# Patient Record
Sex: MALE | Race: OTHER | HISPANIC OR LATINO | URBAN - METROPOLITAN AREA
[De-identification: names, ages, dates, MRNs, and addresses within clinical notes are randomized per-mention and may not be internally consistent; named-entity substitution may affect disease eponyms.]

---

## 2023-03-05 ENCOUNTER — INPATIENT (INPATIENT)
Facility: HOSPITAL | Age: 63
LOS: 25 days | Discharge: NURSING, REHAB OR LONG-TERM CARE | DRG: 871 | End: 2023-03-31
Attending: INTERNAL MEDICINE | Admitting: INTERNAL MEDICINE
Payer: MEDICAID

## 2023-03-05 VITALS
HEART RATE: 130 BPM | WEIGHT: 190.04 LBS | DIASTOLIC BLOOD PRESSURE: 68 MMHG | SYSTOLIC BLOOD PRESSURE: 105 MMHG | OXYGEN SATURATION: 94 % | RESPIRATION RATE: 26 BRPM | HEIGHT: 70 IN

## 2023-03-05 DIAGNOSIS — R53.2 FUNCTIONAL QUADRIPLEGIA: ICD-10-CM

## 2023-03-05 DIAGNOSIS — J18.9 PNEUMONIA, UNSPECIFIED ORGANISM: ICD-10-CM

## 2023-03-05 DIAGNOSIS — Z98.890 OTHER SPECIFIED POSTPROCEDURAL STATES: Chronic | ICD-10-CM

## 2023-03-05 DIAGNOSIS — J96.21 ACUTE AND CHRONIC RESPIRATORY FAILURE WITH HYPOXIA: ICD-10-CM

## 2023-03-05 DIAGNOSIS — D63.8 ANEMIA IN OTHER CHRONIC DISEASES CLASSIFIED ELSEWHERE: ICD-10-CM

## 2023-03-05 DIAGNOSIS — E11.9 TYPE 2 DIABETES MELLITUS WITHOUT COMPLICATIONS: ICD-10-CM

## 2023-03-05 DIAGNOSIS — R74.01 ELEVATION OF LEVELS OF LIVER TRANSAMINASE LEVELS: ICD-10-CM

## 2023-03-05 DIAGNOSIS — A41.9 SEPSIS, UNSPECIFIED ORGANISM: ICD-10-CM

## 2023-03-05 DIAGNOSIS — I10 ESSENTIAL (PRIMARY) HYPERTENSION: ICD-10-CM

## 2023-03-05 DIAGNOSIS — Z71.89 OTHER SPECIFIED COUNSELING: ICD-10-CM

## 2023-03-05 DIAGNOSIS — E43 UNSPECIFIED SEVERE PROTEIN-CALORIE MALNUTRITION: ICD-10-CM

## 2023-03-05 DIAGNOSIS — G93.40 ENCEPHALOPATHY, UNSPECIFIED: ICD-10-CM

## 2023-03-05 LAB
ALBUMIN SERPL ELPH-MCNC: 1.8 G/DL — LOW (ref 3.5–5)
ALBUMIN SERPL ELPH-MCNC: 2.1 G/DL — LOW (ref 3.5–5)
ALP SERPL-CCNC: 150 U/L — HIGH (ref 40–120)
ALP SERPL-CCNC: 198 U/L — HIGH (ref 40–120)
ALT FLD-CCNC: 103 U/L DA — HIGH (ref 10–60)
ALT FLD-CCNC: 80 U/L DA — HIGH (ref 10–60)
ANION GAP SERPL CALC-SCNC: 5 MMOL/L — SIGNIFICANT CHANGE UP (ref 5–17)
ANION GAP SERPL CALC-SCNC: 5 MMOL/L — SIGNIFICANT CHANGE UP (ref 5–17)
APPEARANCE UR: CLEAR — SIGNIFICANT CHANGE UP
APTT BLD: 26.1 SEC — LOW (ref 27.5–35.5)
AST SERPL-CCNC: 121 U/L — HIGH (ref 10–40)
AST SERPL-CCNC: 81 U/L — HIGH (ref 10–40)
BACTERIA # UR AUTO: ABNORMAL /HPF
BASOPHILS # BLD AUTO: 0.03 K/UL — SIGNIFICANT CHANGE UP (ref 0–0.2)
BASOPHILS # BLD AUTO: 0.03 K/UL — SIGNIFICANT CHANGE UP (ref 0–0.2)
BASOPHILS NFR BLD AUTO: 0.5 % — SIGNIFICANT CHANGE UP (ref 0–2)
BASOPHILS NFR BLD AUTO: 0.8 % — SIGNIFICANT CHANGE UP (ref 0–2)
BILIRUB SERPL-MCNC: 0.8 MG/DL — SIGNIFICANT CHANGE UP (ref 0.2–1.2)
BILIRUB SERPL-MCNC: 0.8 MG/DL — SIGNIFICANT CHANGE UP (ref 0.2–1.2)
BILIRUB UR-MCNC: NEGATIVE — SIGNIFICANT CHANGE UP
BUN SERPL-MCNC: 11 MG/DL — SIGNIFICANT CHANGE UP (ref 7–18)
BUN SERPL-MCNC: 15 MG/DL — SIGNIFICANT CHANGE UP (ref 7–18)
CALCIUM SERPL-MCNC: 7.8 MG/DL — LOW (ref 8.4–10.5)
CALCIUM SERPL-MCNC: 8.5 MG/DL — SIGNIFICANT CHANGE UP (ref 8.4–10.5)
CHLORIDE SERPL-SCNC: 107 MMOL/L — SIGNIFICANT CHANGE UP (ref 96–108)
CHLORIDE SERPL-SCNC: 110 MMOL/L — HIGH (ref 96–108)
CO2 SERPL-SCNC: 24 MMOL/L — SIGNIFICANT CHANGE UP (ref 22–31)
CO2 SERPL-SCNC: 25 MMOL/L — SIGNIFICANT CHANGE UP (ref 22–31)
COLOR SPEC: YELLOW — SIGNIFICANT CHANGE UP
COMMENT - URINE: SIGNIFICANT CHANGE UP
CREAT SERPL-MCNC: 0.47 MG/DL — LOW (ref 0.5–1.3)
CREAT SERPL-MCNC: 0.59 MG/DL — SIGNIFICANT CHANGE UP (ref 0.5–1.3)
DIFF PNL FLD: ABNORMAL
EGFR: 111 ML/MIN/1.73M2 — SIGNIFICANT CHANGE UP
EGFR: 119 ML/MIN/1.73M2 — SIGNIFICANT CHANGE UP
EOSINOPHIL # BLD AUTO: 0.01 K/UL — SIGNIFICANT CHANGE UP (ref 0–0.5)
EOSINOPHIL # BLD AUTO: 0.02 K/UL — SIGNIFICANT CHANGE UP (ref 0–0.5)
EOSINOPHIL NFR BLD AUTO: 0.3 % — SIGNIFICANT CHANGE UP (ref 0–6)
EOSINOPHIL NFR BLD AUTO: 0.3 % — SIGNIFICANT CHANGE UP (ref 0–6)
EPI CELLS # UR: SIGNIFICANT CHANGE UP /HPF
FLUAV AG NPH QL: SIGNIFICANT CHANGE UP
FLUBV AG NPH QL: SIGNIFICANT CHANGE UP
GLUCOSE SERPL-MCNC: 105 MG/DL — HIGH (ref 70–99)
GLUCOSE SERPL-MCNC: 166 MG/DL — HIGH (ref 70–99)
GLUCOSE UR QL: NEGATIVE — SIGNIFICANT CHANGE UP
HCT VFR BLD CALC: 25.3 % — LOW (ref 39–50)
HCT VFR BLD CALC: 27 % — LOW (ref 39–50)
HGB BLD-MCNC: 8 G/DL — LOW (ref 13–17)
HGB BLD-MCNC: 8.5 G/DL — LOW (ref 13–17)
IMM GRANULOCYTES NFR BLD AUTO: 0.3 % — SIGNIFICANT CHANGE UP (ref 0–0.9)
IMM GRANULOCYTES NFR BLD AUTO: 0.3 % — SIGNIFICANT CHANGE UP (ref 0–0.9)
INR BLD: 1.47 RATIO — HIGH (ref 0.88–1.16)
KETONES UR-MCNC: NEGATIVE — SIGNIFICANT CHANGE UP
LACTATE SERPL-SCNC: 1.3 MMOL/L — SIGNIFICANT CHANGE UP (ref 0.7–2)
LACTATE SERPL-SCNC: 2.2 MMOL/L — HIGH (ref 0.7–2)
LEUKOCYTE ESTERASE UR-ACNC: NEGATIVE — SIGNIFICANT CHANGE UP
LYMPHOCYTES # BLD AUTO: 0.41 K/UL — LOW (ref 1–3.3)
LYMPHOCYTES # BLD AUTO: 0.71 K/UL — LOW (ref 1–3.3)
LYMPHOCYTES # BLD AUTO: 17.8 % — SIGNIFICANT CHANGE UP (ref 13–44)
LYMPHOCYTES # BLD AUTO: 6.5 % — LOW (ref 13–44)
MAGNESIUM SERPL-MCNC: 1.8 MG/DL — SIGNIFICANT CHANGE UP (ref 1.6–2.6)
MCHC RBC-ENTMCNC: 29.6 PG — SIGNIFICANT CHANGE UP (ref 27–34)
MCHC RBC-ENTMCNC: 29.6 PG — SIGNIFICANT CHANGE UP (ref 27–34)
MCHC RBC-ENTMCNC: 31.5 GM/DL — LOW (ref 32–36)
MCHC RBC-ENTMCNC: 31.6 GM/DL — LOW (ref 32–36)
MCV RBC AUTO: 93.7 FL — SIGNIFICANT CHANGE UP (ref 80–100)
MCV RBC AUTO: 94.1 FL — SIGNIFICANT CHANGE UP (ref 80–100)
MONOCYTES # BLD AUTO: 0.42 K/UL — SIGNIFICANT CHANGE UP (ref 0–0.9)
MONOCYTES # BLD AUTO: 0.5 K/UL — SIGNIFICANT CHANGE UP (ref 0–0.9)
MONOCYTES NFR BLD AUTO: 10.5 % — SIGNIFICANT CHANGE UP (ref 2–14)
MONOCYTES NFR BLD AUTO: 7.9 % — SIGNIFICANT CHANGE UP (ref 2–14)
NEUTROPHILS # BLD AUTO: 2.82 K/UL — SIGNIFICANT CHANGE UP (ref 1.8–7.4)
NEUTROPHILS # BLD AUTO: 5.31 K/UL — SIGNIFICANT CHANGE UP (ref 1.8–7.4)
NEUTROPHILS NFR BLD AUTO: 70.3 % — SIGNIFICANT CHANGE UP (ref 43–77)
NEUTROPHILS NFR BLD AUTO: 84.5 % — HIGH (ref 43–77)
NITRITE UR-MCNC: NEGATIVE — SIGNIFICANT CHANGE UP
NRBC # BLD: 0 /100 WBCS — SIGNIFICANT CHANGE UP (ref 0–0)
NRBC # BLD: 0 /100 WBCS — SIGNIFICANT CHANGE UP (ref 0–0)
PH UR: 6.5 — SIGNIFICANT CHANGE UP (ref 5–8)
PHOSPHATE SERPL-MCNC: 2.5 MG/DL — SIGNIFICANT CHANGE UP (ref 2.5–4.5)
PLATELET # BLD AUTO: 209 K/UL — SIGNIFICANT CHANGE UP (ref 150–400)
PLATELET # BLD AUTO: 217 K/UL — SIGNIFICANT CHANGE UP (ref 150–400)
POTASSIUM SERPL-MCNC: 3.3 MMOL/L — LOW (ref 3.5–5.3)
POTASSIUM SERPL-MCNC: 3.6 MMOL/L — SIGNIFICANT CHANGE UP (ref 3.5–5.3)
POTASSIUM SERPL-SCNC: 3.3 MMOL/L — LOW (ref 3.5–5.3)
POTASSIUM SERPL-SCNC: 3.6 MMOL/L — SIGNIFICANT CHANGE UP (ref 3.5–5.3)
PROT SERPL-MCNC: 6.3 G/DL — SIGNIFICANT CHANGE UP (ref 6–8.3)
PROT SERPL-MCNC: 6.8 G/DL — SIGNIFICANT CHANGE UP (ref 6–8.3)
PROT UR-MCNC: 30 MG/DL
PROTHROM AB SERPL-ACNC: 17.6 SEC — HIGH (ref 10.5–13.4)
RAPID RVP RESULT: SIGNIFICANT CHANGE UP
RBC # BLD: 2.7 M/UL — LOW (ref 4.2–5.8)
RBC # BLD: 2.87 M/UL — LOW (ref 4.2–5.8)
RBC # FLD: 13.5 % — SIGNIFICANT CHANGE UP (ref 10.3–14.5)
RBC # FLD: 13.7 % — SIGNIFICANT CHANGE UP (ref 10.3–14.5)
RBC CASTS # UR COMP ASSIST: ABNORMAL /HPF (ref 0–2)
SARS-COV-2 RNA SPEC QL NAA+PROBE: SIGNIFICANT CHANGE UP
SARS-COV-2 RNA SPEC QL NAA+PROBE: SIGNIFICANT CHANGE UP
SODIUM SERPL-SCNC: 137 MMOL/L — SIGNIFICANT CHANGE UP (ref 135–145)
SODIUM SERPL-SCNC: 139 MMOL/L — SIGNIFICANT CHANGE UP (ref 135–145)
SP GR SPEC: 1.01 — SIGNIFICANT CHANGE UP (ref 1.01–1.02)
TROPONIN I, HIGH SENSITIVITY RESULT: 10.5 NG/L — SIGNIFICANT CHANGE UP
UROBILINOGEN FLD QL: 12
WBC # BLD: 4 K/UL — SIGNIFICANT CHANGE UP (ref 3.8–10.5)
WBC # BLD: 6.29 K/UL — SIGNIFICANT CHANGE UP (ref 3.8–10.5)
WBC # FLD AUTO: 4 K/UL — SIGNIFICANT CHANGE UP (ref 3.8–10.5)
WBC # FLD AUTO: 6.29 K/UL — SIGNIFICANT CHANGE UP (ref 3.8–10.5)
WBC UR QL: SIGNIFICANT CHANGE UP /HPF (ref 0–5)

## 2023-03-05 PROCEDURE — 93010 ELECTROCARDIOGRAM REPORT: CPT

## 2023-03-05 PROCEDURE — 71045 X-RAY EXAM CHEST 1 VIEW: CPT | Mod: 26

## 2023-03-05 PROCEDURE — 99285 EMERGENCY DEPT VISIT HI MDM: CPT

## 2023-03-05 RX ORDER — DOXAZOSIN MESYLATE 4 MG
2 TABLET ORAL AT BEDTIME
Refills: 0 | Status: DISCONTINUED | OUTPATIENT
Start: 2023-03-05 | End: 2023-03-14

## 2023-03-05 RX ORDER — PIPERACILLIN AND TAZOBACTAM 4; .5 G/20ML; G/20ML
3.38 INJECTION, POWDER, LYOPHILIZED, FOR SOLUTION INTRAVENOUS ONCE
Refills: 0 | Status: COMPLETED | OUTPATIENT
Start: 2023-03-05 | End: 2023-03-05

## 2023-03-05 RX ORDER — INSULIN GLARGINE 100 [IU]/ML
12 INJECTION, SOLUTION SUBCUTANEOUS EVERY MORNING
Refills: 0 | Status: DISCONTINUED | OUTPATIENT
Start: 2023-03-05 | End: 2023-03-31

## 2023-03-05 RX ORDER — ACETAMINOPHEN 500 MG
650 TABLET ORAL ONCE
Refills: 0 | Status: COMPLETED | OUTPATIENT
Start: 2023-03-05 | End: 2023-03-05

## 2023-03-05 RX ORDER — CIPROFLOXACIN LACTATE 400MG/40ML
400 VIAL (ML) INTRAVENOUS ONCE
Refills: 0 | Status: DISCONTINUED | OUTPATIENT
Start: 2023-03-05 | End: 2023-03-05

## 2023-03-05 RX ORDER — ACETAMINOPHEN 500 MG
650 TABLET ORAL EVERY 6 HOURS
Refills: 0 | Status: DISCONTINUED | OUTPATIENT
Start: 2023-03-05 | End: 2023-03-05

## 2023-03-05 RX ORDER — CEFEPIME 1 G/1
2000 INJECTION, POWDER, FOR SOLUTION INTRAMUSCULAR; INTRAVENOUS ONCE
Refills: 0 | Status: COMPLETED | OUTPATIENT
Start: 2023-03-05 | End: 2023-03-05

## 2023-03-05 RX ORDER — INSULIN LISPRO 100/ML
6 VIAL (ML) SUBCUTANEOUS EVERY 6 HOURS
Refills: 0 | Status: DISCONTINUED | OUTPATIENT
Start: 2023-03-05 | End: 2023-03-31

## 2023-03-05 RX ORDER — ENOXAPARIN SODIUM 100 MG/ML
40 INJECTION SUBCUTANEOUS EVERY 24 HOURS
Refills: 0 | Status: DISCONTINUED | OUTPATIENT
Start: 2023-03-05 | End: 2023-03-31

## 2023-03-05 RX ORDER — CEFEPIME 1 G/1
INJECTION, POWDER, FOR SOLUTION INTRAMUSCULAR; INTRAVENOUS
Refills: 0 | Status: DISCONTINUED | OUTPATIENT
Start: 2023-03-05 | End: 2023-03-13

## 2023-03-05 RX ORDER — SENNA PLUS 8.6 MG/1
10 TABLET ORAL DAILY
Refills: 0 | Status: DISCONTINUED | OUTPATIENT
Start: 2023-03-05 | End: 2023-03-31

## 2023-03-05 RX ORDER — ONDANSETRON 8 MG/1
4 TABLET, FILM COATED ORAL EVERY 8 HOURS
Refills: 0 | Status: DISCONTINUED | OUTPATIENT
Start: 2023-03-05 | End: 2023-03-31

## 2023-03-05 RX ORDER — CEFEPIME 1 G/1
2000 INJECTION, POWDER, FOR SOLUTION INTRAMUSCULAR; INTRAVENOUS EVERY 8 HOURS
Refills: 0 | Status: DISCONTINUED | OUTPATIENT
Start: 2023-03-05 | End: 2023-03-13

## 2023-03-05 RX ORDER — VANCOMYCIN HCL 1 G
1250 VIAL (EA) INTRAVENOUS EVERY 12 HOURS
Refills: 0 | Status: DISCONTINUED | OUTPATIENT
Start: 2023-03-05 | End: 2023-03-07

## 2023-03-05 RX ORDER — AMANTADINE HCL 100 MG
100 CAPSULE ORAL
Refills: 0 | Status: DISCONTINUED | OUTPATIENT
Start: 2023-03-05 | End: 2023-03-31

## 2023-03-05 RX ORDER — POLYETHYLENE GLYCOL 3350 17 G/17G
17 POWDER, FOR SOLUTION ORAL DAILY
Refills: 0 | Status: DISCONTINUED | OUTPATIENT
Start: 2023-03-05 | End: 2023-03-31

## 2023-03-05 RX ORDER — ATORVASTATIN CALCIUM 80 MG/1
80 TABLET, FILM COATED ORAL AT BEDTIME
Refills: 0 | Status: DISCONTINUED | OUTPATIENT
Start: 2023-03-05 | End: 2023-03-12

## 2023-03-05 RX ORDER — ASPIRIN/CALCIUM CARB/MAGNESIUM 324 MG
81 TABLET ORAL DAILY
Refills: 0 | Status: DISCONTINUED | OUTPATIENT
Start: 2023-03-05 | End: 2023-03-31

## 2023-03-05 RX ORDER — INSULIN GLARGINE 100 [IU]/ML
12 INJECTION, SOLUTION SUBCUTANEOUS AT BEDTIME
Refills: 0 | Status: DISCONTINUED | OUTPATIENT
Start: 2023-03-05 | End: 2023-03-31

## 2023-03-05 RX ORDER — POTASSIUM CHLORIDE 20 MEQ
40 PACKET (EA) ORAL ONCE
Refills: 0 | Status: COMPLETED | OUTPATIENT
Start: 2023-03-05 | End: 2023-03-05

## 2023-03-05 RX ORDER — DOXAZOSIN MESYLATE 4 MG
2 TABLET ORAL AT BEDTIME
Refills: 0 | Status: DISCONTINUED | OUTPATIENT
Start: 2023-03-05 | End: 2023-03-05

## 2023-03-05 RX ORDER — ACETAMINOPHEN 500 MG
650 TABLET ORAL EVERY 6 HOURS
Refills: 0 | Status: DISCONTINUED | OUTPATIENT
Start: 2023-03-05 | End: 2023-03-31

## 2023-03-05 RX ORDER — INSULIN LISPRO 100/ML
VIAL (ML) SUBCUTANEOUS EVERY 6 HOURS
Refills: 0 | Status: DISCONTINUED | OUTPATIENT
Start: 2023-03-05 | End: 2023-03-31

## 2023-03-05 RX ORDER — SODIUM CHLORIDE 9 MG/ML
3 INJECTION INTRAMUSCULAR; INTRAVENOUS; SUBCUTANEOUS EVERY 4 HOURS
Refills: 0 | Status: DISCONTINUED | OUTPATIENT
Start: 2023-03-05 | End: 2023-03-05

## 2023-03-05 RX ORDER — NICOTINE POLACRILEX 2 MG
1 GUM BUCCAL DAILY
Refills: 0 | Status: DISCONTINUED | OUTPATIENT
Start: 2023-03-05 | End: 2023-03-28

## 2023-03-05 RX ORDER — AMANTADINE HCL 100 MG
100 CAPSULE ORAL ONCE
Refills: 0 | Status: DISCONTINUED | OUTPATIENT
Start: 2023-03-05 | End: 2023-03-05

## 2023-03-05 RX ORDER — SODIUM CHLORIDE 9 MG/ML
2700 INJECTION INTRAMUSCULAR; INTRAVENOUS; SUBCUTANEOUS ONCE
Refills: 0 | Status: COMPLETED | OUTPATIENT
Start: 2023-03-05 | End: 2023-03-05

## 2023-03-05 RX ORDER — LANOLIN ALCOHOL/MO/W.PET/CERES
3 CREAM (GRAM) TOPICAL AT BEDTIME
Refills: 0 | Status: DISCONTINUED | OUTPATIENT
Start: 2023-03-05 | End: 2023-03-31

## 2023-03-05 RX ORDER — IPRATROPIUM/ALBUTEROL SULFATE 18-103MCG
3 AEROSOL WITH ADAPTER (GRAM) INHALATION EVERY 6 HOURS
Refills: 0 | Status: DISCONTINUED | OUTPATIENT
Start: 2023-03-05 | End: 2023-03-31

## 2023-03-05 RX ORDER — ASPIRIN/CALCIUM CARB/MAGNESIUM 324 MG
81 TABLET ORAL DAILY
Refills: 0 | Status: DISCONTINUED | OUTPATIENT
Start: 2023-03-05 | End: 2023-03-05

## 2023-03-05 RX ADMIN — POLYETHYLENE GLYCOL 3350 17 GRAM(S): 17 POWDER, FOR SOLUTION ORAL at 14:06

## 2023-03-05 RX ADMIN — Medication 40 MILLIEQUIVALENT(S): at 14:58

## 2023-03-05 RX ADMIN — Medication 81 MILLIGRAM(S): at 14:06

## 2023-03-05 RX ADMIN — CEFEPIME 100 MILLIGRAM(S): 1 INJECTION, POWDER, FOR SOLUTION INTRAMUSCULAR; INTRAVENOUS at 09:21

## 2023-03-05 RX ADMIN — Medication 1 PATCH: at 19:31

## 2023-03-05 RX ADMIN — Medication 1 PATCH: at 14:07

## 2023-03-05 RX ADMIN — ENOXAPARIN SODIUM 40 MILLIGRAM(S): 100 INJECTION SUBCUTANEOUS at 10:11

## 2023-03-05 RX ADMIN — Medication 650 MILLIGRAM(S): at 06:24

## 2023-03-05 RX ADMIN — PIPERACILLIN AND TAZOBACTAM 200 GRAM(S): 4; .5 INJECTION, POWDER, LYOPHILIZED, FOR SOLUTION INTRAVENOUS at 06:51

## 2023-03-05 RX ADMIN — SODIUM CHLORIDE 2700 MILLILITER(S): 9 INJECTION INTRAMUSCULAR; INTRAVENOUS; SUBCUTANEOUS at 04:21

## 2023-03-05 RX ADMIN — ATORVASTATIN CALCIUM 80 MILLIGRAM(S): 80 TABLET, FILM COATED ORAL at 22:15

## 2023-03-05 RX ADMIN — CEFEPIME 100 MILLIGRAM(S): 1 INJECTION, POWDER, FOR SOLUTION INTRAMUSCULAR; INTRAVENOUS at 22:14

## 2023-03-05 RX ADMIN — INSULIN GLARGINE 12 UNIT(S): 100 INJECTION, SOLUTION SUBCUTANEOUS at 22:17

## 2023-03-05 RX ADMIN — SENNA PLUS 10 MILLILITER(S): 8.6 TABLET ORAL at 14:06

## 2023-03-05 RX ADMIN — Medication 650 MILLIGRAM(S): at 09:23

## 2023-03-05 RX ADMIN — Medication 650 MILLIGRAM(S): at 04:23

## 2023-03-05 RX ADMIN — Medication 166.67 MILLIGRAM(S): at 17:02

## 2023-03-05 RX ADMIN — CEFEPIME 100 MILLIGRAM(S): 1 INJECTION, POWDER, FOR SOLUTION INTRAMUSCULAR; INTRAVENOUS at 14:06

## 2023-03-05 RX ADMIN — INSULIN GLARGINE 12 UNIT(S): 100 INJECTION, SOLUTION SUBCUTANEOUS at 10:07

## 2023-03-05 RX ADMIN — Medication 100 MILLIGRAM(S): at 18:22

## 2023-03-05 RX ADMIN — Medication 3 MILLILITER(S): at 20:22

## 2023-03-05 RX ADMIN — Medication 650 MILLIGRAM(S): at 07:49

## 2023-03-05 RX ADMIN — Medication 1 APPLICATION(S): at 18:23

## 2023-03-05 RX ADMIN — Medication 2 MILLIGRAM(S): at 22:15

## 2023-03-05 NOTE — PROGRESS NOTE ADULT - PROBLEM SELECTOR PLAN 8
Passive ROM exercises daily  Turn and position every 2 hours.  Strict aspiration precautions.  PT consult.

## 2023-03-05 NOTE — H&P ADULT - NSICDXPASTMEDICALHX_GEN_ALL_CORE_FT
PAST MEDICAL HISTORY:  DM (diabetes mellitus)     History of subdural hemorrhage     PEG (percutaneous endoscopic gastrostomy) status

## 2023-03-05 NOTE — H&P ADULT - NSHPPHYSICALEXAM_GEN_ALL_CORE
General - laying in bed  Eyes - PERRLA, EOM intact  ENT - Nonicteric sclerae, PERRLA, EOMI. Oropharynx clear. Moist mucous membranes. Conjunctivae appear well perfused. (+) Tracheostomy cuffed, 7.5mm  Neck - No noticeable or palpable swelling, redness or rash around throat or on face  Lymph Nodes - No lymphadenopathy  Cardiovascular - RRR no m/r/g, no JVD, no carotid bruits  Lungs - Clear to auscultation, no use of accessory muscles, no crackles or wheezes.  Skin - No rashes, skin warm and dry, no erythematous areas  Abdomen - Normal bowel sounds, abdomen soft and nontender (+) PEG, (+) de la torre catheter  Rectal – Rectal exam not performed since no symptoms indicated blood loss.  Extremities - (+) UE edema non pitting   Musculoskeletal -  normal range of motion, no swollen or erythematous joints.  Neuro– awake, eyes non tracking, (+) R craniotomy, b/l UE and LE paralysis General - laying in bed  Eyes - PERRLA, EOM intact  ENT - Nonicteric sclerae, PERRLA, EOMI. Oropharynx clear. Moist mucous membranes. Conjunctivae appear well perfused. (+) Tracheostomy cuffed, 7.5mm  Neck - No noticeable or palpable swelling, redness or rash around throat or on face  Lymph Nodes - No lymphadenopathy  Cardiovascular - RRR no m/r/g, no JVD, no carotid bruits  Lungs - (+) decreased breath sounds b/l bases, RUL decreased breath sound, no crackles or rales  Skin - No rashes, skin warm and dry, no erythematous areas  Abdomen - Normal bowel sounds, abdomen soft and nontender (+) PEG, (+) de la torre catheter  Rectal – Rectal exam not performed since no symptoms indicated blood loss.  Extremities - (+) UE edema non pitting   Musculoskeletal -  normal range of motion, no swollen or erythematous joints.  Neuro– awake, eyes non tracking, (+) R craniotomy, b/l UE and LE paralysis

## 2023-03-05 NOTE — PROGRESS NOTE ADULT - PROBLEM SELECTOR PLAN 10
Admitted to SCU  DVT and GI prophylaxis.  Continue antibiotics. Follow up culture results.  Continue trach collar Monitor oxygen saturation.  Continue bronchodilators.

## 2023-03-05 NOTE — PROGRESS NOTE ADULT - PROBLEM SELECTOR PLAN 7
Continue lantus 12U every 12 yovana  Lispro 6U every 6 hours  And continue sliding scale coverage.  Monitor glucose.

## 2023-03-05 NOTE — PROGRESS NOTE ADULT - ASSESSMENT
62M from [facility], h/o traumatic subdural hemorrhage following a fall down the stairs while drunk, s/p r craniotomy at [facility] in 01/2023, s/p trach/PEG BIBEMS for tachycardia 170s, RR 27, increased secretions admitted to  for sepsis secondary to RUL pneumonia with tracheostomy to 4L NC.

## 2023-03-05 NOTE — H&P ADULT - ASSESSMENT
62M from [facility], h/o traumatic subdural hemorrhage following a fall down the stairs while drunk, s/p r craniotomy at [facility] in 01/2023, s/p trach/PEG BIBEMS for tachycardia 170s, RR 27, increased secretions admitted to  for sepsis secondary to RUL pneumonia with trachestomy to 4L NC.    Assessment:  #SDH s/p craniotomy  #Sinus Tachycardia  #AHRF secondary to RUL Pneumonia  #s/p Trachesotomy  #s/p PEG  #Anemia  #Chronic de la torre    Plan:  Neuro:  #SDH s/p craniotomy    Cardiovascular:  #Tachycardia  likely due to sepsis  improved    Pulmonary:   #AHRF secondary to Pneumonia  p/w sob  CXR shows RUL wedge-like opacity  Start cefepime 2g q8, vancomycin 250 q12  obtain VT prior to 3rd dose  Send strep ag, legionella, RVP, procalcitonin, mycoplasma igm  tylenol prn    #Tracheostomy  obtain [facility] records  pt currently with cuffed 7.5mm trach  on 4L NC, NH baseline appears to be 2L?  aspiration precautions    Gastrointestinal:  #PEG  on TF at home  resume TF with glucerna  nutrition consult    Renal:  #Chronic de la torre  de la torre changed in ED    Infectious Diseases:  #Sepsis  p/w fever 100.7F, HR >90, RR 27, lactate 2.2 > 1.3  received 2.7L bolus  f/u blood cultures  ua negative  tylenol prn  start vanc and cefepime    Heme/onc:   #Anemia  Hb 8.5  No active bleeding  monitor cbc qd  likely acd    Endo:   #DM  On lantus 18 bid + lispro 10 q6 at NH  c/w lantus 12 bid + lispro 6 q6 + sliding scale  Adjust insulin as indicated  FS q6       Skin/ catheter:   FC changed in ED 3/5  b/l peripherals  Sacral ulcer - wound care consult    Prophylaxis:   Lovenox 40mg sq qd    Goals of Care:   FULL CODE    Dispo:  Admit to  62M from [facility], h/o traumatic subdural hemorrhage following a fall down the stairs while drunk, s/p r craniotomy at [facility] in 01/2023, s/p trach/PEG BIBEMS for tachycardia 170s, RR 27, increased secretions admitted to  for sepsis secondary to RUL pneumonia with trachestomy to 4L NC.    Assessment:  #SDH s/p craniotomy  #Sinus Tachycardia  #AHRF secondary to RUL Pneumonia  #s/p Trachesotomy  #s/p PEG  #Anemia  #Chronic de la torre  #Transaminitis    Plan:  Neuro:  #SDH s/p craniotomy    Cardiovascular:  #Tachycardia  likely due to sepsis  improved    Pulmonary:   #AHRF secondary to Pneumonia  p/w sob  CXR shows RUL wedge-like opacity  Start cefepime 2g q8, vancomycin 250 q12  obtain VT prior to 3rd dose  Send strep ag, legionella, RVP, procalcitonin, mycoplasma igm, MRSA Swab  if MRSA swab negative, may dc vancomycin  tylenol prn  taper off O2 as tolerated    #Tracheostomy  obtain [facility] records  pt currently with cuffed 7.5mm trach  on 4L NC, NH baseline appears to be 2L?  aspiration precautions    Gastrointestinal:  #PEG  on TF at home  resume TF with glucerna  nutrition consult    Renal:  #Chronic de la torre  de la torre changed in ED    Infectious Diseases:  #Sepsis  p/w fever 100.7F, HR >90, RR 27, lactate 2.2 > 1.3  received 2.7L bolus  f/u blood cultures  ua negative  tylenol prn  start vanc and cefepime    Heme/onc:   #Anemia  Hb 8.5  No active bleeding  monitor cbc qd  likely acd    Endo:   #DM  On lantus 18 bid + lispro 10 q6 at NH  c/w lantus 12 bid + lispro 6 q6 + sliding scale  Adjust insulin as indicated  FS q6     Skin/ catheter:   FC changed in ED 3/5  b/l peripherals  Sacral ulcer - wound care consult    Prophylaxis:   Lovenox 40mg sq qd    Goals of Care:   FULL CODE    Dispo:  Admit to  62M from [facility], h/o traumatic subdural hemorrhage following a fall down the stairs while drunk, s/p r craniotomy at [facility] in 01/2023, s/p trach/PEG BIBEMS for tachycardia 170s, RR 27, increased secretions admitted to  for sepsis secondary to RUL pneumonia with trachestomy to 4L NC.    Assessment:  #SDH s/p craniotomy  #Sinus Tachycardia  #AHRF secondary to RUL Pneumonia  #s/p Trachesotomy  #s/p PEG  #Anemia  #Chronic de la torre  #Transaminitis  #HTN    Plan:  Neuro:  #SDH s/p craniotomy    Cardiovascular:  #Tachycardia  likely due to sepsis  improved    #HTN  on amlodipine 10 and enalapril 10 as per NH papers  hold for now due to sepsis and normotensive  restart as bp tolerates    Pulmonary:   #AHRF secondary to Pneumonia  p/w sob  CXR shows RUL wedge-like opacity  Start cefepime 2g q8, vancomycin 250 q12  obtain VT prior to 3rd dose  Send strep ag, legionella, RVP, procalcitonin, mycoplasma igm, MRSA Swab  if MRSA swab negative, may dc vancomycin  tylenol prn  taper off O2 as tolerated    #Tracheostomy  obtain [facility] records  pt currently with cuffed 7.5mm trach  on 4L NC, NH baseline appears to be 2L?  aspiration precautions    Gastrointestinal:  #PEG  on TF at home  resume TF with glucerna  nutrition consult    Renal:  #Chronic de la torre  de la torre changed in ED    Infectious Diseases:  #Sepsis  p/w fever 100.7F, HR >90, RR 27, lactate 2.2 > 1.3  received 2.7L bolus  f/u blood cultures  ua negative  tylenol prn  start vanc and cefepime    Heme/onc:   #Anemia  Hb 8.5  No active bleeding  monitor cbc qd  likely acd    Endo:   #DM  On lantus 18 bid + lispro 10 q6 at NH  c/w lantus 12 bid + lispro 6 q6 + sliding scale  Adjust insulin as indicated  FS q6     Skin/ catheter:   FC changed in ED 3/5  b/l peripherals  Sacral ulcer - wound care consult    Prophylaxis:   Lovenox 40mg sq qd    Goals of Care:   FULL CODE    Dispo:  Admit to  62M from [facility], h/o traumatic subdural hemorrhage following a fall down the stairs while drunk, s/p r craniotomy at [facility] in 01/2023, s/p trach/PEG BIBEMS for tachycardia 170s, RR 27, increased secretions admitted to  for sepsis secondary to RUL pneumonia with trachestomy to 4L NC.    Assessment:  #SDH s/p craniotomy  #Sinus Tachycardia  #AHRF secondary to RUL Pneumonia  #s/p Trachesotomy  #s/p PEG  #Anemia  #Chronic de la torre  #Transaminitis  #HTN    Plan:  Neuro:  #SDH s/p craniotomy    Cardiovascular:  #Tachycardia  likely due to sepsis  improved    #HTN  on amlodipine 10 and enalapril 10 as per NH papers  hold for now due to sepsis and normotensive  restart as bp tolerates    Pulmonary:   #AHRF secondary to Pneumonia  p/w sob  CXR shows RUL wedge-like opacity  Start cefepime 2g q8, vancomycin 250 q12  obtain VT prior to 3rd dose  Send strep ag, legionella, RVP, procalcitonin, mycoplasma igm, MRSA Swab, sputum culture  if MRSA swab negative, may dc vancomycin  tylenol prn  taper off O2 as tolerated    #Tracheostomy  obtain [facility] records  pt currently with cuffed 7.5mm trach  on 4L NC, NH baseline appears to be 2L?  aspiration precautions    Gastrointestinal:  #PEG  on TF at home  resume TF with glucerna  nutrition consult    Renal:  #Chronic de la torre  de la torre changed in ED 3/5    Infectious Diseases:  #Sepsis  p/w fever 100.7F, HR >90, RR 27, lactate 2.2 > 1.3  received 2.7L bolus  f/u blood cultures  ua negative  tylenol prn  start vanc and cefepime    Heme/onc:   #Anemia  Hb 8.5  No active bleeding  monitor cbc qd  likely acd    Endo:   #DM  On lantus 18 bid + lispro 10 q6 at NH  c/w lantus 12 bid + lispro 6 q6 + sliding scale  Adjust insulin as indicated  FS q6     Skin/ catheter:   FC changed in ED 3/5  b/l peripherals  Sacral ulcer - wound care consult    Prophylaxis:   Lovenox 40mg sq qd    Goals of Care:   FULL CODE    Dispo:  Admit to

## 2023-03-05 NOTE — PROGRESS NOTE ADULT - PROBLEM SELECTOR PLAN 1
Continue trach collar.  Monitor oxygen saturation.  Continue sodium chloride 0.9% via nebulizer every 4 hours  F/U blood, urine and sputum culture  Continue antibiotics for RUL pna.

## 2023-03-05 NOTE — ED PROVIDER NOTE - CLINICAL SUMMARY MEDICAL DECISION MAKING FREE TEXT BOX
61 yo male h/o DMII, traumatic SDH, trach PEG p/w fever, tachycardia and increased secretions from trach site. Concern for sepsis. Will send labs, cultures, perform ekg cxr and reassess

## 2023-03-05 NOTE — ED ADULT NURSE NOTE - ED STAT RN HANDOFF DETAILS 2
Pt is endorsed to [facility] INIGUEZ. Temp was repeated - 98.4F after Tylenol 650 mg via Peg tube was given. No distress noted.

## 2023-03-05 NOTE — PROGRESS NOTE ADULT - PROBLEM SELECTOR PLAN 2
Secondary to RUL pna.  Tachycardic, febrile and hypoxic on admission.  Meets sepsis criteria.  F/U cultures.  Continue cefepime and vanco  Monitor trough.

## 2023-03-05 NOTE — H&P ADULT - HISTORY OF PRESENT ILLNESS
62M from [facility], h/o traumatic subdural hemorrhage following a fall down the stairs while drunk, s/p r craniotomy at [facility] in 01/2023, s/p trach/PEG BIBEMS for tachycardia 170s, RR 27, increased secretions. Pt treated with rocephin 1g from 3/2 but continued to worsen and was sent to the hospital for persistent tachycardia and tachypnea.     Spoke with Raulito Evans Jr over phone who provided history about his father's condition. He states that his father had a fall from the top of the stairs while he was drunk and was at [facility] in [place] for a period of time where he underwent a craniotomy, trach and peg, then was discharged to [facility].

## 2023-03-05 NOTE — ED PROVIDER NOTE - OBJECTIVE STATEMENT
63 yo male h/o DMII, traumatic SDH, trach PEG p/w fever, tachycardia and increased secretions from trach site. Pt currently on trach collar. Receiving ceftriaxone IV for past 3 days at NH.

## 2023-03-05 NOTE — PROGRESS NOTE ADULT - PROBLEM SELECTOR PLAN 3
Secondary to SDH and craniotomy  Craniotomy at [facility] 1/23  Maintain safety precautions  Strict aspiration precautions. Normal muscle tone/strength

## 2023-03-05 NOTE — PATIENT PROFILE ADULT - FALL HARM RISK - HARM RISK INTERVENTIONS

## 2023-03-05 NOTE — H&P ADULT - CONVERSATION DETAILS
Spoke to Raulito Evans Jr extensively about pt's SDH with trach/peg. I explained that a tracheostomy and a PEG increases risk of infections. I also explained that CPR in setting of SDH and minimal neurological function is unlikely to improve his overall prognosis. Son Raulito Solomon sates that he just wants his father to get through this and hopefully the antibiotics that the team will provide will help. I explained that his father has severe infection due to pneumonia.     At this time patient is FULL CODE

## 2023-03-05 NOTE — PROGRESS NOTE ADULT - SUBJECTIVE AND OBJECTIVE BOX
WILMA NICOLE    SCU progress note    INTERVAL HPI/OVERNIGHT EVENTS: ***62 year old male admitted earlier this morning from [facility].    DNR [ ]   DNI  [  ]  Full Code    Covid - 19 PCR: negative 3/5    The 4Ms    What Matters Most: see GO  Age appropriate Medications/Screen for High Risk Medication: Yes  Mentation: see CAM below  Mobility: defer to physical exam    The Confusion Assessment Method (CAM) Diagnostic Algorithm     1: Acute Onset or Fluctuating Course  - Is there evidence of an acute change in mental status from the patient’s baseline? Did the (abnormal) behavior  fluctuate during the day, that is, tend to come and go, or increase and decrease in severity?  [ ] YES [ ] NO     2: Inattention  - Did the patient have difficulty focusing attention, being easily distractible, or having difficulty keeping track of what was being said?  [ ] YES [ ] NO  Unable to access     3: Disorganized thinking  -Was the patient’s thinking disorganized or incoherent, such as rambling or irrelevant conversation, unclear or illogical flow of ideas, or unpredictable switching from subject to subject?  [ ] YES [ ] NO  Unable to access    4: Altered Level of consciousness?  [ ] YES [ ] NO    The diagnosis of delirium by CAM requires the presence of features 1 and 2 and either 3 or 4.    PRESSORS: [ ] YES [x ] NO  cefepime   IVPB      cefepime   IVPB 2000 milliGRAM(s) IV Intermittent every 8 hours  vancomycin  IVPB 1250 milliGRAM(s) IV Intermittent every 12 hours    Cardiovascular:  Heart Failure  Acute   Acute on Chronic  Chronic       doxazosin 2 milliGRAM(s) Oral at bedtime    Pulmonary:  sodium chloride 0.9% for Nebulization 3 milliLiter(s) Nebulizer every 4 hours    Hematalogic:  aspirin  chewable 81 milliGRAM(s) Oral daily  enoxaparin Injectable 40 milliGRAM(s) SubCutaneous every 24 hours    Other:  acetaminophen    Suspension .. 650 milliGRAM(s) Oral every 6 hours PRN  aluminum hydroxide/magnesium hydroxide/simethicone Suspension 30 milliLiter(s) Oral every 4 hours PRN  amantadine Syrup 100 milliGRAM(s) Oral two times a day  atorvastatin 80 milliGRAM(s) Oral at bedtime  insulin glargine Injectable (LANTUS) 12 Unit(s) SubCutaneous every morning  insulin glargine Injectable (LANTUS) 12 Unit(s) SubCutaneous at bedtime  insulin lispro (ADMELOG) corrective regimen sliding scale   SubCutaneous every 6 hours  insulin lispro Injectable (ADMELOG) 6 Unit(s) SubCutaneous every 6 hours  melatonin 3 milliGRAM(s) Oral at bedtime PRN  nicotine -   7 mG/24Hr(s) Patch 1 Patch Transdermal daily  ondansetron Injectable 4 milliGRAM(s) IV Push every 8 hours PRN  polyethylene glycol 3350 17 Gram(s) Oral daily  senna Syrup 10 milliLiter(s) Oral daily  silver sulfADIAZINE 1% Cream 1 Application(s) Topical two times a day    acetaminophen    Suspension .. 650 milliGRAM(s) Oral every 6 hours PRN  aluminum hydroxide/magnesium hydroxide/simethicone Suspension 30 milliLiter(s) Oral every 4 hours PRN  amantadine Syrup 100 milliGRAM(s) Oral two times a day  aspirin  chewable 81 milliGRAM(s) Oral daily  atorvastatin 80 milliGRAM(s) Oral at bedtime  cefepime   IVPB      cefepime   IVPB 2000 milliGRAM(s) IV Intermittent every 8 hours  doxazosin 2 milliGRAM(s) Oral at bedtime  enoxaparin Injectable 40 milliGRAM(s) SubCutaneous every 24 hours  insulin glargine Injectable (LANTUS) 12 Unit(s) SubCutaneous every morning  insulin glargine Injectable (LANTUS) 12 Unit(s) SubCutaneous at bedtime  insulin lispro (ADMELOG) corrective regimen sliding scale   SubCutaneous every 6 hours  insulin lispro Injectable (ADMELOG) 6 Unit(s) SubCutaneous every 6 hours  melatonin 3 milliGRAM(s) Oral at bedtime PRN  nicotine -   7 mG/24Hr(s) Patch 1 Patch Transdermal daily  ondansetron Injectable 4 milliGRAM(s) IV Push every 8 hours PRN  polyethylene glycol 3350 17 Gram(s) Oral daily  senna Syrup 10 milliLiter(s) Oral daily  silver sulfADIAZINE 1% Cream 1 Application(s) Topical two times a day  sodium chloride 0.9% for Nebulization 3 milliLiter(s) Nebulizer every 4 hours  vancomycin  IVPB 1250 milliGRAM(s) IV Intermittent every 12 hours    Drug Dosing Weight  Height (cm): 177.8 (05 Mar 2023 03:36)  Weight (kg): 86.2 (05 Mar 2023 03:36)  BMI (kg/m2): 27.3 (05 Mar 2023 03:36)  BSA (m2): 2.04 (05 Mar 2023 03:36)    CENTRAL LINE: [ ] YES [x ] NO  LOCATION:   DATE INSERTED:  REMOVE: [ ] YES [ ] NO  EXPLAIN:    MELCHOR: [x ] YES [ ] NO    DATE INSERTED:  REMOVE:  [ ] YES [x ] NO  EXPLAIN:  Chronic    PAST MEDICAL & SURGICAL HISTORY:  History of subdural hemorrhage      PEG (percutaneous endoscopic gastrostomy) status      DM (diabetes mellitus)      S/P craniotomy                  03- @ 07:01  -  - @ 07:00  --------------------------------------------------------  IN: 0 mL / OUT: 1500 mL / NET: -1500 mL            PHYSICAL EXAM:    GENERAL: NAD, well-groomed, well-developed  HEAD:  Atraumatic, Normocephalic  EYES: EOMI, PERRLA, conjunctiva and sclera clear  ENMT: No tonsillar erythema, exudates  NECK: Supple, No JVD, tracheostomy cuffed 7.5 intact  NERVOUS SYSTEM: Awake. Nonverbal at baseline. Does not follow commands. No spontaneous movement of extremities.  CHEST/LUNG: Diminished breath sounds bilateral bases and RUL diminished Scattered rhonchi noted.        HEART: Regular rate and rhythm; No murmurs, rubs, or gallops  ABDOMEN: +Peg  Soft, Nontender, Nondistended; Bowel sounds present  EXTREMITIES:  Rigid with no spontaneous movement. +1 edema bilateral upper extremities.  2+ Peripheral Pulses, No clubbing, cyanosis  LYMPH: No lymphadenopathy noted  SKIN: Coccyx area reddened.  Craniotomy suture site well healed.      LABS:  CBC Full  -  ( 05 Mar 2023 10:33 )  WBC Count : 4.00 K/uL  RBC Count : 2.70 M/uL  Hemoglobin : 8.0 g/dL  Hematocrit : 25.3 %  Platelet Count - Automated : 209 K/uL  Mean Cell Volume : 93.7 fl  Mean Cell Hemoglobin : 29.6 pg  Mean Cell Hemoglobin Concentration : 31.6 gm/dL  Auto Neutrophil # : 2.82 K/uL  Auto Lymphocyte # : 0.71 K/uL  Auto Monocyte # : 0.42 K/uL  Auto Eosinophil # : 0.01 K/uL  Auto Basophil # : 0.03 K/uL  Auto Neutrophil % : 70.3 %  Auto Lymphocyte % : 17.8 %  Auto Monocyte % : 10.5 %  Auto Eosinophil % : 0.3 %  Auto Basophil % : 0.8 %        139  |  110<H>  |  11  ----------------------------<  166<H>  3.3<L>   |  24  |  0.47<L>    Ca    7.8<L>      05 Mar 2023 10:33  Phos  2.5     03-  Mg     1.8     -    TPro  6.3  /  Alb  1.8<L>  /  TBili  0.8  /  DBili  x   /  AST  121<H>  /  ALT  103<H>  /  AlkPhos  198<H>      PT/INR - ( 05 Mar 2023 04:00 )   PT: 17.6 sec;   INR: 1.47 ratio         PTT - ( 05 Mar 2023 04:00 )  PTT:26.1 sec  Urinalysis Basic - ( 05 Mar 2023 03:15 )    Color: Yellow / Appearance: Clear / S.010 / pH: x  Gluc: x / Ketone: Negative  / Bili: Negative / Urobili: 12   Blood: x / Protein: 30 mg/dL / Nitrite: Negative   Leuk Esterase: Negative / RBC: 2-5 /HPF / WBC 0-2 /HPF   Sq Epi: x / Non Sq Epi: Few /HPF / Bacteria: Few /HPF            [  ]  DVT Prophylaxis  [  ]  Nutrition, Brand, Rate         Goal Rate        Abnormal Nutritional Status -  Malnutrition   Cachexia          RADIOLOGY & ADDITIONAL STUDIES:  ***  < from: Xray Chest 1 View-PORTABLE IMMEDIATE (23 @ 05:57) >  IMPRESSION: Heart is normal in size. There is a tracheostomy tube  present. There is mild bilateral airspace disease without a focal area of   opacification. This may be from pulmonary edema.    --- End of Report ---    < end of copied text >    Goals of Care Discussion with Family/Proxy/Other

## 2023-03-05 NOTE — ED ADULT NURSE NOTE - OBJECTIVE STATEMENT
Pt BIBA from Nursing Home c/o fever and tachycardia x last night. Pt on tracheostomy tube attached to tri collar at 6lpm. with Gtube in placed. Noted redness to bilateral buttocks

## 2023-03-05 NOTE — H&P ADULT - ATTENDING COMMENTS
62M from [facility], h/o traumatic subdural hemorrhage following a fall down the stairs while drunk, s/p r craniotomy at [facility] in 01/2023, s/p trach/PEG BIBEMS for tachycardia 170s, RR 27, increased secretions admitted to  for sepsis secondary to RUL pneumonia with trachestomy to 4L NC.    Assessment:  #SDH s/p craniotomy  #Sinus Tachycardia  #AHRF secondary to RUL Pneumonia  #s/p Trachesotomy  #s/p PEG  #Anemia  #Chronic de la torre  #Transaminitis  #HTN    Plan:  Pain control   Delirium mitigation  Hold home anti HTN  for now due to sepsis and normotensive, restart as bp tolerates  Broad spectrum empiric abx for PNA   FU Cultures   Bronchodilators   PRN Suctioning as needed   Supplemental oxygen as needed to keep O2 sats >94%  Aspiration precautions  Nutrition consult and optimize enteral nutrion  Strict IO   Monitor and correct electrolyte derangements    Optimize glycemic control to keep Glu 140 -180  c/w lantus 12 bid + lispro 6 q6 + sliding scale  Sacral ulcer - wound care consult    Prophylaxis:   Lovenox 40mg sq qd    Goals of Care:   FULL CODE    Dispo:  Admit to

## 2023-03-06 DIAGNOSIS — E87.6 HYPOKALEMIA: ICD-10-CM

## 2023-03-06 LAB
ALBUMIN SERPL ELPH-MCNC: 1.9 G/DL — LOW (ref 3.5–5)
ALP SERPL-CCNC: 186 U/L — HIGH (ref 40–120)
ALT FLD-CCNC: 86 U/L DA — HIGH (ref 10–60)
ANION GAP SERPL CALC-SCNC: 7 MMOL/L — SIGNIFICANT CHANGE UP (ref 5–17)
AST SERPL-CCNC: 86 U/L — HIGH (ref 10–40)
BASOPHILS # BLD AUTO: 0.01 K/UL — SIGNIFICANT CHANGE UP (ref 0–0.2)
BASOPHILS NFR BLD AUTO: 0.2 % — SIGNIFICANT CHANGE UP (ref 0–2)
BILIRUB SERPL-MCNC: 0.9 MG/DL — SIGNIFICANT CHANGE UP (ref 0.2–1.2)
BUN SERPL-MCNC: 8 MG/DL — SIGNIFICANT CHANGE UP (ref 7–18)
CALCIUM SERPL-MCNC: 8.2 MG/DL — LOW (ref 8.4–10.5)
CHLORIDE SERPL-SCNC: 105 MMOL/L — SIGNIFICANT CHANGE UP (ref 96–108)
CO2 SERPL-SCNC: 25 MMOL/L — SIGNIFICANT CHANGE UP (ref 22–31)
CREAT SERPL-MCNC: 0.51 MG/DL — SIGNIFICANT CHANGE UP (ref 0.5–1.3)
CULTURE RESULTS: SIGNIFICANT CHANGE UP
EGFR: 116 ML/MIN/1.73M2 — SIGNIFICANT CHANGE UP
EOSINOPHIL # BLD AUTO: 0.02 K/UL — SIGNIFICANT CHANGE UP (ref 0–0.5)
EOSINOPHIL NFR BLD AUTO: 0.4 % — SIGNIFICANT CHANGE UP (ref 0–6)
GLUCOSE SERPL-MCNC: 129 MG/DL — HIGH (ref 70–99)
GRAM STN FLD: SIGNIFICANT CHANGE UP
HCT VFR BLD CALC: 26.1 % — LOW (ref 39–50)
HCV AB S/CO SERPL IA: 0.09 S/CO — SIGNIFICANT CHANGE UP (ref 0–0.99)
HCV AB SERPL-IMP: SIGNIFICANT CHANGE UP
HGB BLD-MCNC: 8.6 G/DL — LOW (ref 13–17)
IMM GRANULOCYTES NFR BLD AUTO: 0.4 % — SIGNIFICANT CHANGE UP (ref 0–0.9)
LEGIONELLA AG UR QL: NEGATIVE — SIGNIFICANT CHANGE UP
LYMPHOCYTES # BLD AUTO: 0.59 K/UL — LOW (ref 1–3.3)
LYMPHOCYTES # BLD AUTO: 11.3 % — LOW (ref 13–44)
M PNEUMO IGM SER-ACNC: 0.15 INDEX — SIGNIFICANT CHANGE UP (ref 0–0.9)
MAGNESIUM SERPL-MCNC: 1.9 MG/DL — SIGNIFICANT CHANGE UP (ref 1.6–2.6)
MCHC RBC-ENTMCNC: 29.6 PG — SIGNIFICANT CHANGE UP (ref 27–34)
MCHC RBC-ENTMCNC: 33 GM/DL — SIGNIFICANT CHANGE UP (ref 32–36)
MCV RBC AUTO: 89.7 FL — SIGNIFICANT CHANGE UP (ref 80–100)
MONOCYTES # BLD AUTO: 0.5 K/UL — SIGNIFICANT CHANGE UP (ref 0–0.9)
MONOCYTES NFR BLD AUTO: 9.6 % — SIGNIFICANT CHANGE UP (ref 2–14)
MRSA PCR RESULT.: SIGNIFICANT CHANGE UP
MRSA PCR RESULT.: SIGNIFICANT CHANGE UP
MYCOPLASMA AG SPEC QL: NEGATIVE — SIGNIFICANT CHANGE UP
NEUTROPHILS # BLD AUTO: 4.09 K/UL — SIGNIFICANT CHANGE UP (ref 1.8–7.4)
NEUTROPHILS NFR BLD AUTO: 78.1 % — HIGH (ref 43–77)
NRBC # BLD: 0 /100 WBCS — SIGNIFICANT CHANGE UP (ref 0–0)
PHOSPHATE SERPL-MCNC: 2.6 MG/DL — SIGNIFICANT CHANGE UP (ref 2.5–4.5)
PLATELET # BLD AUTO: 221 K/UL — SIGNIFICANT CHANGE UP (ref 150–400)
POTASSIUM SERPL-MCNC: 2.9 MMOL/L — CRITICAL LOW (ref 3.5–5.3)
POTASSIUM SERPL-SCNC: 2.9 MMOL/L — CRITICAL LOW (ref 3.5–5.3)
PROT SERPL-MCNC: 6.7 G/DL — SIGNIFICANT CHANGE UP (ref 6–8.3)
RBC # BLD: 2.91 M/UL — LOW (ref 4.2–5.8)
RBC # FLD: 13.5 % — SIGNIFICANT CHANGE UP (ref 10.3–14.5)
S AUREUS DNA NOSE QL NAA+PROBE: SIGNIFICANT CHANGE UP
S AUREUS DNA NOSE QL NAA+PROBE: SIGNIFICANT CHANGE UP
S PNEUM AG UR QL: NEGATIVE — SIGNIFICANT CHANGE UP
SODIUM SERPL-SCNC: 137 MMOL/L — SIGNIFICANT CHANGE UP (ref 135–145)
SPECIMEN SOURCE: SIGNIFICANT CHANGE UP
SPECIMEN SOURCE: SIGNIFICANT CHANGE UP
VANCOMYCIN TROUGH SERPL-MCNC: 5.9 UG/ML — LOW (ref 10–20)
WBC # BLD: 5.23 K/UL — SIGNIFICANT CHANGE UP (ref 3.8–10.5)
WBC # FLD AUTO: 5.23 K/UL — SIGNIFICANT CHANGE UP (ref 3.8–10.5)

## 2023-03-06 RX ORDER — POTASSIUM CHLORIDE 20 MEQ
40 PACKET (EA) ORAL ONCE
Refills: 0 | Status: COMPLETED | OUTPATIENT
Start: 2023-03-06 | End: 2023-03-06

## 2023-03-06 RX ORDER — POTASSIUM CHLORIDE 20 MEQ
10 PACKET (EA) ORAL
Refills: 0 | Status: COMPLETED | OUTPATIENT
Start: 2023-03-06 | End: 2023-03-06

## 2023-03-06 RX ADMIN — Medication 1 APPLICATION(S): at 17:47

## 2023-03-06 RX ADMIN — Medication 1 APPLICATION(S): at 05:11

## 2023-03-06 RX ADMIN — Medication 3 MILLILITER(S): at 14:03

## 2023-03-06 RX ADMIN — POLYETHYLENE GLYCOL 3350 17 GRAM(S): 17 POWDER, FOR SOLUTION ORAL at 13:13

## 2023-03-06 RX ADMIN — Medication 1 PATCH: at 13:10

## 2023-03-06 RX ADMIN — ATORVASTATIN CALCIUM 80 MILLIGRAM(S): 80 TABLET, FILM COATED ORAL at 21:36

## 2023-03-06 RX ADMIN — INSULIN GLARGINE 12 UNIT(S): 100 INJECTION, SOLUTION SUBCUTANEOUS at 21:36

## 2023-03-06 RX ADMIN — Medication 1 PATCH: at 08:33

## 2023-03-06 RX ADMIN — Medication 100 MILLIEQUIVALENT(S): at 08:14

## 2023-03-06 RX ADMIN — Medication 1: at 13:01

## 2023-03-06 RX ADMIN — Medication 6 UNIT(S): at 05:15

## 2023-03-06 RX ADMIN — Medication 3 MILLILITER(S): at 03:10

## 2023-03-06 RX ADMIN — Medication 6 UNIT(S): at 17:04

## 2023-03-06 RX ADMIN — Medication 1: at 17:04

## 2023-03-06 RX ADMIN — Medication 166.67 MILLIGRAM(S): at 06:04

## 2023-03-06 RX ADMIN — ENOXAPARIN SODIUM 40 MILLIGRAM(S): 100 INJECTION SUBCUTANEOUS at 10:08

## 2023-03-06 RX ADMIN — Medication 1 PATCH: at 13:12

## 2023-03-06 RX ADMIN — Medication 650 MILLIGRAM(S): at 22:07

## 2023-03-06 RX ADMIN — Medication 40 MILLIEQUIVALENT(S): at 08:15

## 2023-03-06 RX ADMIN — Medication 100 MILLIEQUIVALENT(S): at 10:07

## 2023-03-06 RX ADMIN — Medication 6 UNIT(S): at 13:01

## 2023-03-06 RX ADMIN — SENNA PLUS 10 MILLILITER(S): 8.6 TABLET ORAL at 13:12

## 2023-03-06 RX ADMIN — Medication 1: at 23:47

## 2023-03-06 RX ADMIN — CEFEPIME 100 MILLIGRAM(S): 1 INJECTION, POWDER, FOR SOLUTION INTRAMUSCULAR; INTRAVENOUS at 21:37

## 2023-03-06 RX ADMIN — CEFEPIME 100 MILLIGRAM(S): 1 INJECTION, POWDER, FOR SOLUTION INTRAMUSCULAR; INTRAVENOUS at 13:17

## 2023-03-06 RX ADMIN — Medication 2 MILLIGRAM(S): at 21:36

## 2023-03-06 RX ADMIN — Medication 650 MILLIGRAM(S): at 07:00

## 2023-03-06 RX ADMIN — Medication 100 MILLIGRAM(S): at 05:12

## 2023-03-06 RX ADMIN — Medication 650 MILLIGRAM(S): at 05:08

## 2023-03-06 RX ADMIN — Medication 81 MILLIGRAM(S): at 13:14

## 2023-03-06 RX ADMIN — Medication 3 MILLILITER(S): at 20:30

## 2023-03-06 RX ADMIN — CEFEPIME 100 MILLIGRAM(S): 1 INJECTION, POWDER, FOR SOLUTION INTRAMUSCULAR; INTRAVENOUS at 05:10

## 2023-03-06 RX ADMIN — Medication 1 PATCH: at 19:42

## 2023-03-06 RX ADMIN — Medication 100 MILLIEQUIVALENT(S): at 09:31

## 2023-03-06 RX ADMIN — Medication 166.67 MILLIGRAM(S): at 17:05

## 2023-03-06 RX ADMIN — Medication 3 MILLILITER(S): at 08:38

## 2023-03-06 RX ADMIN — Medication 6 UNIT(S): at 00:33

## 2023-03-06 RX ADMIN — Medication 6 UNIT(S): at 23:49

## 2023-03-06 RX ADMIN — INSULIN GLARGINE 12 UNIT(S): 100 INJECTION, SOLUTION SUBCUTANEOUS at 08:12

## 2023-03-06 RX ADMIN — Medication 100 MILLIGRAM(S): at 17:48

## 2023-03-06 NOTE — DISCHARGE NOTE PROVIDER - PROVIDER TOKENS
FREE:[LAST:[Facility Provider],PHONE:[(   )    -],FAX:[(   )    -]] FREE:[LAST:[Facility Provider],PHONE:[(   )    -],FAX:[(   )    -],FOLLOWUP:[1-3 days]]

## 2023-03-06 NOTE — PROGRESS NOTE ADULT - PROBLEM SELECTOR PLAN 2
Likely secondary to aspiration pna.  Continue cefepime and vanco. Monitor vanco trough  F/U urine, sputum and blood cultures.

## 2023-03-06 NOTE — PROGRESS NOTE ADULT - PROBLEM SELECTOR PLAN 3
Secondary to SDH and craniotomy  Craniotomy at [facility] 1/23  Maintain safety precautions  Strict aspiration precautions.

## 2023-03-06 NOTE — PROGRESS NOTE ADULT - PROBLEM SELECTOR PLAN 1
Continue trach collar.  Monitor oxygen saturation.  Continue bronchodilators via nebulizer  F/U sputum and blood cultures  Continue vanco and cefepime.  f/u vanco trough level

## 2023-03-06 NOTE — CHART NOTE - NSCHARTNOTEFT_GEN_A_CORE
EVENT: Temp 38.7    BRIEF HPI: 62M from [facility], h/o traumatic subdural hemorrhage following a fall down the stairs while drunk, s/p r craniotomy at [facility] in 01/2023, s/p trach/PEG BIB EMS for tachycardia 170s, RR 27, increased secretions admitted to  for sepsis secondary to RUL pneumonia with tracheostomy to 4L NC.    OBJECTIVE:  Vital Signs Last 24 Hrs  T(C): 38.7 (06 Mar 2023 05:01), Max: 38.7 (06 Mar 2023 05:01)  T(F): 101.7 (06 Mar 2023 05:01), Max: 101.7 (06 Mar 2023 05:01)  HR: 119 (06 Mar 2023 05:01) (92 - 119)  BP: 129/76 (06 Mar 2023 05:01) (108/69 - 133/80)  BP(mean): 84 (05 Mar 2023 06:49) (80 - 84)  RR: 28 (06 Mar 2023 05:01) (17 - 28)  SpO2: 100% (06 Mar 2023 05:01) (95% - 100%)    Parameters below as of 06 Mar 2023 05:01  Patient On (Oxygen Delivery Method): Hydro-Trach  O2 Flow (L/min): 3    FOCUSED PHYSICAL EXAM:  RESP: Hydro-trach in place  CV: S1 S2, mild tach  NEURO: Non-interactive    LABS:                        8.0    4.00  )-----------( 209      ( 05 Mar 2023 10:33 )             25.3     03-05    139  |  110<H>  |  11  ----------------------------<  166<H>  3.3<L>   |  24  |  0.47<L>    Ca    7.8<L>      05 Mar 2023 10:33  Phos  2.5     03-05  Mg     1.8     03-05    TPro  6.3  /  Alb  1.8<L>  /  TBili  0.8  /  DBili  x   /  AST  121<H>  /  ALT  103<H>  /  AlkPhos  198<H>  03-05    SARS-CoV-2: Not-De mikal (05 Mar 2023 14:46)    Culture - Sputum . (03.06.23 @ 00:14)  Gram Stain:   Numerous polymorphonuclear leukocytes per low power field  Few Squamous epithelial cells per low power field  Numerous Gram Variable Rods seen per oil power field  Moderate Yeast like cells seen per oil power field  Few Gram positive cocci in pairs seen per oil power field  Specimen Source: Trach Asp Tracheal Aspirate    IMAGING:  ACC: [id number] EXAM:  XR CHEST PORTABLE   PROCEDURE DATE:  03/05/2023    INTERPRETATION:  AP chest.  CLINICAL INDICATION: Sepsis.  IMPRESSION: Heart is normal in size. There is a tracheostomy tube  present. There is mild bilateral airspace disease without a focal area of   opacification. This may be from pulmonary edema.    PROBLEM: SIRS probably due to PNA  PLAN:   1. Cont acetaminophen    Suspension 650 lucio GRAM(s) Oral every 6 hours PRN Temp greater or equal to 38C (100.4F),  2. Cont cefepime   IVPB 2000 lucio GRAM(s) IV Intermittent every 8 hours  3. Cont vancomycin  IVPB 1250 lucio GRAM(s) IV Intermittent every 12 hours    FOLLOW UP / RESULT: blood cultures results

## 2023-03-06 NOTE — CONSULT NOTE ADULT - NS ATTEND AMEND GEN_ALL_CORE FT
Impression: This is a 61 Y/O Male from [facility] . Hx of Traumatic Subdural Hemorrhage following a fall down the stairs while drunk, had s/p Craniotomy at [facility] on  with s/p Trach / Peg . Admitted to  with Acute on chronic hypoxic respiratory failure secondary to RUL Pneumonia.    Suggestion:   O2 saturation 96% with HME O2 supplementation at 3L via trach collar.  Oral, trach care, suction.  Not a candidate for speaking valve, trach capping at present time.   Not a candidate for decannulation at this time.   Continue Duoneb via nebulization Q 6 Hours.  On Cefepime 2 mg IVPB Q 8 hours.   Vancomycin 1250 mg IVPB Q 12 Hours with Vacomycin trough monitoring.   DVT / GI prophylactic. On Lovenox 40mg SQ daily.   Aspiration precautions with HOB elevation especially during Peg feeding.

## 2023-03-06 NOTE — DISCHARGE NOTE PROVIDER - NSDCCPCAREPLAN_GEN_ALL_CORE_FT
PRINCIPAL DISCHARGE DIAGNOSIS  Diagnosis: Acute on chronic respiratory failure with hypoxia  Assessment and Plan of Treatment:       SECONDARY DISCHARGE DIAGNOSES  Diagnosis: Sepsis  Assessment and Plan of Treatment:     Diagnosis: Acute encephalopathy  Assessment and Plan of Treatment:     Diagnosis: Transaminitis  Assessment and Plan of Treatment:     Diagnosis: Anemia of chronic disease  Assessment and Plan of Treatment:     Diagnosis: Hypertension  Assessment and Plan of Treatment:     Diagnosis: Diabetes  Assessment and Plan of Treatment:      PRINCIPAL DISCHARGE DIAGNOSIS  Diagnosis: Acute on chronic respiratory failure with hypoxia  Assessment and Plan of Treatment: Continue oxygen as prescribed. Currently using 2LPM via hydro-trach collar. Suction as needed. Keep head of bed elevated at all times. Continue bronchodilators. Suction as needed.      SECONDARY DISCHARGE DIAGNOSES  Diagnosis: Sepsis  Assessment and Plan of Treatment: You were admitted with sepsis. You were treated with multiple IV antibiotics. You no longer require any antibiotics. Keep head of bed elevated at all times.    Diagnosis: Acute encephalopathy  Assessment and Plan of Treatment: Secondary ti ICH from TBI. Maintain safety measure and seizure precautions at all times. Must travel with [facility] on.    Diagnosis: Transaminitis  Assessment and Plan of Treatment: Resolving likely from sepsis. Monitor LFT while in rehab.    Diagnosis: Anemia of chronic disease  Assessment and Plan of Treatment: H&H low but stable. Monitor at rehab monthly.    Diagnosis: Hypertension  Assessment and Plan of Treatment: Continue all medications as prescribed.    Diagnosis: Diabetes  Assessment and Plan of Treatment: Continue morning and bedtime lantus. Continue sliding scale coverage.  Monitor glucose.     PRINCIPAL DISCHARGE DIAGNOSIS  Diagnosis: Acute on chronic respiratory failure with hypoxia  Assessment and Plan of Treatment: Continue oxygen as prescribed. Currently using 2LPM via hydro-trach collar. Suction as needed. Keep head of bed elevated at all times. Continue bronchodilators. Suction as needed.      SECONDARY DISCHARGE DIAGNOSES  Diagnosis: Sepsis  Assessment and Plan of Treatment: You were admitted with sepsis. You were treated with multiple IV antibiotics. You no longer require any antibiotics. Keep head of bed elevated at all times.    Diagnosis: Acute encephalopathy  Assessment and Plan of Treatment: Secondary ti ICH from TBI. Maintain safety measure and seizure precautions at all times. Must travel with [place] on.    Diagnosis: Transaminitis  Assessment and Plan of Treatment: Resolving likely from sepsis. Monitor LFT while in rehab.    Diagnosis: Anemia of chronic disease  Assessment and Plan of Treatment: H&H low but stable. Monitor at rehab monthly.    Diagnosis: Hypertension  Assessment and Plan of Treatment: Continue all medications as prescribed.    Diagnosis: Diabetes  Assessment and Plan of Treatment: Continue morning and bedtime lantus. Continue sliding scale coverage.  Monitor glucose.    Diagnosis: Urinary retention  Assessment and Plan of Treatment: Your de la torre catheter was removed......................     PRINCIPAL DISCHARGE DIAGNOSIS  Diagnosis: Acute on chronic respiratory failure with hypoxia  Assessment and Plan of Treatment: Continue oxygen as prescribed. Currently using 2LPM via hydro-trach collar. Suction as needed. Keep head of bed elevated at all times. Continue bronchodilators. Suction as needed.      SECONDARY DISCHARGE DIAGNOSES  Diagnosis: Sepsis  Assessment and Plan of Treatment: You were admitted with sepsis. You were treated with multiple IV antibiotics. Initially treated for urinary tract infection. Blood cultures negative. You were treated for yeast infection in your sputum.   You were monitored for frequent fever with no apparent source.   Whole body scan resulted  Faint soft tissues accumulation in the left upper thigh lateral to the left hip may represent inflammation/infection. Left lateral thigh exam is not consistent with obvious infectious process at this time, area if w/o tenderness or erythema.  You have no longer fever on antibiotic. Dose completed.  You no longer require any antibiotics. Keep head of bed elevated at all times.   Follow up with facility MD after discharge.       Diagnosis: Acute encephalopathy  Assessment and Plan of Treatment: Secondary ti ICH from TBI. Maintain safety measure and seizure precautions at all times. Must travel with [place] on.    Diagnosis: Transaminitis  Assessment and Plan of Treatment: Resolving likely from sepsis.   Hold Cholesterol table until liver functions are stable.   Monitor LFT while in rehab.    Diagnosis: Anemia of chronic disease  Assessment and Plan of Treatment: Hgb/Hct remains low but stable. No report active bleeding. Monitor CBC at rehab monthly.    Diagnosis: Hypertension  Assessment and Plan of Treatment: Continue  home dose medications as prescribed. Monitor blood pressure.   Follow up with facility MD at facility.    Diagnosis: Diabetes  Assessment and Plan of Treatment: Continue morning and bedtime lantus 12units each. continue 6units lispro q 6hours and Continue sliding scale coverage.  Monitor blood glucose every 6 hours with feeding tube.   Follow up with facility MD.    Diagnosis: Urinary retention  Assessment and Plan of Treatment: You had urinary retention and Urinary catheter was placed. Now attempted trial of voiding but unsuccessful. Reinserted Catheter 3/27/23 Fr 16.  Continue medications as prescribed. Flomax, Proscar.  continue de la torre catheter care.  Can attempt another trial of voiding at nursing facility.    Diagnosis: Functional quadriplegia  Assessment and Plan of Treatment: Continue Passive ROM exercises daily.  Turn and position every 2 hours  Strict aspiration precautions.  Keep head of bed elevated at all times.  Continue supportive care at nursing facility.       Diagnosis: Severe protein-calorie malnutrition  Assessment and Plan of Treatment: Continue tube feeds and supplementation.  Continue following up with facility dietitian and MD.       Diagnosis: Yeast infection  Assessment and Plan of Treatment: You were treated for yeast infection in your sputum on admission.   continue trach care at nursing facility.    Diagnosis: Hyperlipidemia  Assessment and Plan of Treatment: Hold cholesterol pill at this time as your liver enzymes were elevated on admission, now trending down. Continue monitor liver function and lipid profile at nursing facility. Resume as needed.     PRINCIPAL DISCHARGE DIAGNOSIS  Diagnosis: Acute on chronic respiratory failure with hypoxia  Assessment and Plan of Treatment: Continue oxygen as prescribed. Currently using 2LPM via hydro-trach collar. Suction as needed. Keep head of bed elevated at all times. Continue bronchodilators. Suction as needed.      SECONDARY DISCHARGE DIAGNOSES  Diagnosis: Sepsis  Assessment and Plan of Treatment: You were admitted with sepsis. You were treated with multiple IV antibiotics. Initially treated for urinary tract infection. Blood cultures negative. You were treated for yeast infection in your sputum.   You were monitored for frequent fever with no apparent source.   Whole body scan resulted  Faint soft tissues accumulation in the left upper thigh lateral to the left hip may represent inflammation/infection. Left lateral thigh exam is not consistent with obvious infectious process at this time, no signs of infection, tenderness or erythema.  You have no longer fever on antibiotic. Dose completed.  You no longer require any antibiotics. Keep head of bed elevated at all times.   Follow up with facility MD after discharge.       Diagnosis: Acute encephalopathy  Assessment and Plan of Treatment: Secondary ti ICH from TBI. Maintain safety measure and seizure precautions at all times. Must travel [place] on.    Diagnosis: Transaminitis  Assessment and Plan of Treatment: Resolving likely from sepsis.   Hold Cholesterol table until liver functions are stable.   Monitor LFT while in rehab.    Diagnosis: Anemia of chronic disease  Assessment and Plan of Treatment: Hgb/Hct remains low but stable. No report active bleeding. Monitor CBC at rehab monthly.    Diagnosis: Hypertension  Assessment and Plan of Treatment: Continue  home dose medications as prescribed. Monitor blood pressure.   Follow up with facility MD at facility.    Diagnosis: Diabetes  Assessment and Plan of Treatment: Continue morning and bedtime lantus 12units each. continue 6units lispro q 6hours and Continue sliding scale coverage.  Monitor blood glucose every 6 hours with feeding tube.   Follow up with facility MD.    Diagnosis: Urinary retention  Assessment and Plan of Treatment: You had urinary retention and Urinary catheter was placed. Now attempted trial of voiding but unsuccessful. Reinserted Catheter 3/27/23 Fr 16.  Continue medications as prescribed. Flomax, Proscar.  continue de la torre catheter care.  Can attempt another trial of voiding at nursing facility.    Diagnosis: Functional quadriplegia  Assessment and Plan of Treatment: Continue Passive ROM exercises daily.  Turn and position every 2 hours  Strict aspiration precautions.  Keep head of bed elevated at all times.  Continue supportive care at nursing facility.       Diagnosis: Severe protein-calorie malnutrition  Assessment and Plan of Treatment: Continue tube feeds and supplementation.  Continue following up with facility dietitian and MD.       Diagnosis: Yeast infection  Assessment and Plan of Treatment: You were treated for yeast infection in your sputum on admission.   continue trach care at nursing facility.    Diagnosis: Hyperlipidemia  Assessment and Plan of Treatment: Hold cholesterol pill at this time as your liver enzymes were elevated on admission, now trending down. Continue monitor liver function and lipid profile at nursing facility. Resume as needed.

## 2023-03-06 NOTE — PROGRESS NOTE ADULT - PROBLEM SELECTOR PLAN 8
Continue lantus 12U every 12 hours  Lispro 6U every 6 hours  Continue sliding scale coverage.  Monitor glucose.

## 2023-03-06 NOTE — PROGRESS NOTE ADULT - PROBLEM SELECTOR PLAN 1
Continue trach collar.  Monitor oxygen saturation.  Continue bronchodilators via nebulizer  F/U sputum and blood cultures  Continue vanco and cefepime.

## 2023-03-06 NOTE — DISCHARGE NOTE PROVIDER - HOSPITAL COURSE
62M from [facility], h/o traumatic subdural hemorrhage following a fall down the stairs while drunk, s/p r craniotomy at [facility] in 01/2023, s/p trach/PEG BIBEMS for tachycardia 170s, RR 27, increased secretions admitted to  for sepsis secondary to RUL pneumonia with tracheostomy to 3L NC.  3/6 Febrile 101.7    Son will bring helmet to hospital from [facility]. 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  3/6 Febrile 101.7  03/08: Afebrile. Vanco discontinued since MRSA negative. + yeast in sputum; + Candida Galbrata in urine (Bran in place with clear urine and afebrile).   Dr. Peres following. Blood cx NGTD. C/w Cefepime.   03/09: Febrile, tachycardic and tachypneic this AM. Code sepsis called. Sepsis w/u in progress.  Started on Vanco. ID following, . Discussed with Dr. Peres. F/u CXR. Worsening Leukocytosis. IVF bolus with LR (2,300 ml)  per protocol. Lactate 1.9.   3/11: afebrile over past 24hrs, hemodynamically stable. Vanco trough 11.7  03/12: Vanco trough this PM. Blood cx remains NGTD. C/w Vanco and Cefepime. ID following. Persistent Transaminitis.  Hep C non-reactive. F/u remaining Hepatitis panel. Patient on high dose statin which could be contributing to transaminitis. Hold for now and re-eval. Given that patient is unable to verbalize/demonstrate if symptomatic will order Liver US.   03/13: Spiked fevers overnight. Tachycardic and hypotensive. IVF bolus given. Blood cx in progress.  F/u Procal and Lactate. Vanco increased to 1500 mg BID and Meropenem added and Cefepime discontinued Discussed wih Dr. Peres and reccs appreciated  Worsening leukocytosis Pending Abdominal US 2/2 transaminitis.   03/14: Febrile. Urinary retention. On Flomax. Bladder scan and Straight cath Q 4-6 hours. On Vanco and Jose Elias. F/u cx. Abd US unremarkable. CXR 3/13 negative for acute findings.   3/15 Bladder scan 550. Bran cath reinserted.  Son will bring helmet to hospital from [facility]. 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  3/6 Febrile 101.7  03/08: Afebrile. Vanco discontinued since MRSA negative. + yeast in sputum; + Candida Galbrata in urine (Bran in place with clear urine and afebrile).   Dr. Peres following. Blood cx NGTD. C/w Cefepime.   03/09: Febrile, tachycardic and tachypneic this AM. Code sepsis called. Sepsis w/u in progress.  Started on Vanco. ID following, . Discussed with Dr. Peres. F/u CXR. Worsening Leukocytosis. IVF bolus with LR (2,300 ml)  per protocol. Lactate 1.9.   3/11: afebrile over past 24hrs, hemodynamically stable. Vanco trough 11.7  03/12: Vanco trough this PM. Blood cx remains NGTD. C/w Vanco and Cefepime. ID following. Persistent Transaminitis.  Hep C non-reactive. F/u remaining Hepatitis panel. Patient on high dose statin which could be contributing to transaminitis. Hold for now and re-eval. Given that patient is unable to verbalize/demonstrate if symptomatic will order Liver US.   03/13: Spiked fevers overnight. Tachycardic and hypotensive. IVF bolus given. Blood cx in progress.  F/u Procal and Lactate. Vanco increased to 1500 mg BID and Meropenem added and Cefepime discontinued Discussed wih Dr. Peres and reccs appreciated  Worsening leukocytosis Pending Abdominal US 2/2 transaminitis.   03/14: Febrile. Urinary retention. On Flomax. Bladder scan and Straight cath Q 4-6 hours. On Vanco and Jose Elias. F/u cx. Abd US unremarkable. CXR 3/13 negative for acute findings.   3/15 Bladder scan 550. Bran cath reinserted.  Son will bring helmet to hospital from [facility].   62M from [facility], h/o traumatic subdural hemorrhage following a fall down the stairs while drunk, s/p r craniotomy at [facility] in 01/2023, s/p trach/PEG BIBEMS for tachycardia 170s, RR 27, increased secretions admitted to  for sepsis secondary to RUL pneumonia with tracheostomy to 3L NC.  3/6 Pt is noted febrile 101.7 F.   03/08: Afebrile. Vanco discontinued since MRSA negative. + yeast in sputum; + Candida Galbrata in urine (De La Torre in place with clear urine and afebrile).   Dr. Peres following. Blood cx NGTD. C/w Cefepime.   03/09: Febrile, tachycardic and tachypneic this AM. Code sepsis called. Sepsis w/u in progress.  Started on Vanco. ID following, . Discussed with Dr. Peres. F/u CXR. Worsening Leukocytosis. IVF bolus with LR (2,300 ml)  per protocol. Lactate 1.9.   3/11: afebrile over past 24hrs, hemodynamically stable. Vanco trough 11.7  03/12: Vanco trough this PM. Blood cx remains NGTD. C/w Vanco and Cefepime. ID following. Persistent Transaminitis.  Hep C non-reactive. F/u remaining Hepatitis panel. Patient on high dose statin which could be contributing to transaminitis. Hold for now and re-eval. Given that patient is unable to verbalize/demonstrate if symptomatic will order Liver US.   03/13: Spiked fevers overnight. Tachycardic and hypotensive. IVF bolus given. Blood cx in progress.  F/u Procal and Lactate. Vanco increased to 1500 mg BID and Meropenem added and Cefepime discontinued Discussed wih Dr. Peres and reccs appreciated  Worsening leukocytosis Pending Abdominal US 2/2 transaminitis.   03/14: Febrile. Urinary retention. On Flomax. Bladder scan and Straight cath Q 4-6 hours. On Vanco and Jose Elias. F/u cx. Abd US unremarkable. CXR 3/13 negative for acute findings.   3/15 Bladder scan 550. De La Torre cath reinserted.   3/23 Patient febrile overnight 100.5 likely central fever, no leukocytosis  3/24 Patient febrile 102.3 this AM. Patient blood ans sputum culture and UA sent, chest x-ray, RVP ordered. Will start Meropenem, ID aware.  3/25 Patient attempted for trial of voiding, s/p bladder scan with straight catheter. continue bladder scan q6h.   3/27  Patient continues to retain large amount of urine. Last bladder scan 600ml. De La Torre cath reinserted.  3/30  No acute events overnight, WBC whole body scan report noted for Faint soft tissues accumulation in the left upper thigh lateral to the left hip may represent inflammation/infection. Please correlate clinically. Left lateral thigh exam is not consistent with obvious infectious process at this time, if affected area remains stable w/o tenderness or erythema.   3/31 Patient evaluated at bedside, remains afebrile. Last dose ABT Meropenem completed. No further infectious work up is needed as discussed with ID.     Patient is medically optimized for discharge to NH, plan to continue de la torre care at the facility.   Discharge plan discussed with attending physician.   Please refer to medical records for in-depth hospital course as this is a brief summary.    62M from [facility], h/o traumatic subdural hemorrhage following a fall down the stairs while drunk, s/p r craniotomy at [facility] in 01/2023, s/p trach/PEG BIBEMS for tachycardia 170s, RR 27, increased secretions admitted to  for sepsis secondary to RUL pneumonia with tracheostomy to 3L NC.  3/6 Pt is noted febrile 101.7 F.   03/08: Afebrile. Vanco discontinued since MRSA negative. + yeast in sputum; + Candida Galbrata in urine (De La Torre in place with clear urine and afebrile).   Dr. Peres following. Blood cx NGTD. C/w Cefepime.   03/09: Febrile, tachycardic and tachypneic this AM. Code sepsis called. Sepsis w/u in progress.  Started on Vanco. ID following, . Discussed with Dr. Peres. F/u CXR. Worsening Leukocytosis. IVF bolus with LR (2,300 ml)  per protocol. Lactate 1.9.   3/11: afebrile over past 24hrs, hemodynamically stable. Vanco trough 11.7  03/12: Vanco trough this PM. Blood cx remains NGTD. C/w Vanco and Cefepime. ID following. Persistent Transaminitis.  Hep C non-reactive. F/u remaining Hepatitis panel. Patient on high dose statin which could be contributing to transaminitis. Hold for now and re-eval. Given that patient is unable to verbalize/demonstrate if symptomatic will order Liver US.   03/13: Spiked fevers overnight. Tachycardic and hypotensive. IVF bolus given. Blood cx in progress.  F/u Procal and Lactate. Vanco increased to 1500 mg BID and Meropenem added and Cefepime discontinued Discussed wih Dr. Peres and reccs appreciated  Worsening leukocytosis Pending Abdominal US 2/2 transaminitis.   03/14: Febrile. Urinary retention. On Flomax. Bladder scan and Straight cath Q 4-6 hours. On Vanco and Jose Elias. F/u cx. Abd US unremarkable. CXR 3/13 negative for acute findings.   3/15 Bladder scan 550. De La Torre cath reinserted.   3/23 Patient febrile overnight 100.5 likely central fever, no leukocytosis  3/24 Patient febrile 102.3 this AM. Patient blood ans sputum culture and UA sent, chest x-ray, RVP ordered. Will start Meropenem, ID aware.  3/25 Patient attempted for trial of voiding, s/p bladder scan with straight catheter. continue bladder scan q6h.   3/27  Patient continues to retain large amount of urine. Last bladder scan 600ml. De La Torre cath reinserted.  3/30  No acute events overnight, WBC whole body scan report noted for Faint soft tissues accumulation in the left upper thigh lateral to the left hip may represent inflammation/infection. Please correlate clinically. Left lateral thigh exam is not consistent with obvious infectious process at this time, the affected area remains stable w/o tenderness or erythema.   3/31 Patient evaluated at bedside, remains afebrile. Last dose ABT Meropenem completed. No further infectious work up is needed as discussed with ID.     Patient is medically optimized for discharge to NH, plan to continue de la torre care at the facility.   Discharge plan discussed with attending physician.   Please refer to medical records for in-depth hospital course as this is a brief summary.

## 2023-03-06 NOTE — DISCHARGE NOTE PROVIDER - CARE PROVIDER_API CALL
Facility Provider,   Phone: (   )    -  Fax: (   )    -  Follow Up Time:    Facility Provider,   Phone: (   )    -  Fax: (   )    -  Follow Up Time: 1-3 days

## 2023-03-06 NOTE — PROGRESS NOTE ADULT - PROBLEM SELECTOR PLAN 9
Passive ROM exercises daily.  Turn and position every 2 hours  Strict aspiration precautions.  Keep head of bed elevated at all times.

## 2023-03-06 NOTE — ADVANCED PRACTICE NURSE CONSULT - ASSESSMENT
This is a 62yr old male patient admitted for Pneumonia, presenting with a Stage 1 Pressure Injury to the Coccyx and Bilateral Heels, as evident by non-blanchable erythema

## 2023-03-06 NOTE — PROGRESS NOTE ADULT - SUBJECTIVE AND OBJECTIVE BOX
WILMA NICOLE    SCU progress note    INTERVAL HPI/OVERNIGHT EVENTS: ***Febrile overnight.  Tmax 101.7    DNR [ ]   DNI  [  ]  FULL CODE    Covid - 19 PCR: Negative 3/5    The 4Ms    What Matters Most: see GO  Age appropriate Medications/Screen for High Risk Medication: Yes  Mentation: see CAM below  Mobility: defer to physical exam    The Confusion Assessment Method (CAM) Diagnostic Algorithm     1: Acute Onset or Fluctuating Course  - Is there evidence of an acute change in mental status from the patient’s baseline? Did the (abnormal) behavior  fluctuate during the day, that is, tend to come and go, or increase and decrease in severity?  [ ] YES [ ] NO     2: Inattention  - Did the patient have difficulty focusing attention, being easily distractible, or having difficulty keeping track of what was being said?  [ ] YES [ ] NO   Unable to access     3: Disorganized thinking  -Was the patient’s thinking disorganized or incoherent, such as rambling or irrelevant conversation, unclear or illogical flow of ideas, or unpredictable switching from subject to subject?  [ ] YES [ ] NO   Unable to access    4: Altered Level of consciousness?  [x ] YES [ ] NO    The diagnosis of delirium by CAM requires the presence of features 1 and 2 and either 3 or 4.    PRESSORS: [ ] YES [x ] NO  cefepime   IVPB      cefepime   IVPB 2000 milliGRAM(s) IV Intermittent every 8 hours  vancomycin  IVPB 1250 milliGRAM(s) IV Intermittent every 12 hours    Cardiovascular:  Heart Failure  Acute   Acute on Chronic  Chronic       doxazosin 2 milliGRAM(s) Oral at bedtime    Pulmonary:  albuterol/ipratropium for Nebulization 3 milliLiter(s) Nebulizer every 6 hours    Hematalogic:  aspirin  chewable 81 milliGRAM(s) Oral daily  enoxaparin Injectable 40 milliGRAM(s) SubCutaneous every 24 hours    Other:  acetaminophen    Suspension .. 650 milliGRAM(s) Oral every 6 hours PRN  aluminum hydroxide/magnesium hydroxide/simethicone Suspension 30 milliLiter(s) Oral every 4 hours PRN  amantadine Syrup 100 milliGRAM(s) Oral two times a day  atorvastatin 80 milliGRAM(s) Oral at bedtime  insulin glargine Injectable (LANTUS) 12 Unit(s) SubCutaneous every morning  insulin glargine Injectable (LANTUS) 12 Unit(s) SubCutaneous at bedtime  insulin lispro (ADMELOG) corrective regimen sliding scale   SubCutaneous every 6 hours  insulin lispro Injectable (ADMELOG) 6 Unit(s) SubCutaneous every 6 hours  melatonin 3 milliGRAM(s) Oral at bedtime PRN  nicotine -   7 mG/24Hr(s) Patch 1 Patch Transdermal daily  ondansetron Injectable 4 milliGRAM(s) IV Push every 8 hours PRN  polyethylene glycol 3350 17 Gram(s) Oral daily  senna Syrup 10 milliLiter(s) Oral daily  silver sulfADIAZINE 1% Cream 1 Application(s) Topical two times a day    acetaminophen    Suspension .. 650 milliGRAM(s) Oral every 6 hours PRN  albuterol/ipratropium for Nebulization 3 milliLiter(s) Nebulizer every 6 hours  aluminum hydroxide/magnesium hydroxide/simethicone Suspension 30 milliLiter(s) Oral every 4 hours PRN  amantadine Syrup 100 milliGRAM(s) Oral two times a day  aspirin  chewable 81 milliGRAM(s) Oral daily  atorvastatin 80 milliGRAM(s) Oral at bedtime  cefepime   IVPB      cefepime   IVPB 2000 milliGRAM(s) IV Intermittent every 8 hours  doxazosin 2 milliGRAM(s) Oral at bedtime  enoxaparin Injectable 40 milliGRAM(s) SubCutaneous every 24 hours  insulin glargine Injectable (LANTUS) 12 Unit(s) SubCutaneous every morning  insulin glargine Injectable (LANTUS) 12 Unit(s) SubCutaneous at bedtime  insulin lispro (ADMELOG) corrective regimen sliding scale   SubCutaneous every 6 hours  insulin lispro Injectable (ADMELOG) 6 Unit(s) SubCutaneous every 6 hours  melatonin 3 milliGRAM(s) Oral at bedtime PRN  nicotine -   7 mG/24Hr(s) Patch 1 Patch Transdermal daily  ondansetron Injectable 4 milliGRAM(s) IV Push every 8 hours PRN  polyethylene glycol 3350 17 Gram(s) Oral daily  senna Syrup 10 milliLiter(s) Oral daily  silver sulfADIAZINE 1% Cream 1 Application(s) Topical two times a day  vancomycin  IVPB 1250 milliGRAM(s) IV Intermittent every 12 hours    Drug Dosing Weight  Height (cm): 177.8 (05 Mar 2023 03:36)  Weight (kg): 86.2 (05 Mar 2023 03:36)  BMI (kg/m2): 27.3 (05 Mar 2023 03:36)  BSA (m2): 2.04 (05 Mar 2023 03:36)    CENTRAL LINE: [ ] YES [x ] NO  LOCATION:   DATE INSERTED:  REMOVE: [ ] YES [ ] NO  EXPLAIN:    MELCHOR: [x ] YES [ ] NO    DATE INSERTED:  REMOVE:  [x ] YES [ ] NO  EXPLAIN:  TOV ordered    PAST MEDICAL & SURGICAL HISTORY:  History of subdural hemorrhage      PEG (percutaneous endoscopic gastrostomy) status      DM (diabetes mellitus)      S/P craniotomy                  - @ 07:01  -  03- @ 07:00  --------------------------------------------------------  IN: 0 mL / OUT: 3420 mL / NET: -3420 mL            PHYSICAL EXAM:    GENERAL: NAD, well-groomed, well-developed  HEAD:  Atraumatic, Normocephalic  EYES: EOMI, PERRLA, conjunctiva and sclera clear. Intermittent treacking  ENMT: No tonsillar erythema, exudates  NECK: Supple, No JVD, tracheostomy  NERVOUS SYSTEM:  Awake. Nonverbal at baseline. Intermittent tracking. Does not follow commands. No spontaneous movement of extremities.  CHEST/LUNG: Diminished breath sounds bilateral bases and RUL diminished with scattered rhonchi  HEART: Regular rate and rhythm; No murmurs, rubs, or gallops  ABDOMEN: +Peg, Soft, Nontender, Nondistended; Bowel sounds present  EXTREMITIES: +1 edema all extremities.  2+ Peripheral Pulses, No clubbing or cyanosis  LYMPH: No lymphadenopathy noted  SKIN: Stage 1 coccyx and bilateral heels. Craniotomy suture site well healed.      LABS:  CBC Full  -  ( 06 Mar 2023 04:58 )  WBC Count : 5.23 K/uL  RBC Count : 2.91 M/uL  Hemoglobin : 8.6 g/dL  Hematocrit : 26.1 %  Platelet Count - Automated : 221 K/uL  Mean Cell Volume : 89.7 fl  Mean Cell Hemoglobin : 29.6 pg  Mean Cell Hemoglobin Concentration : 33.0 gm/dL  Auto Neutrophil # : 4.09 K/uL  Auto Lymphocyte # : 0.59 K/uL  Auto Monocyte # : 0.50 K/uL  Auto Eosinophil # : 0.02 K/uL  Auto Basophil # : 0.01 K/uL  Auto Neutrophil % : 78.1 %  Auto Lymphocyte % : 11.3 %  Auto Monocyte % : 9.6 %  Auto Eosinophil % : 0.4 %  Auto Basophil % : 0.2 %        137  |  105  |  8   ----------------------------<  129<H>  2.9<LL>   |  25  |  0.51    Ca    8.2<L>      06 Mar 2023 04:58  Phos  2.6     -  Mg     1.9         TPro  6.7  /  Alb  1.9<L>  /  TBili  0.9  /  DBili  x   /  AST  86<H>  /  ALT  86<H>  /  AlkPhos  186<H>      PT/INR - ( 05 Mar 2023 04:00 )   PT: 17.6 sec;   INR: 1.47 ratio         PTT - ( 05 Mar 2023 04:00 )  PTT:26.1 sec  Urinalysis Basic - ( 05 Mar 2023 03:15 )    Color: Yellow / Appearance: Clear / S.010 / pH: x  Gluc: x / Ketone: Negative  / Bili: Negative / Urobili: 12   Blood: x / Protein: 30 mg/dL / Nitrite: Negative   Leuk Esterase: Negative / RBC: 2-5 /HPF / WBC 0-2 /HPF   Sq Epi: x / Non Sq Epi: Few /HPF / Bacteria: Few /HPF            [  ]  DVT Prophylaxis  [  ]  Nutrition, Brand, Rate         Goal Rate        Abnormal Nutritional Status -  Malnutrition   Cachexia          RADIOLOGY & ADDITIONAL STUDIES:  ***  < from: Xray Chest 1 View-PORTABLE IMMEDIATE (23 @ 05:57) >  CLINICAL INDICATION: Sepsis.    IMPRESSION: Heart is normal in size. There is a tracheostomy tube  present. There is mild bilateral airspace disease without a focal area of   opacification. This may be from pulmonary edema.    < end of copied text >    Goals of Care Discussion with Family/Proxy/Other   - see note from 3/6

## 2023-03-06 NOTE — PROGRESS NOTE ADULT - ASSESSMENT
62M from [facility], h/o traumatic subdural hemorrhage following a fall down the stairs while drunk, s/p r craniotomy at [facility] in 01/2023, s/p trach/PEG BIBEMS for tachycardia 170s, RR 27, increased secretions admitted to  for sepsis secondary to RUL pneumonia with tracheostomy to 3L NC.  3/6 Febrile 101.7

## 2023-03-06 NOTE — PROGRESS NOTE ADULT - PROBLEM SELECTOR PLAN 11
DVT and GI prophylaxis.  Continue antibiotics. Followup cultures results.  Continue trach collar. Monitor oxygen saturation.  Continue bronchodilators.  Wife and son updated at bedside.

## 2023-03-06 NOTE — PROGRESS NOTE ADULT - PROBLEM SELECTOR PLAN 3
Secondary to SDH and craniotomy  Craniotomy at [facility] 1/23  Maintain safety precautions  Strict aspiration precautions.

## 2023-03-06 NOTE — DISCHARGE NOTE PROVIDER - NSDCMRMEDTOKEN_GEN_ALL_CORE_FT
acetaminophen 48 mg/mL oral liquid: 20 milliliter(s) orally every 6 hours, As Needed fever  amantadine 50 mg/5 mL oral syrup: 10 milliliter(s) orally 2 times a day  amLODIPine 10 mg oral tablet: 1 tab(s) orally once a day  aspirin 81 mg oral tablet, chewable: 1 tab(s) orally once a day  atorvastatin 80 mg oral tablet: 1 tab(s) orally once a day  doxazosin 2 mg oral tablet: 1 tab(s) orally once a day  enalapril 10 mg oral tablet: 1 tab(s) orally once a day  enoxaparin 40 mg/0.4 mL injectable solution: 0.4 milliliter(s) subcutaneous once a day  Glucagon Emergency Kit for Low Blood Sugar 1 mg injection:   HumaLOG KwikPen 100 units/mL injectable solution: 10 unit(s) injectable every 6 hours  HumaLOG KwikPen 100 units/mL injectable solution: sliding scale  Lantus Solostar Pen 100 units/mL subcutaneous solution: 18 unit(s) subcutaneous every 12 hours  MiraLax oral powder for reconstitution: 17 gram(s) by PEG tube once a day  nicotine 7 mg/24 hr transdermal film, extended release: 1 patch transdermal once a day  Senna leaf extract 176 mg/5 mL oral syrup: 10 milliliter(s) orally once a day (at bedtime)  Silvadene 1% topical cream: Apply topically to affected area 2 times a day  Sodium Chloride, Inhalation 0.9% inhalation solution: 3 milliliter(s) by nebulizer 4 times a day   acetaminophen 48 mg/mL oral liquid: 20 milliliter(s) orally every 6 hours, As Needed fever  amantadine 50 mg/5 mL oral syrup: 10 milliliter(s) orally 2 times a day  amLODIPine 10 mg oral tablet: 1 tab(s) orally once a day  aspirin 81 mg oral tablet, chewable: 1 tab(s) orally once a day  enalapril 10 mg oral tablet: 1 tab(s) orally once a day  enoxaparin 40 mg/0.4 mL injectable solution: 0.4 milliliter(s) subcutaneous once a day  finasteride 5 mg oral tablet: 1 tab(s) orally once a day  Glucagon Emergency Kit for Low Blood Sugar 1 mg injection:   insulin glargine 100 units/mL subcutaneous solution: 12 unit(s) subcutaneous once a day (at bedtime)  insulin lispro 100 units/mL injectable solution: 6 injectable every 6 hours  ipratropium-albuterol 0.5 mg-2.5 mg/3 mL inhalation solution: 3 milliliter(s) inhaled every 6 hours  MiraLax oral powder for reconstitution: 17 gram(s) by PEG tube once a day  Senna leaf extract 176 mg/5 mL oral syrup: 10 milliliter(s) orally once a day (at bedtime)  Silvadene 1% topical cream: Apply topically to affected area 2 times a day  tamsulosin 0.4 mg oral capsule: 1 cap(s) orally once a day (at bedtime)

## 2023-03-06 NOTE — PROGRESS NOTE ADULT - ASSESSMENT
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  3/6 Febrile 101.7

## 2023-03-06 NOTE — PROGRESS NOTE ADULT - SUBJECTIVE AND OBJECTIVE BOX
SUBJECTIVE / OVERNIGHT EVENTS:pt seen and examined  23     MEDICATIONS  (STANDING):  albuterol/ipratropium for Nebulization 3 milliLiter(s) Nebulizer every 6 hours  amantadine Syrup 100 milliGRAM(s) Oral two times a day  aspirin  chewable 81 milliGRAM(s) Oral daily  atorvastatin 80 milliGRAM(s) Oral at bedtime  cefepime   IVPB      cefepime   IVPB 2000 milliGRAM(s) IV Intermittent every 8 hours  doxazosin 2 milliGRAM(s) Oral at bedtime  enoxaparin Injectable 40 milliGRAM(s) SubCutaneous every 24 hours  insulin glargine Injectable (LANTUS) 12 Unit(s) SubCutaneous every morning  insulin glargine Injectable (LANTUS) 12 Unit(s) SubCutaneous at bedtime  insulin lispro (ADMELOG) corrective regimen sliding scale   SubCutaneous every 6 hours  insulin lispro Injectable (ADMELOG) 6 Unit(s) SubCutaneous every 6 hours  nicotine -   7 mG/24Hr(s) Patch 1 Patch Transdermal daily  polyethylene glycol 3350 17 Gram(s) Oral daily  senna Syrup 10 milliLiter(s) Oral daily  silver sulfADIAZINE 1% Cream 1 Application(s) Topical two times a day  vancomycin  IVPB 1250 milliGRAM(s) IV Intermittent every 12 hours    MEDICATIONS  (PRN):  acetaminophen    Suspension .. 650 milliGRAM(s) Oral every 6 hours PRN Temp greater or equal to 38C (100.4F), Mild Pain (1 - 3)  aluminum hydroxide/magnesium hydroxide/simethicone Suspension 30 milliLiter(s) Oral every 4 hours PRN Dyspepsia  melatonin 3 milliGRAM(s) Oral at bedtime PRN Insomnia  ondansetron Injectable 4 milliGRAM(s) IV Push every 8 hours PRN Nausea and/or Vomiting    T(C): 38 (23 @ 21:05), Max: 38.7 (23 @ 05:01)  HR: 99 (23 @ 21:05) (99 - 119)  BP: 120/70 (23 @ 21:05) (110/68 - 129/76)  RR: 24 (23 @ 21:05) (24 - 28)  SpO2: 100% (23 @ 21:05) (100% - 100%)    CAPILLARY BLOOD GLUCOSE      POCT Blood Glucose.: 140 mg/dL (06 Mar 2023 21:26)  POCT Blood Glucose.: 165 mg/dL (06 Mar 2023 16:53)  POCT Blood Glucose.: 194 mg/dL (06 Mar 2023 12:25)  POCT Blood Glucose.: 126 mg/dL (06 Mar 2023 04:50)  POCT Blood Glucose.: 140 mg/dL (05 Mar 2023 23:47)    I&O's Summary    05 Mar 2023 07:01  -  06 Mar 2023 07:00  --------------------------------------------------------  IN: 0 mL / OUT: 3420 mL / NET: -3420 mL    06 Mar 2023 07:01  -  06 Mar 2023 23:26  --------------------------------------------------------  IN: 0 mL / OUT: 1000 mL / NET: -1000 mL        PHYSICAL EXAM:  GENERAL: NAD  EYES: EOMI, PERRLA  NECK: Supple, No JVD  CHEST/LUNG: dec breath sounds at bases  HEART:  S1 , S2 +  ABDOMEN: soft, bs+, peg+  EXTREMITIES:  no edema        LABS:                        8.6    5.23  )-----------( 221      ( 06 Mar 2023 04:58 )             26.1     03-06    137  |  105  |  8   ----------------------------<  129<H>  2.9<LL>   |  25  |  0.51    Ca    8.2<L>      06 Mar 2023 04:58  Phos  2.6     03-06  Mg     1.9     03-06    TPro  6.7  /  Alb  1.9<L>  /  TBili  0.9  /  DBili  x   /  AST  86<H>  /  ALT  86<H>  /  AlkPhos  186<H>  03-06    PT/INR - ( 05 Mar 2023 04:00 )   PT: 17.6 sec;   INR: 1.47 ratio         PTT - ( 05 Mar 2023 04:00 )  PTT:26.1 sec      Urinalysis Basic - ( 05 Mar 2023 03:15 )    Color: Yellow / Appearance: Clear / S.010 / pH: x  Gluc: x / Ketone: Negative  / Bili: Negative / Urobili: 12   Blood: x / Protein: 30 mg/dL / Nitrite: Negative   Leuk Esterase: Negative / RBC: 2-5 /HPF / WBC 0-2 /HPF   Sq Epi: x / Non Sq Epi: Few /HPF / Bacteria: Few /HPF        Culture - Sputum (collected 23 @ 00:14)  Source: Trach Asp Tracheal Aspirate  Gram Stain (23 @ 03:46):    Numerous polymorphonuclear leukocytes per low power field    Few Squamous epithelial cells per low power field    Numerous Gram Variable Rods seen per oil power field    Moderate Yeast like cells seen per oil power field    Few Gram positive cocci in pairsseen per oil power field  Preliminary Report (23 @ 07:52):    No growth to date.      RADIOLOGY & ADDITIONAL TESTS:    Imaging Personally Reviewed:yes    Consultant(s) Notes Reviewed:  yes    Care Discussed with Consultants/Other Providers:yes

## 2023-03-06 NOTE — CHART NOTE - NSCHARTNOTEFT_GEN_A_CORE
Wife and son James updated on test results and treatment plan.  Treatment plan discussed in detail. Discussed when and how patient  would eligible for decannulation. Explained that pt is not a candidate at this time.  All questions answered.

## 2023-03-07 LAB
ALBUMIN SERPL ELPH-MCNC: 1.8 G/DL — LOW (ref 3.5–5)
ALP SERPL-CCNC: 221 U/L — HIGH (ref 40–120)
ALT FLD-CCNC: 80 U/L DA — HIGH (ref 10–60)
ANION GAP SERPL CALC-SCNC: 3 MMOL/L — LOW (ref 5–17)
AST SERPL-CCNC: 76 U/L — HIGH (ref 10–40)
BASOPHILS # BLD AUTO: 0.02 K/UL — SIGNIFICANT CHANGE UP (ref 0–0.2)
BASOPHILS NFR BLD AUTO: 0.4 % — SIGNIFICANT CHANGE UP (ref 0–2)
BILIRUB SERPL-MCNC: 0.6 MG/DL — SIGNIFICANT CHANGE UP (ref 0.2–1.2)
BUN SERPL-MCNC: 11 MG/DL — SIGNIFICANT CHANGE UP (ref 7–18)
CALCIUM SERPL-MCNC: 8.2 MG/DL — LOW (ref 8.4–10.5)
CHLORIDE SERPL-SCNC: 105 MMOL/L — SIGNIFICANT CHANGE UP (ref 96–108)
CO2 SERPL-SCNC: 27 MMOL/L — SIGNIFICANT CHANGE UP (ref 22–31)
CREAT SERPL-MCNC: 0.48 MG/DL — LOW (ref 0.5–1.3)
CULTURE RESULTS: SIGNIFICANT CHANGE UP
EGFR: 118 ML/MIN/1.73M2 — SIGNIFICANT CHANGE UP
EOSINOPHIL # BLD AUTO: 0.23 K/UL — SIGNIFICANT CHANGE UP (ref 0–0.5)
EOSINOPHIL NFR BLD AUTO: 4.4 % — SIGNIFICANT CHANGE UP (ref 0–6)
GLUCOSE SERPL-MCNC: 158 MG/DL — HIGH (ref 70–99)
HCT VFR BLD CALC: 24 % — LOW (ref 39–50)
HGB BLD-MCNC: 8.1 G/DL — LOW (ref 13–17)
IMM GRANULOCYTES NFR BLD AUTO: 0.2 % — SIGNIFICANT CHANGE UP (ref 0–0.9)
LYMPHOCYTES # BLD AUTO: 1.35 K/UL — SIGNIFICANT CHANGE UP (ref 1–3.3)
LYMPHOCYTES # BLD AUTO: 25.7 % — SIGNIFICANT CHANGE UP (ref 13–44)
MAGNESIUM SERPL-MCNC: 2.2 MG/DL — SIGNIFICANT CHANGE UP (ref 1.6–2.6)
MCHC RBC-ENTMCNC: 29.8 PG — SIGNIFICANT CHANGE UP (ref 27–34)
MCHC RBC-ENTMCNC: 33.8 GM/DL — SIGNIFICANT CHANGE UP (ref 32–36)
MCV RBC AUTO: 88.2 FL — SIGNIFICANT CHANGE UP (ref 80–100)
MONOCYTES # BLD AUTO: 0.65 K/UL — SIGNIFICANT CHANGE UP (ref 0–0.9)
MONOCYTES NFR BLD AUTO: 12.4 % — SIGNIFICANT CHANGE UP (ref 2–14)
NEUTROPHILS # BLD AUTO: 3 K/UL — SIGNIFICANT CHANGE UP (ref 1.8–7.4)
NEUTROPHILS NFR BLD AUTO: 56.9 % — SIGNIFICANT CHANGE UP (ref 43–77)
NRBC # BLD: 0 /100 WBCS — SIGNIFICANT CHANGE UP (ref 0–0)
PHOSPHATE SERPL-MCNC: 3.1 MG/DL — SIGNIFICANT CHANGE UP (ref 2.5–4.5)
PLATELET # BLD AUTO: 214 K/UL — SIGNIFICANT CHANGE UP (ref 150–400)
POTASSIUM SERPL-MCNC: 3.3 MMOL/L — LOW (ref 3.5–5.3)
POTASSIUM SERPL-SCNC: 3.3 MMOL/L — LOW (ref 3.5–5.3)
PROT SERPL-MCNC: 6.5 G/DL — SIGNIFICANT CHANGE UP (ref 6–8.3)
RBC # BLD: 2.72 M/UL — LOW (ref 4.2–5.8)
RBC # FLD: 13.7 % — SIGNIFICANT CHANGE UP (ref 10.3–14.5)
SODIUM SERPL-SCNC: 135 MMOL/L — SIGNIFICANT CHANGE UP (ref 135–145)
SPECIMEN SOURCE: SIGNIFICANT CHANGE UP
WBC # BLD: 5.26 K/UL — SIGNIFICANT CHANGE UP (ref 3.8–10.5)
WBC # FLD AUTO: 5.26 K/UL — SIGNIFICANT CHANGE UP (ref 3.8–10.5)

## 2023-03-07 RX ORDER — POTASSIUM CHLORIDE 20 MEQ
40 PACKET (EA) ORAL ONCE
Refills: 0 | Status: COMPLETED | OUTPATIENT
Start: 2023-03-07 | End: 2023-03-07

## 2023-03-07 RX ADMIN — Medication 6 UNIT(S): at 11:57

## 2023-03-07 RX ADMIN — INSULIN GLARGINE 12 UNIT(S): 100 INJECTION, SOLUTION SUBCUTANEOUS at 08:17

## 2023-03-07 RX ADMIN — Medication 100 MILLIGRAM(S): at 18:16

## 2023-03-07 RX ADMIN — CEFEPIME 100 MILLIGRAM(S): 1 INJECTION, POWDER, FOR SOLUTION INTRAMUSCULAR; INTRAVENOUS at 22:04

## 2023-03-07 RX ADMIN — Medication 2 MILLIGRAM(S): at 22:04

## 2023-03-07 RX ADMIN — ATORVASTATIN CALCIUM 80 MILLIGRAM(S): 80 TABLET, FILM COATED ORAL at 22:05

## 2023-03-07 RX ADMIN — CEFEPIME 100 MILLIGRAM(S): 1 INJECTION, POWDER, FOR SOLUTION INTRAMUSCULAR; INTRAVENOUS at 05:31

## 2023-03-07 RX ADMIN — Medication 6 UNIT(S): at 17:09

## 2023-03-07 RX ADMIN — Medication 1 APPLICATION(S): at 05:32

## 2023-03-07 RX ADMIN — Medication 1 PATCH: at 19:01

## 2023-03-07 RX ADMIN — Medication 1: at 17:09

## 2023-03-07 RX ADMIN — POLYETHYLENE GLYCOL 3350 17 GRAM(S): 17 POWDER, FOR SOLUTION ORAL at 11:55

## 2023-03-07 RX ADMIN — CEFEPIME 100 MILLIGRAM(S): 1 INJECTION, POWDER, FOR SOLUTION INTRAMUSCULAR; INTRAVENOUS at 14:01

## 2023-03-07 RX ADMIN — Medication 650 MILLIGRAM(S): at 00:00

## 2023-03-07 RX ADMIN — Medication 3 MILLILITER(S): at 09:18

## 2023-03-07 RX ADMIN — Medication 81 MILLIGRAM(S): at 11:54

## 2023-03-07 RX ADMIN — Medication 1 APPLICATION(S): at 17:10

## 2023-03-07 RX ADMIN — Medication 40 MILLIEQUIVALENT(S): at 11:53

## 2023-03-07 RX ADMIN — INSULIN GLARGINE 12 UNIT(S): 100 INJECTION, SOLUTION SUBCUTANEOUS at 22:04

## 2023-03-07 RX ADMIN — Medication 100 MILLIGRAM(S): at 06:53

## 2023-03-07 RX ADMIN — Medication 1: at 11:56

## 2023-03-07 RX ADMIN — Medication 3 MILLILITER(S): at 20:29

## 2023-03-07 RX ADMIN — Medication 6 UNIT(S): at 05:33

## 2023-03-07 RX ADMIN — Medication 1 PATCH: at 17:07

## 2023-03-07 RX ADMIN — Medication 166.67 MILLIGRAM(S): at 05:32

## 2023-03-07 RX ADMIN — Medication 3 MILLILITER(S): at 14:43

## 2023-03-07 RX ADMIN — Medication 1: at 05:32

## 2023-03-07 RX ADMIN — Medication 3 MILLILITER(S): at 02:29

## 2023-03-07 RX ADMIN — ENOXAPARIN SODIUM 40 MILLIGRAM(S): 100 INJECTION SUBCUTANEOUS at 11:54

## 2023-03-07 RX ADMIN — Medication 1 PATCH: at 11:55

## 2023-03-07 RX ADMIN — SENNA PLUS 10 MILLILITER(S): 8.6 TABLET ORAL at 11:55

## 2023-03-07 NOTE — DIETITIAN INITIAL EVALUATION ADULT - PERTINENT MEDS FT
MEDICATIONS  (STANDING):  albuterol/ipratropium for Nebulization 3 milliLiter(s) Nebulizer every 6 hours  amantadine Syrup 100 milliGRAM(s) Oral two times a day  aspirin  chewable 81 milliGRAM(s) Oral daily  atorvastatin 80 milliGRAM(s) Oral at bedtime  cefepime   IVPB      cefepime   IVPB 2000 milliGRAM(s) IV Intermittent every 8 hours  doxazosin 2 milliGRAM(s) Oral at bedtime  enoxaparin Injectable 40 milliGRAM(s) SubCutaneous every 24 hours  insulin glargine Injectable (LANTUS) 12 Unit(s) SubCutaneous every morning  insulin glargine Injectable (LANTUS) 12 Unit(s) SubCutaneous at bedtime  insulin lispro (ADMELOG) corrective regimen sliding scale   SubCutaneous every 6 hours  insulin lispro Injectable (ADMELOG) 6 Unit(s) SubCutaneous every 6 hours  nicotine -   7 mG/24Hr(s) Patch 1 Patch Transdermal daily  polyethylene glycol 3350 17 Gram(s) Oral daily  potassium chloride   Powder 40 milliEquivalent(s) Oral once  senna Syrup 10 milliLiter(s) Oral daily  silver sulfADIAZINE 1% Cream 1 Application(s) Topical two times a day  vancomycin  IVPB 1250 milliGRAM(s) IV Intermittent every 12 hours    MEDICATIONS  (PRN):  acetaminophen    Suspension .. 650 milliGRAM(s) Oral every 6 hours PRN Temp greater or equal to 38C (100.4F), Mild Pain (1 - 3)  aluminum hydroxide/magnesium hydroxide/simethicone Suspension 30 milliLiter(s) Oral every 4 hours PRN Dyspepsia  melatonin 3 milliGRAM(s) Oral at bedtime PRN Insomnia  ondansetron Injectable 4 milliGRAM(s) IV Push every 8 hours PRN Nausea and/or Vomiting

## 2023-03-07 NOTE — PROGRESS NOTE ADULT - SUBJECTIVE AND OBJECTIVE BOX
WILMA NICOLE    SCU progress note    INTERVAL HPI/OVERNIGHT EVENTS: ***    DNR [ ]   DNI  [  ]    Covid - 19 PCR:     The 4Ms    What Matters Most: see GOC  Age appropriate Medications/Screen for High Risk Medication: Yes  Mentation: see CAM below  Mobility: defer to physical exam    The Confusion Assessment Method (CAM) Diagnostic Algorithm     1: Acute Onset or Fluctuating Course  - Is there evidence of an acute change in mental status from the patient’s baseline? Did the (abnormal) behavior  fluctuate during the day, that is, tend to come and go, or increase and decrease in severity?  [ ] YES [ ] NO     2: Inattention  - Did the patient have difficulty focusing attention, being easily distractible, or having difficulty keeping track of what was being said?  [ ] YES [ ] NO     3: Disorganized thinking  -Was the patient’s thinking disorganized or incoherent, such as rambling or irrelevant conversation, unclear or illogical flow of ideas, or unpredictable switching from subject to subject?  [ ] YES [ ] NO    4: Altered Level of consciousness?  [ ] YES [ ] NO    The diagnosis of delirium by CAM requires the presence of features 1 and 2 and either 3 or 4.    PRESSORS: [ ] YES [ ] NO  cefepime   IVPB      cefepime   IVPB 2000 milliGRAM(s) IV Intermittent every 8 hours  vancomycin  IVPB 1250 milliGRAM(s) IV Intermittent every 12 hours    Cardiovascular:  Heart Failure  Acute   Acute on Chronic  Chronic       doxazosin 2 milliGRAM(s) Oral at bedtime    Pulmonary:  albuterol/ipratropium for Nebulization 3 milliLiter(s) Nebulizer every 6 hours    Hematalogic:  aspirin  chewable 81 milliGRAM(s) Oral daily  enoxaparin Injectable 40 milliGRAM(s) SubCutaneous every 24 hours    Other:  acetaminophen    Suspension .. 650 milliGRAM(s) Oral every 6 hours PRN  aluminum hydroxide/magnesium hydroxide/simethicone Suspension 30 milliLiter(s) Oral every 4 hours PRN  amantadine Syrup 100 milliGRAM(s) Oral two times a day  atorvastatin 80 milliGRAM(s) Oral at bedtime  insulin glargine Injectable (LANTUS) 12 Unit(s) SubCutaneous every morning  insulin glargine Injectable (LANTUS) 12 Unit(s) SubCutaneous at bedtime  insulin lispro (ADMELOG) corrective regimen sliding scale   SubCutaneous every 6 hours  insulin lispro Injectable (ADMELOG) 6 Unit(s) SubCutaneous every 6 hours  melatonin 3 milliGRAM(s) Oral at bedtime PRN  nicotine -   7 mG/24Hr(s) Patch 1 Patch Transdermal daily  ondansetron Injectable 4 milliGRAM(s) IV Push every 8 hours PRN  polyethylene glycol 3350 17 Gram(s) Oral daily  potassium chloride   Powder 40 milliEquivalent(s) Oral once  senna Syrup 10 milliLiter(s) Oral daily  silver sulfADIAZINE 1% Cream 1 Application(s) Topical two times a day    acetaminophen    Suspension .. 650 milliGRAM(s) Oral every 6 hours PRN  albuterol/ipratropium for Nebulization 3 milliLiter(s) Nebulizer every 6 hours  aluminum hydroxide/magnesium hydroxide/simethicone Suspension 30 milliLiter(s) Oral every 4 hours PRN  amantadine Syrup 100 milliGRAM(s) Oral two times a day  aspirin  chewable 81 milliGRAM(s) Oral daily  atorvastatin 80 milliGRAM(s) Oral at bedtime  cefepime   IVPB      cefepime   IVPB 2000 milliGRAM(s) IV Intermittent every 8 hours  doxazosin 2 milliGRAM(s) Oral at bedtime  enoxaparin Injectable 40 milliGRAM(s) SubCutaneous every 24 hours  insulin glargine Injectable (LANTUS) 12 Unit(s) SubCutaneous every morning  insulin glargine Injectable (LANTUS) 12 Unit(s) SubCutaneous at bedtime  insulin lispro (ADMELOG) corrective regimen sliding scale   SubCutaneous every 6 hours  insulin lispro Injectable (ADMELOG) 6 Unit(s) SubCutaneous every 6 hours  melatonin 3 milliGRAM(s) Oral at bedtime PRN  nicotine -   7 mG/24Hr(s) Patch 1 Patch Transdermal daily  ondansetron Injectable 4 milliGRAM(s) IV Push every 8 hours PRN  polyethylene glycol 3350 17 Gram(s) Oral daily  potassium chloride   Powder 40 milliEquivalent(s) Oral once  senna Syrup 10 milliLiter(s) Oral daily  silver sulfADIAZINE 1% Cream 1 Application(s) Topical two times a day  vancomycin  IVPB 1250 milliGRAM(s) IV Intermittent every 12 hours    Drug Dosing Weight  Height (cm): 177.8 (05 Mar 2023 03:36)  Weight (kg): 86.2 (05 Mar 2023 03:36)  BMI (kg/m2): 27.3 (05 Mar 2023 03:36)  BSA (m2): 2.04 (05 Mar 2023 03:36)    CENTRAL LINE: [ ] YES [ ] NO  LOCATION:   DATE INSERTED:  REMOVE: [ ] YES [ ] NO  EXPLAIN:    MELCHOR: [ ] YES [ ] NO    DATE INSERTED:  REMOVE:  [ ] YES [ ] NO  EXPLAIN:    PAST MEDICAL & SURGICAL HISTORY:  History of subdural hemorrhage      PEG (percutaneous endoscopic gastrostomy) status      DM (diabetes mellitus)      S/P craniotomy                  03-06 @ 07:01  -  03-07 @ 07:00  --------------------------------------------------------  IN: 0 mL / OUT: 1000 mL / NET: -1000 mL            PHYSICAL EXAM:    GENERAL: NAD, well-groomed, well-developed  HEAD:  Atraumatic, Normocephalic  EYES: EOMI, PERRLA, conjunctiva and sclera clear  ENMT: No tonsillar erythema, exudates, or enlargement; Moist mucous membranes, Good dentition, No lesions  NECK: Supple, No JVD, Normal thyroid  NERVOUS SYSTEM:  Alert & Oriented X3, Good concentration; Motor Strength 5/5 B/L upper and lower extremities; DTRs 2+ intact and symmetric  CHEST/LUNG: Clear to percussion bilaterally; No rales, rhonchi, wheezing, or rubs  HEART: Regular rate and rhythm; No murmurs, rubs, or gallops  ABDOMEN: Soft, Nontender, Nondistended; Bowel sounds present  EXTREMITIES:  2+ Peripheral Pulses, No clubbing, cyanosis, or edema  LYMPH: No lymphadenopathy noted  SKIN: No rashes or lesions      LABS:  CBC Full  -  ( 07 Mar 2023 06:49 )  WBC Count : 5.26 K/uL  RBC Count : 2.72 M/uL  Hemoglobin : 8.1 g/dL  Hematocrit : 24.0 %  Platelet Count - Automated : 214 K/uL  Mean Cell Volume : 88.2 fl  Mean Cell Hemoglobin : 29.8 pg  Mean Cell Hemoglobin Concentration : 33.8 gm/dL  Auto Neutrophil # : 3.00 K/uL  Auto Lymphocyte # : 1.35 K/uL  Auto Monocyte # : 0.65 K/uL  Auto Eosinophil # : 0.23 K/uL  Auto Basophil # : 0.02 K/uL  Auto Neutrophil % : 56.9 %  Auto Lymphocyte % : 25.7 %  Auto Monocyte % : 12.4 %  Auto Eosinophil % : 4.4 %  Auto Basophil % : 0.4 %    03-07    135  |  105  |  11  ----------------------------<  158<H>  3.3<L>   |  27  |  0.48<L>    Ca    8.2<L>      07 Mar 2023 06:49  Phos  3.1     03-07  Mg     2.2     03-07    TPro  6.5  /  Alb  1.8<L>  /  TBili  0.6  /  DBili  x   /  AST  76<H>  /  ALT  80<H>  /  AlkPhos  221<H>  03-07              [  ]  DVT Prophylaxis  [  ]  Nutrition, Brand, Rate         Goal Rate        Abnormal Nutritional Status -  Malnutrition   Cachexia      Morbid Obesity BMI >/=40    RADIOLOGY & ADDITIONAL STUDIES:  ***    Goals of Care Discussion with Family/Proxy/Other   - see note from/family meeting set up for...     WILMA NICOLE    SCU progress note    INTERVAL HPI/OVERNIGHT EVENTS: No events overnight.     Full Code.     Covid - 19 PCR: Negative 03/05/23     The 4Ms    What Matters Most: see GO  Age appropriate Medications/Screen for High Risk Medication: Yes  Mentation: see CAM below  Mobility: defer to physical exam    The Confusion Assessment Method (CAM) Diagnostic Algorithm     1: Acute Onset or Fluctuating Course  - Is there evidence of an acute change in mental status from the patient’s baseline? Did the (abnormal) behavior  fluctuate during the day, that is, tend to come and go, or increase and decrease in severity?  [ ] YES [x ] NO     2: Inattention  - Did the patient have difficulty focusing attention, being easily distractible, or having difficulty keeping track of what was being said?  [ ] YES [x ] NO     3: Disorganized thinking  -Was the patient’s thinking disorganized or incoherent, such as rambling or irrelevant conversation, unclear or illogical flow of ideas, or unpredictable switching from subject to subject?  [ ] YES [x ] NO    4: Altered Level of consciousness?  [ ] YES [x ] NO    The diagnosis of delirium by CAM requires the presence of features 1 and 2 and either 3 or 4.    PRESSORS: [ ] YES [ x] NO  cefepime   IVPB      cefepime   IVPB 2000 milliGRAM(s) IV Intermittent every 8 hours  vancomycin  IVPB 1250 milliGRAM(s) IV Intermittent every 12 hours    Cardiovascular:  Heart Failure  Acute   Acute on Chronic  Chronic       doxazosin 2 milliGRAM(s) Oral at bedtime    Pulmonary:  albuterol/ipratropium for Nebulization 3 milliLiter(s) Nebulizer every 6 hours    Hematalogic:  aspirin  chewable 81 milliGRAM(s) Oral daily  enoxaparin Injectable 40 milliGRAM(s) SubCutaneous every 24 hours    Other:  acetaminophen    Suspension .. 650 milliGRAM(s) Oral every 6 hours PRN  aluminum hydroxide/magnesium hydroxide/simethicone Suspension 30 milliLiter(s) Oral every 4 hours PRN  amantadine Syrup 100 milliGRAM(s) Oral two times a day  atorvastatin 80 milliGRAM(s) Oral at bedtime  insulin glargine Injectable (LANTUS) 12 Unit(s) SubCutaneous every morning  insulin glargine Injectable (LANTUS) 12 Unit(s) SubCutaneous at bedtime  insulin lispro (ADMELOG) corrective regimen sliding scale   SubCutaneous every 6 hours  insulin lispro Injectable (ADMELOG) 6 Unit(s) SubCutaneous every 6 hours  melatonin 3 milliGRAM(s) Oral at bedtime PRN  nicotine -   7 mG/24Hr(s) Patch 1 Patch Transdermal daily  ondansetron Injectable 4 milliGRAM(s) IV Push every 8 hours PRN  polyethylene glycol 3350 17 Gram(s) Oral daily  potassium chloride   Powder 40 milliEquivalent(s) Oral once  senna Syrup 10 milliLiter(s) Oral daily  silver sulfADIAZINE 1% Cream 1 Application(s) Topical two times a day    acetaminophen    Suspension .. 650 milliGRAM(s) Oral every 6 hours PRN  albuterol/ipratropium for Nebulization 3 milliLiter(s) Nebulizer every 6 hours  aluminum hydroxide/magnesium hydroxide/simethicone Suspension 30 milliLiter(s) Oral every 4 hours PRN  amantadine Syrup 100 milliGRAM(s) Oral two times a day  aspirin  chewable 81 milliGRAM(s) Oral daily  atorvastatin 80 milliGRAM(s) Oral at bedtime  cefepime   IVPB      cefepime   IVPB 2000 milliGRAM(s) IV Intermittent every 8 hours  doxazosin 2 milliGRAM(s) Oral at bedtime  enoxaparin Injectable 40 milliGRAM(s) SubCutaneous every 24 hours  insulin glargine Injectable (LANTUS) 12 Unit(s) SubCutaneous every morning  insulin glargine Injectable (LANTUS) 12 Unit(s) SubCutaneous at bedtime  insulin lispro (ADMELOG) corrective regimen sliding scale   SubCutaneous every 6 hours  insulin lispro Injectable (ADMELOG) 6 Unit(s) SubCutaneous every 6 hours  melatonin 3 milliGRAM(s) Oral at bedtime PRN  nicotine -   7 mG/24Hr(s) Patch 1 Patch Transdermal daily  ondansetron Injectable 4 milliGRAM(s) IV Push every 8 hours PRN  polyethylene glycol 3350 17 Gram(s) Oral daily  potassium chloride   Powder 40 milliEquivalent(s) Oral once  senna Syrup 10 milliLiter(s) Oral daily  silver sulfADIAZINE 1% Cream 1 Application(s) Topical two times a day  vancomycin  IVPB 1250 milliGRAM(s) IV Intermittent every 12 hours    Drug Dosing Weight  Height (cm): 177.8 (05 Mar 2023 03:36)  Weight (kg): 86.2 (05 Mar 2023 03:36)  BMI (kg/m2): 27.3 (05 Mar 2023 03:36)  BSA (m2): 2.04 (05 Mar 2023 03:36)    CENTRAL LINE: [ ] YES [ x] NO  LOCATION:   DATE INSERTED:  REMOVE: [ ] YES [ ] NO  EXPLAIN:    MELCHOR: [ ] YES [ ] NO    DATE INSERTED:  REMOVE:  [ ] YES [ ] NO  EXPLAIN:    PAST MEDICAL & SURGICAL HISTORY:  History of subdural hemorrhage      PEG (percutaneous endoscopic gastrostomy) status      DM (diabetes mellitus)      S/P craniotomy      03-06 @ 07:01  -  03-07 @ 07:00  --------------------------------------------------------  IN: 0 mL / OUT: 1000 mL / NET: -1000 mL      PHYSICAL EXAM:    GENERAL: NAD, well-groomed, well-developed  HEAD:  Atraumatic, Uneven scalp from previous craniotomy.   EYES: Does not track, conjunctiva and sclera clear  ENMT: No tonsillar erythema,Moist mucous membranes,   NECK: Supple, No JVD  NERVOUS SYSTEM:  Alert. Does not follow commands. Motor: 1/5 b/l.   CHEST/LUNG: Decreased at the bases bilaterally. No rales, rhonchi, wheezing, or rubs. Trach Collar in place. Hydro trach @ 3L.   HEART: Regular rate and rhythm; No murmurs, rubs, or gallops  ABDOMEN: Soft, Nontender, Nondistended; Bowel sounds present  EXTREMITIES:  2+ Peripheral Pulses, No clubbing, cyanosis, or edema  LYMPH: No obvious lymphadenopathy noted  SKIN: Stage 1 Pressure Injury to the Coccyx and Bilateral Heels, as evident by non-blanchable erythema.         LABS:  CBC Full  -  ( 07 Mar 2023 06:49 )  WBC Count : 5.26 K/uL  RBC Count : 2.72 M/uL  Hemoglobin : 8.1 g/dL  Hematocrit : 24.0 %  Platelet Count - Automated : 214 K/uL  Mean Cell Volume : 88.2 fl  Mean Cell Hemoglobin : 29.8 pg  Mean Cell Hemoglobin Concentration : 33.8 gm/dL  Auto Neutrophil # : 3.00 K/uL  Auto Lymphocyte # : 1.35 K/uL  Auto Monocyte # : 0.65 K/uL  Auto Eosinophil # : 0.23 K/uL  Auto Basophil # : 0.02 K/uL  Auto Neutrophil % : 56.9 %  Auto Lymphocyte % : 25.7 %  Auto Monocyte % : 12.4 %  Auto Eosinophil % : 4.4 %  Auto Basophil % : 0.4 %    03-07    135  |  105  |  11  ----------------------------<  158<H>  3.3<L>   |  27  |  0.48<L>    Ca    8.2<L>      07 Mar 2023 06:49  Phos  3.1     03-07  Mg     2.2     03-07    TPro  6.5  /  Alb  1.8<L>  /  TBili  0.6  /  DBili  x   /  AST  76<H>  /  ALT  80<H>  /  AlkPhos  221<H>  03-07      [  ]  DVT Prophylaxis  [  ]  Nutrition, Brand, Rate         Goal Rate        Abnormal Nutritional Status -  Malnutrition   Cachexia      Morbid Obesity BMI >/=40    RADIOLOGY & ADDITIONAL STUDIES:    < from: Xray Chest 1 View-PORTABLE IMMEDIATE (03.05.23 @ 05:57) >  ACC: [id number] EXAM:  XR CHEST PORTABLE IMMED 1V   ORDERED BY: JOSELYN GLASGOW     PROCEDURE DATE:  03/05/2023          INTERPRETATION:  AP chest.    CLINICAL INDICATION: Sepsis.    IMPRESSION: Heart is normal in size. There is a tracheostomy tube  present. There is mild bilateral airspace disease without a focal area of   opacification. This may be from pulmonary edema.    --- End of Report ---      NAHOMI SETH MD; Attending Radiologist  This document has been electronically signed. Mar  5 2023 11:37AM    < end of copied text >      Plan of care discussed with Dr. Roberts.

## 2023-03-07 NOTE — PROGRESS NOTE ADULT - PROBLEM SELECTOR PLAN 5
H&H low  Will order iron studies.  Continue to monitor daily. H&H low but stable.   F/u on iron studies.  Continue to monitor daily.

## 2023-03-07 NOTE — DIETITIAN INITIAL EVALUATION ADULT - PERTINENT LABORATORY DATA
03-07    135  |  105  |  11  ----------------------------<  158<H>  3.3<L>   |  27  |  0.48<L>    Ca    8.2<L>      07 Mar 2023 06:49  Phos  3.1     03-07  Mg     2.2     03-07    TPro  6.5  /  Alb  1.8<L>  /  TBili  0.6  /  DBili  x   /  AST  76<H>  /  ALT  80<H>  /  AlkPhos  221<H>  03-07  POCT Blood Glucose.: 154 mg/dL (03-07-23 @ 05:19)

## 2023-03-07 NOTE — DIETITIAN INITIAL EVALUATION ADULT - ENTERAL
Glucerna 1.5 Initial Goal Rate @ 50 ml /hr x 24 hr as tolerated to provide 1200 ml ,1800 calories, Protein 99 gms, free water 911 ml /day.

## 2023-03-07 NOTE — DIETITIAN INITIAL EVALUATION ADULT - OTHER INFO
Pt visited. Pt with Tracheostomy. TF Glucerna 1.5 infusing @ @ 40 ml /hr x 24 hr 960 ml , 1440  calories, Protein 79 gms, free water 729 ml /day.  D/W RN TF tolerated. Pt with Pressure ulcer Stage 1 @ B/L Heel and coccyx. Pt with blue Boots. Bed scale 164 lb. Labs noted.

## 2023-03-07 NOTE — PROGRESS NOTE ADULT - ASSESSMENT
62M from [facility], h/o traumatic subdural hemorrhage following a fall down the stairs while drunk, s/p r craniotomy at [facility] in 01/2023, s/p trach/PEG BIBEMS for tachycardia 170s, RR 27, increased secretions admitted to  for sepsis secondary to RUL pneumonia with tracheostomy to 3L NC.  3/6 Febrile 101.7  03/07: No events overnight. On [place]-trach at 3 L and tolerating well. Hemodynamically stable. Cultures in progress.    62M from [facility], h/o traumatic subdural hemorrhage following a fall down the stairs while drunk, s/p r craniotomy at [facility] in 01/2023, s/p trach/PEG BIBEMS for tachycardia 170s, RR 27, increased secretions admitted to  for sepsis secondary to RUL pneumonia with tracheostomy to 3L NC.  3/6 Febrile 101.7  03/07: No events overnight. On [place]-trach at 3 L and tolerating well. Hemodynamically stable. Cultures in progress. C/w Vanco and Cefepime. F/u Vanco trough. Hypokalemic this AM and replacement ordered.

## 2023-03-07 NOTE — PROGRESS NOTE ADULT - SUBJECTIVE AND OBJECTIVE BOX
Time of Visit:  Patient seen and examined. pat is laying in bed comfortable . o2 via TC     MEDICATIONS  (STANDING):  albuterol/ipratropium for Nebulization 3 milliLiter(s) Nebulizer every 6 hours  amantadine Syrup 100 milliGRAM(s) Oral two times a day  aspirin  chewable 81 milliGRAM(s) Oral daily  atorvastatin 80 milliGRAM(s) Oral at bedtime  cefepime   IVPB      cefepime   IVPB 2000 milliGRAM(s) IV Intermittent every 8 hours  doxazosin 2 milliGRAM(s) Oral at bedtime  enoxaparin Injectable 40 milliGRAM(s) SubCutaneous every 24 hours  insulin glargine Injectable (LANTUS) 12 Unit(s) SubCutaneous every morning  insulin glargine Injectable (LANTUS) 12 Unit(s) SubCutaneous at bedtime  insulin lispro (ADMELOG) corrective regimen sliding scale   SubCutaneous every 6 hours  insulin lispro Injectable (ADMELOG) 6 Unit(s) SubCutaneous every 6 hours  nicotine -   7 mG/24Hr(s) Patch 1 Patch Transdermal daily  polyethylene glycol 3350 17 Gram(s) Oral daily  senna Syrup 10 milliLiter(s) Oral daily  silver sulfADIAZINE 1% Cream 1 Application(s) Topical two times a day      MEDICATIONS  (PRN):  acetaminophen    Suspension .. 650 milliGRAM(s) Oral every 6 hours PRN Temp greater or equal to 38C (100.4F), Mild Pain (1 - 3)  aluminum hydroxide/magnesium hydroxide/simethicone Suspension 30 milliLiter(s) Oral every 4 hours PRN Dyspepsia  melatonin 3 milliGRAM(s) Oral at bedtime PRN Insomnia  ondansetron Injectable 4 milliGRAM(s) IV Push every 8 hours PRN Nausea and/or Vomiting       Medications up to date at time of exam.      PHYSICAL EXAMINATION:  Patient has no new complaints.  GENERAL: The patient is a well-developed, well-nourished, in no apparent distress.     Vital Signs Last 24 Hrs  T(C): 37.5 (07 Mar 2023 14:14), Max: 38 (06 Mar 2023 21:05)  T(F): 99.5 (07 Mar 2023 14:14), Max: 100.4 (06 Mar 2023 21:05)  HR: 99 (07 Mar 2023 14:14) (94 - 99)  BP: 118/73 (07 Mar 2023 14:14) (116/73 - 120/70)  BP(mean): --  RR: 19 (07 Mar 2023 14:14) (19 - 24)  SpO2: 100% (07 Mar 2023 14:14) (100% - 100%)    Parameters below as of 07 Mar 2023 14:14  Patient On (Oxygen Delivery Method): tracheostomy collar  O2 Flow (L/min): 3     (if applicable)    Chest Tube (if applicable)    HEENT: + skull depression from prior surgery     NECK: Supple, + trach     LUNGS: Clear to auscultation, no wheezing, rales, or rhonchi.    HEART: Regular rate and rhythm without murmur.    ABDOMEN: Soft, nontender, and nondistended.  No hepatosplenomegaly is noted + PEG     EXTREMITIES: Without any cyanosis, clubbing, rash, lesions or edema.    NEUROLOGIC: eyes open     SKIN: Warm, dry, good turgor.      LABS:                        8.1    5.26  )-----------( 214      ( 07 Mar 2023 06:49 )             24.0     03-07    135  |  105  |  11  ----------------------------<  158<H>  3.3<L>   |  27  |  0.48<L>    Ca    8.2<L>      07 Mar 2023 06:49  Phos  3.1     03-07  Mg     2.2     03-07    TPro  6.5  /  Alb  1.8<L>  /  TBili  0.6  /  DBili  x   /  AST  76<H>  /  ALT  80<H>  /  AlkPhos  221<H>  03-07                        MICROBIOLOGY: (if applicable)    RADIOLOGY & ADDITIONAL STUDIES:  EKG:   CXR:  ECHO:    IMPRESSION: 62y Male PAST MEDICAL & SURGICAL HISTORY:  History of subdural hemorrhage      PEG (percutaneous endoscopic gastrostomy) status      DM (diabetes mellitus)      S/P craniotomy       p/w         IMP: This is a 62 yr old man  from [facility], h/o traumatic subdural hemorrhage following a fall down the stairs while drunk, s/p r craniotomy at [facility] in 01/2023, s/p trach/PEG BIBEMS for tachycardia 170s, RR 27, increased secretions admitted to  for sepsis secondary to RUL pneumonia with chronic hypoxic resp failue s/p  tracheostomy on  3L NC.  3/6 Febrile 101.7  03/07: No events overnight. On [place]-trach at 3 L and tolerating well. Hemodynamically stable. Cultures in progress. C/w Vanco and Cefepime. F/u Vanco trough. Hypokalemic this AM and replacement ordered.   Pat admitted for acute on chronic hypoxic resp failure due to b/l PNA in setting of tracheostomy requiring supp O2     Plan   - Continue O2 supp via trach   - Trach care   - Antibx as per ICU team   - PEG feed   - Asp precaution   - Duonebs   - DVT GI prophy

## 2023-03-07 NOTE — PROGRESS NOTE ADULT - PROBLEM SELECTOR PLAN 6
Antihypertensives currently on hold secondary to sepsis.  Adjust medications as needed. Antihypertensives currently on hold secondary to sepsis.  Controlled.   Adjust medications as needed.

## 2023-03-07 NOTE — PROGRESS NOTE ADULT - PROBLEM SELECTOR PLAN 11
DVT and GI prophylaxis.  Continue antibiotics. Followup cultures results.  Continue trach collar. Monitor oxygen saturation.  Continue bronchodilators.  Wife and son updated at bedside. DVT and GI prophylaxis.  Continue antibiotics. Followup cultures results.  Continue trach collar. Monitor oxygen saturation.  Continue bronchodilators.  Wife and son updated at bedside.  Likely will return to [facility].

## 2023-03-07 NOTE — DIETITIAN INITIAL EVALUATION ADULT - ORAL INTAKE PTA/DIET HISTORY
NPO w TF , At NH Pt is on Glucerna 1.5  4 cans ( Bolus) /day.( 1424 calories, protein 80 gms) + Flush Water 300 ml BID.  +Pro source once a day.( 15 gms of proetin, 60 calories)

## 2023-03-07 NOTE — PROGRESS NOTE ADULT - SUBJECTIVE AND OBJECTIVE BOX
SUBJECTIVE / OVERNIGHT EVENTS:pt seen and examined  23     MEDICATIONS  (STANDING):  albuterol/ipratropium for Nebulization 3 milliLiter(s) Nebulizer every 6 hours  amantadine Syrup 100 milliGRAM(s) Oral two times a day  aspirin  chewable 81 milliGRAM(s) Oral daily  atorvastatin 80 milliGRAM(s) Oral at bedtime  cefepime   IVPB      cefepime   IVPB 2000 milliGRAM(s) IV Intermittent every 8 hours  doxazosin 2 milliGRAM(s) Oral at bedtime  enoxaparin Injectable 40 milliGRAM(s) SubCutaneous every 24 hours  insulin glargine Injectable (LANTUS) 12 Unit(s) SubCutaneous every morning  insulin glargine Injectable (LANTUS) 12 Unit(s) SubCutaneous at bedtime  insulin lispro (ADMELOG) corrective regimen sliding scale   SubCutaneous every 6 hours  insulin lispro Injectable (ADMELOG) 6 Unit(s) SubCutaneous every 6 hours  nicotine -   7 mG/24Hr(s) Patch 1 Patch Transdermal daily  polyethylene glycol 3350 17 Gram(s) Oral daily  senna Syrup 10 milliLiter(s) Oral daily  silver sulfADIAZINE 1% Cream 1 Application(s) Topical two times a day    MEDICATIONS  (PRN):  acetaminophen    Suspension .. 650 milliGRAM(s) Oral every 6 hours PRN Temp greater or equal to 38C (100.4F), Mild Pain (1 - 3)  aluminum hydroxide/magnesium hydroxide/simethicone Suspension 30 milliLiter(s) Oral every 4 hours PRN Dyspepsia  melatonin 3 milliGRAM(s) Oral at bedtime PRN Insomnia  ondansetron Injectable 4 milliGRAM(s) IV Push every 8 hours PRN Nausea and/or Vomiting    Vital Signs Last 24 Hrs  T(C): 37.6 (23 @ 21:14), Max: 37.6 (23 @ 10:00)  T(F): 99.7 (23 @ 21:14), Max: 99.7 (23 @ 21:14)  HR: 107 (23 @ 21:14) (94 - 107)  BP: 136/77 (23 @ 21:14) (116/73 - 136/77)  BP(mean): --  RR: 18 (23 @ 21:14) (18 - 20)  SpO2: 100% (23 @ 21:14) (100% - 100%)        PHYSICAL EXAM:  GENERAL: NAD  EYES: EOMI, PERRLA  trach site+  CHEST/LUNG: dec breath sounds at bases  HEART:  S1 , S2 +  ABDOMEN: soft, bs+, peg+  EXTREMITIES:  no edema      LABS:      135  |  105  |  11  ----------------------------<  158<H>  3.3<L>   |  27  |  0.48<L>    Ca    8.2<L>      07 Mar 2023 06:49  Phos  3.1       Mg     2.2         TPro  6.5  /  Alb  1.8<L>  /  TBili  0.6  /  DBili      /  AST  76<H>  /  ALT  80<H>  /  AlkPhos  221<H>      Creatinine Trend: 0.48 <--, 0.51 <--, 0.47 <--, 0.59 <--                        8.1    5.26  )-----------( 214      ( 07 Mar 2023 06:49 )             24.0     Urine Studies:  Urinalysis Basic - ( 05 Mar 2023 03:15 )    Color: Yellow / Appearance: Clear / S.010 / pH:   Gluc:  / Ketone: Negative  / Bili: Negative / Urobili: 12   Blood:  / Protein: 30 mg/dL / Nitrite: Negative   Leuk Esterase: Negative / RBC: 2-5 /HPF / WBC 0-2 /HPF   Sq Epi:  / Non Sq Epi: Few /HPF / Bacteria: Few /HPF              LIVER FUNCTIONS - ( 07 Mar 2023 06:49 )  Alb: 1.8 g/dL / Pro: 6.5 g/dL / ALK PHOS: 221 U/L / ALT: 80 U/L DA / AST: 76 U/L / GGT: x             Preliminary Report (23 @ 07:52):    No growth to date.      RADIOLOGY & ADDITIONAL TESTS:    Imaging Personally Reviewed:yes    Consultant(s) Notes Reviewed:  yes    Care Discussed with Consultants/Other Providers:yes

## 2023-03-07 NOTE — PROGRESS NOTE ADULT - PROBLEM SELECTOR PLAN 3
Secondary to SDH and craniotomy  Craniotomy at [facility] 1/23  Maintain safety precautions  Strict aspiration precautions.

## 2023-03-08 DIAGNOSIS — J18.9 PNEUMONIA, UNSPECIFIED ORGANISM: ICD-10-CM

## 2023-03-08 DIAGNOSIS — B37.9 CANDIDIASIS, UNSPECIFIED: ICD-10-CM

## 2023-03-08 LAB
ALBUMIN SERPL ELPH-MCNC: 2 G/DL — LOW (ref 3.5–5)
ALP SERPL-CCNC: 238 U/L — HIGH (ref 40–120)
ALT FLD-CCNC: 94 U/L DA — HIGH (ref 10–60)
ANION GAP SERPL CALC-SCNC: 7 MMOL/L — SIGNIFICANT CHANGE UP (ref 5–17)
AST SERPL-CCNC: 94 U/L — HIGH (ref 10–40)
BILIRUB SERPL-MCNC: 0.6 MG/DL — SIGNIFICANT CHANGE UP (ref 0.2–1.2)
BUN SERPL-MCNC: 13 MG/DL — SIGNIFICANT CHANGE UP (ref 7–18)
CALCIUM SERPL-MCNC: 8.6 MG/DL — SIGNIFICANT CHANGE UP (ref 8.4–10.5)
CHLORIDE SERPL-SCNC: 104 MMOL/L — SIGNIFICANT CHANGE UP (ref 96–108)
CO2 SERPL-SCNC: 27 MMOL/L — SIGNIFICANT CHANGE UP (ref 22–31)
CREAT SERPL-MCNC: 0.46 MG/DL — LOW (ref 0.5–1.3)
EGFR: 120 ML/MIN/1.73M2 — SIGNIFICANT CHANGE UP
FERRITIN SERPL-MCNC: 685 NG/ML — HIGH (ref 30–400)
GLUCOSE SERPL-MCNC: 143 MG/DL — HIGH (ref 70–99)
HCT VFR BLD CALC: 26.5 % — LOW (ref 39–50)
HGB BLD-MCNC: 8.4 G/DL — LOW (ref 13–17)
MAGNESIUM SERPL-MCNC: 2.4 MG/DL — SIGNIFICANT CHANGE UP (ref 1.6–2.6)
MCHC RBC-ENTMCNC: 29 PG — SIGNIFICANT CHANGE UP (ref 27–34)
MCHC RBC-ENTMCNC: 31.7 GM/DL — LOW (ref 32–36)
MCV RBC AUTO: 91.4 FL — SIGNIFICANT CHANGE UP (ref 80–100)
NRBC # BLD: 0 /100 WBCS — SIGNIFICANT CHANGE UP (ref 0–0)
PHOSPHATE SERPL-MCNC: 3.2 MG/DL — SIGNIFICANT CHANGE UP (ref 2.5–4.5)
PLATELET # BLD AUTO: 291 K/UL — SIGNIFICANT CHANGE UP (ref 150–400)
POTASSIUM SERPL-MCNC: 4 MMOL/L — SIGNIFICANT CHANGE UP (ref 3.5–5.3)
POTASSIUM SERPL-SCNC: 4 MMOL/L — SIGNIFICANT CHANGE UP (ref 3.5–5.3)
PROT SERPL-MCNC: 7.4 G/DL — SIGNIFICANT CHANGE UP (ref 6–8.3)
RBC # BLD: 2.9 M/UL — LOW (ref 4.2–5.8)
RBC # FLD: 13.7 % — SIGNIFICANT CHANGE UP (ref 10.3–14.5)
SODIUM SERPL-SCNC: 138 MMOL/L — SIGNIFICANT CHANGE UP (ref 135–145)
WBC # BLD: 6.09 K/UL — SIGNIFICANT CHANGE UP (ref 3.8–10.5)
WBC # FLD AUTO: 6.09 K/UL — SIGNIFICANT CHANGE UP (ref 3.8–10.5)

## 2023-03-08 RX ADMIN — Medication 3 MILLILITER(S): at 16:08

## 2023-03-08 RX ADMIN — Medication 1 APPLICATION(S): at 17:36

## 2023-03-08 RX ADMIN — Medication 1: at 06:13

## 2023-03-08 RX ADMIN — INSULIN GLARGINE 12 UNIT(S): 100 INJECTION, SOLUTION SUBCUTANEOUS at 22:12

## 2023-03-08 RX ADMIN — Medication 2 MILLIGRAM(S): at 22:13

## 2023-03-08 RX ADMIN — Medication 1 PATCH: at 12:03

## 2023-03-08 RX ADMIN — Medication 6 UNIT(S): at 12:02

## 2023-03-08 RX ADMIN — ENOXAPARIN SODIUM 40 MILLIGRAM(S): 100 INJECTION SUBCUTANEOUS at 10:13

## 2023-03-08 RX ADMIN — Medication 100 MILLIGRAM(S): at 06:51

## 2023-03-08 RX ADMIN — Medication 6 UNIT(S): at 00:12

## 2023-03-08 RX ADMIN — Medication 3 MILLILITER(S): at 08:49

## 2023-03-08 RX ADMIN — Medication 100 MILLIGRAM(S): at 18:08

## 2023-03-08 RX ADMIN — Medication 6 UNIT(S): at 17:36

## 2023-03-08 RX ADMIN — Medication 6 UNIT(S): at 23:06

## 2023-03-08 RX ADMIN — INSULIN GLARGINE 12 UNIT(S): 100 INJECTION, SOLUTION SUBCUTANEOUS at 08:24

## 2023-03-08 RX ADMIN — Medication 1 APPLICATION(S): at 05:34

## 2023-03-08 RX ADMIN — CEFEPIME 100 MILLIGRAM(S): 1 INJECTION, POWDER, FOR SOLUTION INTRAMUSCULAR; INTRAVENOUS at 05:34

## 2023-03-08 RX ADMIN — CEFEPIME 100 MILLIGRAM(S): 1 INJECTION, POWDER, FOR SOLUTION INTRAMUSCULAR; INTRAVENOUS at 13:02

## 2023-03-08 RX ADMIN — Medication 3 MILLILITER(S): at 03:25

## 2023-03-08 RX ADMIN — Medication 1: at 00:11

## 2023-03-08 RX ADMIN — Medication 1 PATCH: at 07:45

## 2023-03-08 RX ADMIN — SENNA PLUS 10 MILLILITER(S): 8.6 TABLET ORAL at 12:02

## 2023-03-08 RX ADMIN — Medication 1: at 12:01

## 2023-03-08 RX ADMIN — Medication 81 MILLIGRAM(S): at 12:02

## 2023-03-08 RX ADMIN — CEFEPIME 100 MILLIGRAM(S): 1 INJECTION, POWDER, FOR SOLUTION INTRAMUSCULAR; INTRAVENOUS at 22:13

## 2023-03-08 RX ADMIN — Medication 6 UNIT(S): at 06:14

## 2023-03-08 RX ADMIN — Medication 1 PATCH: at 12:06

## 2023-03-08 RX ADMIN — Medication 3 MILLILITER(S): at 20:17

## 2023-03-08 RX ADMIN — ATORVASTATIN CALCIUM 80 MILLIGRAM(S): 80 TABLET, FILM COATED ORAL at 22:13

## 2023-03-08 RX ADMIN — Medication 1 PATCH: at 18:47

## 2023-03-08 RX ADMIN — POLYETHYLENE GLYCOL 3350 17 GRAM(S): 17 POWDER, FOR SOLUTION ORAL at 12:03

## 2023-03-08 NOTE — PROGRESS NOTE ADULT - PROBLEM SELECTOR PLAN 3
Secondary to SDH and craniotomy  Craniotomy at [facility] 1/23  Maintain safety precautions  Strict aspiration precautions.

## 2023-03-08 NOTE — PROGRESS NOTE ADULT - PROBLEM SELECTOR PLAN 4
Likely secondary to aspiration pna.  Continue cefepime.  Vanco discontinued on 03/07 since MRSA negative.   urine growing Candida Galbrata and sputum + Yeast.   Patient afebrile and urine appears clear.   Dr. Peres consulted on 3/7.  Blood cx NGTD.  Plan to return to [facility] when optimized.

## 2023-03-08 NOTE — PROGRESS NOTE ADULT - ASSESSMENT
62M from [facility], h/o traumatic subdural hemorrhage following a fall down the stairs while drunk, s/p r craniotomy at [facility] in 01/2023, s/p trach/PEG BIBEMS for tachycardia 170s, RR 27, increased secretions admitted to  for sepsis secondary to RUL pneumonia with tracheostomy to 3L NC.  3/6 Febrile 101.7

## 2023-03-08 NOTE — PROGRESS NOTE ADULT - ASSESSMENT
62M from [facility], h/o traumatic subdural hemorrhage following a fall down the stairs while drunk, s/p r craniotomy at [facility] in 01/2023, s/p trach/PEG BIBEMS for tachycardia 170s, RR 27, increased secretions admitted to  for sepsis secondary to RUL pneumonia with tracheostomy to 3L NC.  3/6 Febrile 101.7  03/07: No events overnight. On [place]-trach at 3 L and tolerating well. Hemodynamically stable. Cultures in progress. C/w Vanco and Cefepime. F/u Vanco trough. Hypokalemic this AM and replacement ordered.   03/08: Afebrile. Vanco discontinued since MRSA negative. + yeast in sputum; + Candida Galbrata in urine (Bran in place with clear urine and afebrile).   Dr. Peres following. Blood cx NGTD. C/w Cefepime.

## 2023-03-08 NOTE — CONSULT NOTE ADULT - ASSESSMENT
Bilat Pneumonia  Fevers - improved      Plan - Cont Maxipime 2gms iv q8hrs  No need to treat the Torulopsis Glabrata in urine at this time as his urine is clear.

## 2023-03-08 NOTE — PROGRESS NOTE ADULT - PROBLEM SELECTOR PLAN 3
+ yeast in sputum  + Candida Galbrata in urine (Bran in place with clear urine and afebrile).   Dr. Peres following  Blood cx NGTD

## 2023-03-08 NOTE — PROGRESS NOTE ADULT - PROBLEM SELECTOR PLAN 1
Continue trach collar @ 3L.  Maintain O2 Sat > 94%   Monitor oxygen saturation.  Continue bronchodilators via nebulizer  blood cultures NGTD.   Continue cefepime for PNA  Vanco discontinued on 3/7.

## 2023-03-08 NOTE — PROGRESS NOTE ADULT - PROBLEM SELECTOR PLAN 8
Antihypertensives currently on hold secondary to sepsis.  Controlled.   Adjust medications as needed.

## 2023-03-08 NOTE — PROGRESS NOTE ADULT - PROBLEM SELECTOR PLAN 5
Secondary to SDH and craniotomy  Craniotomy at [facility] 1/23  Maintain safety precautions  Strict aspiration precautions.

## 2023-03-08 NOTE — PROGRESS NOTE ADULT - SUBJECTIVE AND OBJECTIVE BOX
WILMA NICOLE    SCU progress note    INTERVAL HPI/OVERNIGHT EVENTS: No acute events overnight.     DNR [x ]   DNI  [ x ]    Covid - 19 PCR: negative 3/5.     The 4Ms    What Matters Most: see [facility]  Age appropriate Medications/Screen for High Risk Medication: Yes  Mentation: see CAM below  Mobility: defer to physical exam    The Confusion Assessment Method (CAM) Diagnostic Algorithm     1: Acute Onset or Fluctuating Course  - Is there evidence of an acute change in mental status from the patient’s baseline? Did the (abnormal) behavior  fluctuate during the day, that is, tend to come and go, or increase and decrease in severity?  [ ] YES [x ] NO     2: Inattention  - Did the patient have difficulty focusing attention, being easily distractible, or having difficulty keeping track of what was being said?  [ ] YES [x ] NO     3: Disorganized thinking  -Was the patient’s thinking disorganized or incoherent, such as rambling or irrelevant conversation, unclear or illogical flow of ideas, or unpredictable switching from subject to subject?  [ ] YES [x ] NO    4: Altered Level of consciousness?  [ ] YES [x ] NO    The diagnosis of delirium by CAM requires the presence of features 1 and 2 and either 3 or 4.    PRESSORS: [ ] YES [ x] NO  cefepime   IVPB      cefepime   IVPB 2000 milliGRAM(s) IV Intermittent every 8 hours    Cardiovascular:  Heart Failure  Acute   Acute on Chronic  Chronic       doxazosin 2 milliGRAM(s) Oral at bedtime    Pulmonary:  albuterol/ipratropium for Nebulization 3 milliLiter(s) Nebulizer every 6 hours    Hematalogic:  aspirin  chewable 81 milliGRAM(s) Oral daily  enoxaparin Injectable 40 milliGRAM(s) SubCutaneous every 24 hours    Other:  acetaminophen    Suspension .. 650 milliGRAM(s) Oral every 6 hours PRN  aluminum hydroxide/magnesium hydroxide/simethicone Suspension 30 milliLiter(s) Oral every 4 hours PRN  amantadine Syrup 100 milliGRAM(s) Oral two times a day  atorvastatin 80 milliGRAM(s) Oral at bedtime  insulin glargine Injectable (LANTUS) 12 Unit(s) SubCutaneous every morning  insulin glargine Injectable (LANTUS) 12 Unit(s) SubCutaneous at bedtime  insulin lispro (ADMELOG) corrective regimen sliding scale   SubCutaneous every 6 hours  insulin lispro Injectable (ADMELOG) 6 Unit(s) SubCutaneous every 6 hours  melatonin 3 milliGRAM(s) Oral at bedtime PRN  nicotine -   7 mG/24Hr(s) Patch 1 Patch Transdermal daily  ondansetron Injectable 4 milliGRAM(s) IV Push every 8 hours PRN  polyethylene glycol 3350 17 Gram(s) Oral daily  senna Syrup 10 milliLiter(s) Oral daily  silver sulfADIAZINE 1% Cream 1 Application(s) Topical two times a day    acetaminophen    Suspension .. 650 milliGRAM(s) Oral every 6 hours PRN  albuterol/ipratropium for Nebulization 3 milliLiter(s) Nebulizer every 6 hours  aluminum hydroxide/magnesium hydroxide/simethicone Suspension 30 milliLiter(s) Oral every 4 hours PRN  amantadine Syrup 100 milliGRAM(s) Oral two times a day  aspirin  chewable 81 milliGRAM(s) Oral daily  atorvastatin 80 milliGRAM(s) Oral at bedtime  cefepime   IVPB      cefepime   IVPB 2000 milliGRAM(s) IV Intermittent every 8 hours  doxazosin 2 milliGRAM(s) Oral at bedtime  enoxaparin Injectable 40 milliGRAM(s) SubCutaneous every 24 hours  insulin glargine Injectable (LANTUS) 12 Unit(s) SubCutaneous every morning  insulin glargine Injectable (LANTUS) 12 Unit(s) SubCutaneous at bedtime  insulin lispro (ADMELOG) corrective regimen sliding scale   SubCutaneous every 6 hours  insulin lispro Injectable (ADMELOG) 6 Unit(s) SubCutaneous every 6 hours  melatonin 3 milliGRAM(s) Oral at bedtime PRN  nicotine -   7 mG/24Hr(s) Patch 1 Patch Transdermal daily  ondansetron Injectable 4 milliGRAM(s) IV Push every 8 hours PRN  polyethylene glycol 3350 17 Gram(s) Oral daily  senna Syrup 10 milliLiter(s) Oral daily  silver sulfADIAZINE 1% Cream 1 Application(s) Topical two times a day    Drug Dosing Weight  Height (cm): 177.8 (05 Mar 2023 03:36)  Weight (kg): 86.2 (05 Mar 2023 03:36)  BMI (kg/m2): 27.3 (05 Mar 2023 03:36)  BSA (m2): 2.04 (05 Mar 2023 03:36)    CENTRAL LINE: [ ] YES [x ] NO  LOCATION:   DATE INSERTED:  REMOVE: [ ] YES [ ] NO  EXPLAIN:    MELCHOR: [ x] YES [ ] NO    DATE INSERTED:  REMOVE:  [ ] YES [ ] NO  EXPLAIN:    PAST MEDICAL & SURGICAL HISTORY:  History of subdural hemorrhage      PEG (percutaneous endoscopic gastrostomy) status      DM (diabetes mellitus)      S/P craniotomy    03-07 @ 07:01  -  03-08 @ 07:00  --------------------------------------------------------  IN: 0 mL / OUT: 2450 mL / NET: -2450 mL    PHYSICAL EXAM:    GENERAL: NAD, well-groomed, well-developed  HEAD:  Atraumatic, Asymmetrical skull 2/2 hx of craniotomy.   EYES: PERRLA, conjunctiva and sclera clear.   ENMT: Moist mucous membranes.   NECK: Supple, No JVD.   NERVOUS SYSTEM:  Opens eyes spontaneously. Does not track. Does not move extremities.   CHEST/LUNG: Decreased at the bases bilaterally. On [place] trach @ 3l. ; No rales, rhonchi, wheezing, or rubs.   HEART: Regular rate and rhythm; No murmurs, rubs, or gallops  ABDOMEN: Soft, Nontender, Nondistended; Bowel sounds present  EXTREMITIES:  2+ Peripheral Pulses, No clubbing, cyanosis, or edema  LYMPH: No lymphadenopathy noted  SKIN: No rashes or lesions      LABS:  CBC Full  -  ( 08 Mar 2023 06:35 )  WBC Count : 6.09 K/uL  RBC Count : 2.90 M/uL  Hemoglobin : 8.4 g/dL  Hematocrit : 26.5 %  Platelet Count - Automated : 291 K/uL  Mean Cell Volume : 91.4 fl  Mean Cell Hemoglobin : 29.0 pg  Mean Cell Hemoglobin Concentration : 31.7 gm/dL  Auto Neutrophil # : x  Auto Lymphocyte # : x  Auto Monocyte # : x  Auto Eosinophil # : x  Auto Basophil # : x  Auto Neutrophil % : x  Auto Lymphocyte % : x  Auto Monocyte % : x  Auto Eosinophil % : x  Auto Basophil % : x    03-08    138  |  104  |  13  ----------------------------<  143<H>  4.0   |  27  |  0.46<L>    Ca    8.6      08 Mar 2023 06:35  Phos  3.2     03-08  Mg     2.4     03-08    TPro  7.4  /  Alb  2.0<L>  /  TBili  0.6  /  DBili  x   /  AST  94<H>  /  ALT  94<H>  /  AlkPhos  238<H>  03-08    [ x ]  DVT Prophylaxis Lovenox.     RADIOLOGY & ADDITIONAL STUDIES:    < from: Xray Chest 1 View-PORTABLE IMMEDIATE (03.05.23 @ 05:57) >  ACC: [id number] EXAM:  XR CHEST PORTABLE IMMED 1V   ORDERED BY: JOSELYN GLASGOW     PROCEDURE DATE:  03/05/2023          INTERPRETATION:  AP chest.    CLINICAL INDICATION: Sepsis.    IMPRESSION: Heart is normal in size. There is a tracheostomy tube  present. There is mild bilateral airspace disease without a focal area of   opacification. This may be from pulmonary edema.    --- End of Report ---      NAHOMI SETH MD; Attending Radiologist  This document has been electronically signed. Mar  5 2023 11:37AM    < end of copied text >      Plan of care discussed with intensivist.      WILMA NICOLE    SCU progress note    INTERVAL HPI/OVERNIGHT EVENTS: No acute events overnight.     DNR [x ]   DNI  [ x ]    Covid - 19 PCR: negative 3/5.     The 4Ms    What Matters Most: see [facility]  Age appropriate Medications/Screen for High Risk Medication: Yes  Mentation: see CAM below  Mobility: defer to physical exam    The Confusion Assessment Method (CAM) Diagnostic Algorithm     1: Acute Onset or Fluctuating Course  - Is there evidence of an acute change in mental status from the patient’s baseline? Did the (abnormal) behavior  fluctuate during the day, that is, tend to come and go, or increase and decrease in severity?  [ ] YES [x ] NO     2: Inattention  - Did the patient have difficulty focusing attention, being easily distractible, or having difficulty keeping track of what was being said?  [ ] YES [x ] NO     3: Disorganized thinking  -Was the patient’s thinking disorganized or incoherent, such as rambling or irrelevant conversation, unclear or illogical flow of ideas, or unpredictable switching from subject to subject?  [ ] YES [x ] NO    4: Altered Level of consciousness?  [ ] YES [x ] NO    The diagnosis of delirium by CAM requires the presence of features 1 and 2 and either 3 or 4.    PRESSORS: [ ] YES [ x] NO  cefepime   IVPB      cefepime   IVPB 2000 milliGRAM(s) IV Intermittent every 8 hours    Cardiovascular:  Heart Failure  Acute   Acute on Chronic  Chronic       doxazosin 2 milliGRAM(s) Oral at bedtime    Pulmonary:  albuterol/ipratropium for Nebulization 3 milliLiter(s) Nebulizer every 6 hours    Hematalogic:  aspirin  chewable 81 milliGRAM(s) Oral daily  enoxaparin Injectable 40 milliGRAM(s) SubCutaneous every 24 hours    Other:  acetaminophen    Suspension .. 650 milliGRAM(s) Oral every 6 hours PRN  aluminum hydroxide/magnesium hydroxide/simethicone Suspension 30 milliLiter(s) Oral every 4 hours PRN  amantadine Syrup 100 milliGRAM(s) Oral two times a day  atorvastatin 80 milliGRAM(s) Oral at bedtime  insulin glargine Injectable (LANTUS) 12 Unit(s) SubCutaneous every morning  insulin glargine Injectable (LANTUS) 12 Unit(s) SubCutaneous at bedtime  insulin lispro (ADMELOG) corrective regimen sliding scale   SubCutaneous every 6 hours  insulin lispro Injectable (ADMELOG) 6 Unit(s) SubCutaneous every 6 hours  melatonin 3 milliGRAM(s) Oral at bedtime PRN  nicotine -   7 mG/24Hr(s) Patch 1 Patch Transdermal daily  ondansetron Injectable 4 milliGRAM(s) IV Push every 8 hours PRN  polyethylene glycol 3350 17 Gram(s) Oral daily  senna Syrup 10 milliLiter(s) Oral daily  silver sulfADIAZINE 1% Cream 1 Application(s) Topical two times a day    acetaminophen    Suspension .. 650 milliGRAM(s) Oral every 6 hours PRN  albuterol/ipratropium for Nebulization 3 milliLiter(s) Nebulizer every 6 hours  aluminum hydroxide/magnesium hydroxide/simethicone Suspension 30 milliLiter(s) Oral every 4 hours PRN  amantadine Syrup 100 milliGRAM(s) Oral two times a day  aspirin  chewable 81 milliGRAM(s) Oral daily  atorvastatin 80 milliGRAM(s) Oral at bedtime  cefepime   IVPB      cefepime   IVPB 2000 milliGRAM(s) IV Intermittent every 8 hours  doxazosin 2 milliGRAM(s) Oral at bedtime  enoxaparin Injectable 40 milliGRAM(s) SubCutaneous every 24 hours  insulin glargine Injectable (LANTUS) 12 Unit(s) SubCutaneous every morning  insulin glargine Injectable (LANTUS) 12 Unit(s) SubCutaneous at bedtime  insulin lispro (ADMELOG) corrective regimen sliding scale   SubCutaneous every 6 hours  insulin lispro Injectable (ADMELOG) 6 Unit(s) SubCutaneous every 6 hours  melatonin 3 milliGRAM(s) Oral at bedtime PRN  nicotine -   7 mG/24Hr(s) Patch 1 Patch Transdermal daily  ondansetron Injectable 4 milliGRAM(s) IV Push every 8 hours PRN  polyethylene glycol 3350 17 Gram(s) Oral daily  senna Syrup 10 milliLiter(s) Oral daily  silver sulfADIAZINE 1% Cream 1 Application(s) Topical two times a day    Drug Dosing Weight  Height (cm): 177.8 (05 Mar 2023 03:36)  Weight (kg): 86.2 (05 Mar 2023 03:36)  BMI (kg/m2): 27.3 (05 Mar 2023 03:36)  BSA (m2): 2.04 (05 Mar 2023 03:36)    CENTRAL LINE: [ ] YES [x ] NO  LOCATION:   DATE INSERTED:  REMOVE: [ ] YES [ ] NO  EXPLAIN:    MELCHOR: [ x] YES [ ] NO    DATE INSERTED:  REMOVE:  [ ] YES [ ] NO  EXPLAIN:    PAST MEDICAL & SURGICAL HISTORY:  History of subdural hemorrhage      PEG (percutaneous endoscopic gastrostomy) status      DM (diabetes mellitus)      S/P craniotomy    03-07 @ 07:01  -  03-08 @ 07:00  --------------------------------------------------------  IN: 0 mL / OUT: 2450 mL / NET: -2450 mL    PHYSICAL EXAM:    GENERAL: NAD, well-groomed, well-developed  HEAD:   Asymmetrical skull 2/2 hx of craniotomy.   EYES: PERRLA, conjunctiva and sclera clear.   ENMT: Moist mucous membranes.   NECK: Supple, No JVD.   NERVOUS SYSTEM:  Opens eyes spontaneously. Does not track. Does not move extremities.   CHEST/LUNG: Decreased at the bases bilaterally. On [place] trach @ 3l. ; No rales, rhonchi, wheezing, or rubs.   HEART: Regular rate and rhythm; No murmurs, rubs, or gallops  ABDOMEN: Soft, Nontender, Nondistended; Bowel sounds present  EXTREMITIES:  2+ Peripheral Pulses, No clubbing, cyanosis, or edema  LYMPH: No lymphadenopathy noted  SKIN: No rashes or lesions      LABS:  CBC Full  -  ( 08 Mar 2023 06:35 )  WBC Count : 6.09 K/uL  RBC Count : 2.90 M/uL  Hemoglobin : 8.4 g/dL  Hematocrit : 26.5 %  Platelet Count - Automated : 291 K/uL  Mean Cell Volume : 91.4 fl  Mean Cell Hemoglobin : 29.0 pg  Mean Cell Hemoglobin Concentration : 31.7 gm/dL  Auto Neutrophil # : x  Auto Lymphocyte # : x  Auto Monocyte # : x  Auto Eosinophil # : x  Auto Basophil # : x  Auto Neutrophil % : x  Auto Lymphocyte % : x  Auto Monocyte % : x  Auto Eosinophil % : x  Auto Basophil % : x    03-08    138  |  104  |  13  ----------------------------<  143<H>  4.0   |  27  |  0.46<L>    Ca    8.6      08 Mar 2023 06:35  Phos  3.2     03-08  Mg     2.4     03-08    TPro  7.4  /  Alb  2.0<L>  /  TBili  0.6  /  DBili  x   /  AST  94<H>  /  ALT  94<H>  /  AlkPhos  238<H>  03-08    [ x ]  DVT Prophylaxis Lovenox.     RADIOLOGY & ADDITIONAL STUDIES:    < from: Xray Chest 1 View-PORTABLE IMMEDIATE (03.05.23 @ 05:57) >  ACC: [id number] EXAM:  XR CHEST PORTABLE IMMED 1V   ORDERED BY: JOSELYN GLASGOW     PROCEDURE DATE:  03/05/2023          INTERPRETATION:  AP chest.    CLINICAL INDICATION: Sepsis.    IMPRESSION: Heart is normal in size. There is a tracheostomy tube  present. There is mild bilateral airspace disease without a focal area of   opacification. This may be from pulmonary edema.    --- End of Report ---      NAHOMI SETH MD; Attending Radiologist  This document has been electronically signed. Mar  5 2023 11:37AM    < end of copied text >      Plan of care discussed with intensivist.

## 2023-03-08 NOTE — PROGRESS NOTE ADULT - SUBJECTIVE AND OBJECTIVE BOX
SUBJECTIVE / OVERNIGHT EVENTS:pt seen and examined  23     MEDICATIONS  (STANDING):  albuterol/ipratropium for Nebulization 3 milliLiter(s) Nebulizer every 6 hours  amantadine Syrup 100 milliGRAM(s) Oral two times a day  aspirin  chewable 81 milliGRAM(s) Oral daily  atorvastatin 80 milliGRAM(s) Oral at bedtime  cefepime   IVPB      cefepime   IVPB 2000 milliGRAM(s) IV Intermittent every 8 hours  doxazosin 2 milliGRAM(s) Oral at bedtime  enoxaparin Injectable 40 milliGRAM(s) SubCutaneous every 24 hours  insulin glargine Injectable (LANTUS) 12 Unit(s) SubCutaneous every morning  insulin glargine Injectable (LANTUS) 12 Unit(s) SubCutaneous at bedtime  insulin lispro (ADMELOG) corrective regimen sliding scale   SubCutaneous every 6 hours  insulin lispro Injectable (ADMELOG) 6 Unit(s) SubCutaneous every 6 hours  nicotine -   7 mG/24Hr(s) Patch 1 Patch Transdermal daily  polyethylene glycol 3350 17 Gram(s) Oral daily  senna Syrup 10 milliLiter(s) Oral daily  silver sulfADIAZINE 1% Cream 1 Application(s) Topical two times a day    MEDICATIONS  (PRN):  acetaminophen    Suspension .. 650 milliGRAM(s) Oral every 6 hours PRN Temp greater or equal to 38C (100.4F), Mild Pain (1 - 3)  aluminum hydroxide/magnesium hydroxide/simethicone Suspension 30 milliLiter(s) Oral every 4 hours PRN Dyspepsia  melatonin 3 milliGRAM(s) Oral at bedtime PRN Insomnia  ondansetron Injectable 4 milliGRAM(s) IV Push every 8 hours PRN Nausea and/or Vomiting    Vital Signs Last 24 Hrs  T(C): 36.9 (23 @ 20:46), Max: 37.6 (23 @ 21:14)  T(F): 98.5 (23 @ 20:46), Max: 99.7 (23 @ 21:14)  HR: 100 (23 @ 20:46) (93 - 107)  BP: 124/79 (23 @ 20:46) (113/79 - 136/77)  BP(mean): --  RR: 18 (23 @ 20:46) (17 - 19)  SpO2: 100% (23 @ 20:46) (99% - 100%)          PHYSICAL EXAM:  GENERAL: NAD  EYES: EOMI, PERRLA  trach site+  CHEST/LUNG: dec breath sounds at bases  HEART:  S1 , S2 +  ABDOMEN: soft, bs+, peg+  EXTREMITIES:  no edema      LABS:      138  |  104  |  13  ----------------------------<  143<H>  4.0   |  27  |  0.46<L>    Ca    8.6      08 Mar 2023 06:35  Phos  3.2       Mg     2.4         TPro  7.4  /  Alb  2.0<L>  /  TBili  0.6  /  DBili      /  AST  94<H>  /  ALT  94<H>  /  AlkPhos  238<H>      Creatinine Trend: 0.46 <--, 0.48 <--, 0.51 <--, 0.47 <--, 0.59 <--                        8.4    6.09  )-----------( 291      ( 08 Mar 2023 06:35 )             26.5     Urine Studies:  Urinalysis Basic - ( 05 Mar 2023 03:15 )    Color: Yellow / Appearance: Clear / S.010 / pH:   Gluc:  / Ketone: Negative  / Bili: Negative / Urobili: 12   Blood:  / Protein: 30 mg/dL / Nitrite: Negative   Leuk Esterase: Negative / RBC: 2-5 /HPF / WBC 0-2 /HPF   Sq Epi:  / Non Sq Epi: Few /HPF / Bacteria: Few /HPF              LIVER FUNCTIONS - ( 08 Mar 2023 06:35 )  Alb: 2.0 g/dL / Pro: 7.4 g/dL / ALK PHOS: 238 U/L / ALT: 94 U/L DA / AST: 94 U/L / GGT: x                       LIVER FUNCTIONS - ( 07 Mar 2023 06:49 )  Alb: 1.8 g/dL / Pro: 6.5 g/dL / ALK PHOS: 221 U/L / ALT: 80 U/L DA / AST: 76 U/L / GGT: x             Preliminary Report (03-06-23 @ 07:52):    No growth to date.      RADIOLOGY & ADDITIONAL TESTS:    Imaging Personally Reviewed:yes    Consultant(s) Notes Reviewed:  yes    Care Discussed with Consultants/Other Providers:yes

## 2023-03-08 NOTE — CONSULT NOTE ADULT - SUBJECTIVE AND OBJECTIVE BOX
HPI:  62M from [facility], h/o traumatic subdural hemorrhage following a fall down the stairs while drunk, s/p r craniotomy at [facility] in 01/2023, s/p trach/PEG BIBEMS for tachycardia 170s, RR 27, increased secretions. Pt treated with rocephin 1g from 3/2 but continued to worsen and was sent to the hospital for persistent tachycardia and tachypnea.   Spoke with Raulito Evans Jr over phone who provided history about his father's condition. He states that his father had a fall from the top of the stairs while he was drunk and was at [facility] in [place] for a period of time where he underwent a craniotomy, trach and peg, then was discharged to [facility]. (05 Mar 2023 06:27)                PAST MEDICAL & SURGICAL HISTORY:  History of subdural hemorrhage      PEG (percutaneous endoscopic gastrostomy) status      DM (diabetes mellitus)      S/P craniotomy          No Known Allergies      Meds:  acetaminophen    Suspension .. 650 milliGRAM(s) Oral every 6 hours PRN  albuterol/ipratropium for Nebulization 3 milliLiter(s) Nebulizer every 6 hours  aluminum hydroxide/magnesium hydroxide/simethicone Suspension 30 milliLiter(s) Oral every 4 hours PRN  amantadine Syrup 100 milliGRAM(s) Oral two times a day  aspirin  chewable 81 milliGRAM(s) Oral daily  atorvastatin 80 milliGRAM(s) Oral at bedtime  cefepime   IVPB      cefepime   IVPB 2000 milliGRAM(s) IV Intermittent every 8 hours  doxazosin 2 milliGRAM(s) Oral at bedtime  enoxaparin Injectable 40 milliGRAM(s) SubCutaneous every 24 hours  insulin glargine Injectable (LANTUS) 12 Unit(s) SubCutaneous every morning  insulin glargine Injectable (LANTUS) 12 Unit(s) SubCutaneous at bedtime  insulin lispro (ADMELOG) corrective regimen sliding scale   SubCutaneous every 6 hours  insulin lispro Injectable (ADMELOG) 6 Unit(s) SubCutaneous every 6 hours  melatonin 3 milliGRAM(s) Oral at bedtime PRN  nicotine -   7 mG/24Hr(s) Patch 1 Patch Transdermal daily  ondansetron Injectable 4 milliGRAM(s) IV Push every 8 hours PRN  polyethylene glycol 3350 17 Gram(s) Oral daily  senna Syrup 10 milliLiter(s) Oral daily  silver sulfADIAZINE 1% Cream 1 Application(s) Topical two times a day      SOCIAL HISTORY:  Smoker:  YES / NO        PACK YEARS:                         WHEN QUIT?  ETOH use:  YES / NO               FREQUENCY / QUANTITY:  Ilicit Drug use:  YES / NO  Occupation:  Assisted device use (Cane / Walker):  Live with:    FAMILY HISTORY:      VITALS:  Vital Signs Last 24 Hrs  T(C): 37.2 (08 Mar 2023 13:00), Max: 37.6 (07 Mar 2023 21:14)  T(F): 98.9 (08 Mar 2023 13:00), Max: 99.7 (07 Mar 2023 21:14)  HR: 97 (08 Mar 2023 13:00) (93 - 107)  BP: 113/79 (08 Mar 2023 13:00) (113/79 - 136/77)  BP(mean): --  RR: 18 (08 Mar 2023 13:00) (18 - 18)  SpO2: 100% (08 Mar 2023 13:00) (99% - 100%)    Parameters below as of 08 Mar 2023 13:00  Patient On (Oxygen Delivery Method): tracheostomy collar  O2 Flow (L/min): 3      LABS/DIAGNOSTIC TESTS:                          8.4    6.09  )-----------( 291      ( 08 Mar 2023 06:35 )             26.5     WBC Count: 6.09 K/uL (03-08 @ 06:35)  WBC Count: 5.26 K/uL (03-07 @ 06:49)  WBC Count: 5.23 K/uL (03-06 @ 04:58)      03-08    138  |  104  |  13  ----------------------------<  143<H>  4.0   |  27  |  0.46<L>    Ca    8.6      08 Mar 2023 06:35  Phos  3.2     03-08  Mg     2.4     03-08    TPro  7.4  /  Alb  2.0<L>  /  TBili  0.6  /  DBili  x   /  AST  94<H>  /  ALT  94<H>  /  AlkPhos  238<H>  03-08          LIVER FUNCTIONS - ( 08 Mar 2023 06:35 )  Alb: 2.0 g/dL / Pro: 7.4 g/dL / ALK PHOS: 238 U/L / ALT: 94 U/L DA / AST: 94 U/L / GGT: x                 LACTATE:    ABG -     CULTURES:   Trach Asp Tracheal Aspirate  03-06 @ 00:14   Normal Respiratory Jane present  --    Numerous polymorphonuclear leukocytes per low power field  Few Squamous epithelial cells per low power field  Numerous Gram Variable Rods seen per oil power field  Moderate Yeast like cells seen per oil power field  Few Gram positive cocci in pairsseen per oil power field      .Blood Blood-Peripheral  03-05 @ 04:00   No growth to date.  --  --      .Blood Blood-Peripheral  03-05 @ 03:50   No growth to date.  --  --      Clean Catch Clean Catch (Midstream)  03-05 @ 03:15   50,000 - 99,000 CFU/mL Candida glabrata  "Susceptibilities not performed"  --  --          RADIOLOGY:      ROS  [  ] UNABLE TO ELICIT               HPI:  62M from [facility], h/o traumatic subdural hemorrhage following a fall down the stairs while drunk, [facility], s/p trach/PEG BIBEMS for tachycardia 170s, RR 27, increased secretions. Pt treated with rocephin 1g from 3/2 but continued to worsen and was sent to the hospital for persistent tachycardia and tachypnea.   Spoke with Raulito Evans Jr over phone who provided history about his father's condition. He states that his father had a fall from the top of the stairs while he was drunk and was at [facility] in [place] for a period of time where he underwent a craniotomy, trach and peg, then was discharged to [facility]. (05 Mar 2023 06:27)        History as above, asked to see this patient who is a TBI patient and has trach and peg and is unresponsive and was sent in from his NH because of Tachypnea and tachycardia despite being treated with Rocephin at the NH. Here he was         PAST MEDICAL & SURGICAL HISTORY:  History of subdural hemorrhage      PEG (percutaneous endoscopic gastrostomy) status      DM (diabetes mellitus)      S/P craniotomy          No Known Allergies      Meds:  acetaminophen    Suspension .. 650 milliGRAM(s) Oral every 6 hours PRN  albuterol/ipratropium for Nebulization 3 milliLiter(s) Nebulizer every 6 hours  aluminum hydroxide/magnesium hydroxide/simethicone Suspension 30 milliLiter(s) Oral every 4 hours PRN  amantadine Syrup 100 milliGRAM(s) Oral two times a day  aspirin  chewable 81 milliGRAM(s) Oral daily  atorvastatin 80 milliGRAM(s) Oral at bedtime  cefepime   IVPB      cefepime   IVPB 2000 milliGRAM(s) IV Intermittent every 8 hours  doxazosin 2 milliGRAM(s) Oral at bedtime  enoxaparin Injectable 40 milliGRAM(s) SubCutaneous every 24 hours  insulin glargine Injectable (LANTUS) 12 Unit(s) SubCutaneous every morning  insulin glargine Injectable (LANTUS) 12 Unit(s) SubCutaneous at bedtime  insulin lispro (ADMELOG) corrective regimen sliding scale   SubCutaneous every 6 hours  insulin lispro Injectable (ADMELOG) 6 Unit(s) SubCutaneous every 6 hours  melatonin 3 milliGRAM(s) Oral at bedtime PRN  nicotine -   7 mG/24Hr(s) Patch 1 Patch Transdermal daily  ondansetron Injectable 4 milliGRAM(s) IV Push every 8 hours PRN  polyethylene glycol 3350 17 Gram(s) Oral daily  senna Syrup 10 milliLiter(s) Oral daily  silver sulfADIAZINE 1% Cream 1 Application(s) Topical two times a day      SOCIAL HISTORY: h/o alcohol abuse    FAMILY HISTORY: unknown      VITALS:  Vital Signs Last 24 Hrs  T(C): 37.2 (08 Mar 2023 13:00), Max: 37.6 (07 Mar 2023 21:14)  T(F): 98.9 (08 Mar 2023 13:00), Max: 99.7 (07 Mar 2023 21:14)  HR: 97 (08 Mar 2023 13:00) (93 - 107)  BP: 113/79 (08 Mar 2023 13:00) (113/79 - 136/77)  BP(mean): --  RR: 18 (08 Mar 2023 13:00) (18 - 18)  SpO2: 100% (08 Mar 2023 13:00) (99% - 100%)    Parameters below as of 08 Mar 2023 13:00  Patient On (Oxygen Delivery Method): tracheostomy collar  O2 Flow (L/min): 3      LABS/DIAGNOSTIC TESTS:                          8.4    6.09  )-----------( 291      ( 08 Mar 2023 06:35 )             26.5     WBC Count: 6.09 K/uL (03-08 @ 06:35)  WBC Count: 5.26 K/uL (03-07 @ 06:49)  WBC Count: 5.23 K/uL (03-06 @ 04:58)      03-08    138  |  104  |  13  ----------------------------<  143<H>  4.0   |  27  |  0.46<L>    Ca    8.6      08 Mar 2023 06:35  Phos  3.2     03-08  Mg     2.4     03-08    TPro  7.4  /  Alb  2.0<L>  /  TBili  0.6  /  DBili  x   /  AST  94<H>  /  ALT  94<H>  /  AlkPhos  238<H>  03-08          LIVER FUNCTIONS - ( 08 Mar 2023 06:35 )  Alb: 2.0 g/dL / Pro: 7.4 g/dL / ALK PHOS: 238 U/L / ALT: 94 U/L DA / AST: 94 U/L / GGT: x                 LACTATE:    ABG -     CULTURES:   Trach Asp Tracheal Aspirate  03-06 @ 00:14   Normal Respiratory Jane present  --    Numerous polymorphonuclear leukocytes per low power field  Few Squamous epithelial cells per low power field  Numerous Gram Variable Rods seen per oil power field  Moderate Yeast like cells seen per oil power field  Few Gram positive cocci in pairsseen per oil power field      .Blood Blood-Peripheral  03-05 @ 04:00   No growth to date.  --  --      .Blood Blood-Peripheral  03-05 @ 03:50   No growth to date.  --  --      Clean Catch Clean Catch (Midstream)  03-05 @ 03:15   50,000 - 99,000 CFU/mL Candida glabrata  "Susceptibilities not performed"  --  --          RADIOLOGY:< from: Xray Chest 1 View-PORTABLE IMMEDIATE (03.05.23 @ 05:57) >  ACC: [id number] EXAM:  XR CHEST PORTABLE IMMED 1V   ORDERED BY: JOSELYN GLASGOW     PROCEDURE DATE:  03/05/2023          INTERPRETATION:  AP chest.    CLINICAL INDICATION: Sepsis.    IMPRESSION: Heart is normal in size. There is a tracheostomy tube  present. There is mild bilateral airspace disease without a focal area of   opacification. This may be from pulmonary edema.    --- End of Report ---            NAHOMI SETH MD; Attending Radiologist  This document has been electronically signed. Mar  5 2023 11:37AM    < end of copied text >        ROS  [  ] UNABLE TO ELICIT               HPI:  62M from [facility], h/o traumatic subdural hemorrhage following a fall down the stairs while drunk, s/p r craniotomy at [facility] in 01/2023, s/p trach/PEG BIBEMS for tachycardia 170s, RR 27, increased secretions. Pt treated with rocephin 1g from 3/2 but continued to worsen and was sent to the hospital for persistent tachycardia and tachypnea.   Spoke with Raulito Evans Jr over phone who provided history about his father's condition. He states that his father had a fall from the top of the stairs while he was drunk and was at [facility] in [place] for a period of time where he underwent a craniotomy, trach and peg, then was discharged to [facility]. (05 Mar 2023 06:27)        History as above, asked to see this patient who is a TBI patient and has trach and peg and is unresponsive and was sent in from his NH because of Tachypnea and tachycardia despite being treated with Rocephin at the NH. Here he was found to have fevers as high as 103.2 along with bilat pneumonia. He was initially being treated with Vancomycin and Maxipime but as his sputum culture did not show any MRSA his Vancomycin was DCed. He was also found to be growing out Torulopsis Glabrata in his urine cultures, he has a de la torre in currently and his urine is clear. He is not waking or responding to me at this time.        PAST MEDICAL & SURGICAL HISTORY:  History of subdural hemorrhage      PEG (percutaneous endoscopic gastrostomy) status      DM (diabetes mellitus)      S/P craniotomy          No Known Allergies      Meds:  acetaminophen    Suspension .. 650 milliGRAM(s) Oral every 6 hours PRN  albuterol/ipratropium for Nebulization 3 milliLiter(s) Nebulizer every 6 hours  aluminum hydroxide/magnesium hydroxide/simethicone Suspension 30 milliLiter(s) Oral every 4 hours PRN  amantadine Syrup 100 milliGRAM(s) Oral two times a day  aspirin  chewable 81 milliGRAM(s) Oral daily  atorvastatin 80 milliGRAM(s) Oral at bedtime  cefepime   IVPB      cefepime   IVPB 2000 milliGRAM(s) IV Intermittent every 8 hours  doxazosin 2 milliGRAM(s) Oral at bedtime  enoxaparin Injectable 40 milliGRAM(s) SubCutaneous every 24 hours  insulin glargine Injectable (LANTUS) 12 Unit(s) SubCutaneous every morning  insulin glargine Injectable (LANTUS) 12 Unit(s) SubCutaneous at bedtime  insulin lispro (ADMELOG) corrective regimen sliding scale   SubCutaneous every 6 hours  insulin lispro Injectable (ADMELOG) 6 Unit(s) SubCutaneous every 6 hours  melatonin 3 milliGRAM(s) Oral at bedtime PRN  nicotine -   7 mG/24Hr(s) Patch 1 Patch Transdermal daily  ondansetron Injectable 4 milliGRAM(s) IV Push every 8 hours PRN  polyethylene glycol 3350 17 Gram(s) Oral daily  senna Syrup 10 milliLiter(s) Oral daily  silver sulfADIAZINE 1% Cream 1 Application(s) Topical two times a day      SOCIAL HISTORY: h/o alcohol abuse    FAMILY HISTORY: unknown      VITALS:  Vital Signs Last 24 Hrs  T(C): 37.2 (08 Mar 2023 13:00), Max: 37.6 (07 Mar 2023 21:14)  T(F): 98.9 (08 Mar 2023 13:00), Max: 99.7 (07 Mar 2023 21:14)  HR: 97 (08 Mar 2023 13:00) (93 - 107)  BP: 113/79 (08 Mar 2023 13:00) (113/79 - 136/77)  BP(mean): --  RR: 18 (08 Mar 2023 13:00) (18 - 18)  SpO2: 100% (08 Mar 2023 13:00) (99% - 100%)    Parameters below as of 08 Mar 2023 13:00  Patient On (Oxygen Delivery Method): tracheostomy collar  O2 Flow (L/min): 3      LABS/DIAGNOSTIC TESTS:                          8.4    6.09  )-----------( 291      ( 08 Mar 2023 06:35 )             26.5     WBC Count: 6.09 K/uL (03-08 @ 06:35)  WBC Count: 5.26 K/uL (03-07 @ 06:49)  WBC Count: 5.23 K/uL (03-06 @ 04:58)      03-08    138  |  104  |  13  ----------------------------<  143<H>  4.0   |  27  |  0.46<L>    Ca    8.6      08 Mar 2023 06:35  Phos  3.2     03-08  Mg     2.4     03-08    TPro  7.4  /  Alb  2.0<L>  /  TBili  0.6  /  DBili  x   /  AST  94<H>  /  ALT  94<H>  /  AlkPhos  238<H>  03-08          LIVER FUNCTIONS - ( 08 Mar 2023 06:35 )  Alb: 2.0 g/dL / Pro: 7.4 g/dL / ALK PHOS: 238 U/L / ALT: 94 U/L DA / AST: 94 U/L / GGT: x                 LACTATE:    ABG -     CULTURES:   Trach Asp Tracheal Aspirate  03-06 @ 00:14   Normal Respiratory Jane present  --    Numerous polymorphonuclear leukocytes per low power field  Few Squamous epithelial cells per low power field  Numerous Gram Variable Rods seen per oil power field  Moderate Yeast like cells seen per oil power field  Few Gram positive cocci in pairsseen per oil power field      .Blood Blood-Peripheral  03-05 @ 04:00   No growth to date.  --  --      .Blood Blood-Peripheral  03-05 @ 03:50   No growth to date.  --  --      Clean Catch Clean Catch (Midstream)  03-05 @ 03:15   50,000 - 99,000 CFU/mL Candida glabrata  "Susceptibilities not performed"  --  --          RADIOLOGY:< from: Xray Chest 1 View-PORTABLE IMMEDIATE (03.05.23 @ 05:57) >  ACC: [id number] EXAM:  XR CHEST PORTABLE IMMED 1V   ORDERED BY: JOSELYN GLASGOW     PROCEDURE DATE:  03/05/2023          INTERPRETATION:  AP chest.    CLINICAL INDICATION: Sepsis.    IMPRESSION: Heart is normal in size. There is a tracheostomy tube  present. There is mild bilateral airspace disease without a focal area of   opacification. This may be from pulmonary edema.    --- End of Report ---            NAHOMI SETH MD; Attending Radiologist  This document has been electronically signed. Mar  5 2023 11:37AM    < end of copied text >        ROS  [ x ] UNABLE TO ELICIT

## 2023-03-09 LAB
ALBUMIN SERPL ELPH-MCNC: 2 G/DL — LOW (ref 3.5–5)
ALBUMIN SERPL ELPH-MCNC: 2.2 G/DL — LOW (ref 3.5–5)
ALP SERPL-CCNC: 219 U/L — HIGH (ref 40–120)
ALP SERPL-CCNC: 230 U/L — HIGH (ref 40–120)
ALT FLD-CCNC: 100 U/L DA — HIGH (ref 10–60)
ALT FLD-CCNC: 92 U/L DA — HIGH (ref 10–60)
ANION GAP SERPL CALC-SCNC: 5 MMOL/L — SIGNIFICANT CHANGE UP (ref 5–17)
ANION GAP SERPL CALC-SCNC: 6 MMOL/L — SIGNIFICANT CHANGE UP (ref 5–17)
APPEARANCE UR: CLEAR — SIGNIFICANT CHANGE UP
APTT BLD: 28.9 SEC — SIGNIFICANT CHANGE UP (ref 27.5–35.5)
AST SERPL-CCNC: 73 U/L — HIGH (ref 10–40)
AST SERPL-CCNC: 77 U/L — HIGH (ref 10–40)
BASOPHILS # BLD AUTO: 0.03 K/UL — SIGNIFICANT CHANGE UP (ref 0–0.2)
BASOPHILS NFR BLD AUTO: 0.3 % — SIGNIFICANT CHANGE UP (ref 0–2)
BILIRUB SERPL-MCNC: 0.5 MG/DL — SIGNIFICANT CHANGE UP (ref 0.2–1.2)
BILIRUB SERPL-MCNC: 0.6 MG/DL — SIGNIFICANT CHANGE UP (ref 0.2–1.2)
BILIRUB UR-MCNC: NEGATIVE — SIGNIFICANT CHANGE UP
BUN SERPL-MCNC: 17 MG/DL — SIGNIFICANT CHANGE UP (ref 7–18)
BUN SERPL-MCNC: 17 MG/DL — SIGNIFICANT CHANGE UP (ref 7–18)
CALCIUM SERPL-MCNC: 8.5 MG/DL — SIGNIFICANT CHANGE UP (ref 8.4–10.5)
CALCIUM SERPL-MCNC: 8.8 MG/DL — SIGNIFICANT CHANGE UP (ref 8.4–10.5)
CHLORIDE SERPL-SCNC: 104 MMOL/L — SIGNIFICANT CHANGE UP (ref 96–108)
CHLORIDE SERPL-SCNC: 104 MMOL/L — SIGNIFICANT CHANGE UP (ref 96–108)
CO2 SERPL-SCNC: 28 MMOL/L — SIGNIFICANT CHANGE UP (ref 22–31)
CO2 SERPL-SCNC: 28 MMOL/L — SIGNIFICANT CHANGE UP (ref 22–31)
COLOR SPEC: YELLOW — SIGNIFICANT CHANGE UP
CREAT SERPL-MCNC: 0.55 MG/DL — SIGNIFICANT CHANGE UP (ref 0.5–1.3)
CREAT SERPL-MCNC: 0.59 MG/DL — SIGNIFICANT CHANGE UP (ref 0.5–1.3)
DIFF PNL FLD: ABNORMAL
EGFR: 111 ML/MIN/1.73M2 — SIGNIFICANT CHANGE UP
EGFR: 113 ML/MIN/1.73M2 — SIGNIFICANT CHANGE UP
EOSINOPHIL # BLD AUTO: 0.23 K/UL — SIGNIFICANT CHANGE UP (ref 0–0.5)
EOSINOPHIL NFR BLD AUTO: 2 % — SIGNIFICANT CHANGE UP (ref 0–6)
EPI CELLS # UR: ABNORMAL /HPF
GLUCOSE SERPL-MCNC: 163 MG/DL — HIGH (ref 70–99)
GLUCOSE SERPL-MCNC: 173 MG/DL — HIGH (ref 70–99)
GLUCOSE UR QL: NEGATIVE — SIGNIFICANT CHANGE UP
HCT VFR BLD CALC: 23.7 % — LOW (ref 39–50)
HCT VFR BLD CALC: 26.9 % — LOW (ref 39–50)
HGB BLD-MCNC: 7.5 G/DL — LOW (ref 13–17)
HGB BLD-MCNC: 8.5 G/DL — LOW (ref 13–17)
IMM GRANULOCYTES NFR BLD AUTO: 0.7 % — SIGNIFICANT CHANGE UP (ref 0–0.9)
INR BLD: 1.21 RATIO — HIGH (ref 0.88–1.16)
KETONES UR-MCNC: NEGATIVE — SIGNIFICANT CHANGE UP
LACTATE SERPL-SCNC: 1.9 MMOL/L — SIGNIFICANT CHANGE UP (ref 0.7–2)
LEUKOCYTE ESTERASE UR-ACNC: NEGATIVE — SIGNIFICANT CHANGE UP
LYMPHOCYTES # BLD AUTO: 1.48 K/UL — SIGNIFICANT CHANGE UP (ref 1–3.3)
LYMPHOCYTES # BLD AUTO: 13.1 % — SIGNIFICANT CHANGE UP (ref 13–44)
MAGNESIUM SERPL-MCNC: 2.4 MG/DL — SIGNIFICANT CHANGE UP (ref 1.6–2.6)
MCHC RBC-ENTMCNC: 29.1 PG — SIGNIFICANT CHANGE UP (ref 27–34)
MCHC RBC-ENTMCNC: 29.3 PG — SIGNIFICANT CHANGE UP (ref 27–34)
MCHC RBC-ENTMCNC: 31.6 GM/DL — LOW (ref 32–36)
MCHC RBC-ENTMCNC: 31.6 GM/DL — LOW (ref 32–36)
MCV RBC AUTO: 91.9 FL — SIGNIFICANT CHANGE UP (ref 80–100)
MCV RBC AUTO: 92.8 FL — SIGNIFICANT CHANGE UP (ref 80–100)
MONOCYTES # BLD AUTO: 0.68 K/UL — SIGNIFICANT CHANGE UP (ref 0–0.9)
MONOCYTES NFR BLD AUTO: 6 % — SIGNIFICANT CHANGE UP (ref 2–14)
NEUTROPHILS # BLD AUTO: 8.78 K/UL — HIGH (ref 1.8–7.4)
NEUTROPHILS NFR BLD AUTO: 77.9 % — HIGH (ref 43–77)
NITRITE UR-MCNC: NEGATIVE — SIGNIFICANT CHANGE UP
NRBC # BLD: 0 /100 WBCS — SIGNIFICANT CHANGE UP (ref 0–0)
NRBC # BLD: 0 /100 WBCS — SIGNIFICANT CHANGE UP (ref 0–0)
PH UR: 8 — SIGNIFICANT CHANGE UP (ref 5–8)
PHOSPHATE SERPL-MCNC: 3.2 MG/DL — SIGNIFICANT CHANGE UP (ref 2.5–4.5)
PLATELET # BLD AUTO: 352 K/UL — SIGNIFICANT CHANGE UP (ref 150–400)
PLATELET # BLD AUTO: 422 K/UL — HIGH (ref 150–400)
POTASSIUM SERPL-MCNC: 4.1 MMOL/L — SIGNIFICANT CHANGE UP (ref 3.5–5.3)
POTASSIUM SERPL-MCNC: 4.5 MMOL/L — SIGNIFICANT CHANGE UP (ref 3.5–5.3)
POTASSIUM SERPL-SCNC: 4.1 MMOL/L — SIGNIFICANT CHANGE UP (ref 3.5–5.3)
POTASSIUM SERPL-SCNC: 4.5 MMOL/L — SIGNIFICANT CHANGE UP (ref 3.5–5.3)
PROT SERPL-MCNC: 7.2 G/DL — SIGNIFICANT CHANGE UP (ref 6–8.3)
PROT SERPL-MCNC: 7.7 G/DL — SIGNIFICANT CHANGE UP (ref 6–8.3)
PROT UR-MCNC: 100
PROTHROM AB SERPL-ACNC: 14.4 SEC — HIGH (ref 10.5–13.4)
RAPID RVP RESULT: SIGNIFICANT CHANGE UP
RBC # BLD: 2.58 M/UL — LOW (ref 4.2–5.8)
RBC # BLD: 2.9 M/UL — LOW (ref 4.2–5.8)
RBC # FLD: 13.6 % — SIGNIFICANT CHANGE UP (ref 10.3–14.5)
RBC # FLD: 13.7 % — SIGNIFICANT CHANGE UP (ref 10.3–14.5)
RBC CASTS # UR COMP ASSIST: >50 /HPF (ref 0–2)
SARS-COV-2 RNA SPEC QL NAA+PROBE: SIGNIFICANT CHANGE UP
SODIUM SERPL-SCNC: 137 MMOL/L — SIGNIFICANT CHANGE UP (ref 135–145)
SODIUM SERPL-SCNC: 138 MMOL/L — SIGNIFICANT CHANGE UP (ref 135–145)
SP GR SPEC: 1.01 — SIGNIFICANT CHANGE UP (ref 1.01–1.02)
UROBILINOGEN FLD QL: 1
WBC # BLD: 11.28 K/UL — HIGH (ref 3.8–10.5)
WBC # BLD: 9.35 K/UL — SIGNIFICANT CHANGE UP (ref 3.8–10.5)
WBC # FLD AUTO: 11.28 K/UL — HIGH (ref 3.8–10.5)
WBC # FLD AUTO: 9.35 K/UL — SIGNIFICANT CHANGE UP (ref 3.8–10.5)
WBC UR QL: SIGNIFICANT CHANGE UP /HPF (ref 0–5)

## 2023-03-09 RX ORDER — VANCOMYCIN HCL 1 G
1250 VIAL (EA) INTRAVENOUS ONCE
Refills: 0 | Status: COMPLETED | OUTPATIENT
Start: 2023-03-09 | End: 2023-03-09

## 2023-03-09 RX ORDER — SODIUM CHLORIDE 9 MG/ML
2300 INJECTION, SOLUTION INTRAVENOUS ONCE
Refills: 0 | Status: COMPLETED | OUTPATIENT
Start: 2023-03-09 | End: 2023-03-09

## 2023-03-09 RX ORDER — VANCOMYCIN HCL 1 G
1250 VIAL (EA) INTRAVENOUS EVERY 12 HOURS
Refills: 0 | Status: DISCONTINUED | OUTPATIENT
Start: 2023-03-09 | End: 2023-03-12

## 2023-03-09 RX ORDER — VANCOMYCIN HCL 1 G
VIAL (EA) INTRAVENOUS
Refills: 0 | Status: DISCONTINUED | OUTPATIENT
Start: 2023-03-09 | End: 2023-03-12

## 2023-03-09 RX ADMIN — Medication 100 MILLIGRAM(S): at 17:17

## 2023-03-09 RX ADMIN — Medication 6 UNIT(S): at 17:07

## 2023-03-09 RX ADMIN — Medication 1: at 17:06

## 2023-03-09 RX ADMIN — CEFEPIME 100 MILLIGRAM(S): 1 INJECTION, POWDER, FOR SOLUTION INTRAMUSCULAR; INTRAVENOUS at 14:23

## 2023-03-09 RX ADMIN — CEFEPIME 100 MILLIGRAM(S): 1 INJECTION, POWDER, FOR SOLUTION INTRAMUSCULAR; INTRAVENOUS at 05:51

## 2023-03-09 RX ADMIN — Medication 650 MILLIGRAM(S): at 20:54

## 2023-03-09 RX ADMIN — Medication 1 APPLICATION(S): at 05:51

## 2023-03-09 RX ADMIN — Medication 3 MILLILITER(S): at 09:36

## 2023-03-09 RX ADMIN — Medication 6 UNIT(S): at 12:34

## 2023-03-09 RX ADMIN — Medication 3 MILLILITER(S): at 20:32

## 2023-03-09 RX ADMIN — SODIUM CHLORIDE 2300 MILLILITER(S): 9 INJECTION, SOLUTION INTRAVENOUS at 09:32

## 2023-03-09 RX ADMIN — Medication 166.67 MILLIGRAM(S): at 11:18

## 2023-03-09 RX ADMIN — Medication 6 UNIT(S): at 06:50

## 2023-03-09 RX ADMIN — Medication 2 MILLIGRAM(S): at 22:21

## 2023-03-09 RX ADMIN — Medication 3 MILLILITER(S): at 03:39

## 2023-03-09 RX ADMIN — Medication 6 UNIT(S): at 23:22

## 2023-03-09 RX ADMIN — Medication 1: at 23:21

## 2023-03-09 RX ADMIN — Medication 1 PATCH: at 20:01

## 2023-03-09 RX ADMIN — Medication 1 PATCH: at 12:32

## 2023-03-09 RX ADMIN — INSULIN GLARGINE 12 UNIT(S): 100 INJECTION, SOLUTION SUBCUTANEOUS at 08:40

## 2023-03-09 RX ADMIN — POLYETHYLENE GLYCOL 3350 17 GRAM(S): 17 POWDER, FOR SOLUTION ORAL at 12:32

## 2023-03-09 RX ADMIN — Medication 1 PATCH: at 12:04

## 2023-03-09 RX ADMIN — Medication 100 MILLIGRAM(S): at 05:51

## 2023-03-09 RX ADMIN — Medication 3 MILLILITER(S): at 15:43

## 2023-03-09 RX ADMIN — Medication 166.67 MILLIGRAM(S): at 23:22

## 2023-03-09 RX ADMIN — Medication 1 PATCH: at 07:30

## 2023-03-09 RX ADMIN — SENNA PLUS 10 MILLILITER(S): 8.6 TABLET ORAL at 12:35

## 2023-03-09 RX ADMIN — Medication 1 APPLICATION(S): at 17:17

## 2023-03-09 RX ADMIN — ATORVASTATIN CALCIUM 80 MILLIGRAM(S): 80 TABLET, FILM COATED ORAL at 22:21

## 2023-03-09 RX ADMIN — Medication 1: at 12:01

## 2023-03-09 RX ADMIN — CEFEPIME 100 MILLIGRAM(S): 1 INJECTION, POWDER, FOR SOLUTION INTRAMUSCULAR; INTRAVENOUS at 22:20

## 2023-03-09 RX ADMIN — INSULIN GLARGINE 12 UNIT(S): 100 INJECTION, SOLUTION SUBCUTANEOUS at 22:54

## 2023-03-09 RX ADMIN — Medication 1: at 06:49

## 2023-03-09 RX ADMIN — ENOXAPARIN SODIUM 40 MILLIGRAM(S): 100 INJECTION SUBCUTANEOUS at 11:17

## 2023-03-09 RX ADMIN — Medication 650 MILLIGRAM(S): at 22:18

## 2023-03-09 RX ADMIN — Medication 81 MILLIGRAM(S): at 12:32

## 2023-03-09 NOTE — PROGRESS NOTE ADULT - ASSESSMENT
Bilat Pneumonia  Fevers       Plan - Cont Maxipime 2gms iv q8hrs  Restart Vancomycin 1 gm iv q12hrs  repeat blood cultures.

## 2023-03-09 NOTE — PROGRESS NOTE ADULT - PROBLEM SELECTOR PLAN 6
Possibly secondary to sepsis vs amantadine  Controlled.   Continue to monitor daily. Possibly secondary to sepsis vs amantadine  Controlled.   Continue to monitor daily.  May consider US RUQ if worsens

## 2023-03-09 NOTE — PROGRESS NOTE ADULT - ASSESSMENT
62M from [facility], h/o traumatic subdural hemorrhage following a fall down the stairs while drunk, s/p r craniotomy at [facility] in 01/2023, s/p trach/PEG BIBEMS for tachycardia 170s, RR 27, increased secretions admitted to  for sepsis secondary to RUL pneumonia with tracheostomy to 3L NC.  3/6 Febrile 101.7  03/07: No events overnight. On [place]-trach at 3 L and tolerating well. Hemodynamically stable. Cultures in progress. C/w Vanco and Cefepime. F/u Vanco trough. Hypokalemic this AM and replacement ordered.   03/08: Afebrile. Vanco discontinued since MRSA negative. + yeast in sputum; + Candida Galbrata in urine (Bran in place with clear urine and afebrile).   Dr. Peres following. Blood cx NGTD. C/w Cefepime.   03/09: Febrile, tachycardic and tachypneic. Code sepsis called. Sepsis w/u in progress.  Started on Vanco. ID following, . Discussed with Dr. Peres. F/u CXR. Worsening Leukocytosis. IVF bolus with LR.  62M from [facility], h/o traumatic subdural hemorrhage following a fall down the stairs while drunk, s/p r craniotomy at [facility] in 01/2023, s/p trach/PEG BIBEMS for tachycardia 170s, RR 27, increased secretions admitted to  for sepsis secondary to RUL pneumonia with tracheostomy to 3L NC.  3/6 Febrile 101.7  03/07: No events overnight. On [place]-trach at 3 L and tolerating well. Hemodynamically stable. Cultures in progress. C/w Vanco and Cefepime. F/u Vanco trough. Hypokalemic this AM and replacement ordered.   03/08: Afebrile. Vanco discontinued since MRSA negative. + yeast in sputum; + Candida Galbrata in urine (Bran in place with clear urine and afebrile).   Dr. Peres following. Blood cx NGTD. C/w Cefepime.   03/09: 03/09: Febrile, tachycardic and tachypneic this AM. Code sepsis called. Sepsis w/u in progress.  Started on Vanco. ID following, . Discussed with Dr. Peres. F/u CXR. Worsening Leukocytosis. IVF bolus with LR (2,300 ml)  per protocol. Lactate 1.9.

## 2023-03-09 NOTE — PROGRESS NOTE ADULT - PROBLEM SELECTOR PLAN 12
Continue tube feeds and supplementation.  Dietary consult pending. Continue tube feeds and supplementation.  Dietary consult pending.    # Advanced care planning     Sepsis code called   Febrile   Started on Vanco and conitnue cefepime   ID following    May consider RUQ US if transaminitis worsens    S/p IVF bolus    F/u Cultures   F/u CXR

## 2023-03-09 NOTE — PROGRESS NOTE ADULT - PROBLEM SELECTOR PLAN 4
Likely secondary to aspiration pna.  Continue cefepime.  Vanco discontinued on 03/07 since MRSA negative.   urine growing Candida Galbrata and sputum + Yeast.   Patient afebrile and urine appears clear.   Dr. Peres consulted on 3/7.  Blood cx NGTD.  Plan to return to [facility] when optimized. Febrile today 102.4>>100.9>>100.2   Tachycardic and tachypneic this AM    Likely secondary to aspiration pna.   Continue cefepime. Added Vanco.   Code sepsis called and sepsis w/u initiated.  IVF bolus per protocol ( 2,300 ml)  Lactate 1.9 ; Procal 0.7    urine growing Candida Galbrata and sputum + Yeast. Patient afebrile and urine appears clear.    Dr. Peres consulted on 3/7.   F/u cultures from today (3/9).   F/u CXR

## 2023-03-09 NOTE — PROGRESS NOTE ADULT - PROBLEM SELECTOR PLAN 3
+ yeast in sputum  + Candida Galbrata in urine (Bran in place with clear urine and afebrile).   Dr. Peres following  Blood cx NGTD + yeast in sputum  + Candida Galbrata in urine (Bran in place with clear urine and afebrile).   Dr. Peres following and does not recc tx at this time   Blood cx NGTD

## 2023-03-09 NOTE — PROGRESS NOTE ADULT - PROBLEM SELECTOR PLAN 2
Complicated by yeast infection in sputum  Dr. Peres consulted   C/w Cefepime.  O2 via hydro trach Complicated by yeast infection in sputum  Dr. Peres following   Per ID tx for yeast not warranted at this time   C/w Cefepime.  Added Vanco  O2 via hydro trach  F/u CXR

## 2023-03-09 NOTE — PROGRESS NOTE ADULT - SUBJECTIVE AND OBJECTIVE BOX
Time of Visit:  Patient seen and examined.     MEDICATIONS  (STANDING):  albuterol/ipratropium for Nebulization 3 milliLiter(s) Nebulizer every 6 hours  amantadine Syrup 100 milliGRAM(s) Oral two times a day  aspirin  chewable 81 milliGRAM(s) Oral daily  atorvastatin 80 milliGRAM(s) Oral at bedtime  cefepime   IVPB      cefepime   IVPB 2000 milliGRAM(s) IV Intermittent every 8 hours  doxazosin 2 milliGRAM(s) Oral at bedtime  enoxaparin Injectable 40 milliGRAM(s) SubCutaneous every 24 hours  insulin glargine Injectable (LANTUS) 12 Unit(s) SubCutaneous every morning  insulin glargine Injectable (LANTUS) 12 Unit(s) SubCutaneous at bedtime  insulin lispro (ADMELOG) corrective regimen sliding scale   SubCutaneous every 6 hours  insulin lispro Injectable (ADMELOG) 6 Unit(s) SubCutaneous every 6 hours  nicotine -   7 mG/24Hr(s) Patch 1 Patch Transdermal daily  polyethylene glycol 3350 17 Gram(s) Oral daily  senna Syrup 10 milliLiter(s) Oral daily  silver sulfADIAZINE 1% Cream 1 Application(s) Topical two times a day  vancomycin  IVPB      vancomycin  IVPB 1250 milliGRAM(s) IV Intermittent every 12 hours      MEDICATIONS  (PRN):  acetaminophen    Suspension .. 650 milliGRAM(s) Oral every 6 hours PRN Temp greater or equal to 38C (100.4F), Mild Pain (1 - 3)  aluminum hydroxide/magnesium hydroxide/simethicone Suspension 30 milliLiter(s) Oral every 4 hours PRN Dyspepsia  melatonin 3 milliGRAM(s) Oral at bedtime PRN Insomnia  ondansetron Injectable 4 milliGRAM(s) IV Push every 8 hours PRN Nausea and/or Vomiting       Medications up to date at time of exam.      PHYSICAL EXAMINATION:  Vital Signs Last 24 Hrs  T(C): 38.3 (09 Mar 2023 11:21), Max: 39.1 (09 Mar 2023 08:57)  T(F): 100.9 (09 Mar 2023 11:21), Max: 102.4 (09 Mar 2023 08:57)  HR: 115 (09 Mar 2023 11:21) (97 - 116)  BP: 142/84 (09 Mar 2023 11:21) (113/79 - 142/84)  BP(mean): --  RR: 22 (09 Mar 2023 11:21) (17 - 30)  SpO2: 100% (09 Mar 2023 11:21) (100% - 100%)    Parameters below as of 09 Mar 2023 11:21  Patient On (Oxygen Delivery Method): hydrotrach  O2 Flow (L/min): 3     General ; Non verbal. No acute distress.     HEENT: Craniotomy suture site, non infected . No nasal tenderness . Mucous membranes are moist.     NECK: Has Tracheostomy cuffed #7.5, non infected.      LUNGS: Diminished breath sounds  at bases, Non labored . No use of accessory muscle.     HEART: S1 S2 Regular rate and no murmur.    ABDOMEN: Soft, nontender, and nondistended. Active bowel sounds. +ve Peg.     EXTREMITIES: Total care. Non ambulatory. B/L UE with +ve edema.     NEUROLOGIC: Cognitive impaired .       LABS:                        7.5    11.28 )-----------( 422      ( 09 Mar 2023 09:15 )             23.7         137  |  104  |  17  ----------------------------<  163<H>  4.5   |  28  |  0.59    Ca    8.8      09 Mar 2023 09:15  Phos  3.2       Mg     2.4         TPro  7.7  /  Alb  2.2<L>  /  TBili  0.6  /  DBili  x   /  AST  77<H>  /  ALT  100<H>  /  AlkPhos  230<H>      PT/INR - ( 09 Mar 2023 09:15 )   PT: 14.4 sec;   INR: 1.21 ratio         PTT - ( 09 Mar 2023 09:15 )  PTT:28.9 sec  Urinalysis Basic - ( 09 Mar 2023 09:31 )    Color: Yellow / Appearance: Clear / S.010 / pH: x  Gluc: x / Ketone: Negative  / Bili: Negative / Urobili: 1   Blood: x / Protein: 100 / Nitrite: Negative   Leuk Esterase: Negative / RBC: >50 /HPF / WBC 3-5 /HPF   Sq Epi: x / Non Sq Epi: Occasional /HPF / Bacteria: x                Lactate, Blood: 1.9 mmol/L (23 @ 11:43)        MICROBIOLOGY: (if applicable)    RADIOLOGY & ADDITIONAL STUDIES:  EKG:   CXR:  ECHO:    IMPRESSION: 62y Male PAST MEDICAL & SURGICAL HISTORY:  History of subdural hemorrhage      PEG (percutaneous endoscopic gastrostomy) status      DM (diabetes mellitus)      S/P craniotomy       Impression: This is a 61 Y/O Male from [facility] . Hx of Traumatic Subdural Hemorrhage following a fall down the stairs while drunk, had s/p Craniotomy at [facility] on  with s/p Trach / Peg . Admitted to  with Acute on chronic hypoxic respiratory failure secondary to Sepsis due to Pneumonia. Leukocytosis WBC 11.28. Has also for +ve Yeast  in Sputum, Candida Galbrata in Urine.     Suggestion:   O2 saturation 100% with HME O2 supplementation at 3L via trach collar.  Oral, trach care, suction.  Not a candidate for speaking valve, trach capping at present time.   Not a candidate for decannulation at this time.   Continue Duoneb via nebulization Q 6 Hours.  Code Sepsis was called earlier due to Febrile with Tachycardia , Tachypnea. Ongoing Sepsis work up.       On Cefepime 2 mg IVPB Q 8 hours.   Vancomycin 1250 mg IVPB Q 12 Hours with Vancomycin trough monitoring.   DVT / GI prophylactic. On Lovenox 40mg SQ daily.   Aspiration precautions with HOB elevation especially during Peg feeding.

## 2023-03-09 NOTE — PROGRESS NOTE ADULT - SUBJECTIVE AND OBJECTIVE BOX
WILMA NICOLE    SCU progress note    INTERVAL HPI/OVERNIGHT EVENTS: No acute events overnight. Febrile this AM. Code sepsis called.     Full code.     Covid - 19 PCR:     The 4Ms    What Matters Most: see GOC  Age appropriate Medications/Screen for High Risk Medication: Yes  Mentation: see CAM below  Mobility: defer to physical exam    The Confusion Assessment Method (CAM) Diagnostic Algorithm     1: Acute Onset or Fluctuating Course  - Is there evidence of an acute change in mental status from the patient’s baseline? Did the (abnormal) behavior  fluctuate during the day, that is, tend to come and go, or increase and decrease in severity?  [ ] YES [ ] NO     2: Inattention  - Did the patient have difficulty focusing attention, being easily distractible, or having difficulty keeping track of what was being said?  [ ] YES [ ] NO     3: Disorganized thinking  -Was the patient’s thinking disorganized or incoherent, such as rambling or irrelevant conversation, unclear or illogical flow of ideas, or unpredictable switching from subject to subject?  [ ] YES [ ] NO    4: Altered Level of consciousness?  [ ] YES [ ] NO    The diagnosis of delirium by CAM requires the presence of features 1 and 2 and either 3 or 4.    PRESSORS: [ ] YES [ ] NO  cefepime   IVPB      cefepime   IVPB 2000 milliGRAM(s) IV Intermittent every 8 hours  vancomycin  IVPB        Cardiovascular:  Heart Failure  Acute   Acute on Chronic  Chronic       doxazosin 2 milliGRAM(s) Oral at bedtime    Pulmonary:  albuterol/ipratropium for Nebulization 3 milliLiter(s) Nebulizer every 6 hours    Hematalogic:  aspirin  chewable 81 milliGRAM(s) Oral daily  enoxaparin Injectable 40 milliGRAM(s) SubCutaneous every 24 hours    Other:  acetaminophen    Suspension .. 650 milliGRAM(s) Oral every 6 hours PRN  aluminum hydroxide/magnesium hydroxide/simethicone Suspension 30 milliLiter(s) Oral every 4 hours PRN  amantadine Syrup 100 milliGRAM(s) Oral two times a day  atorvastatin 80 milliGRAM(s) Oral at bedtime  insulin glargine Injectable (LANTUS) 12 Unit(s) SubCutaneous every morning  insulin glargine Injectable (LANTUS) 12 Unit(s) SubCutaneous at bedtime  insulin lispro (ADMELOG) corrective regimen sliding scale   SubCutaneous every 6 hours  insulin lispro Injectable (ADMELOG) 6 Unit(s) SubCutaneous every 6 hours  melatonin 3 milliGRAM(s) Oral at bedtime PRN  nicotine -   7 mG/24Hr(s) Patch 1 Patch Transdermal daily  ondansetron Injectable 4 milliGRAM(s) IV Push every 8 hours PRN  polyethylene glycol 3350 17 Gram(s) Oral daily  senna Syrup 10 milliLiter(s) Oral daily  silver sulfADIAZINE 1% Cream 1 Application(s) Topical two times a day    acetaminophen    Suspension .. 650 milliGRAM(s) Oral every 6 hours PRN  albuterol/ipratropium for Nebulization 3 milliLiter(s) Nebulizer every 6 hours  aluminum hydroxide/magnesium hydroxide/simethicone Suspension 30 milliLiter(s) Oral every 4 hours PRN  amantadine Syrup 100 milliGRAM(s) Oral two times a day  aspirin  chewable 81 milliGRAM(s) Oral daily  atorvastatin 80 milliGRAM(s) Oral at bedtime  cefepime   IVPB      cefepime   IVPB 2000 milliGRAM(s) IV Intermittent every 8 hours  doxazosin 2 milliGRAM(s) Oral at bedtime  enoxaparin Injectable 40 milliGRAM(s) SubCutaneous every 24 hours  insulin glargine Injectable (LANTUS) 12 Unit(s) SubCutaneous every morning  insulin glargine Injectable (LANTUS) 12 Unit(s) SubCutaneous at bedtime  insulin lispro (ADMELOG) corrective regimen sliding scale   SubCutaneous every 6 hours  insulin lispro Injectable (ADMELOG) 6 Unit(s) SubCutaneous every 6 hours  melatonin 3 milliGRAM(s) Oral at bedtime PRN  nicotine -   7 mG/24Hr(s) Patch 1 Patch Transdermal daily  ondansetron Injectable 4 milliGRAM(s) IV Push every 8 hours PRN  polyethylene glycol 3350 17 Gram(s) Oral daily  senna Syrup 10 milliLiter(s) Oral daily  silver sulfADIAZINE 1% Cream 1 Application(s) Topical two times a day  vancomycin  IVPB        Drug Dosing Weight  Height (cm): 177.8 (05 Mar 2023 03:36)  Weight (kg): 86.2 (05 Mar 2023 03:36)  BMI (kg/m2): 27.3 (05 Mar 2023 03:36)  BSA (m2): 2.04 (05 Mar 2023 03:36)    CENTRAL LINE: [ ] YES [ ] NO  LOCATION:   DATE INSERTED:  REMOVE: [ ] YES [ ] NO  EXPLAIN:    MELCHOR: [ ] YES [ ] NO    DATE INSERTED:  REMOVE:  [ ] YES [ ] NO  EXPLAIN:    PAST MEDICAL & SURGICAL HISTORY:  History of subdural hemorrhage      PEG (percutaneous endoscopic gastrostomy) status      DM (diabetes mellitus)      S/P craniotomy                  03-08 @ 07:01  -  03-09 @ 07:00  --------------------------------------------------------  IN: 0 mL / OUT: 1650 mL / NET: -1650 mL            PHYSICAL EXAM:    GENERAL: NAD, diaphoretic.   HEAD:  Atraumatic, Asymmetrical scalp 2/2 craniotomy.   EYES: Pupils round and reactive.  Conjunctiva and sclera clear.   ENMT: No tonsillar erythema, exudates, or enlargement; Moist mucous membranes, Good dentition, No lesions  NECK: Supple, No JVD, Normal thyroid  NERVOUS SYSTEM: Opens eyes spontaneously. does not follow commands. Lethargic.   CHEST/LUNG: Diminished b/l. No rales, rhonchi, wheezing, or rubs. On 3L Via hydro trach   HEART: Regular rate and rhythm; No murmurs, rubs, or gallops  ABDOMEN: Soft, Nontender, Nondistended; Bowel sounds present. PEG in place.   EXTREMITIES:  2+ Peripheral Pulses, No clubbing, cyanosis, or edema  LYMPH: No lymphadenopathy noted  SKIN: No rashes or lesions      LABS:  CBC Full  -  ( 09 Mar 2023 06:14 )  WBC Count : 9.35 K/uL  RBC Count : 2.90 M/uL  Hemoglobin : 8.5 g/dL  Hematocrit : 26.9 %  Platelet Count - Automated : 352 K/uL  Mean Cell Volume : 92.8 fl  Mean Cell Hemoglobin : 29.3 pg  Mean Cell Hemoglobin Concentration : 31.6 gm/dL  Auto Neutrophil # : x  Auto Lymphocyte # : x  Auto Monocyte # : x  Auto Eosinophil # : x  Auto Basophil # : x  Auto Neutrophil % : x  Auto Lymphocyte % : x  Auto Monocyte % : x  Auto Eosinophil % : x  Auto Basophil % : x    03-09    138  |  104  |  17  ----------------------------<  173<H>  4.1   |  28  |  0.55    Ca    8.5      09 Mar 2023 06:14  Phos  3.2     03-09  Mg     2.4     03-09    TPro  7.2  /  Alb  2.0<L>  /  TBili  0.5  /  DBili  x   /  AST  73<H>  /  ALT  92<H>  /  AlkPhos  219<H>  03-09              [  ]  DVT Prophylaxis  [  ]  Nutrition, Brand, Rate         Goal Rate        Abnormal Nutritional Status -  Malnutrition   Cachexia      Morbid Obesity BMI >/=40    RADIOLOGY & ADDITIONAL STUDIES:  ***    Goals of Care Discussion with Family/Proxy/Other   - see note from/family meeting set up for...     WILMA NICOLE    SCU progress note    INTERVAL HPI/OVERNIGHT EVENTS: No acute events overnight. Febrile this AM. Code sepsis called.     Full code.     Covid - 19 PCR: 3/9/2023 negative.     The 4Ms    What Matters Most: see [facility]  Age appropriate Medications/Screen for High Risk Medication: Yes  Mentation: see CAM below  Mobility: defer to physical exam    The Confusion Assessment Method (CAM) Diagnostic Algorithm     1: Acute Onset or Fluctuating Course  - Is there evidence of an acute change in mental status from the patient’s baseline? Did the (abnormal) behavior  fluctuate during the day, that is, tend to come and go, or increase and decrease in severity?  [ ] YES [ ] NO Unable to assess     2: Inattention  - Did the patient have difficulty focusing attention, being easily distractible, or having difficulty keeping track of what was being said?  [ ] YES [ ] NO Unable to assess     3: Disorganized thinking  -Was the patient’s thinking disorganized or incoherent, such as rambling or irrelevant conversation, unclear or illogical flow of ideas, or unpredictable switching from subject to subject?  [ ] YES [ ] NO Unable to assess    4: Altered Level of consciousness?  [ ] YES [ ] NO Unable to assess    The diagnosis of delirium by CAM requires the presence of features 1 and 2 and either 3 or 4.    PRESSORS: [ ] YES [ x] NO  cefepime   IVPB      cefepime   IVPB 2000 milliGRAM(s) IV Intermittent every 8 hours  vancomycin  IVPB        Cardiovascular:  Heart Failure  Acute   Acute on Chronic  Chronic       doxazosin 2 milliGRAM(s) Oral at bedtime    Pulmonary:  albuterol/ipratropium for Nebulization 3 milliLiter(s) Nebulizer every 6 hours    Hematalogic:  aspirin  chewable 81 milliGRAM(s) Oral daily  enoxaparin Injectable 40 milliGRAM(s) SubCutaneous every 24 hours    Other:  acetaminophen    Suspension .. 650 milliGRAM(s) Oral every 6 hours PRN  aluminum hydroxide/magnesium hydroxide/simethicone Suspension 30 milliLiter(s) Oral every 4 hours PRN  amantadine Syrup 100 milliGRAM(s) Oral two times a day  atorvastatin 80 milliGRAM(s) Oral at bedtime  insulin glargine Injectable (LANTUS) 12 Unit(s) SubCutaneous every morning  insulin glargine Injectable (LANTUS) 12 Unit(s) SubCutaneous at bedtime  insulin lispro (ADMELOG) corrective regimen sliding scale   SubCutaneous every 6 hours  insulin lispro Injectable (ADMELOG) 6 Unit(s) SubCutaneous every 6 hours  melatonin 3 milliGRAM(s) Oral at bedtime PRN  nicotine -   7 mG/24Hr(s) Patch 1 Patch Transdermal daily  ondansetron Injectable 4 milliGRAM(s) IV Push every 8 hours PRN  polyethylene glycol 3350 17 Gram(s) Oral daily  senna Syrup 10 milliLiter(s) Oral daily  silver sulfADIAZINE 1% Cream 1 Application(s) Topical two times a day    acetaminophen    Suspension .. 650 milliGRAM(s) Oral every 6 hours PRN  albuterol/ipratropium for Nebulization 3 milliLiter(s) Nebulizer every 6 hours  aluminum hydroxide/magnesium hydroxide/simethicone Suspension 30 milliLiter(s) Oral every 4 hours PRN  amantadine Syrup 100 milliGRAM(s) Oral two times a day  aspirin  chewable 81 milliGRAM(s) Oral daily  atorvastatin 80 milliGRAM(s) Oral at bedtime  cefepime   IVPB      cefepime   IVPB 2000 milliGRAM(s) IV Intermittent every 8 hours  doxazosin 2 milliGRAM(s) Oral at bedtime  enoxaparin Injectable 40 milliGRAM(s) SubCutaneous every 24 hours  insulin glargine Injectable (LANTUS) 12 Unit(s) SubCutaneous every morning  insulin glargine Injectable (LANTUS) 12 Unit(s) SubCutaneous at bedtime  insulin lispro (ADMELOG) corrective regimen sliding scale   SubCutaneous every 6 hours  insulin lispro Injectable (ADMELOG) 6 Unit(s) SubCutaneous every 6 hours  melatonin 3 milliGRAM(s) Oral at bedtime PRN  nicotine -   7 mG/24Hr(s) Patch 1 Patch Transdermal daily  ondansetron Injectable 4 milliGRAM(s) IV Push every 8 hours PRN  polyethylene glycol 3350 17 Gram(s) Oral daily  senna Syrup 10 milliLiter(s) Oral daily  silver sulfADIAZINE 1% Cream 1 Application(s) Topical two times a day  vancomycin  IVPB        Drug Dosing Weight  Height (cm): 177.8 (05 Mar 2023 03:36)  Weight (kg): 86.2 (05 Mar 2023 03:36)  BMI (kg/m2): 27.3 (05 Mar 2023 03:36)  BSA (m2): 2.04 (05 Mar 2023 03:36)    CENTRAL LINE: [ ] YES [x ] NO  LOCATION:   DATE INSERTED:  REMOVE: [ ] YES [ ] NO  EXPLAIN:    MELCHOR: [x ] YES [ ] NO    DATE INSERTED:  REMOVE:  [ ] YES [ ] NO  EXPLAIN:    PAST MEDICAL & SURGICAL HISTORY:  History of subdural hemorrhage      PEG (percutaneous endoscopic gastrostomy) status      DM (diabetes mellitus)      S/P craniotomy      03-08 @ 07:01  -  03-09 @ 07:00  --------------------------------------------------------  IN: 0 mL / OUT: 1650 mL / NET: -1650 mL      PHYSICAL EXAM:    GENERAL: NAD, diaphoretic.   HEAD:  Atraumatic, Asymmetrical scalp 2/2 craniotomy.   EYES: Pupils round and reactive.  Conjunctiva and sclera clear.   ENMT: No tonsillar erythema, exudates, or enlargement; Moist mucous membranes, Good dentition, No lesions  NECK: Supple, No JVD, Normal thyroid  NERVOUS SYSTEM: Opens eyes spontaneously. does not follow commands. Lethargic.   CHEST/LUNG: Diminished b/l. No rales, rhonchi, wheezing, or rubs. On 3L Via hydro trach   HEART: Regular rate and rhythm; No murmurs, rubs, or gallops  ABDOMEN: Soft, Nontender, Nondistended; Bowel sounds present. PEG in place.   EXTREMITIES:  2+ Peripheral Pulses, No clubbing, cyanosis, or edema  LYMPH: No lymphadenopathy noted  SKIN: Stage 1 coccyx and bilateral heels.       LABS:  CBC Full  -  ( 09 Mar 2023 06:14 )  WBC Count : 9.35 K/uL  RBC Count : 2.90 M/uL  Hemoglobin : 8.5 g/dL  Hematocrit : 26.9 %  Platelet Count - Automated : 352 K/uL  Mean Cell Volume : 92.8 fl  Mean Cell Hemoglobin : 29.3 pg  Mean Cell Hemoglobin Concentration : 31.6 gm/dL  Auto Neutrophil # : x  Auto Lymphocyte # : x  Auto Monocyte # : x  Auto Eosinophil # : x  Auto Basophil # : x  Auto Neutrophil % : x  Auto Lymphocyte % : x  Auto Monocyte % : x  Auto Eosinophil % : x  Auto Basophil % : x    03-09    138  |  104  |  17  ----------------------------<  173<H>  4.1   |  28  |  0.55    Ca    8.5      09 Mar 2023 06:14  Phos  3.2     03-09  Mg     2.4     03-09    TPro  7.2  /  Alb  2.0<L>  /  TBili  0.5  /  DBili  x   /  AST  73<H>  /  ALT  92<H>  /  AlkPhos  219<H>  03-09              [  ]  DVT Prophylaxis  [  ]  Nutrition, Brand, Rate         Goal Rate        Abnormal Nutritional Status -  Malnutrition   Cachexia      Morbid Obesity BMI >/=40    RADIOLOGY & ADDITIONAL STUDIES:  ***    Goals of Care Discussion with Family/Proxy/Other   - see note from/family meeting set up for...

## 2023-03-09 NOTE — PROGRESS NOTE ADULT - PROBLEM SELECTOR PLAN 1
Continue trach collar @ 3L.  Maintain O2 Sat > 94%   Monitor oxygen saturation.  Continue bronchodilators via nebulizer  blood cultures NGTD.   Continue cefepime for PNA  Vanco discontinued on 3/7. Continue trach collar @ 3L.   Maintain O2 Sat > 94%    Monitor oxygen saturation.   Continue bronchodilators via nebulizer   blood cultures NGTD.    Continue cefepime and Vanco  for PNA.

## 2023-03-09 NOTE — PROGRESS NOTE ADULT - PROBLEM SELECTOR PLAN 7
H&H low but stable.   F/u on iron studies.  Continue to monitor daily. H&H low but stable.   Drop today likely 2/2 hemodilution   Continue to monitor daily.  No signs of bleeding

## 2023-03-09 NOTE — PROGRESS NOTE ADULT - SUBJECTIVE AND OBJECTIVE BOX
62y Male    Meds:  cefepime   IVPB      cefepime   IVPB 2000 milliGRAM(s) IV Intermittent every 8 hours  vancomycin  IVPB 1250 milliGRAM(s) IV Intermittent every 12 hours  vancomycin  IVPB        Allergies    No Known Allergies    Intolerances        VITALS:  Vital Signs Last 24 Hrs  T(C): 37.9 (09 Mar 2023 13:27), Max: 39.1 (09 Mar 2023 08:57)  T(F): 100.2 (09 Mar 2023 13:27), Max: 102.4 (09 Mar 2023 08:57)  HR: 105 (09 Mar 2023 13:27) (99 - 116)  BP: 140/82 (09 Mar 2023 13:27) (123/72 - 142/84)  BP(mean): --  RR: 22 (09 Mar 2023 13:27) (18 - 30)  SpO2: 100% (09 Mar 2023 13:27) (100% - 100%)    Parameters below as of 09 Mar 2023 13:27  Patient On (Oxygen Delivery Method): hydrotrach  O2 Flow (L/min): 3      LABS/DIAGNOSTIC TESTS:                          7.5    11.28 )-----------( 422      ( 09 Mar 2023 09:15 )             23.7     Lactate, Blood: 1.9 mmol/L (03-09 @ 11:43)      03-09    137  |  104  |  17  ----------------------------<  163<H>  4.5   |  28  |  0.59    Ca    8.8      09 Mar 2023 09:15  Phos  3.2     03-09  Mg     2.4     03-09    TPro  7.7  /  Alb  2.2<L>  /  TBili  0.6  /  DBili  x   /  AST  77<H>  /  ALT  100<H>  /  AlkPhos  230<H>  03-09      LIVER FUNCTIONS - ( 09 Mar 2023 09:15 )  Alb: 2.2 g/dL / Pro: 7.7 g/dL / ALK PHOS: 230 U/L / ALT: 100 U/L DA / AST: 77 U/L / GGT: x             CULTURES: Trach Asp Tracheal Aspirate  03-06 @ 00:14   Normal Respiratory Jane present  --    Numerous polymorphonuclear leukocytes per low power field  Few Squamous epithelial cells per low power field  Numerous Gram Variable Rods seen per oil power field  Moderate Yeast like cells seen per oil power field  Few Gram positive cocci in pairsseen per oil power field      .Blood Blood-Peripheral  03-05 @ 04:00   No growth to date.  --  --      .Blood Blood-Peripheral  03-05 @ 03:50   No growth to date.  --  --      Clean Catch Clean Catch (Midstream)  03-05 @ 03:15   50,000 - 99,000 CFU/mL Candida glabrata  "Susceptibilities not performed"  --  --            RADIOLOGY:      ROS:  [  ] UNABLE TO ELICIT 62y Male who is in the same condition and is not responsive, he started having fevers again as high as 102.4 and so restarted him on Vancomycin in addition to his Maxipime.     Meds:  cefepime   IVPB      cefepime   IVPB 2000 milliGRAM(s) IV Intermittent every 8 hours  vancomycin  IVPB 1250 milliGRAM(s) IV Intermittent every 12 hours  vancomycin  IVPB        Allergies    No Known Allergies    Intolerances        VITALS:  Vital Signs Last 24 Hrs  T(C): 37.9 (09 Mar 2023 13:27), Max: 39.1 (09 Mar 2023 08:57)  T(F): 100.2 (09 Mar 2023 13:27), Max: 102.4 (09 Mar 2023 08:57)  HR: 105 (09 Mar 2023 13:27) (99 - 116)  BP: 140/82 (09 Mar 2023 13:27) (123/72 - 142/84)  BP(mean): --  RR: 22 (09 Mar 2023 13:27) (18 - 30)  SpO2: 100% (09 Mar 2023 13:27) (100% - 100%)    Parameters below as of 09 Mar 2023 13:27  Patient On (Oxygen Delivery Method): hydrotrach  O2 Flow (L/min): 3      LABS/DIAGNOSTIC TESTS:                          7.5    11.28 )-----------( 422      ( 09 Mar 2023 09:15 )             23.7     Lactate, Blood: 1.9 mmol/L (03-09 @ 11:43)      03-09    137  |  104  |  17  ----------------------------<  163<H>  4.5   |  28  |  0.59    Ca    8.8      09 Mar 2023 09:15  Phos  3.2     03-09  Mg     2.4     03-09    TPro  7.7  /  Alb  2.2<L>  /  TBili  0.6  /  DBili  x   /  AST  77<H>  /  ALT  100<H>  /  AlkPhos  230<H>  03-09      LIVER FUNCTIONS - ( 09 Mar 2023 09:15 )  Alb: 2.2 g/dL / Pro: 7.7 g/dL / ALK PHOS: 230 U/L / ALT: 100 U/L DA / AST: 77 U/L / GGT: x             CULTURES: Trach Asp Tracheal Aspirate  03-06 @ 00:14   Normal Respiratory Jane present  --    Numerous polymorphonuclear leukocytes per low power field  Few Squamous epithelial cells per low power field  Numerous Gram Variable Rods seen per oil power field  Moderate Yeast like cells seen per oil power field  Few Gram positive cocci in pairsseen per oil power field      .Blood Blood-Peripheral  03-05 @ 04:00   No growth to date.  --  --      .Blood Blood-Peripheral  03-05 @ 03:50   No growth to date.  --  --      Clean Catch Clean Catch (Midstream)  03-05 @ 03:15   50,000 - 99,000 CFU/mL Candida glabrata  "Susceptibilities not performed"  --  --            RADIOLOGY:      ROS:  [ x ] UNABLE TO ELICIT

## 2023-03-09 NOTE — PROGRESS NOTE ADULT - PROBLEM SELECTOR PLAN 5
Secondary to SDH and craniotomy  Craniotomy at [facility] 1/23  Maintain safety precautions  Strict aspiration precautions.

## 2023-03-10 LAB
ALBUMIN SERPL ELPH-MCNC: 1.8 G/DL — LOW (ref 3.5–5)
ALP SERPL-CCNC: 200 U/L — HIGH (ref 40–120)
ALT FLD-CCNC: 75 U/L DA — HIGH (ref 10–60)
ANION GAP SERPL CALC-SCNC: 4 MMOL/L — LOW (ref 5–17)
AST SERPL-CCNC: 54 U/L — HIGH (ref 10–40)
BILIRUB SERPL-MCNC: 0.5 MG/DL — SIGNIFICANT CHANGE UP (ref 0.2–1.2)
BUN SERPL-MCNC: 11 MG/DL — SIGNIFICANT CHANGE UP (ref 7–18)
CALCIUM SERPL-MCNC: 8.4 MG/DL — SIGNIFICANT CHANGE UP (ref 8.4–10.5)
CHLORIDE SERPL-SCNC: 100 MMOL/L — SIGNIFICANT CHANGE UP (ref 96–108)
CO2 SERPL-SCNC: 29 MMOL/L — SIGNIFICANT CHANGE UP (ref 22–31)
CREAT SERPL-MCNC: 0.49 MG/DL — LOW (ref 0.5–1.3)
CULTURE RESULTS: SIGNIFICANT CHANGE UP
CULTURE RESULTS: SIGNIFICANT CHANGE UP
EGFR: 117 ML/MIN/1.73M2 — SIGNIFICANT CHANGE UP
GLUCOSE SERPL-MCNC: 187 MG/DL — HIGH (ref 70–99)
GRAM STN FLD: SIGNIFICANT CHANGE UP
HCT VFR BLD CALC: 24.3 % — LOW (ref 39–50)
HGB BLD-MCNC: 7.9 G/DL — LOW (ref 13–17)
MCHC RBC-ENTMCNC: 29.7 PG — SIGNIFICANT CHANGE UP (ref 27–34)
MCHC RBC-ENTMCNC: 32.5 GM/DL — SIGNIFICANT CHANGE UP (ref 32–36)
MCV RBC AUTO: 91.4 FL — SIGNIFICANT CHANGE UP (ref 80–100)
NRBC # BLD: 0 /100 WBCS — SIGNIFICANT CHANGE UP (ref 0–0)
PLATELET # BLD AUTO: 383 K/UL — SIGNIFICANT CHANGE UP (ref 150–400)
POTASSIUM SERPL-MCNC: 4 MMOL/L — SIGNIFICANT CHANGE UP (ref 3.5–5.3)
POTASSIUM SERPL-SCNC: 4 MMOL/L — SIGNIFICANT CHANGE UP (ref 3.5–5.3)
PROCALCITONIN SERPL-MCNC: 0.46 NG/ML — HIGH (ref 0.02–0.1)
PROT SERPL-MCNC: 7.1 G/DL — SIGNIFICANT CHANGE UP (ref 6–8.3)
RBC # BLD: 2.66 M/UL — LOW (ref 4.2–5.8)
RBC # FLD: 13.4 % — SIGNIFICANT CHANGE UP (ref 10.3–14.5)
SODIUM SERPL-SCNC: 133 MMOL/L — LOW (ref 135–145)
SPECIMEN SOURCE: SIGNIFICANT CHANGE UP
WBC # BLD: 8.68 K/UL — SIGNIFICANT CHANGE UP (ref 3.8–10.5)
WBC # FLD AUTO: 8.68 K/UL — SIGNIFICANT CHANGE UP (ref 3.8–10.5)

## 2023-03-10 PROCEDURE — 71045 X-RAY EXAM CHEST 1 VIEW: CPT | Mod: 26

## 2023-03-10 RX ADMIN — Medication 1: at 06:29

## 2023-03-10 RX ADMIN — CEFEPIME 100 MILLIGRAM(S): 1 INJECTION, POWDER, FOR SOLUTION INTRAMUSCULAR; INTRAVENOUS at 21:27

## 2023-03-10 RX ADMIN — Medication 1 PATCH: at 20:33

## 2023-03-10 RX ADMIN — Medication 6 UNIT(S): at 17:14

## 2023-03-10 RX ADMIN — Medication 100 MILLIGRAM(S): at 18:00

## 2023-03-10 RX ADMIN — ENOXAPARIN SODIUM 40 MILLIGRAM(S): 100 INJECTION SUBCUTANEOUS at 11:55

## 2023-03-10 RX ADMIN — Medication 100 MILLIGRAM(S): at 06:30

## 2023-03-10 RX ADMIN — Medication 2: at 17:14

## 2023-03-10 RX ADMIN — SENNA PLUS 10 MILLILITER(S): 8.6 TABLET ORAL at 12:57

## 2023-03-10 RX ADMIN — Medication 1: at 23:22

## 2023-03-10 RX ADMIN — Medication 3 MILLILITER(S): at 03:36

## 2023-03-10 RX ADMIN — Medication 166.67 MILLIGRAM(S): at 12:57

## 2023-03-10 RX ADMIN — INSULIN GLARGINE 12 UNIT(S): 100 INJECTION, SOLUTION SUBCUTANEOUS at 22:57

## 2023-03-10 RX ADMIN — CEFEPIME 100 MILLIGRAM(S): 1 INJECTION, POWDER, FOR SOLUTION INTRAMUSCULAR; INTRAVENOUS at 06:30

## 2023-03-10 RX ADMIN — POLYETHYLENE GLYCOL 3350 17 GRAM(S): 17 POWDER, FOR SOLUTION ORAL at 12:58

## 2023-03-10 RX ADMIN — Medication 6 UNIT(S): at 23:22

## 2023-03-10 RX ADMIN — Medication 6 UNIT(S): at 06:30

## 2023-03-10 RX ADMIN — Medication 1 APPLICATION(S): at 06:30

## 2023-03-10 RX ADMIN — Medication 81 MILLIGRAM(S): at 12:57

## 2023-03-10 RX ADMIN — ATORVASTATIN CALCIUM 80 MILLIGRAM(S): 80 TABLET, FILM COATED ORAL at 21:27

## 2023-03-10 RX ADMIN — Medication 166.67 MILLIGRAM(S): at 17:21

## 2023-03-10 RX ADMIN — Medication 2 MILLIGRAM(S): at 21:27

## 2023-03-10 RX ADMIN — Medication 1 PATCH: at 07:30

## 2023-03-10 RX ADMIN — Medication 3 MILLILITER(S): at 14:31

## 2023-03-10 RX ADMIN — CEFEPIME 100 MILLIGRAM(S): 1 INJECTION, POWDER, FOR SOLUTION INTRAMUSCULAR; INTRAVENOUS at 13:59

## 2023-03-10 RX ADMIN — Medication 1 PATCH: at 12:57

## 2023-03-10 RX ADMIN — Medication 1 PATCH: at 12:18

## 2023-03-10 RX ADMIN — Medication 3 MILLILITER(S): at 20:10

## 2023-03-10 RX ADMIN — Medication 3 MILLILITER(S): at 08:19

## 2023-03-10 RX ADMIN — Medication 1 APPLICATION(S): at 17:22

## 2023-03-10 NOTE — PROGRESS NOTE ADULT - PROBLEM SELECTOR PLAN 2
Continue trach collar. Titrate oxygen as tolerated.  Maintain sat at or above 92%  Monitor oxygen saturation  Continue bronchodilators  Continue antibiotics for pna  F/U cultures.

## 2023-03-10 NOTE — PROGRESS NOTE ADULT - PROBLEM SELECTOR PLAN 1
Secondary to pneumonia  S/P Code Sepsis yesterday  F/U Blood and sputum cultures.  Continue cefepime and vanco  Repeat CXR pending

## 2023-03-10 NOTE — PROGRESS NOTE ADULT - ASSESSMENT
62M from [facility], h/o traumatic subdural hemorrhage following a fall down the stairs while drunk, s/p r craniotomy at [facility] in 01/2023, s/p trach/PEG BIBEMS for tachycardia 170s, RR 27, increased secretions admitted to  for sepsis secondary to RUL pneumonia with tracheostomy to 3L NC.  3/6 Febrile 101.7  03/07: No events overnight. On [place]-trach at 3 L and tolerating well. Hemodynamically stable. Cultures in progress. C/w Vanco and Cefepime. F/u Vanco trough. Hypokalemic this AM and replacement ordered.   03/08: Afebrile. Vanco discontinued since MRSA negative. + yeast in sputum; + Candida Galbrata in urine (Bran in place with clear urine and afebrile).   Dr. Peres following. Blood cx NGTD. C/w Cefepime.   03/09: 03/09: Febrile, tachycardic and tachypneic this AM. Code sepsis called. Sepsis w/u in progress.  Started on Vanco. ID following, . Discussed with Dr. Peres. F/u CXR. Worsening Leukocytosis. IVF bolus with LR (2,300 ml)  per protocol. Lactate 1.9.

## 2023-03-10 NOTE — PROGRESS NOTE ADULT - PROBLEM SELECTOR PLAN 3
Secondary to SDH and craniotomy  Craniotomy at [facility] 1/23  Maintain safety precautions  Strict aspiration precautions.  Seizure precautions.

## 2023-03-10 NOTE — CHART NOTE - NSCHARTNOTEFT_GEN_A_CORE
EVENT:  62M from [facility], h/o traumatic subdural hemorrhage following a fall down the stairs while drunk, s/p r craniotomy at [facility] in 01/2023, s/p trach/PEG BIBEMS for tachycardia 170s, RR 27, increased secretions. Pt treated with rocephin 1g from 3/2 but continued to worsen and was sent to the hospital for persistent tachycardia and tachypnea.     SUBJECTIVE:  Failed TOV, elevated temperature    OBJECTIVE:  Vital Signs Last 24 Hrs  T(C): 37 (10 Mar 2023 05:04), Max: 39.1 (09 Mar 2023 08:57)  T(F): 98.6 (10 Mar 2023 05:04), Max: 102.4 (09 Mar 2023 08:57)  HR: 95 (10 Mar 2023 05:04) (95 - 116)  BP: 130/78 (10 Mar 2023 05:04) (119/72 - 142/84)  BP(mean): --  RR: 20 (10 Mar 2023 05:04) (20 - 30)  SpO2: 100% (10 Mar 2023 05:04) (100% - 100%)    Parameters below as of 10 Mar 2023 05:04  Patient On (Oxygen Delivery Method): hydro trach  O2 Flow (L/min): 3    LABS:                        7.5    11.28 )-----------( 422      ( 09 Mar 2023 09:15 )             23.7     03-09    137  |  104  |  17  ----------------------------<  163<H>  4.5   |  28  |  0.59    Ca    8.8      09 Mar 2023 09:15  Phos  3.2     03-09  Mg     2.4     03-09    TPro  7.7  /  Alb  2.2<L>  /  TBili  0.6  /  DBili  x   /  AST  77<H>  /  ALT  100<H>  /  AlkPhos  230<H>  03-09    Problem #1: Sepsis  Noted with elevated temp 100.4 last night, rechecked 98.7    Plan:  Likely secondary to Aspiration pna  Continue Cefepime and Vanco   Urine growing Candida Galbrata and sputum + Yeast    Dr. Peres consulted on 3/7   F/u blood cultures from 3/9   F/u CXR    Problem #2: Urinary retention  Plan:  Failed TOV, bladder scan 879ml  -Bran reinserted

## 2023-03-10 NOTE — PROGRESS NOTE ADULT - SUBJECTIVE AND OBJECTIVE BOX
WILMA NICOLE    SCU progress note    S/PINTERVAL HPI/OVERNIGHT EVENTS: ***S/P Code Sepsis yesterday     DNR [ ]   DNI  [  ]  FULL CODE      Covid - 19 PCR: Negative 3/9    The 4Ms    What Matters Most: see [facility]  Age appropriate Medications/Screen for High Risk Medication: Yes  Mentation: see CAM below  Mobility: defer to physical exam    The Confusion Assessment Method (CAM) Diagnostic Algorithm     1: Acute Onset or Fluctuating Course  - Is there evidence of an acute change in mental status from the patient’s baseline? Did the (abnormal) behavior  fluctuate during the day, that is, tend to come and go, or increase and decrease in severity?  [ ] YES [x ] NO     2: Inattention  - Did the patient have difficulty focusing attention, being easily distractible, or having difficulty keeping track of what was being said?  [ ] YES [ ] NO  Unable to access     3: Disorganized thinking  -Was the patient’s thinking disorganized or incoherent, such as rambling or irrelevant conversation, unclear or illogical flow of ideas, or unpredictable switching from subject to subject?  [ ] YES [ ] NO Unable to access    4: Altered Level of consciousness?  [x ] YES [ ] NO    The diagnosis of delirium by CAM requires the presence of features 1 and 2 and either 3 or 4.    PRESSORS: [ ] YES [x ] NO  cefepime   IVPB      cefepime   IVPB 2000 milliGRAM(s) IV Intermittent every 8 hours  vancomycin  IVPB      vancomycin  IVPB 1250 milliGRAM(s) IV Intermittent every 12 hours    Cardiovascular:  Heart Failure  Acute   Acute on Chronic  Chronic       doxazosin 2 milliGRAM(s) Oral at bedtime    Pulmonary:  albuterol/ipratropium for Nebulization 3 milliLiter(s) Nebulizer every 6 hours    Hematalogic:  aspirin  chewable 81 milliGRAM(s) Oral daily  enoxaparin Injectable 40 milliGRAM(s) SubCutaneous every 24 hours    Other:  acetaminophen    Suspension .. 650 milliGRAM(s) Oral every 6 hours PRN  aluminum hydroxide/magnesium hydroxide/simethicone Suspension 30 milliLiter(s) Oral every 4 hours PRN  amantadine Syrup 100 milliGRAM(s) Oral two times a day  atorvastatin 80 milliGRAM(s) Oral at bedtime  insulin glargine Injectable (LANTUS) 12 Unit(s) SubCutaneous every morning  insulin glargine Injectable (LANTUS) 12 Unit(s) SubCutaneous at bedtime  insulin lispro (ADMELOG) corrective regimen sliding scale   SubCutaneous every 6 hours  insulin lispro Injectable (ADMELOG) 6 Unit(s) SubCutaneous every 6 hours  melatonin 3 milliGRAM(s) Oral at bedtime PRN  nicotine -   7 mG/24Hr(s) Patch 1 Patch Transdermal daily  ondansetron Injectable 4 milliGRAM(s) IV Push every 8 hours PRN  polyethylene glycol 3350 17 Gram(s) Oral daily  senna Syrup 10 milliLiter(s) Oral daily  silver sulfADIAZINE 1% Cream 1 Application(s) Topical two times a day    acetaminophen    Suspension .. 650 milliGRAM(s) Oral every 6 hours PRN  albuterol/ipratropium for Nebulization 3 milliLiter(s) Nebulizer every 6 hours  aluminum hydroxide/magnesium hydroxide/simethicone Suspension 30 milliLiter(s) Oral every 4 hours PRN  amantadine Syrup 100 milliGRAM(s) Oral two times a day  aspirin  chewable 81 milliGRAM(s) Oral daily  atorvastatin 80 milliGRAM(s) Oral at bedtime  cefepime   IVPB      cefepime   IVPB 2000 milliGRAM(s) IV Intermittent every 8 hours  doxazosin 2 milliGRAM(s) Oral at bedtime  enoxaparin Injectable 40 milliGRAM(s) SubCutaneous every 24 hours  insulin glargine Injectable (LANTUS) 12 Unit(s) SubCutaneous every morning  insulin glargine Injectable (LANTUS) 12 Unit(s) SubCutaneous at bedtime  insulin lispro (ADMELOG) corrective regimen sliding scale   SubCutaneous every 6 hours  insulin lispro Injectable (ADMELOG) 6 Unit(s) SubCutaneous every 6 hours  melatonin 3 milliGRAM(s) Oral at bedtime PRN  nicotine -   7 mG/24Hr(s) Patch 1 Patch Transdermal daily  ondansetron Injectable 4 milliGRAM(s) IV Push every 8 hours PRN  polyethylene glycol 3350 17 Gram(s) Oral daily  senna Syrup 10 milliLiter(s) Oral daily  silver sulfADIAZINE 1% Cream 1 Application(s) Topical two times a day  vancomycin  IVPB      vancomycin  IVPB 1250 milliGRAM(s) IV Intermittent every 12 hours    Drug Dosing Weight  Height (cm): 177.8 (05 Mar 2023 03:36)  Weight (kg): 86.2 (05 Mar 2023 03:36)  BMI (kg/m2): 27.3 (05 Mar 2023 03:36)  BSA (m2): 2.04 (05 Mar 2023 03:36)    CENTRAL LINE: [ ] YES [x ] NO  LOCATION:   DATE INSERTED:  REMOVE: [ ] YES [ ] NO  EXPLAIN:    MELCHOR: [x ] YES [ ] NO    DATE INSERTED:  REMOVE:  [ ] YES [x ] NO  EXPLAIN:  Failed TOV    PAST MEDICAL & SURGICAL HISTORY:  History of subdural hemorrhage      PEG (percutaneous endoscopic gastrostomy) status      DM (diabetes mellitus)      S/P craniotomy                  - @ 07:01  -  -10 @ 07:00  --------------------------------------------------------  IN: 0 mL / OUT: 3100 mL / NET: -3100 mL            PHYSICAL EXAM:    GENERAL: NAD  HEAD:  Right craniofacial deformity  EYES: PERRLA, conjunctiva and sclera clear  ENMT: No tonsillar erythema, exudates  NECK: Supple, No JVD, tracheostomy intact  NERVOUS SYSTEM:  Awake. Nonverbal at baseline. Intermittent tracking with eyes. Does not follow commands. No spontaneous movement of extremities.  CHEST/LUNG: diminished breath sounds bilateral bases  HEART: Regular rate and rhythm; No murmurs, rubs, or gallops  ABDOMEN: +Peg, Soft, Nontender, Nondistended; Bowel sounds present  EXTREMITIES:  No spontaneous movement. +1 edema all extremities. 2+ Peripheral Pulses, No clubbing, cyanosis  LYMPH: No lymphadenopathy noted  SKIN: Stage 1 coccyx and bilateral heels      LABS:  CBC Full  -  ( 09 Mar 2023 09:15 )  WBC Count : 11.28 K/uL  RBC Count : 2.58 M/uL  Hemoglobin : 7.5 g/dL  Hematocrit : 23.7 %  Platelet Count - Automated : 422 K/uL  Mean Cell Volume : 91.9 fl  Mean Cell Hemoglobin : 29.1 pg  Mean Cell Hemoglobin Concentration : 31.6 gm/dL  Auto Neutrophil # : 8.78 K/uL  Auto Lymphocyte # : 1.48 K/uL  Auto Monocyte # : 0.68 K/uL  Auto Eosinophil # : 0.23 K/uL  Auto Basophil # : 0.03 K/uL  Auto Neutrophil % : 77.9 %  Auto Lymphocyte % : 13.1 %  Auto Monocyte % : 6.0 %  Auto Eosinophil % : 2.0 %  Auto Basophil % : 0.3 %        137  |  104  |  17  ----------------------------<  163<H>  4.5   |  28  |  0.59    Ca    8.8      09 Mar 2023 09:15  Phos  3.2       Mg     2.4         TPro  7.7  /  Alb  2.2<L>  /  TBili  0.6  /  DBili  x   /  AST  77<H>  /  ALT  100<H>  /  AlkPhos  230<H>      PT/INR - ( 09 Mar 2023 09:15 )   PT: 14.4 sec;   INR: 1.21 ratio         PTT - ( 09 Mar 2023 09:15 )  PTT:28.9 sec  Urinalysis Basic - ( 09 Mar 2023 09:31 )    Color: Yellow / Appearance: Clear / S.010 / pH: x  Gluc: x / Ketone: Negative  / Bili: Negative / Urobili: 1   Blood: x / Protein: 100 / Nitrite: Negative   Leuk Esterase: Negative / RBC: >50 /HPF / WBC 3-5 /HPF   Sq Epi: x / Non Sq Epi: Occasional /HPF / Bacteria: x            [  ]  DVT Prophylaxis  [  ]  Nutrition, Brand, Rate         Goal Rate        Abnormal Nutritional Status -  Malnutrition   Cachexia          RADIOLOGY & ADDITIONAL STUDIES:  ***  < from: Xray Chest 1 View-PORTABLE IMMEDIATE (23 @ 05:57) >  IMPRESSION: Heart is normal in size. There is a tracheostomy tube  present. There is mild bilateral airspace disease without a focal area of   opacification. This may be from pulmonary edema.      < end of copied text >    Goals of Care Discussion with Family/Proxy/Other   - see note from 3/05/23

## 2023-03-10 NOTE — PROGRESS NOTE ADULT - PROBLEM SELECTOR PLAN 4
Possibly secondary to sepsis vs amantadine  Controlled.   Continue to monitor daily.  May consider US RUQ if worsens.

## 2023-03-10 NOTE — PROGRESS NOTE ADULT - PROBLEM SELECTOR PLAN 10
DVT and GI prophylaxis  Continue antibiotics as per ID cefepime and vanco  F/U cultures  Repeat CXR pending

## 2023-03-10 NOTE — PROGRESS NOTE ADULT - SUBJECTIVE AND OBJECTIVE BOX
Time of Visit:  Patient seen and examined.     MEDICATIONS  (STANDING):  albuterol/ipratropium for Nebulization 3 milliLiter(s) Nebulizer every 6 hours  amantadine Syrup 100 milliGRAM(s) Oral two times a day  aspirin  chewable 81 milliGRAM(s) Oral daily  atorvastatin 80 milliGRAM(s) Oral at bedtime  cefepime   IVPB      cefepime   IVPB 2000 milliGRAM(s) IV Intermittent every 8 hours  doxazosin 2 milliGRAM(s) Oral at bedtime  enoxaparin Injectable 40 milliGRAM(s) SubCutaneous every 24 hours  insulin glargine Injectable (LANTUS) 12 Unit(s) SubCutaneous every morning  insulin glargine Injectable (LANTUS) 12 Unit(s) SubCutaneous at bedtime  insulin lispro (ADMELOG) corrective regimen sliding scale   SubCutaneous every 6 hours  insulin lispro Injectable (ADMELOG) 6 Unit(s) SubCutaneous every 6 hours  nicotine -   7 mG/24Hr(s) Patch 1 Patch Transdermal daily  polyethylene glycol 3350 17 Gram(s) Oral daily  senna Syrup 10 milliLiter(s) Oral daily  silver sulfADIAZINE 1% Cream 1 Application(s) Topical two times a day  vancomycin  IVPB      vancomycin  IVPB 1250 milliGRAM(s) IV Intermittent every 12 hours      MEDICATIONS  (PRN):  acetaminophen    Suspension .. 650 milliGRAM(s) Oral every 6 hours PRN Temp greater or equal to 38C (100.4F), Mild Pain (1 - 3)  aluminum hydroxide/magnesium hydroxide/simethicone Suspension 30 milliLiter(s) Oral every 4 hours PRN Dyspepsia  melatonin 3 milliGRAM(s) Oral at bedtime PRN Insomnia  ondansetron Injectable 4 milliGRAM(s) IV Push every 8 hours PRN Nausea and/or Vomiting       Medications up to date at time of exam.      PHYSICAL EXAMINATION:  Patient has no new complaints.  GENERAL: The patient is a well-developed, well-nourished, in no apparent distress.     Vital Signs Last 24 Hrs  T(C): 37.7 (10 Mar 2023 14:33), Max: 38 (09 Mar 2023 20:09)  T(F): 99.8 (10 Mar 2023 14:33), Max: 100.4 (09 Mar 2023 20:09)  HR: 101 (10 Mar 2023 14:33) (95 - 101)  BP: 129/83 (10 Mar 2023 14:33) (119/72 - 133/79)  BP(mean): --  RR: 20 (10 Mar 2023 14:33) (20 - 21)  SpO2: 95% (10 Mar 2023 14:33) (95% - 100%)    Parameters below as of 10 Mar 2023 14:33  Patient On (Oxygen Delivery Method): hydro trach  O2 Flow (L/min): 3     (if applicable)    Chest Tube (if applicable)    HEENT: Head is normocephalic and atraumatic. Extraocular muscles are intact. Mucous membranes are moist.     NECK: Supple, + tach , clean stoma     LUNGS: Clear to auscultation, no wheezing, rales, or rhonchi.    HEART: Regular rate and rhythm without murmur.    ABDOMEN: Soft, nontender, and nondistended.  No hepatosplenomegaly is noted. + PEG     : No painful voiding, no pelvic pain    EXTREMITIES: Without any cyanosis, clubbing, rash, lesions or edema.    NEUROLOGIC: eyes open     SKIN: Warm, dry, good turgor.      LABS:                        7.5    11.28 )-----------( 422      ( 09 Mar 2023 09:15 )             23.7     03-09    137  |  104  |  17  ----------------------------<  163<H>  4.5   |  28  |  0.59    Ca    8.8      09 Mar 2023 09:15  Phos  3.2     03-09  Mg     2.4     03-09    TPro  7.7  /  Alb  2.2<L>  /  TBili  0.6  /  DBili  x   /  AST  77<H>  /  ALT  100<H>  /  AlkPhos  230<H>  03-09    PT/INR - ( 09 Mar 2023 09:15 )   PT: 14.4 sec;   INR: 1.21 ratio         PTT - ( 09 Mar 2023 09:15 )  PTT:28.9 sec  Urinalysis Basic - ( 09 Mar 2023 09:31 )    Color: Yellow / Appearance: Clear / S.010 / pH: x  Gluc: x / Ketone: Negative  / Bili: Negative / Urobili: 1   Blood: x / Protein: 100 / Nitrite: Negative   Leuk Esterase: Negative / RBC: >50 /HPF / WBC 3-5 /HPF   Sq Epi: x / Non Sq Epi: Occasional /HPF / Bacteria: x                  Procalcitonin, Serum: 0.46 ng/mL (03-10-23 @ 06:50)      MICROBIOLOGY: (if applicable)    RADIOLOGY & ADDITIONAL STUDIES:  EKG:   CXR:< from: Xray Chest 1 View- PORTABLE-Urgent (Xray Chest 1 View- PORTABLE-Urgent .) (03.10.23 @ 11:07) >    ACC: [id number] EXAM:  XR CHEST PORTABLE URGENT 1V   ORDERED BY: ANTHONY RESENDIZ     PROCEDURE DATE:  03/10/2023          INTERPRETATION:  Chest one view    HISTORY: Fever    COMPARISON STUDY: 3/5/2023    Frontal expiratory view of the chest shows the heart to be normal in   size. Tracheostomy tube is again noted.    The lungs show mild right atelectasis and there is no evidence of   pneumothorax nor pleural effusion.    IMPRESSION:  No focal infiltrate.        Thank you for the courtesy of this referral.    --- End of Report ---            DOLORES CAM MD; Attending Interventional Radiologist  This document has been electronically signed. Mar 10 2023  2:43PM    < end of copied text >    ECHO:    IMPRESSION: 62y Male PAST MEDICAL & SURGICAL HISTORY:  History of subdural hemorrhage      PEG (percutaneous endoscopic gastrostomy) status      DM (diabetes mellitus)      S/P craniotomy       p/w           IMP : This is a 62 yr old man  from [facility], h/o traumatic subdural hemorrhage following a fall down the stairs while drunk, s/p r craniotomy at [facility] in 2023, s/p trach/PEG BIBEMS for tachycardia 170s, RR 27, increased secretions admitted to  for sepsis secondary to RUL pneumonia with tracheostomy to 3L NC.  3/6 Febrile 101.7  : No events overnight. On [place]-trach at 3 L and tolerating well. Hemodynamically stable. Cultures in progress. C/w Vanco and Cefepime. F/u Vanco trough. Hypokalemic this AM and replacement ordered.   : Afebrile. Vanco discontinued since MRSA negative. + yeast in sputum; + Candida Galbrata in urine (Bran in place with clear urine and afebrile).   Dr. Peres following. Blood cx NGTD. C/w Cefepime.   : : Febrile, tachycardic and tachypneic this AM. Code sepsis called. Sepsis w/u in progress.  Started on Vanco. ID following, . Discussed with Dr. Peres. F/u CXR. Worsening Leukocytosis. IVF bolus with LR (2,300 ml)  per protocol. Lactate 1.9.        COVID-19 Negative Lab Result:  · COVID-19 Negative Lab Result	COVID-19 ruled out    Plan   - Continue antibx   - CXR improved   - Pulmoanry hygiene   - Trach stoma care   - Continue o2 supp as needed via TC to maintain sat >90%  - Continue duonebs   - DVT GI prophy     D/C with NP covering

## 2023-03-10 NOTE — CHART NOTE - NSCHARTNOTEFT_GEN_A_CORE
Son updated on current test results and treatment plan.  Discussed antibiotic changes.  All questions answered.

## 2023-03-11 LAB
ALBUMIN SERPL ELPH-MCNC: 1.8 G/DL — LOW (ref 3.5–5)
ALP SERPL-CCNC: 205 U/L — HIGH (ref 40–120)
ALT FLD-CCNC: 75 U/L DA — HIGH (ref 10–60)
ANION GAP SERPL CALC-SCNC: 5 MMOL/L — SIGNIFICANT CHANGE UP (ref 5–17)
AST SERPL-CCNC: 57 U/L — HIGH (ref 10–40)
BILIRUB SERPL-MCNC: 0.5 MG/DL — SIGNIFICANT CHANGE UP (ref 0.2–1.2)
BUN SERPL-MCNC: 12 MG/DL — SIGNIFICANT CHANGE UP (ref 7–18)
CALCIUM SERPL-MCNC: 8.5 MG/DL — SIGNIFICANT CHANGE UP (ref 8.4–10.5)
CHLORIDE SERPL-SCNC: 100 MMOL/L — SIGNIFICANT CHANGE UP (ref 96–108)
CO2 SERPL-SCNC: 29 MMOL/L — SIGNIFICANT CHANGE UP (ref 22–31)
CREAT SERPL-MCNC: 0.52 MG/DL — SIGNIFICANT CHANGE UP (ref 0.5–1.3)
EGFR: 115 ML/MIN/1.73M2 — SIGNIFICANT CHANGE UP
GLUCOSE SERPL-MCNC: 173 MG/DL — HIGH (ref 70–99)
HCT VFR BLD CALC: 24.3 % — LOW (ref 39–50)
HGB BLD-MCNC: 7.9 G/DL — LOW (ref 13–17)
MAGNESIUM SERPL-MCNC: 2.3 MG/DL — SIGNIFICANT CHANGE UP (ref 1.6–2.6)
MCHC RBC-ENTMCNC: 29.3 PG — SIGNIFICANT CHANGE UP (ref 27–34)
MCHC RBC-ENTMCNC: 32.5 GM/DL — SIGNIFICANT CHANGE UP (ref 32–36)
MCV RBC AUTO: 90 FL — SIGNIFICANT CHANGE UP (ref 80–100)
NRBC # BLD: 0 /100 WBCS — SIGNIFICANT CHANGE UP (ref 0–0)
PHOSPHATE SERPL-MCNC: 3 MG/DL — SIGNIFICANT CHANGE UP (ref 2.5–4.5)
PLATELET # BLD AUTO: 427 K/UL — HIGH (ref 150–400)
POTASSIUM SERPL-MCNC: 3.8 MMOL/L — SIGNIFICANT CHANGE UP (ref 3.5–5.3)
POTASSIUM SERPL-SCNC: 3.8 MMOL/L — SIGNIFICANT CHANGE UP (ref 3.5–5.3)
PROT SERPL-MCNC: 7.1 G/DL — SIGNIFICANT CHANGE UP (ref 6–8.3)
RBC # BLD: 2.7 M/UL — LOW (ref 4.2–5.8)
RBC # FLD: 13.4 % — SIGNIFICANT CHANGE UP (ref 10.3–14.5)
SODIUM SERPL-SCNC: 134 MMOL/L — LOW (ref 135–145)
VANCOMYCIN TROUGH SERPL-MCNC: 11.7 UG/ML — SIGNIFICANT CHANGE UP (ref 10–20)
WBC # BLD: 8.82 K/UL — SIGNIFICANT CHANGE UP (ref 3.8–10.5)
WBC # FLD AUTO: 8.82 K/UL — SIGNIFICANT CHANGE UP (ref 3.8–10.5)

## 2023-03-11 RX ADMIN — Medication 1: at 23:59

## 2023-03-11 RX ADMIN — Medication 1 APPLICATION(S): at 05:27

## 2023-03-11 RX ADMIN — Medication 6 UNIT(S): at 17:04

## 2023-03-11 RX ADMIN — CEFEPIME 100 MILLIGRAM(S): 1 INJECTION, POWDER, FOR SOLUTION INTRAMUSCULAR; INTRAVENOUS at 13:12

## 2023-03-11 RX ADMIN — Medication 81 MILLIGRAM(S): at 11:20

## 2023-03-11 RX ADMIN — Medication 166.67 MILLIGRAM(S): at 06:21

## 2023-03-11 RX ADMIN — CEFEPIME 100 MILLIGRAM(S): 1 INJECTION, POWDER, FOR SOLUTION INTRAMUSCULAR; INTRAVENOUS at 21:53

## 2023-03-11 RX ADMIN — ATORVASTATIN CALCIUM 80 MILLIGRAM(S): 80 TABLET, FILM COATED ORAL at 21:54

## 2023-03-11 RX ADMIN — INSULIN GLARGINE 12 UNIT(S): 100 INJECTION, SOLUTION SUBCUTANEOUS at 08:18

## 2023-03-11 RX ADMIN — CEFEPIME 100 MILLIGRAM(S): 1 INJECTION, POWDER, FOR SOLUTION INTRAMUSCULAR; INTRAVENOUS at 05:27

## 2023-03-11 RX ADMIN — Medication 1 PATCH: at 11:19

## 2023-03-11 RX ADMIN — Medication 1 PATCH: at 19:55

## 2023-03-11 RX ADMIN — SENNA PLUS 10 MILLILITER(S): 8.6 TABLET ORAL at 11:21

## 2023-03-11 RX ADMIN — Medication 1: at 17:03

## 2023-03-11 RX ADMIN — Medication 1 PATCH: at 07:26

## 2023-03-11 RX ADMIN — Medication 3 MILLILITER(S): at 20:30

## 2023-03-11 RX ADMIN — Medication 100 MILLIGRAM(S): at 17:06

## 2023-03-11 RX ADMIN — Medication 6 UNIT(S): at 06:22

## 2023-03-11 RX ADMIN — Medication 1 PATCH: at 11:20

## 2023-03-11 RX ADMIN — INSULIN GLARGINE 12 UNIT(S): 100 INJECTION, SOLUTION SUBCUTANEOUS at 21:53

## 2023-03-11 RX ADMIN — ENOXAPARIN SODIUM 40 MILLIGRAM(S): 100 INJECTION SUBCUTANEOUS at 09:13

## 2023-03-11 RX ADMIN — Medication 3 MILLILITER(S): at 03:10

## 2023-03-11 RX ADMIN — Medication 3 MILLILITER(S): at 08:49

## 2023-03-11 RX ADMIN — Medication 6 UNIT(S): at 13:12

## 2023-03-11 RX ADMIN — Medication 1: at 13:12

## 2023-03-11 RX ADMIN — Medication 1: at 06:22

## 2023-03-11 RX ADMIN — Medication 166.67 MILLIGRAM(S): at 17:03

## 2023-03-11 RX ADMIN — Medication 2 MILLIGRAM(S): at 21:54

## 2023-03-11 RX ADMIN — Medication 1 APPLICATION(S): at 17:06

## 2023-03-11 RX ADMIN — Medication 100 MILLIGRAM(S): at 05:27

## 2023-03-11 RX ADMIN — Medication 3 MILLILITER(S): at 15:29

## 2023-03-11 RX ADMIN — POLYETHYLENE GLYCOL 3350 17 GRAM(S): 17 POWDER, FOR SOLUTION ORAL at 11:21

## 2023-03-11 NOTE — CHART NOTE - NSCHARTNOTEFT_GEN_A_CORE
Patient with mild hematuria noted this evening via de [place] torre catheter. Patient already receiving antibiotics which would cover possible UTI. He has mild thrombocytosis. Will continue to monitor for now. Most likely mild de [place] torre related trauma in setting of being on baby asa and enoxaparin ppx.

## 2023-03-11 NOTE — PROGRESS NOTE ADULT - SUBJECTIVE AND OBJECTIVE BOX
Time of Visit:  Patient seen and examined. pat is laying in bed , wife Navya at bedside     MEDICATIONS  (STANDING):  albuterol/ipratropium for Nebulization 3 milliLiter(s) Nebulizer every 6 hours  amantadine Syrup 100 milliGRAM(s) Oral two times a day  aspirin  chewable 81 milliGRAM(s) Oral daily  atorvastatin 80 milliGRAM(s) Oral at bedtime  cefepime   IVPB      cefepime   IVPB 2000 milliGRAM(s) IV Intermittent every 8 hours  doxazosin 2 milliGRAM(s) Oral at bedtime  enoxaparin Injectable 40 milliGRAM(s) SubCutaneous every 24 hours  insulin glargine Injectable (LANTUS) 12 Unit(s) SubCutaneous every morning  insulin glargine Injectable (LANTUS) 12 Unit(s) SubCutaneous at bedtime  insulin lispro (ADMELOG) corrective regimen sliding scale   SubCutaneous every 6 hours  insulin lispro Injectable (ADMELOG) 6 Unit(s) SubCutaneous every 6 hours  nicotine -   7 mG/24Hr(s) Patch 1 Patch Transdermal daily  polyethylene glycol 3350 17 Gram(s) Oral daily  senna Syrup 10 milliLiter(s) Oral daily  silver sulfADIAZINE 1% Cream 1 Application(s) Topical two times a day  vancomycin  IVPB      vancomycin  IVPB 1250 milliGRAM(s) IV Intermittent every 12 hours      MEDICATIONS  (PRN):  acetaminophen    Suspension .. 650 milliGRAM(s) Oral every 6 hours PRN Temp greater or equal to 38C (100.4F), Mild Pain (1 - 3)  aluminum hydroxide/magnesium hydroxide/simethicone Suspension 30 milliLiter(s) Oral every 4 hours PRN Dyspepsia  melatonin 3 milliGRAM(s) Oral at bedtime PRN Insomnia  ondansetron Injectable 4 milliGRAM(s) IV Push every 8 hours PRN Nausea and/or Vomiting       Medications up to date at time of exam.      PHYSICAL EXAMINATION:  Patient has no new complaints.  GENERAL: The patient is a well-developed, well-nourished, in no apparent distress.     Vital Signs Last 24 Hrs  T(C): 37.6 (11 Mar 2023 12:48), Max: 37.7 (10 Mar 2023 14:33)  T(F): 99.6 (11 Mar 2023 12:48), Max: 99.8 (10 Mar 2023 14:33)  HR: 103 (11 Mar 2023 12:48) (101 - 106)  BP: 128/71 (11 Mar 2023 12:48) (124/75 - 132/75)  BP(mean): --  RR: 19 (11 Mar 2023 12:48) (19 - 20)  SpO2: 100% (11 Mar 2023 12:48) (95% - 100%)    Parameters below as of 11 Mar 2023 12:48  Patient On (Oxygen Delivery Method): Hydrotrach  O2 Flow (L/min): 3     (if applicable)    Chest Tube (if applicable)    HEENT: Head is normocephalic and atraumatic. Extraocular muscles are intact. Mucous membranes are moist.     NECK: Supple, no palpable adenopathy. + trach     LUNGS: Clear to auscultation, no wheezing, rales, or rhonchi.    HEART: Regular rate and rhythm without murmur.    ABDOMEN: Soft, nontender, and nondistended.  No hepatosplenomegaly is noted. + PEG     EXTREMITIES: Without any cyanosis, clubbing, rash, lesions or edema.    NEUROLOGIC: eyes open     SKIN: Warm, dry, good turgor.      LABS:                        7.9    8.82  )-----------( 427      ( 11 Mar 2023 05:15 )             24.3     03-11    134<L>  |  100  |  12  ----------------------------<  173<H>  3.8   |  29  |  0.52    Ca    8.5      11 Mar 2023 05:15  Phos  3.0     03-11  Mg     2.3     03-11    TPro  7.1  /  Alb  1.8<L>  /  TBili  0.5  /  DBili  x   /  AST  57<H>  /  ALT  75<H>  /  AlkPhos  205<H>  03-11                    Procalcitonin, Serum: 0.46 ng/mL (03-10-23 @ 06:50)      MICROBIOLOGY: (if applicable)    RADIOLOGY & ADDITIONAL STUDIES:  EKG:   CXR:  ECHO:    IMPRESSION: 62y Male PAST MEDICAL & SURGICAL HISTORY:  History of subdural hemorrhage      PEG (percutaneous endoscopic gastrostomy) status      DM (diabetes mellitus)      S/P craniotomy       p/w       IMP : This is a 62 yr old man  from [facility], h/o traumatic subdural hemorrhage following a fall down the stairs while drunk, s/p r craniotomy at [facility] in 01/2023, s/p trach/PEG BIBEMS for tachycardia 170s, RR 27, increased secretions admitted to  for sepsis secondary to RUL pneumonia with tracheostomy to 3L NC.  3/6 Febrile 101.7  03/07: No events overnight. On [place]-trach at 3 L and tolerating well. Hemodynamically stable. Cultures in progress. C/w Vanco and Cefepime. F/u Vanco trough. Hypokalemic this AM and replacement ordered.   03/08: Afebrile. Vanco discontinued since MRSA negative. + yeast in sputum; + Candida Galbrata in urine (Bran in place with clear urine and afebrile).   Dr. Peres following. Blood cx NGTD. C/w Cefepime.   03/09: 03/09: Febrile, tachycardic and tachypneic this AM. Code sepsis called. Sepsis w/u in progress.  Started on Vanco. ID following, . Discussed with Dr. Peres. F/u CXR. Worsening Leukocytosis. IVF bolus with LR (2,300 ml)  per protocol. Lactate 1.9.   3/11; pat having low grade fever           Plan   - Continue antibx   - Monitor vanco level and dose appropriately   - Pulmonary hygiene   - Trach secretions is watery pink   - Trach stoma care   - Continue o2 supp as needed via TC to maintain sat >90%  - Continue duonebs   - DVT GI prophy     spoke with wife Navya at bedside  Time of Visit:  Patient seen and examined. pat is laying in bed , wife Navya at bedside     MEDICATIONS  (STANDING):  albuterol/ipratropium for Nebulization 3 milliLiter(s) Nebulizer every 6 hours  amantadine Syrup 100 milliGRAM(s) Oral two times a day  aspirin  chewable 81 milliGRAM(s) Oral daily  atorvastatin 80 milliGRAM(s) Oral at bedtime  cefepime   IVPB      cefepime   IVPB 2000 milliGRAM(s) IV Intermittent every 8 hours  doxazosin 2 milliGRAM(s) Oral at bedtime  enoxaparin Injectable 40 milliGRAM(s) SubCutaneous every 24 hours  insulin glargine Injectable (LANTUS) 12 Unit(s) SubCutaneous every morning  insulin glargine Injectable (LANTUS) 12 Unit(s) SubCutaneous at bedtime  insulin lispro (ADMELOG) corrective regimen sliding scale   SubCutaneous every 6 hours  insulin lispro Injectable (ADMELOG) 6 Unit(s) SubCutaneous every 6 hours  nicotine -   7 mG/24Hr(s) Patch 1 Patch Transdermal daily  polyethylene glycol 3350 17 Gram(s) Oral daily  senna Syrup 10 milliLiter(s) Oral daily  silver sulfADIAZINE 1% Cream 1 Application(s) Topical two times a day  vancomycin  IVPB      vancomycin  IVPB 1250 milliGRAM(s) IV Intermittent every 12 hours      MEDICATIONS  (PRN):  acetaminophen    Suspension .. 650 milliGRAM(s) Oral every 6 hours PRN Temp greater or equal to 38C (100.4F), Mild Pain (1 - 3)  aluminum hydroxide/magnesium hydroxide/simethicone Suspension 30 milliLiter(s) Oral every 4 hours PRN Dyspepsia  melatonin 3 milliGRAM(s) Oral at bedtime PRN Insomnia  ondansetron Injectable 4 milliGRAM(s) IV Push every 8 hours PRN Nausea and/or Vomiting       Medications up to date at time of exam.      PHYSICAL EXAMINATION:  Patient has no new complaints.  GENERAL: The patient is a well-developed, well-nourished, in no apparent distress.     Vital Signs Last 24 Hrs  T(C): 37.6 (11 Mar 2023 12:48), Max: 37.7 (10 Mar 2023 14:33)  T(F): 99.6 (11 Mar 2023 12:48), Max: 99.8 (10 Mar 2023 14:33)  HR: 103 (11 Mar 2023 12:48) (101 - 106)  BP: 128/71 (11 Mar 2023 12:48) (124/75 - 132/75)  BP(mean): --  RR: 19 (11 Mar 2023 12:48) (19 - 20)  SpO2: 100% (11 Mar 2023 12:48) (95% - 100%)    Parameters below as of 11 Mar 2023 12:48  Patient On (Oxygen Delivery Method): Hydrotrach  O2 Flow (L/min): 3     (if applicable)    Chest Tube (if applicable)    HEENT:  + skull depression due to surgery  Mucous membranes are moist.     NECK: Supple, no palpable adenopathy. + trach     LUNGS: Clear to auscultation, no wheezing, rales, or rhonchi.    HEART: Regular rate and rhythm without murmur.    ABDOMEN: Soft, nontender, and nondistended.  No hepatosplenomegaly is noted. + PEG     EXTREMITIES: Without any cyanosis, clubbing, rash, lesions or edema.    NEUROLOGIC: eyes open     SKIN: Warm, dry, good turgor.      LABS:                        7.9    8.82  )-----------( 427      ( 11 Mar 2023 05:15 )             24.3     03-11    134<L>  |  100  |  12  ----------------------------<  173<H>  3.8   |  29  |  0.52    Ca    8.5      11 Mar 2023 05:15  Phos  3.0     03-11  Mg     2.3     03-11    TPro  7.1  /  Alb  1.8<L>  /  TBili  0.5  /  DBili  x   /  AST  57<H>  /  ALT  75<H>  /  AlkPhos  205<H>  03-11                    Procalcitonin, Serum: 0.46 ng/mL (03-10-23 @ 06:50)      MICROBIOLOGY: (if applicable)    RADIOLOGY & ADDITIONAL STUDIES:  EKG:   CXR:  ECHO:    IMPRESSION: 62y Male PAST MEDICAL & SURGICAL HISTORY:  History of subdural hemorrhage      PEG (percutaneous endoscopic gastrostomy) status      DM (diabetes mellitus)      S/P craniotomy       p/w       IMP : This is a 62 yr old man  from [facility], h/o traumatic subdural hemorrhage following a fall down the stairs while drunk, s/p r craniotomy at [facility] in 01/2023, s/p trach/PEG BIBEMS for tachycardia 170s, RR 27, increased secretions admitted to  for sepsis secondary to RUL pneumonia with tracheostomy to 3L NC.  3/6 Febrile 101.7  03/07: No events overnight. On [place]-trach at 3 L and tolerating well. Hemodynamically stable. Cultures in progress. C/w Vanco and Cefepime. F/u Vanco trough. Hypokalemic this AM and replacement ordered.   03/08: Afebrile. Vanco discontinued since MRSA negative. + yeast in sputum; + Candida Galbrata in urine (Bran in place with clear urine and afebrile).   Dr. Peres following. Blood cx NGTD. C/w Cefepime.   03/09: 03/09: Febrile, tachycardic and tachypneic this AM. Code sepsis called. Sepsis w/u in progress.  Started on Vanco. ID following, . Discussed with Dr. Peres. F/u CXR. Worsening Leukocytosis. IVF bolus with LR (2,300 ml)  per protocol. Lactate 1.9.   3/11; pat having low grade fever           Plan   - Continue antibx   - Monitor vanco level and dose appropriately   - Pulmonary hygiene   - Trach secretions is watery pink   - Trach stoma care   - Continue o2 supp as needed via TC to maintain sat >90%  - Continue duonebs   - DVT GI santiago     spoke with wife Navya at bedside

## 2023-03-11 NOTE — PROGRESS NOTE ADULT - PROBLEM SELECTOR PLAN 2
Continue trach collar. Titrate oxygen as tolerated.  Maintain sat at or above 92%  Monitor oxygen saturation  Continue bronchodilators  Continue antibiotics for pna  F/U cultures. Continue trach collar. Titrate oxygen as tolerated.  Maintain sat at or above 92%  Monitor oxygen saturation  Continue bronchodilators  Continue antibiotics for pna  F/U cultures from 3/9 - thus far negative to date.

## 2023-03-11 NOTE — PROGRESS NOTE ADULT - PROBLEM SELECTOR PLAN 10
DVT and GI prophylaxis  Continue antibiotics as per ID cefepime and vanco  F/U cultures  Repeat CXR pending DVT and GI prophylaxis  Continue antibiotics as per ID cefepime and vanco  F/U cultures - thus far NGTD  Repeat CXR with no focal infiltrate DVT and GI prophylaxis  Continue antibiotics as per ID cefepime and vanco  F/U cultures - thus far NGTD  Repeat CXR with no focal infiltrate.

## 2023-03-11 NOTE — PROGRESS NOTE ADULT - PROBLEM SELECTOR PLAN 3
Secondary to SDH and craniotomy  Craniotomy at [facility] 1/23  Maintain safety precautions  Strict aspiration precautions.  Seizure precautions.

## 2023-03-11 NOTE — PROGRESS NOTE ADULT - PROBLEM SELECTOR PLAN 9
Continue tube feeds and supplementation.  Dietary consult pending. Continue tube feeds and supplementation.  Dietary consult completed 3/7; recs: Glucerna 1.5 @ 50 ml /hr x 24 Continue tube feeds and supplementation.  Dietary consult completed 3/7; recs: Glucerna 1.5 @ 50 ml /hr x 24  Continue bowel regimen with senna, miralax

## 2023-03-11 NOTE — CHART NOTE - NSCHARTNOTEFT_GEN_A_CORE
Patient's wife updated at the bedside. We discussed the pending microbiology results, CXR results from 3/10 as well as current medical plan. She had no further questions at the end of our discussion.

## 2023-03-11 NOTE — PROGRESS NOTE ADULT - SUBJECTIVE AND OBJECTIVE BOX
WILMA NICOLE    SCU progress note    INTERVAL HPI/OVERNIGHT EVENTS: *No acute events overnight    DNR [ ]   DNI  [  ] Full Code    Covid - 19 PCR: SARS-CoV-2: NotDetec (09 Mar 2023 10:48)  SARS-CoV-2: NotDetec (05 Mar 2023 14:46)    [facility] 4Ms    What Matters Most: see GOC  Age appropriate Medications/Screen for High Risk Medication: Yes  Mentation: see CAM below  Mobility: defer to physical exam    The Confusion Assessment Method (CAM) Diagnostic Algorithm     1: Acute Onset or Fluctuating Course  - Is there evidence of an acute change in mental status from the patient’s baseline? Did the (abnormal) behavior  fluctuate during the day, that is, tend to come and go, or increase and decrease in severity?  [ ] YES [ X] NO     2: Inattention  - Did the patient have difficulty focusing attention, being easily distractible, or having difficulty keeping track of what was being said?  [ ] YES [ ] NO unable to assess     3: Disorganized thinking  -Was the patient’s thinking disorganized or incoherent, such as rambling or irrelevant conversation, unclear or illogical flow of ideas, or unpredictable switching from subject to subject?  [ ] YES [ ] NO unable to assess    4: Altered Level of consciousness?  [X ] YES [ ] NO    The diagnosis of delirium by CAM requires the presence of features 1 and 2 and either 3 or 4.      cefepime   IVPB      cefepime   IVPB 2000 milliGRAM(s) IV Intermittent every 8 hours  vancomycin  IVPB      vancomycin  IVPB 1250 milliGRAM(s) IV Intermittent every 12 hours    Cardiovascular:  Heart Failure  Acute   Acute on Chronic  Chronic       doxazosin 2 milliGRAM(s) Oral at bedtime    Pulmonary:  albuterol/ipratropium for Nebulization 3 milliLiter(s) Nebulizer every 6 hours    Hematalogic:  aspirin  chewable 81 milliGRAM(s) Oral daily  enoxaparin Injectable 40 milliGRAM(s) SubCutaneous every 24 hours    Other:  acetaminophen    Suspension .. 650 milliGRAM(s) Oral every 6 hours PRN  aluminum hydroxide/magnesium hydroxide/simethicone Suspension 30 milliLiter(s) Oral every 4 hours PRN  amantadine Syrup 100 milliGRAM(s) Oral two times a day  atorvastatin 80 milliGRAM(s) Oral at bedtime  insulin glargine Injectable (LANTUS) 12 Unit(s) SubCutaneous every morning  insulin glargine Injectable (LANTUS) 12 Unit(s) SubCutaneous at bedtime  insulin lispro (ADMELOG) corrective regimen sliding scale   SubCutaneous every 6 hours  insulin lispro Injectable (ADMELOG) 6 Unit(s) SubCutaneous every 6 hours  melatonin 3 milliGRAM(s) Oral at bedtime PRN  nicotine -   7 mG/24Hr(s) Patch 1 Patch Transdermal daily  ondansetron Injectable 4 milliGRAM(s) IV Push every 8 hours PRN  polyethylene glycol 3350 17 Gram(s) Oral daily  senna Syrup 10 milliLiter(s) Oral daily  silver sulfADIAZINE 1% Cream 1 Application(s) Topical two times a day    acetaminophen    Suspension .. 650 milliGRAM(s) Oral every 6 hours PRN  albuterol/ipratropium for Nebulization 3 milliLiter(s) Nebulizer every 6 hours  aluminum hydroxide/magnesium hydroxide/simethicone Suspension 30 milliLiter(s) Oral every 4 hours PRN  amantadine Syrup 100 milliGRAM(s) Oral two times a day  aspirin  chewable 81 milliGRAM(s) Oral daily  atorvastatin 80 milliGRAM(s) Oral at bedtime  cefepime   IVPB 2000 milliGRAM(s) IV Intermittent every 8 hours  cefepime   IVPB      doxazosin 2 milliGRAM(s) Oral at bedtime  enoxaparin Injectable 40 milliGRAM(s) SubCutaneous every 24 hours  insulin glargine Injectable (LANTUS) 12 Unit(s) SubCutaneous every morning  insulin glargine Injectable (LANTUS) 12 Unit(s) SubCutaneous at bedtime  insulin lispro (ADMELOG) corrective regimen sliding scale   SubCutaneous every 6 hours  insulin lispro Injectable (ADMELOG) 6 Unit(s) SubCutaneous every 6 hours  melatonin 3 milliGRAM(s) Oral at bedtime PRN  nicotine -   7 mG/24Hr(s) Patch 1 Patch Transdermal daily  ondansetron Injectable 4 milliGRAM(s) IV Push every 8 hours PRN  polyethylene glycol 3350 17 Gram(s) Oral daily  senna Syrup 10 milliLiter(s) Oral daily  silver sulfADIAZINE 1% Cream 1 Application(s) Topical two times a day  vancomycin  IVPB      vancomycin  IVPB 1250 milliGRAM(s) IV Intermittent every 12 hours    Drug Dosing Weight  Height (cm): 177.8 (05 Mar 2023 03:36)  Weight (kg): 86.2 (05 Mar 2023 03:36)  BMI (kg/m2): 27.3 (05 Mar 2023 03:36)  BSA (m2): 2.04 (05 Mar 2023 03:36)    CENTRAL LINE: [ ] YES [X ] NO  LOCATION:   DATE INSERTED:  REMOVE: [ ] YES [ ] NO  EXPLAIN:    MELCHOR: [X ] YES [ ] NO    DATE INSERTED: 3/5/23  REMOVE:  [ ] YES [X ] NO  EXPLAIN:   Failed TOV    PAST MEDICAL & SURGICAL HISTORY:  History of subdural hemorrhage      PEG (percutaneous endoscopic gastrostomy) status      DM (diabetes mellitus)      S/P craniotomy          03-10 @ 07:01  -  03-11 @ 07:00  --------------------------------------------------------  IN: 0 mL / OUT: 2000 mL / NET: -2000 mL            PHYSICAL EXAM:  GENERAL: NAD  HEAD:  Right craniofacial deformity  EYES: PERRL, conjunctiva and sclera clear  ENMT: No tonsillar erythema, exudates  NECK: Supple, No JVD, tracheostomy intact  NERVOUS SYSTEM:  Awake. Nonverbal at baseline. Intermittent tracking with eyes. Does not follow commands. No spontaneous movement of extremities.  CHEST/LUNG: Breathing non-labored, regular, coarse breath sounds bilaterally with wet cough productive of large amt, thick yellow secretions  HEART: Regular rate and rhythm; No murmurs, rubs, or gallops  ABDOMEN: +Peg, Soft, Nontender, Nondistended; Bowel sounds present  EXTREMITIES:  No spontaneous movement. +1 edema all extremities. 2+ Peripheral Pulses, No clubbing, cyanosis  LYMPH: No lymphadenopathy noted  SKIN: Stage 1 coccyx and bilateral heels        LABS:  CBC Full  -  ( 11 Mar 2023 05:15 )  WBC Count : 8.82 K/uL  RBC Count : 2.70 M/uL  Hemoglobin : 7.9 g/dL  Hematocrit : 24.3 %  Platelet Count - Automated : 427 K/uL  Mean Cell Volume : 90.0 fl  Mean Cell Hemoglobin : 29.3 pg  Mean Cell Hemoglobin Concentration : 32.5 gm/dL  Auto Neutrophil # : x  Auto Lymphocyte # : x  Auto Monocyte # : x  Auto Eosinophil # : x  Auto Basophil # : x  Auto Neutrophil % : x  Auto Lymphocyte % : x  Auto Monocyte % : x  Auto Eosinophil % : x  Auto Basophil % : x    03-11    134<L>  |  100  |  12  ----------------------------<  173<H>  3.8   |  29  |  0.52    Ca    8.5      11 Mar 2023 05:15  Phos  3.0     03-11  Mg     2.3     03-11    TPro  7.1  /  Alb  1.8<L>  /  TBili  0.5  /  DBili  x   /  AST  57<H>  /  ALT  75<H>  /  AlkPhos  205<H>  03-11              [  ]  DVT Prophylaxis  [  ]  Nutrition, Brand, Rate         Goal Rate        Abnormal Nutritional Status -  Malnutrition   Cachexia      Morbid Obesity BMI >/=40    RADIOLOGY & ADDITIONAL STUDIES:  ***  < from: Xray Chest 1 View-PORTABLE IMMEDIATE (03.05.23 @ 05:57) >  IMPRESSION: Heart is normal in size. There is a tracheostomy tube  present. There is mild bilateral airspace disease without a focal area of   opacification. This may be from pulmonary edema.      < end of copied text >    Goals of Care Discussion with Family/Proxy/Other   - see note from 3/05/23 WILMA NICOLE    SCU progress note    INTERVAL HPI/OVERNIGHT EVENTS: *No acute events overnight    DNR [ ]   DNI  [  ] Full Code    Covid - 19 PCR: SARS-CoV-2: NotDetec (09 Mar 2023 10:48)  SARS-CoV-2: NotDetec (05 Mar 2023 14:46)    [facility]    What Matters Most: see GOC  Age appropriate Medications/Screen for High Risk Medication: Yes  Mentation: see CAM below  Mobility: defer to physical exam    The Confusion Assessment Method (CAM) Diagnostic Algorithm     1: Acute Onset or Fluctuating Course  - Is there evidence of an acute change in mental status from the patient’s baseline? Did the (abnormal) behavior  fluctuate during the day, that is, tend to come and go, or increase and decrease in severity?  [ ] YES [ X] NO     2: Inattention  - Did the patient have difficulty focusing attention, being easily distractible, or having difficulty keeping track of what was being said?  [ ] YES [ ] NO unable to assess     3: Disorganized thinking  -Was the patient’s thinking disorganized or incoherent, such as rambling or irrelevant conversation, unclear or illogical flow of ideas, or unpredictable switching from subject to subject?  [ ] YES [ ] NO unable to assess    4: Altered Level of consciousness?  [X ] YES [ ] NO    The diagnosis of delirium by CAM requires the presence of features 1 and 2 and either 3 or 4.      cefepime   IVPB      cefepime   IVPB 2000 milliGRAM(s) IV Intermittent every 8 hours  vancomycin  IVPB      vancomycin  IVPB 1250 milliGRAM(s) IV Intermittent every 12 hours    Cardiovascular:  Heart Failure  Acute   Acute on Chronic  Chronic       doxazosin 2 milliGRAM(s) Oral at bedtime    Pulmonary:  albuterol/ipratropium for Nebulization 3 milliLiter(s) Nebulizer every 6 hours    Hematalogic:  aspirin  chewable 81 milliGRAM(s) Oral daily  enoxaparin Injectable 40 milliGRAM(s) SubCutaneous every 24 hours    Other:  acetaminophen    Suspension .. 650 milliGRAM(s) Oral every 6 hours PRN  aluminum hydroxide/magnesium hydroxide/simethicone Suspension 30 milliLiter(s) Oral every 4 hours PRN  amantadine Syrup 100 milliGRAM(s) Oral two times a day  atorvastatin 80 milliGRAM(s) Oral at bedtime  insulin glargine Injectable (LANTUS) 12 Unit(s) SubCutaneous every morning  insulin glargine Injectable (LANTUS) 12 Unit(s) SubCutaneous at bedtime  insulin lispro (ADMELOG) corrective regimen sliding scale   SubCutaneous every 6 hours  insulin lispro Injectable (ADMELOG) 6 Unit(s) SubCutaneous every 6 hours  melatonin 3 milliGRAM(s) Oral at bedtime PRN  nicotine -   7 mG/24Hr(s) Patch 1 Patch Transdermal daily  ondansetron Injectable 4 milliGRAM(s) IV Push every 8 hours PRN  polyethylene glycol 3350 17 Gram(s) Oral daily  senna Syrup 10 milliLiter(s) Oral daily  silver sulfADIAZINE 1% Cream 1 Application(s) Topical two times a day    acetaminophen    Suspension .. 650 milliGRAM(s) Oral every 6 hours PRN  albuterol/ipratropium for Nebulization 3 milliLiter(s) Nebulizer every 6 hours  aluminum hydroxide/magnesium hydroxide/simethicone Suspension 30 milliLiter(s) Oral every 4 hours PRN  amantadine Syrup 100 milliGRAM(s) Oral two times a day  aspirin  chewable 81 milliGRAM(s) Oral daily  atorvastatin 80 milliGRAM(s) Oral at bedtime  cefepime   IVPB 2000 milliGRAM(s) IV Intermittent every 8 hours  cefepime   IVPB      doxazosin 2 milliGRAM(s) Oral at bedtime  enoxaparin Injectable 40 milliGRAM(s) SubCutaneous every 24 hours  insulin glargine Injectable (LANTUS) 12 Unit(s) SubCutaneous every morning  insulin glargine Injectable (LANTUS) 12 Unit(s) SubCutaneous at bedtime  insulin lispro (ADMELOG) corrective regimen sliding scale   SubCutaneous every 6 hours  insulin lispro Injectable (ADMELOG) 6 Unit(s) SubCutaneous every 6 hours  melatonin 3 milliGRAM(s) Oral at bedtime PRN  nicotine -   7 mG/24Hr(s) Patch 1 Patch Transdermal daily  ondansetron Injectable 4 milliGRAM(s) IV Push every 8 hours PRN  polyethylene glycol 3350 17 Gram(s) Oral daily  senna Syrup 10 milliLiter(s) Oral daily  silver sulfADIAZINE 1% Cream 1 Application(s) Topical two times a day  vancomycin  IVPB      vancomycin  IVPB 1250 milliGRAM(s) IV Intermittent every 12 hours    Drug Dosing Weight  Height (cm): 177.8 (05 Mar 2023 03:36)  Weight (kg): 86.2 (05 Mar 2023 03:36)  BMI (kg/m2): 27.3 (05 Mar 2023 03:36)  BSA (m2): 2.04 (05 Mar 2023 03:36)    CENTRAL LINE: [ ] YES [X ] NO  LOCATION:   DATE INSERTED:  REMOVE: [ ] YES [ ] NO  EXPLAIN:    MELCHOR: [X ] YES [ ] NO    DATE INSERTED: 3/5/23  REMOVE:  [ ] YES [X ] NO  EXPLAIN:   Failed TOV    PAST MEDICAL & SURGICAL HISTORY:  History of subdural hemorrhage      PEG (percutaneous endoscopic gastrostomy) status      DM (diabetes mellitus)      S/P craniotomy          03-10 @ 07:01  -  03-11 @ 07:00  --------------------------------------------------------  IN: 0 mL / OUT: 2000 mL / NET: -2000 mL            PHYSICAL EXAM:  GENERAL: NAD  HEAD:  Right craniofacial deformity  EYES: PERRL, conjunctiva and sclera clear  ENMT: No tonsillar erythema, exudates  NECK: Supple, No JVD, tracheostomy intact  NERVOUS SYSTEM:  Awake. Nonverbal at baseline. Intermittent tracking with eyes. Does not follow commands. No spontaneous movement of extremities.  CHEST/LUNG: Breathing non-labored, regular, coarse breath sounds bilaterally with wet cough productive of large amt, thick yellow secretions  HEART: Regular rate and rhythm; No murmurs, rubs, or gallops  ABDOMEN: +Peg, Soft, Nontender, Nondistended; Bowel sounds present  EXTREMITIES:  No spontaneous movement. +1 edema all extremities. 2+ Peripheral Pulses, No clubbing, cyanosis  LYMPH: No lymphadenopathy noted  SKIN: Stage 1 coccyx and bilateral heels        LABS:  CBC Full  -  ( 11 Mar 2023 05:15 )  WBC Count : 8.82 K/uL  RBC Count : 2.70 M/uL  Hemoglobin : 7.9 g/dL  Hematocrit : 24.3 %  Platelet Count - Automated : 427 K/uL  Mean Cell Volume : 90.0 fl  Mean Cell Hemoglobin : 29.3 pg  Mean Cell Hemoglobin Concentration : 32.5 gm/dL  Auto Neutrophil # : x  Auto Lymphocyte # : x  Auto Monocyte # : x  Auto Eosinophil # : x  Auto Basophil # : x  Auto Neutrophil % : x  Auto Lymphocyte % : x  Auto Monocyte % : x  Auto Eosinophil % : x  Auto Basophil % : x    03-11    134<L>  |  100  |  12  ----------------------------<  173<H>  3.8   |  29  |  0.52    Ca    8.5      11 Mar 2023 05:15  Phos  3.0     03-11  Mg     2.3     03-11    TPro  7.1  /  Alb  1.8<L>  /  TBili  0.5  /  DBili  x   /  AST  57<H>  /  ALT  75<H>  /  AlkPhos  205<H>  03-11              [  ]  DVT Prophylaxis  [  ]  Nutrition, Brand, Rate         Goal Rate        Abnormal Nutritional Status -  Malnutrition   Cachexia      Morbid Obesity BMI >/=40    RADIOLOGY & ADDITIONAL STUDIES:  ***    < from: Xray Chest 1 View- PORTABLE-Urgent (Xray Chest 1 View- PORTABLE-Urgent .) (03.10.23 @ 11:07) >  COMPARISON STUDY: 3/5/2023    Frontal expiratory view of the chest shows the heart to be normal in size. Tracheostomy tube is again noted.    The lungs show mild right atelectasis and there is no evidence of pneumothorax nor pleural effusion.    IMPRESSION:  No focal infiltrate.    < end of copied text >    < from: Xray Chest 1 View-PORTABLE IMMEDIATE (03.05.23 @ 05:57) >  IMPRESSION: Heart is normal in size. There is a tracheostomy tube  present. There is mild bilateral airspace disease without a focal area of   opacification. This may be from pulmonary edema.      < end of copied text >    Goals of Care Discussion with Family/Proxy/Other   - see note from 3/05/23 WILMA NICOLE    SCU progress note    INTERVAL HPI/OVERNIGHT EVENTS: *No acute events overnight    DNR [ ]   DNI  [  ] Full Code    Covid - 19 PCR: SARS-CoV-2: NotDetec (09 Mar 2023 10:48)  SARS-CoV-2: NotDetec (05 Mar 2023 14:46)    [facility] 4Ms    What Matters Most: see GOC  Age appropriate Medications/Screen for High Risk Medication: Yes  Mentation: see CAM below  Mobility: defer to physical exam    The Confusion Assessment Method (CAM) Diagnostic Algorithm     1: Acute Onset or Fluctuating Course  - Is there evidence of an acute change in mental status from the patient’s baseline? Did the (abnormal) behavior  fluctuate during the day, that is, tend to come and go, or increase and decrease in severity?  [ ] YES [ X] NO     2: Inattention  - Did the patient have difficulty focusing attention, being easily distractible, or having difficulty keeping track of what was being said?  [ ] YES [ ] NO unable to assess     3: Disorganized thinking  -Was the patient’s thinking disorganized or incoherent, such as rambling or irrelevant conversation, unclear or illogical flow of ideas, or unpredictable switching from subject to subject?  [ ] YES [ ] NO unable to assess    4: Altered Level of consciousness?  [X ] YES [ ] NO    The diagnosis of delirium by CAM requires the presence of features 1 and 2 and either 3 or 4.      cefepime   IVPB      cefepime   IVPB 2000 milliGRAM(s) IV Intermittent every 8 hours  vancomycin  IVPB      vancomycin  IVPB 1250 milliGRAM(s) IV Intermittent every 12 hours    Cardiovascular:  Heart Failure  Acute   Acute on Chronic  Chronic       doxazosin 2 milliGRAM(s) Oral at bedtime    Pulmonary:  albuterol/ipratropium for Nebulization 3 milliLiter(s) Nebulizer every 6 hours    Hematalogic:  aspirin  chewable 81 milliGRAM(s) Oral daily  enoxaparin Injectable 40 milliGRAM(s) SubCutaneous every 24 hours    Other:  acetaminophen    Suspension .. 650 milliGRAM(s) Oral every 6 hours PRN  aluminum hydroxide/magnesium hydroxide/simethicone Suspension 30 milliLiter(s) Oral every 4 hours PRN  amantadine Syrup 100 milliGRAM(s) Oral two times a day  atorvastatin 80 milliGRAM(s) Oral at bedtime  insulin glargine Injectable (LANTUS) 12 Unit(s) SubCutaneous every morning  insulin glargine Injectable (LANTUS) 12 Unit(s) SubCutaneous at bedtime  insulin lispro (ADMELOG) corrective regimen sliding scale   SubCutaneous every 6 hours  insulin lispro Injectable (ADMELOG) 6 Unit(s) SubCutaneous every 6 hours  melatonin 3 milliGRAM(s) Oral at bedtime PRN  nicotine -   7 mG/24Hr(s) Patch 1 Patch Transdermal daily  ondansetron Injectable 4 milliGRAM(s) IV Push every 8 hours PRN  polyethylene glycol 3350 17 Gram(s) Oral daily  senna Syrup 10 milliLiter(s) Oral daily  silver sulfADIAZINE 1% Cream 1 Application(s) Topical two times a day    acetaminophen    Suspension .. 650 milliGRAM(s) Oral every 6 hours PRN  albuterol/ipratropium for Nebulization 3 milliLiter(s) Nebulizer every 6 hours  aluminum hydroxide/magnesium hydroxide/simethicone Suspension 30 milliLiter(s) Oral every 4 hours PRN  amantadine Syrup 100 milliGRAM(s) Oral two times a day  aspirin  chewable 81 milliGRAM(s) Oral daily  atorvastatin 80 milliGRAM(s) Oral at bedtime  cefepime   IVPB 2000 milliGRAM(s) IV Intermittent every 8 hours  cefepime   IVPB      doxazosin 2 milliGRAM(s) Oral at bedtime  enoxaparin Injectable 40 milliGRAM(s) SubCutaneous every 24 hours  insulin glargine Injectable (LANTUS) 12 Unit(s) SubCutaneous every morning  insulin glargine Injectable (LANTUS) 12 Unit(s) SubCutaneous at bedtime  insulin lispro (ADMELOG) corrective regimen sliding scale   SubCutaneous every 6 hours  insulin lispro Injectable (ADMELOG) 6 Unit(s) SubCutaneous every 6 hours  melatonin 3 milliGRAM(s) Oral at bedtime PRN  nicotine -   7 mG/24Hr(s) Patch 1 Patch Transdermal daily  ondansetron Injectable 4 milliGRAM(s) IV Push every 8 hours PRN  polyethylene glycol 3350 17 Gram(s) Oral daily  senna Syrup 10 milliLiter(s) Oral daily  silver sulfADIAZINE 1% Cream 1 Application(s) Topical two times a day  vancomycin  IVPB      vancomycin  IVPB 1250 milliGRAM(s) IV Intermittent every 12 hours    Drug Dosing Weight  Height (cm): 177.8 (05 Mar 2023 03:36)  Weight (kg): 86.2 (05 Mar 2023 03:36)  BMI (kg/m2): 27.3 (05 Mar 2023 03:36)  BSA (m2): 2.04 (05 Mar 2023 03:36)    CENTRAL LINE: [ ] YES [X ] NO  LOCATION:   DATE INSERTED:  REMOVE: [ ] YES [ ] NO  EXPLAIN:    MELCHOR: [X ] YES [ ] NO    DATE INSERTED: 3/5/23  REMOVE:  [ ] YES [X ] NO  EXPLAIN:   Failed TOV    PAST MEDICAL & SURGICAL HISTORY:  History of subdural hemorrhage      PEG (percutaneous endoscopic gastrostomy) status      DM (diabetes mellitus)      S/P craniotomy          03-10 @ 07:01  -  03-11 @ 07:00  --------------------------------------------------------  IN: 0 mL / OUT: 2000 mL / NET: -2000 mL    Last bowel movement 3/10/23        PHYSICAL EXAM:  GENERAL: NAD  HEAD:  Right craniofacial deformity  EYES: PERRL, conjunctiva and sclera clear  ENMT: No tonsillar erythema, exudates  NECK: Supple, No JVD, tracheostomy intact  NERVOUS SYSTEM:  Awake. Nonverbal at baseline. Intermittent tracking with eyes. Does not follow commands. No spontaneous movement of extremities.  CHEST/LUNG: Breathing non-labored, regular, coarse breath sounds bilaterally with wet cough productive of large amt, thick yellow secretions  HEART: Regular rate and rhythm; No murmurs, rubs, or gallops  ABDOMEN: +Peg, Soft, Nontender, Nondistended; Bowel sounds present, last bowel movement 3/10  EXTREMITIES:  No spontaneous movement. +1 edema all extremities. 2+ Peripheral Pulses, No clubbing, cyanosis  LYMPH: No lymphadenopathy noted  SKIN: Stage 1 coccyx and bilateral heels        LABS:  CBC Full  -  ( 11 Mar 2023 05:15 )  WBC Count : 8.82 K/uL  RBC Count : 2.70 M/uL  Hemoglobin : 7.9 g/dL  Hematocrit : 24.3 %  Platelet Count - Automated : 427 K/uL  Mean Cell Volume : 90.0 fl  Mean Cell Hemoglobin : 29.3 pg  Mean Cell Hemoglobin Concentration : 32.5 gm/dL  Auto Neutrophil # : x  Auto Lymphocyte # : x  Auto Monocyte # : x  Auto Eosinophil # : x  Auto Basophil # : x  Auto Neutrophil % : x  Auto Lymphocyte % : x  Auto Monocyte % : x  Auto Eosinophil % : x  Auto Basophil % : x    03-11    134<L>  |  100  |  12  ----------------------------<  173<H>  3.8   |  29  |  0.52    Ca    8.5      11 Mar 2023 05:15  Phos  3.0     03-11  Mg     2.3     03-11    TPro  7.1  /  Alb  1.8<L>  /  TBili  0.5  /  DBili  x   /  AST  57<H>  /  ALT  75<H>  /  AlkPhos  205<H>  03-11              [  ]  DVT Prophylaxis  [  ]  Nutrition, Brand, Rate         Goal Rate        Abnormal Nutritional Status -  Malnutrition   Cachexia      Morbid Obesity BMI >/=40    RADIOLOGY & ADDITIONAL STUDIES:  ***    < from: Xray Chest 1 View- PORTABLE-Urgent (Xray Chest 1 View- PORTABLE-Urgent .) (03.10.23 @ 11:07) >  COMPARISON STUDY: 3/5/2023    Frontal expiratory view of the chest shows the heart to be normal in size. Tracheostomy tube is again noted.    The lungs show mild right atelectasis and there is no evidence of pneumothorax nor pleural effusion.    IMPRESSION:  No focal infiltrate.    < end of copied text >    < from: Xray Chest 1 View-PORTABLE IMMEDIATE (03.05.23 @ 05:57) >  IMPRESSION: Heart is normal in size. There is a tracheostomy tube  present. There is mild bilateral airspace disease without a focal area of   opacification. This may be from pulmonary edema.      < end of copied text >    Goals of Care Discussion with Family/Proxy/Other   - see note from 3/05/23

## 2023-03-11 NOTE — PROGRESS NOTE ADULT - PROBLEM SELECTOR PLAN 1
Secondary to pneumonia  S/P Code Sepsis yesterday  F/U Blood and sputum cultures.  Continue cefepime and vanco  Repeat CXR pending Secondary to pneumonia  S/P Code Sepsis (3/9)  F/U Blood and sputum cultures (3/9) - thus far negative to date  Continue cefepime and vanco  Repeat CXR 3/10 without focal infiltrate

## 2023-03-11 NOTE — PROGRESS NOTE ADULT - ASSESSMENT
62M from [facility], h/o traumatic subdural hemorrhage following a fall down the stairs while drunk, s/p r craniotomy at [facility] in 01/2023, s/p trach/PEG BIBEMS for tachycardia 170s, RR 27, increased secretions admitted to  for sepsis secondary to RUL pneumonia with tracheostomy to 3L NC.  3/6 Febrile 101.7  03/07: No events overnight. On [place]-trach at 3 L and tolerating well. Hemodynamically stable. Cultures in progress. C/w Vanco and Cefepime. F/u Vanco trough. Hypokalemic this AM and replacement ordered.   03/08: Afebrile. Vanco discontinued since MRSA negative. + yeast in sputum; + Candida Galbrata in urine (Bran in place with clear urine and afebrile).   Dr. Peres following. Blood cx NGTD. C/w Cefepime.   03/09: 03/09: Febrile, tachycardic and tachypneic this AM. Code sepsis called. Sepsis w/u in progress.  Started on Vanco. ID following, . Discussed with Dr. Peres. F/u CXR. Worsening Leukocytosis. IVF bolus with LR (2,300 ml)  per protocol. Lactate 1.9.    62M from [facility], h/o traumatic subdural hemorrhage following a fall down the stairs while drunk, s/p r craniotomy at [facility] in 01/2023, s/p trach/PEG BIBEMS for tachycardia 170s, RR 27, increased secretions admitted to  for sepsis secondary to RUL pneumonia with tracheostomy to 3L NC.  3/6 Febrile 101.7  03/07: No events overnight. On [place]-trach at 3 L and tolerating well. Hemodynamically stable. Cultures in progress. C/w Vanco and Cefepime. F/u Vanco trough. Hypokalemic this AM and replacement ordered.   03/08: Afebrile. Vanco discontinued since MRSA negative. + yeast in sputum; + Candida Galbrata in urine (Bran in place with clear urine and afebrile).   Dr. Peres following. Blood cx NGTD. C/w Cefepime.   03/09: 03/09: Febrile, tachycardic and tachypneic this AM. Code sepsis called. Sepsis w/u in progress.  Started on Vanco. ID following, . Discussed with Dr. Peres. F/u CXR. Worsening Leukocytosis. IVF bolus with LR (2,300 ml)  per protocol. Lactate 1.9.   3/11: afebrile over past 24hrs, hemodynamically stable.   62M from [facility], h/o traumatic subdural hemorrhage following a fall down the stairs while drunk, s/p r craniotomy at [facility] in 01/2023, s/p trach/PEG BIBEMS for tachycardia 170s, RR 27, increased secretions admitted to  for sepsis secondary to RUL pneumonia with tracheostomy to 3L NC.  3/6 Febrile 101.7  03/07: No events overnight. On [place]-trach at 3 L and tolerating well. Hemodynamically stable. Cultures in progress. C/w Vanco and Cefepime. F/u Vanco trough. Hypokalemic this AM and replacement ordered.   03/08: Afebrile. Vanco discontinued since MRSA negative. + yeast in sputum; + Candida Galbrata in urine (Bran in place with clear urine and afebrile).   Dr. Peres following. Blood cx NGTD. C/w Cefepime.   03/09: Febrile, tachycardic and tachypneic this AM. Code sepsis called. Sepsis w/u in progress.  Started on Vanco. ID following, . Discussed with Dr. Peres. F/u CXR. Worsening Leukocytosis. IVF bolus with LR (2,300 ml)  per protocol. Lactate 1.9.   3/11: afebrile over past 24hrs, hemodynamically stable. Vanco trough 11.7

## 2023-03-12 LAB
ALBUMIN SERPL ELPH-MCNC: 2 G/DL — LOW (ref 3.5–5)
ALP SERPL-CCNC: 201 U/L — HIGH (ref 40–120)
ALT FLD-CCNC: 81 U/L DA — HIGH (ref 10–60)
ANION GAP SERPL CALC-SCNC: 5 MMOL/L — SIGNIFICANT CHANGE UP (ref 5–17)
AST SERPL-CCNC: 59 U/L — HIGH (ref 10–40)
BILIRUB SERPL-MCNC: 0.5 MG/DL — SIGNIFICANT CHANGE UP (ref 0.2–1.2)
BUN SERPL-MCNC: 15 MG/DL — SIGNIFICANT CHANGE UP (ref 7–18)
CALCIUM SERPL-MCNC: 8.9 MG/DL — SIGNIFICANT CHANGE UP (ref 8.4–10.5)
CHLORIDE SERPL-SCNC: 101 MMOL/L — SIGNIFICANT CHANGE UP (ref 96–108)
CO2 SERPL-SCNC: 29 MMOL/L — SIGNIFICANT CHANGE UP (ref 22–31)
CREAT SERPL-MCNC: 0.53 MG/DL — SIGNIFICANT CHANGE UP (ref 0.5–1.3)
CULTURE RESULTS: SIGNIFICANT CHANGE UP
EGFR: 115 ML/MIN/1.73M2 — SIGNIFICANT CHANGE UP
GLUCOSE SERPL-MCNC: 169 MG/DL — HIGH (ref 70–99)
HCT VFR BLD CALC: 25.2 % — LOW (ref 39–50)
HGB BLD-MCNC: 8.1 G/DL — LOW (ref 13–17)
MAGNESIUM SERPL-MCNC: 2.5 MG/DL — SIGNIFICANT CHANGE UP (ref 1.6–2.6)
MCHC RBC-ENTMCNC: 29.2 PG — SIGNIFICANT CHANGE UP (ref 27–34)
MCHC RBC-ENTMCNC: 32.1 GM/DL — SIGNIFICANT CHANGE UP (ref 32–36)
MCV RBC AUTO: 91 FL — SIGNIFICANT CHANGE UP (ref 80–100)
NRBC # BLD: 0 /100 WBCS — SIGNIFICANT CHANGE UP (ref 0–0)
PHOSPHATE SERPL-MCNC: 3.2 MG/DL — SIGNIFICANT CHANGE UP (ref 2.5–4.5)
PLATELET # BLD AUTO: 474 K/UL — HIGH (ref 150–400)
POTASSIUM SERPL-MCNC: 4 MMOL/L — SIGNIFICANT CHANGE UP (ref 3.5–5.3)
POTASSIUM SERPL-SCNC: 4 MMOL/L — SIGNIFICANT CHANGE UP (ref 3.5–5.3)
PROT SERPL-MCNC: 7.4 G/DL — SIGNIFICANT CHANGE UP (ref 6–8.3)
RBC # BLD: 2.77 M/UL — LOW (ref 4.2–5.8)
RBC # FLD: 13.3 % — SIGNIFICANT CHANGE UP (ref 10.3–14.5)
SODIUM SERPL-SCNC: 135 MMOL/L — SIGNIFICANT CHANGE UP (ref 135–145)
SPECIMEN SOURCE: SIGNIFICANT CHANGE UP
VANCOMYCIN TROUGH SERPL-MCNC: 13.3 UG/ML — SIGNIFICANT CHANGE UP (ref 10–20)
WBC # BLD: 9.21 K/UL — SIGNIFICANT CHANGE UP (ref 3.8–10.5)
WBC # FLD AUTO: 9.21 K/UL — SIGNIFICANT CHANGE UP (ref 3.8–10.5)

## 2023-03-12 RX ORDER — VANCOMYCIN HCL 1 G
250 VIAL (EA) INTRAVENOUS ONCE
Refills: 0 | Status: COMPLETED | OUTPATIENT
Start: 2023-03-12 | End: 2023-03-12

## 2023-03-12 RX ORDER — SODIUM CHLORIDE 9 MG/ML
500 INJECTION INTRAMUSCULAR; INTRAVENOUS; SUBCUTANEOUS ONCE
Refills: 0 | Status: COMPLETED | OUTPATIENT
Start: 2023-03-12 | End: 2023-03-12

## 2023-03-12 RX ORDER — VANCOMYCIN HCL 1 G
1500 VIAL (EA) INTRAVENOUS EVERY 12 HOURS
Refills: 0 | Status: COMPLETED | OUTPATIENT
Start: 2023-03-13 | End: 2023-03-19

## 2023-03-12 RX ADMIN — Medication 81 MILLIGRAM(S): at 11:55

## 2023-03-12 RX ADMIN — Medication 6 UNIT(S): at 17:06

## 2023-03-12 RX ADMIN — Medication 3 MILLILITER(S): at 03:05

## 2023-03-12 RX ADMIN — Medication 650 MILLIGRAM(S): at 18:20

## 2023-03-12 RX ADMIN — Medication 3 MILLILITER(S): at 09:23

## 2023-03-12 RX ADMIN — Medication 3 MILLILITER(S): at 15:57

## 2023-03-12 RX ADMIN — Medication 1 PATCH: at 11:30

## 2023-03-12 RX ADMIN — Medication 1: at 05:21

## 2023-03-12 RX ADMIN — ENOXAPARIN SODIUM 40 MILLIGRAM(S): 100 INJECTION SUBCUTANEOUS at 11:53

## 2023-03-12 RX ADMIN — POLYETHYLENE GLYCOL 3350 17 GRAM(S): 17 POWDER, FOR SOLUTION ORAL at 11:54

## 2023-03-12 RX ADMIN — Medication 166.67 MILLIGRAM(S): at 06:37

## 2023-03-12 RX ADMIN — Medication 166.67 MILLIGRAM(S): at 17:53

## 2023-03-12 RX ADMIN — INSULIN GLARGINE 12 UNIT(S): 100 INJECTION, SOLUTION SUBCUTANEOUS at 08:13

## 2023-03-12 RX ADMIN — CEFEPIME 100 MILLIGRAM(S): 1 INJECTION, POWDER, FOR SOLUTION INTRAMUSCULAR; INTRAVENOUS at 05:26

## 2023-03-12 RX ADMIN — Medication 1 PATCH: at 11:55

## 2023-03-12 RX ADMIN — Medication 1: at 11:53

## 2023-03-12 RX ADMIN — Medication 100 MILLIGRAM(S): at 20:53

## 2023-03-12 RX ADMIN — Medication 650 MILLIGRAM(S): at 17:54

## 2023-03-12 RX ADMIN — Medication 1 PATCH: at 07:26

## 2023-03-12 RX ADMIN — Medication 6 UNIT(S): at 00:00

## 2023-03-12 RX ADMIN — Medication 6 UNIT(S): at 05:22

## 2023-03-12 RX ADMIN — Medication 100 MILLIGRAM(S): at 17:06

## 2023-03-12 RX ADMIN — Medication 3 MILLILITER(S): at 20:08

## 2023-03-12 RX ADMIN — CEFEPIME 100 MILLIGRAM(S): 1 INJECTION, POWDER, FOR SOLUTION INTRAMUSCULAR; INTRAVENOUS at 13:39

## 2023-03-12 RX ADMIN — Medication 100 MILLIGRAM(S): at 05:27

## 2023-03-12 RX ADMIN — Medication 6 UNIT(S): at 11:54

## 2023-03-12 RX ADMIN — SENNA PLUS 10 MILLILITER(S): 8.6 TABLET ORAL at 11:55

## 2023-03-12 RX ADMIN — SODIUM CHLORIDE 1000 MILLILITER(S): 9 INJECTION INTRAMUSCULAR; INTRAVENOUS; SUBCUTANEOUS at 21:38

## 2023-03-12 RX ADMIN — Medication 1: at 17:05

## 2023-03-12 RX ADMIN — Medication 1 APPLICATION(S): at 17:07

## 2023-03-12 RX ADMIN — Medication 1 PATCH: at 19:25

## 2023-03-12 RX ADMIN — INSULIN GLARGINE 12 UNIT(S): 100 INJECTION, SOLUTION SUBCUTANEOUS at 22:16

## 2023-03-12 RX ADMIN — Medication 1 APPLICATION(S): at 05:26

## 2023-03-12 RX ADMIN — CEFEPIME 100 MILLIGRAM(S): 1 INJECTION, POWDER, FOR SOLUTION INTRAMUSCULAR; INTRAVENOUS at 22:15

## 2023-03-12 NOTE — PROGRESS NOTE ADULT - ASSESSMENT
62M from [facility], h/o traumatic subdural hemorrhage following a fall down the stairs while drunk, s/p r craniotomy at [facility] in 01/2023, s/p trach/PEG BIBEMS for tachycardia 170s, RR 27, increased secretions admitted to  for sepsis secondary to RUL pneumonia with tracheostomy to 3L NC.  3/6 Febrile 101.7  03/07: No events overnight. On [place]-trach at 3 L and tolerating well. Hemodynamically stable. Cultures in progress. C/w Vanco and Cefepime. F/u Vanco trough. Hypokalemic this AM and replacement ordered.   03/08: Afebrile. Vanco discontinued since MRSA negative. + yeast in sputum; + Candida Galbrata in urine (Bran in place with clear urine and afebrile).   Dr. Peres following. Blood cx NGTD. C/w Cefepime.   03/09: Febrile, tachycardic and tachypneic this AM. Code sepsis called. Sepsis w/u in progress.  Started on Vanco. ID following, . Discussed with Dr. Peres. F/u CXR. Worsening Leukocytosis. IVF bolus with LR (2,300 ml)  per protocol. Lactate 1.9.   3/11: afebrile over past 24hrs, hemodynamically stable. Vanco trough 11.7  03/12: Vanco trough this PM. Blood cx remains NGTD.    62M from [facility], h/o traumatic subdural hemorrhage following a fall down the stairs while drunk, s/p r craniotomy at [facility] in 01/2023, s/p trach/PEG BIBEMS for tachycardia 170s, RR 27, increased secretions admitted to  for sepsis secondary to RUL pneumonia with tracheostomy to 3L NC.  3/6 Febrile 101.7  03/07: No events overnight. On [place]-trach at 3 L and tolerating well. Hemodynamically stable. Cultures in progress. C/w Vanco and Cefepime. F/u Vanco trough. Hypokalemic this AM and replacement ordered.   03/08: Afebrile. Vanco discontinued since MRSA negative. + yeast in sputum; + Candida Galbrata in urine (Bran in place with clear urine and afebrile).   Dr. Peres following. Blood cx NGTD. C/w Cefepime.   03/09: Febrile, tachycardic and tachypneic this AM. Code sepsis called. Sepsis w/u in progress.  Started on Vanco. ID following, . Discussed with Dr. Peres. F/u CXR. Worsening Leukocytosis. IVF bolus with LR (2,300 ml)  per protocol. Lactate 1.9.   3/11: afebrile over past 24hrs, hemodynamically stable. Vanco trough 11.7  03/12: Vanco trough this PM. Blood cx remains NGTD. C/w Vanco and Cefepime. ID following. Persistent Transaminitis.  Hep C non-reactive. F/u remaining Hepatitis panel. Patient on high dose statin which could be contributing to transaminitis. Hold for now and re-eval. Given that patient is unable to verbalize/demonstrate if symptomatic will order Liver US.

## 2023-03-12 NOTE — PROGRESS NOTE ADULT - PROBLEM SELECTOR PLAN 10
DVT and GI prophylaxis  Continue antibiotics as per ID cefepime and vanco  F/U cultures - thus far NGTD  Repeat CXR with no focal infiltrate. DVT and GI prophylaxis  Continue antibiotics as per ID cefepime and vanco  Monitor Vanco trough   F/U cultures - thus far NGTD  Repeat CXR with no focal infiltrate.  Persistent transaminitis. Held high dose statin and F/u Liver US.   Likely will return to [facility] when optimized.

## 2023-03-12 NOTE — PROGRESS NOTE ADULT - PROBLEM SELECTOR PLAN 2
Continue trach collar. Titrate oxygen as tolerated.  Maintain sat at or above 92%  Monitor oxygen saturation  Continue bronchodilators  Continue antibiotics for pna  F/U cultures from 3/9 - thus far negative to date.

## 2023-03-12 NOTE — PROGRESS NOTE ADULT - PROBLEM SELECTOR PLAN 1
Secondary to pneumonia  S/P Code Sepsis (3/9)  F/U Blood and sputum cultures (3/9) - thus far negative to date  Continue cefepime and vanco  Repeat CXR 3/10 without focal infiltrate Secondary to pneumonia  S/P Code Sepsis (3/9)  F/U Blood and sputum cultures (3/9) - thus far negative to date  Continue cefepime and vanco  Repeat CXR 3/10 without focal infiltrate  ID following  Afebrile

## 2023-03-12 NOTE — CHART NOTE - NSCHARTNOTEFT_GEN_A_CORE
EVENT: Notified by nurse regarding patient with elevated temp    HPI: 62M from [facility], h/o traumatic subdural hemorrhage following a fall down the stairs while drunk, s/p r craniotomy at [facility] in 01/2023, s/p trach/PEG BIBEMS for tachycardia 170s, RR 27, increased secretions admitted to  for sepsis secondary to RUL pneumonia     Patient with elevated temp t- max 102.3 and hypotension 92/53. Patient currently on ABT for Septicemia and followed by ID    OBJECTIVE:  Vital Signs Last 24 Hrs  T(C): 39.1 (12 Mar 2023 21:30), Max: 39.1 (12 Mar 2023 21:30)  T(F): 102.3 (12 Mar 2023 21:30), Max: 102.3 (12 Mar 2023 21:30)  HR: 122 (12 Mar 2023 21:30) (104 - 122)  BP: 92/53 (12 Mar 2023 21:30) (92/53 - 163/87)  BP(mean): 87 (12 Mar 2023 06:08) (87 - 87)  RR: 20 (12 Mar 2023 21:30) (16 - 20)  SpO2: 100% (12 Mar 2023 21:30) (100% - 100%)    Parameters below as of 12 Mar 2023 21:30  Patient On (Oxygen Delivery Method): hydro trach  O2 Flow (L/min): 3    MEDS  acetaminophen    Suspension .. 650 milliGRAM(s) Oral every 6 hours PRN  albuterol/ipratropium for Nebulization 3 milliLiter(s) Nebulizer every 6 hours  aluminum hydroxide/magnesium hydroxide/simethicone Suspension 30 milliLiter(s) Oral every 4 hours PRN  amantadine Syrup 100 milliGRAM(s) Oral two times a day  aspirin  chewable 81 milliGRAM(s) Oral daily  cefepime   IVPB      cefepime   IVPB 2000 milliGRAM(s) IV Intermittent every 8 hours  doxazosin 2 milliGRAM(s) Oral at bedtime  enoxaparin Injectable 40 milliGRAM(s) SubCutaneous every 24 hours  insulin glargine Injectable (LANTUS) 12 Unit(s) SubCutaneous every morning  insulin glargine Injectable (LANTUS) 12 Unit(s) SubCutaneous at bedtime  insulin lispro (ADMELOG) corrective regimen sliding scale   SubCutaneous every 6 hours  insulin lispro Injectable (ADMELOG) 6 Unit(s) SubCutaneous every 6 hours  melatonin 3 milliGRAM(s) Oral at bedtime PRN  nicotine -   7 mG/24Hr(s) Patch 1 Patch Transdermal daily  ondansetron Injectable 4 milliGRAM(s) IV Push every 8 hours PRN  polyethylene glycol 3350 17 Gram(s) Oral daily  senna Syrup 10 milliLiter(s) Oral daily  silver sulfADIAZINE 1% Cream 1 Application(s) Topical two times a day      FOCUSED PHYSICAL EXAM:    LABS:                        8.1    9.21  )-----------( 474      ( 12 Mar 2023 07:31 )             25.2     03-12    135  |  101  |  15  ----------------------------<  169<H>  4.0   |  29  |  0.53    Ca    8.9      12 Mar 2023 07:31  Phos  3.2     03-12  Mg     2.5     03-12    TPro  7.4  /  Alb  2.0<L>  /  TBili  0.5  /  DBili  x   /  AST  59<H>  /  ALT  81<H>  /  AlkPhos  201<H>  03-12      PROBLEM: Elevated temp  and hypotension  2/2 Sepsis.   PLAN:   NS bolus 500 ml x 1 dose   Tylenol 650 mg as needed for fever  continue cefepime  IVPB 2000 milliGRAM(s) IV Intermittent every 8 hours  and Vanco  F/U Blood and sputum cultures (3/9) - thus far negative to date  HOLD antihypertension meds   Supportive care   FOLLOW UP / RESULT: Reassess blood pressure and temp

## 2023-03-12 NOTE — PROGRESS NOTE ADULT - SUBJECTIVE AND OBJECTIVE BOX
WILMA NICOLE    SCU progress note    INTERVAL HPI/OVERNIGHT EVENTS: Mild hematuria overnight. Currently no hematuria noticed. Melchor in place.     FULL CODE.     Covid - 19 PCR: not detected 3/9/2023    The 4Ms    What Matters Most: see [facility]  Age appropriate Medications/Screen for High Risk Medication: Yes  Mentation: see CAM below  Mobility: defer to physical exam    The Confusion Assessment Method (CAM) Diagnostic Algorithm     1: Acute Onset or Fluctuating Course  - Is there evidence of an acute change in mental status from the patient’s baseline? Did the (abnormal) behavior  fluctuate during the day, that is, tend to come and go, or increase and decrease in severity?  [ ] YES [ ] NO Unable to assess     2: Inattention  - Did the patient have difficulty focusing attention, being easily distractible, or having difficulty keeping track of what was being said?  [ ] YES [ ] NO  Unable to assess     3: Disorganized thinking  -Was the patient’s thinking disorganized or incoherent, such as rambling or irrelevant conversation, unclear or illogical flow of ideas, or unpredictable switching from subject to subject?  [ ] YES [ ] NO  Unable to assess    4: Altered Level of consciousness?  [ ] YES [ ] NO  Unable to assess    The diagnosis of delirium by CAM requires the presence of features 1 and 2 and either 3 or 4.    PRESSORS: [ ] YES [x ] NO  cefepime   IVPB      cefepime   IVPB 2000 milliGRAM(s) IV Intermittent every 8 hours  vancomycin  IVPB      vancomycin  IVPB 1250 milliGRAM(s) IV Intermittent every 12 hours    Cardiovascular:  Heart Failure  Acute   Acute on Chronic  Chronic       doxazosin 2 milliGRAM(s) Oral at bedtime    Pulmonary:  albuterol/ipratropium for Nebulization 3 milliLiter(s) Nebulizer every 6 hours    Hematalogic:  aspirin  chewable 81 milliGRAM(s) Oral daily  enoxaparin Injectable 40 milliGRAM(s) SubCutaneous every 24 hours    Other:  acetaminophen    Suspension .. 650 milliGRAM(s) Oral every 6 hours PRN  aluminum hydroxide/magnesium hydroxide/simethicone Suspension 30 milliLiter(s) Oral every 4 hours PRN  amantadine Syrup 100 milliGRAM(s) Oral two times a day  atorvastatin 80 milliGRAM(s) Oral at bedtime  insulin glargine Injectable (LANTUS) 12 Unit(s) SubCutaneous every morning  insulin glargine Injectable (LANTUS) 12 Unit(s) SubCutaneous at bedtime  insulin lispro (ADMELOG) corrective regimen sliding scale   SubCutaneous every 6 hours  insulin lispro Injectable (ADMELOG) 6 Unit(s) SubCutaneous every 6 hours  melatonin 3 milliGRAM(s) Oral at bedtime PRN  nicotine -   7 mG/24Hr(s) Patch 1 Patch Transdermal daily  ondansetron Injectable 4 milliGRAM(s) IV Push every 8 hours PRN  polyethylene glycol 3350 17 Gram(s) Oral daily  senna Syrup 10 milliLiter(s) Oral daily  silver sulfADIAZINE 1% Cream 1 Application(s) Topical two times a day    acetaminophen    Suspension .. 650 milliGRAM(s) Oral every 6 hours PRN  albuterol/ipratropium for Nebulization 3 milliLiter(s) Nebulizer every 6 hours  aluminum hydroxide/magnesium hydroxide/simethicone Suspension 30 milliLiter(s) Oral every 4 hours PRN  amantadine Syrup 100 milliGRAM(s) Oral two times a day  aspirin  chewable 81 milliGRAM(s) Oral daily  atorvastatin 80 milliGRAM(s) Oral at bedtime  cefepime   IVPB 2000 milliGRAM(s) IV Intermittent every 8 hours  cefepime   IVPB      doxazosin 2 milliGRAM(s) Oral at bedtime  enoxaparin Injectable 40 milliGRAM(s) SubCutaneous every 24 hours  insulin glargine Injectable (LANTUS) 12 Unit(s) SubCutaneous every morning  insulin glargine Injectable (LANTUS) 12 Unit(s) SubCutaneous at bedtime  insulin lispro (ADMELOG) corrective regimen sliding scale   SubCutaneous every 6 hours  insulin lispro Injectable (ADMELOG) 6 Unit(s) SubCutaneous every 6 hours  melatonin 3 milliGRAM(s) Oral at bedtime PRN  nicotine -   7 mG/24Hr(s) Patch 1 Patch Transdermal daily  ondansetron Injectable 4 milliGRAM(s) IV Push every 8 hours PRN  polyethylene glycol 3350 17 Gram(s) Oral daily  senna Syrup 10 milliLiter(s) Oral daily  silver sulfADIAZINE 1% Cream 1 Application(s) Topical two times a day  vancomycin  IVPB      vancomycin  IVPB 1250 milliGRAM(s) IV Intermittent every 12 hours    Drug Dosing Weight  Height (cm): 177.8 (05 Mar 2023 03:36)  Weight (kg): 86.2 (05 Mar 2023 03:36)  BMI (kg/m2): 27.3 (05 Mar 2023 03:36)  BSA (m2): 2.04 (05 Mar 2023 03:36)    CENTRAL LINE: [ ] YES [ x] NO  LOCATION:   DATE INSERTED:  REMOVE: [ ] YES [ ] NO  EXPLAIN:    MELCHOR: [ x] YES [ ] NO    DATE INSERTED:  REMOVE:  [ ] YES [ ] NO  EXPLAIN:    PAST MEDICAL & SURGICAL HISTORY:  History of subdural hemorrhage      PEG (percutaneous endoscopic gastrostomy) status      DM (diabetes mellitus)      S/P craniotomy    03-11 @ 06:01  -  03-12 @ 07:00  --------------------------------------------------------  IN: 0 mL / OUT: 1000 mL / NET: -1000 mL      ROS: Unattainable 2/2 mentation (Base line/ hx of SDH/craniotomy).   PHYSICAL EXAM:    GENERAL: NAD, diaphoretic   HEAD:  Atraumatic, Normocephalic  EYES: PERRLA, conjunctiva and sclera clear  ENMT:  Moist mucous membranes.   NECK: Supple, No JVD.   NERVOUS SYSTEM:  opens eyes spontaneously. Does not move exts upon command or spontaneously.   CHEST/LUNG:  Lung sounds diminished bilaterally R>L. ; No rales, rhonchi, wheezing, or rubs. Hydra trach in place. On 3L .   HEART: Regular rate and rhythm; No murmurs, rubs, or gallops  ABDOMEN: Soft, Nontender, Nondistended; Bowel sounds present  EXTREMITIES:  2+ Peripheral Pulses, No clubbing, cyanosis, or edema  LYMPH: No lymphadenopathy noted  SKIN: Stage 1 Pressure Coccyx and Bilateral Heels.       LABS:  CBC Full  -  ( 12 Mar 2023 07:31 )  WBC Count : 9.21 K/uL  RBC Count : 2.77 M/uL  Hemoglobin : 8.1 g/dL  Hematocrit : 25.2 %  Platelet Count - Automated : 474 K/uL  Mean Cell Volume : 91.0 fl  Mean Cell Hemoglobin : 29.2 pg  Mean Cell Hemoglobin Concentration : 32.1 gm/dL  Auto Neutrophil # : x  Auto Lymphocyte # : x  Auto Monocyte # : x  Auto Eosinophil # : x  Auto Basophil # : x  Auto Neutrophil % : x  Auto Lymphocyte % : x  Auto Monocyte % : x  Auto Eosinophil % : x  Auto Basophil % : x    03-12    135  |  101  |  15  ----------------------------<  169<H>  4.0   |  29  |  0.53    Ca    8.9      12 Mar 2023 07:31  Phos  3.2     03-12  Mg     2.5     03-12    TPro  7.4  /  Alb  2.0<L>  /  TBili  0.5  /  DBili  x   /  AST  59<H>  /  ALT  81<H>  /  AlkPhos  201<H>  03-12    [ x ]  DVT Prophylaxis: Lovenox 40 mg daily.   [  ]  Nutrition: Glucerna 1.5 @ 40 ml/hr       RADIOLOGY & ADDITIONAL STUDIES:   < from: Xray Chest 1 View- PORTABLE-Urgent (Xray Chest 1 View- PORTABLE-Urgent .) (03.10.23 @ 11:07) >  ACC: [id number] EXAM:  XR CHEST PORTABLE URGENT 1V   ORDERED BY: ANTHONY RESENDIZ     PROCEDURE DATE:  03/10/2023        INTERPRETATION:  Chest one view    HISTORY: Fever    COMPARISON STUDY: 3/5/2023    Frontal expiratory view of the chest shows the heart to be normal in   size. Tracheostomy tube is again noted.    The lungs show mild right atelectasis and there is no evidence of   pneumothorax nor pleural effusion.    IMPRESSION:  No focal infiltrate.      Thank you for the courtesy of this referral.    --- End of Report ---      DOLORES CAM MD; Attending Interventional Radiologist  This document has been electronically signed. Mar 10 2023  2:43PM    < end of copied text >    Plan of care discussed with intensivist.

## 2023-03-12 NOTE — PROGRESS NOTE ADULT - PROBLEM SELECTOR PLAN 9
Continue tube feeds and supplementation.  Dietary consult completed 3/7; recs: Glucerna 1.5 @ 50 ml /hr x 24  Continue bowel regimen with senna, miralax

## 2023-03-12 NOTE — PROGRESS NOTE ADULT - PROBLEM SELECTOR PLAN 3
Secondary to SDH and craniotomy  Craniotomy at [facility] 1/23  Maintain safety precautions  Strict aspiration precautions.  Seizure precautions. Secondary to SDH and craniotomy  Craniotomy at [facility] 1/23  Maintain safety precautions  Strict aspiration precautions.  Seizure precautions.  Off note: Request that family bring helmet to protect Crani site).

## 2023-03-12 NOTE — PROGRESS NOTE ADULT - PROBLEM SELECTOR PLAN 4
Possibly secondary to sepsis vs amantadine  Controlled.   Continue to monitor daily.  May consider US RUQ if worsens. Possibly secondary to sepsis vs drug induced (On high dose statin).   Hold statin for now  Liver US  Continue to monitor daily.  F/u remaining Hepatitis panel   Hepatic panel in AM

## 2023-03-12 NOTE — PROGRESS NOTE ADULT - PROBLEM SELECTOR PLAN 6
Continue lantus 12U every 12 hours  Lispro 6U every 6 hours  Continue sliding scale coverage.  Monitor glucose. Continue Lantus 12 units BID  Lispro 6U every 6 hours  Continue sliding scale coverage.  Monitor glucose.

## 2023-03-13 LAB
ALBUMIN SERPL ELPH-MCNC: 1.9 G/DL — LOW (ref 3.5–5)
ALP SERPL-CCNC: 238 U/L — HIGH (ref 40–120)
ALT FLD-CCNC: 89 U/L DA — HIGH (ref 10–60)
ANION GAP SERPL CALC-SCNC: 7 MMOL/L — SIGNIFICANT CHANGE UP (ref 5–17)
APPEARANCE UR: CLEAR — SIGNIFICANT CHANGE UP
AST SERPL-CCNC: 61 U/L — HIGH (ref 10–40)
BACTERIA # UR AUTO: ABNORMAL /HPF
BILIRUB SERPL-MCNC: 0.6 MG/DL — SIGNIFICANT CHANGE UP (ref 0.2–1.2)
BILIRUB UR-MCNC: NEGATIVE — SIGNIFICANT CHANGE UP
BUN SERPL-MCNC: 17 MG/DL — SIGNIFICANT CHANGE UP (ref 7–18)
CALCIUM SERPL-MCNC: 8.6 MG/DL — SIGNIFICANT CHANGE UP (ref 8.4–10.5)
CHLORIDE SERPL-SCNC: 103 MMOL/L — SIGNIFICANT CHANGE UP (ref 96–108)
CO2 SERPL-SCNC: 27 MMOL/L — SIGNIFICANT CHANGE UP (ref 22–31)
COLOR SPEC: YELLOW — SIGNIFICANT CHANGE UP
CREAT SERPL-MCNC: 0.58 MG/DL — SIGNIFICANT CHANGE UP (ref 0.5–1.3)
DIFF PNL FLD: ABNORMAL
EGFR: 112 ML/MIN/1.73M2 — SIGNIFICANT CHANGE UP
GLUCOSE SERPL-MCNC: 191 MG/DL — HIGH (ref 70–99)
GLUCOSE UR QL: NEGATIVE — SIGNIFICANT CHANGE UP
HAV IGG SER QL IA: REACTIVE
HAV IGM SER-ACNC: SIGNIFICANT CHANGE UP
HBV CORE IGM SER-ACNC: SIGNIFICANT CHANGE UP
HBV SURFACE AG SER-ACNC: SIGNIFICANT CHANGE UP
HCT VFR BLD CALC: 25.2 % — LOW (ref 39–50)
HGB BLD-MCNC: 8 G/DL — LOW (ref 13–17)
KETONES UR-MCNC: NEGATIVE — SIGNIFICANT CHANGE UP
LACTATE SERPL-SCNC: 1.1 MMOL/L — SIGNIFICANT CHANGE UP (ref 0.7–2)
LEUKOCYTE ESTERASE UR-ACNC: ABNORMAL
MAGNESIUM SERPL-MCNC: 2.5 MG/DL — SIGNIFICANT CHANGE UP (ref 1.6–2.6)
MCHC RBC-ENTMCNC: 28.7 PG — SIGNIFICANT CHANGE UP (ref 27–34)
MCHC RBC-ENTMCNC: 31.7 GM/DL — LOW (ref 32–36)
MCV RBC AUTO: 90.3 FL — SIGNIFICANT CHANGE UP (ref 80–100)
NITRITE UR-MCNC: NEGATIVE — SIGNIFICANT CHANGE UP
NRBC # BLD: 0 /100 WBCS — SIGNIFICANT CHANGE UP (ref 0–0)
PH UR: 6.5 — SIGNIFICANT CHANGE UP (ref 5–8)
PHOSPHATE SERPL-MCNC: 2.7 MG/DL — SIGNIFICANT CHANGE UP (ref 2.5–4.5)
PLATELET # BLD AUTO: 456 K/UL — HIGH (ref 150–400)
POTASSIUM SERPL-MCNC: 3.4 MMOL/L — LOW (ref 3.5–5.3)
POTASSIUM SERPL-SCNC: 3.4 MMOL/L — LOW (ref 3.5–5.3)
PROCALCITONIN SERPL-MCNC: 1.24 NG/ML — HIGH (ref 0.02–0.1)
PROT SERPL-MCNC: 7.2 G/DL — SIGNIFICANT CHANGE UP (ref 6–8.3)
PROT UR-MCNC: 30 MG/DL
RBC # BLD: 2.79 M/UL — LOW (ref 4.2–5.8)
RBC # FLD: 13.4 % — SIGNIFICANT CHANGE UP (ref 10.3–14.5)
RBC CASTS # UR COMP ASSIST: ABNORMAL /HPF (ref 0–2)
SODIUM SERPL-SCNC: 137 MMOL/L — SIGNIFICANT CHANGE UP (ref 135–145)
SP GR SPEC: 1.01 — SIGNIFICANT CHANGE UP (ref 1.01–1.02)
UROBILINOGEN FLD QL: NEGATIVE — SIGNIFICANT CHANGE UP
WBC # BLD: 12.32 K/UL — HIGH (ref 3.8–10.5)
WBC # FLD AUTO: 12.32 K/UL — HIGH (ref 3.8–10.5)
WBC UR QL: ABNORMAL /HPF (ref 0–5)

## 2023-03-13 PROCEDURE — 71045 X-RAY EXAM CHEST 1 VIEW: CPT | Mod: 26

## 2023-03-13 PROCEDURE — 76705 ECHO EXAM OF ABDOMEN: CPT | Mod: 26

## 2023-03-13 RX ORDER — POTASSIUM CHLORIDE 20 MEQ
40 PACKET (EA) ORAL ONCE
Refills: 0 | Status: DISCONTINUED | OUTPATIENT
Start: 2023-03-13 | End: 2023-03-13

## 2023-03-13 RX ORDER — MEROPENEM 1 G/30ML
1000 INJECTION INTRAVENOUS ONCE
Refills: 0 | Status: COMPLETED | OUTPATIENT
Start: 2023-03-13 | End: 2023-03-13

## 2023-03-13 RX ORDER — MEROPENEM 1 G/30ML
INJECTION INTRAVENOUS
Refills: 0 | Status: COMPLETED | OUTPATIENT
Start: 2023-03-13 | End: 2023-03-20

## 2023-03-13 RX ORDER — POTASSIUM CHLORIDE 20 MEQ
40 PACKET (EA) ORAL ONCE
Refills: 0 | Status: COMPLETED | OUTPATIENT
Start: 2023-03-13 | End: 2023-03-13

## 2023-03-13 RX ORDER — MEROPENEM 1 G/30ML
1000 INJECTION INTRAVENOUS EVERY 8 HOURS
Refills: 0 | Status: COMPLETED | OUTPATIENT
Start: 2023-03-13 | End: 2023-03-20

## 2023-03-13 RX ADMIN — Medication 81 MILLIGRAM(S): at 12:24

## 2023-03-13 RX ADMIN — Medication 1 APPLICATION(S): at 06:32

## 2023-03-13 RX ADMIN — Medication 6 UNIT(S): at 00:09

## 2023-03-13 RX ADMIN — Medication 1: at 06:33

## 2023-03-13 RX ADMIN — MEROPENEM 100 MILLIGRAM(S): 1 INJECTION INTRAVENOUS at 14:12

## 2023-03-13 RX ADMIN — Medication 300 MILLIGRAM(S): at 17:11

## 2023-03-13 RX ADMIN — Medication 100 MILLIGRAM(S): at 05:29

## 2023-03-13 RX ADMIN — Medication 2 MILLIGRAM(S): at 22:05

## 2023-03-13 RX ADMIN — Medication 300 MILLIGRAM(S): at 06:32

## 2023-03-13 RX ADMIN — Medication 650 MILLIGRAM(S): at 20:29

## 2023-03-13 RX ADMIN — Medication 3: at 00:08

## 2023-03-13 RX ADMIN — MEROPENEM 100 MILLIGRAM(S): 1 INJECTION INTRAVENOUS at 22:06

## 2023-03-13 RX ADMIN — Medication 6 UNIT(S): at 17:10

## 2023-03-13 RX ADMIN — Medication 3 MILLILITER(S): at 03:16

## 2023-03-13 RX ADMIN — Medication 6 UNIT(S): at 12:23

## 2023-03-13 RX ADMIN — Medication 3 MILLILITER(S): at 20:39

## 2023-03-13 RX ADMIN — Medication 40 MILLIEQUIVALENT(S): at 13:01

## 2023-03-13 RX ADMIN — Medication 1: at 17:10

## 2023-03-13 RX ADMIN — ENOXAPARIN SODIUM 40 MILLIGRAM(S): 100 INJECTION SUBCUTANEOUS at 12:22

## 2023-03-13 RX ADMIN — Medication 100 MILLIGRAM(S): at 18:01

## 2023-03-13 RX ADMIN — Medication 1: at 12:22

## 2023-03-13 RX ADMIN — Medication 3 MILLILITER(S): at 08:50

## 2023-03-13 RX ADMIN — POLYETHYLENE GLYCOL 3350 17 GRAM(S): 17 POWDER, FOR SOLUTION ORAL at 12:25

## 2023-03-13 RX ADMIN — Medication 650 MILLIGRAM(S): at 21:06

## 2023-03-13 RX ADMIN — SENNA PLUS 10 MILLILITER(S): 8.6 TABLET ORAL at 12:24

## 2023-03-13 RX ADMIN — Medication 1 PATCH: at 12:20

## 2023-03-13 RX ADMIN — Medication 1 APPLICATION(S): at 17:12

## 2023-03-13 RX ADMIN — CEFEPIME 100 MILLIGRAM(S): 1 INJECTION, POWDER, FOR SOLUTION INTRAMUSCULAR; INTRAVENOUS at 05:25

## 2023-03-13 RX ADMIN — MEROPENEM 100 MILLIGRAM(S): 1 INJECTION INTRAVENOUS at 11:51

## 2023-03-13 RX ADMIN — Medication 1 PATCH: at 19:43

## 2023-03-13 RX ADMIN — Medication 1 PATCH: at 07:20

## 2023-03-13 RX ADMIN — Medication 1 PATCH: at 12:24

## 2023-03-13 RX ADMIN — Medication 3 MILLILITER(S): at 14:16

## 2023-03-13 RX ADMIN — Medication 6 UNIT(S): at 06:33

## 2023-03-13 RX ADMIN — INSULIN GLARGINE 12 UNIT(S): 100 INJECTION, SOLUTION SUBCUTANEOUS at 08:14

## 2023-03-13 NOTE — PROGRESS NOTE ADULT - PROBLEM SELECTOR PLAN 10
DVT and GI prophylaxis  Continue antibiotics   Cefepime discontinued.   Vanco increased from 1250 to 1500 mg BID  Meropenem added in the setting of fevers.   Monitor Vanco trough   F/U cultures - thus far NGTD  Repeat blood cultures  Persistent transaminitis. Held high dose statin and F/u Liver US.   Likely will return to [facility] when optimized.

## 2023-03-13 NOTE — PROGRESS NOTE ADULT - PROBLEM SELECTOR PLAN 4
Possibly secondary to sepsis vs drug induced (On high dose statin).   Hold statin for now  Liver US pending  Continue to monitor daily.  F/u remaining Hepatitis panel   Avoid hepatotoxic agents.

## 2023-03-13 NOTE — PROGRESS NOTE ADULT - SUBJECTIVE AND OBJECTIVE BOX
WILMA NICOLE    SCU progress note    INTERVAL HPI/OVERNIGHT EVENTS: Fever and hypotension overnight.     Full code.     Covid - 19 PCR: 03/09/2023 Negative.     The 4Ms    What Matters Most: see [facility]  Age appropriate Medications/Screen for High Risk Medication: Yes  Mentation: see CAM below  Mobility: defer to physical exam    The Confusion Assessment Method (CAM) Diagnostic Algorithm     1: Acute Onset or Fluctuating Course  - Is there evidence of an acute change in mental status from the patient’s baseline? Did the (abnormal) behavior  fluctuate during the day, that is, tend to come and go, or increase and decrease in severity?  [ ] YES [ ] NO Unable to assess     2: Inattention  - Did the patient have difficulty focusing attention, being easily distractible, or having difficulty keeping track of what was being said?  [ ] YES [ ] NO  Unable to assess     3: Disorganized thinking  -Was the patient’s thinking disorganized or incoherent, such as rambling or irrelevant conversation, unclear or illogical flow of ideas, or unpredictable switching from subject to subject?  [ ] YES [ ] NO  Unable to assess    4: Altered Level of consciousness?  [ ] YES [ ] NO  Unable to assess    The diagnosis of delirium by CAM requires the presence of features 1 and 2 and either 3 or 4.    PRESSORS: [ ] YES [x ] NO  meropenem  IVPB      meropenem  IVPB 1000 milliGRAM(s) IV Intermittent once  meropenem  IVPB 1000 milliGRAM(s) IV Intermittent every 8 hours  vancomycin  IVPB 1500 milliGRAM(s) IV Intermittent every 12 hours    Cardiovascular:  Heart Failure  Acute   Acute on Chronic  Chronic       doxazosin 2 milliGRAM(s) Oral at bedtime    Pulmonary:  albuterol/ipratropium for Nebulization 3 milliLiter(s) Nebulizer every 6 hours    Hematalogic:  aspirin  chewable 81 milliGRAM(s) Oral daily  enoxaparin Injectable 40 milliGRAM(s) SubCutaneous every 24 hours    Other:  acetaminophen    Suspension .. 650 milliGRAM(s) Oral every 6 hours PRN  aluminum hydroxide/magnesium hydroxide/simethicone Suspension 30 milliLiter(s) Oral every 4 hours PRN  amantadine Syrup 100 milliGRAM(s) Oral two times a day  insulin glargine Injectable (LANTUS) 12 Unit(s) SubCutaneous every morning  insulin glargine Injectable (LANTUS) 12 Unit(s) SubCutaneous at bedtime  insulin lispro (ADMELOG) corrective regimen sliding scale   SubCutaneous every 6 hours  insulin lispro Injectable (ADMELOG) 6 Unit(s) SubCutaneous every 6 hours  melatonin 3 milliGRAM(s) Oral at bedtime PRN  nicotine -   7 mG/24Hr(s) Patch 1 Patch Transdermal daily  ondansetron Injectable 4 milliGRAM(s) IV Push every 8 hours PRN  polyethylene glycol 3350 17 Gram(s) Oral daily  potassium chloride    Tablet ER 40 milliEquivalent(s) Oral once  senna Syrup 10 milliLiter(s) Oral daily  silver sulfADIAZINE 1% Cream 1 Application(s) Topical two times a day    acetaminophen    Suspension .. 650 milliGRAM(s) Oral every 6 hours PRN  albuterol/ipratropium for Nebulization 3 milliLiter(s) Nebulizer every 6 hours  aluminum hydroxide/magnesium hydroxide/simethicone Suspension 30 milliLiter(s) Oral every 4 hours PRN  amantadine Syrup 100 milliGRAM(s) Oral two times a day  aspirin  chewable 81 milliGRAM(s) Oral daily  doxazosin 2 milliGRAM(s) Oral at bedtime  enoxaparin Injectable 40 milliGRAM(s) SubCutaneous every 24 hours  insulin glargine Injectable (LANTUS) 12 Unit(s) SubCutaneous every morning  insulin glargine Injectable (LANTUS) 12 Unit(s) SubCutaneous at bedtime  insulin lispro (ADMELOG) corrective regimen sliding scale   SubCutaneous every 6 hours  insulin lispro Injectable (ADMELOG) 6 Unit(s) SubCutaneous every 6 hours  melatonin 3 milliGRAM(s) Oral at bedtime PRN  meropenem  IVPB      meropenem  IVPB 1000 milliGRAM(s) IV Intermittent once  meropenem  IVPB 1000 milliGRAM(s) IV Intermittent every 8 hours  nicotine -   7 mG/24Hr(s) Patch 1 Patch Transdermal daily  ondansetron Injectable 4 milliGRAM(s) IV Push every 8 hours PRN  polyethylene glycol 3350 17 Gram(s) Oral daily  potassium chloride    Tablet ER 40 milliEquivalent(s) Oral once  senna Syrup 10 milliLiter(s) Oral daily  silver sulfADIAZINE 1% Cream 1 Application(s) Topical two times a day  vancomycin  IVPB 1500 milliGRAM(s) IV Intermittent every 12 hours    Drug Dosing Weight  Height (cm): 177.8 (05 Mar 2023 03:36)  Weight (kg): 86.2 (05 Mar 2023 03:36)  BMI (kg/m2): 27.3 (05 Mar 2023 03:36)  BSA (m2): 2.04 (05 Mar 2023 03:36)    CENTRAL LINE: [ ] YES [x ] NO  LOCATION:   DATE INSERTED:  REMOVE: [ ] YES [ ] NO  EXPLAIN:    MELCHOR: [x ] YES [ ] NO    DATE INSERTED:  REMOVE:  [ ] YES [ ] NO  EXPLAIN:    PAST MEDICAL & SURGICAL HISTORY:  History of subdural hemorrhage      PEG (percutaneous endoscopic gastrostomy) status      DM (diabetes mellitus)      S/P craniotomy      03-12 @ 07:01  -  03-13 @ 07:00  --------------------------------------------------------  IN: 0 mL / OUT: 800 mL / NET: -800 mL    PHYSICAL EXAM:    GENERAL: NAD  HEAD:  Atraumatic, Asymmetrical skull 2/2 craniotomy hx.   EYES: Pupils round and reactive, conjunctiva and sclera clear  ENMT: No tonsillar erythema, exudates, or enlargement; Moist mucous membranes, Good dentition, No lesions  NECK: Supple, No JVD.   NERVOUS SYSTEM: Opens eyes spontaneously. Does not move exts. Does not follow commands. Does not track.   CHEST/LUNG: Diminished RLL>LLL  No rales, rhonchi, wheezing, or rubs. Trach in place.   HEART: Regular rate and rhythm; No murmurs, rubs, or gallops  ABDOMEN: Soft, Nontender, Nondistended; Bowel sounds present. PEG tube in place.   EXTREMITIES:  2+ Peripheral Pulses, No clubbing, cyanosis, or edema  LYMPH: No lymphadenopathy noted  SKIN: Stage 1 Pressure Injury to the Coccyx and Bilateral Heels, as evident by non-blanchable erythema        LABS:  CBC Full  -  ( 13 Mar 2023 06:48 )  WBC Count : 12.32 K/uL  RBC Count : 2.79 M/uL  Hemoglobin : 8.0 g/dL  Hematocrit : 25.2 %  Platelet Count - Automated : 456 K/uL  Mean Cell Volume : 90.3 fl  Mean Cell Hemoglobin : 28.7 pg  Mean Cell Hemoglobin Concentration : 31.7 gm/dL  Auto Neutrophil # : x  Auto Lymphocyte # : x  Auto Monocyte # : x  Auto Eosinophil # : x  Auto Basophil # : x  Auto Neutrophil % : x  Auto Lymphocyte % : x  Auto Monocyte % : x  Auto Eosinophil % : x  Auto Basophil % : x    03-13    137  |  103  |  17  ----------------------------<  191<H>  3.4<L>   |  27  |  0.58    Ca    8.6      13 Mar 2023 06:48  Phos  2.7     03-13  Mg     2.5     03-13    TPro  7.2  /  Alb  1.9<L>  /  TBili  0.6  /  DBili  x   /  AST  61<H>  /  ALT  89<H>  /  AlkPhos  238<H>  03-13    [ x ]  DVT Prophylaxis Lovenox.   [  ] Glucerna 1.5     RADIOLOGY & ADDITIONAL STUDIES:    < from: Xray Chest 1 View- PORTABLE-Urgent (Xray Chest 1 View- PORTABLE-Urgent .) (03.10.23 @ 11:07) >  ACC: [id number] EXAM:  XR CHEST PORTABLE URGENT 1V   ORDERED BY: ANTHONY RESENDIZ     PROCEDURE DATE:  03/10/2023          INTERPRETATION:  Chest one view    HISTORY: Fever    COMPARISON STUDY: 3/5/2023    Frontal expiratory view of the chest shows the heart to be normal in   size. Tracheostomy tube is again noted.    The lungs show mild right atelectasis and there is no evidence of   pneumothorax nor pleural effusion.    IMPRESSION:  No focal infiltrate.        Thank you for the courtesy of this referral.    --- End of Report ---      DOLORES CAM MD; Attending Interventional Radiologist  This document has been electronically signed. Mar 10 2023  2:43PM    < end of copied text >      Plan of care discussed with intensivist.      WILMA NICOLE    SCU progress note    INTERVAL HPI/OVERNIGHT EVENTS: Fever and hypotension overnight.     Full code.     Covid - 19 PCR: 03/09/2023 Negative.     The 4Ms    What Matters Most: see [facility]  Age appropriate Medications/Screen for High Risk Medication: Yes  Mentation: see CAM below  Mobility: defer to physical exam    The Confusion Assessment Method (CAM) Diagnostic Algorithm     1: Acute Onset or Fluctuating Course  - Is there evidence of an acute change in mental status from the patient’s baseline? Did the (abnormal) behavior  fluctuate during the day, that is, tend to come and go, or increase and decrease in severity?  [ ] YES [ ] NO Unable to assess     2: Inattention  - Did the patient have difficulty focusing attention, being easily distractible, or having difficulty keeping track of what was being said?  [ ] YES [ ] NO  Unable to assess     3: Disorganized thinking  -Was the patient’s thinking disorganized or incoherent, such as rambling or irrelevant conversation, unclear or illogical flow of ideas, or unpredictable switching from subject to subject?  [ ] YES [ ] NO  Unable to assess    4: Altered Level of consciousness?  [ ] YES [ ] NO  Unable to assess    The diagnosis of delirium by CAM requires the presence of features 1 and 2 and either 3 or 4.    PRESSORS: [ ] YES [x ] NO  meropenem  IVPB      meropenem  IVPB 1000 milliGRAM(s) IV Intermittent once  meropenem  IVPB 1000 milliGRAM(s) IV Intermittent every 8 hours  vancomycin  IVPB 1500 milliGRAM(s) IV Intermittent every 12 hours    Cardiovascular:  Heart Failure  Acute   Acute on Chronic  Chronic       doxazosin 2 milliGRAM(s) Oral at bedtime    Pulmonary:  albuterol/ipratropium for Nebulization 3 milliLiter(s) Nebulizer every 6 hours    Hematalogic:  aspirin  chewable 81 milliGRAM(s) Oral daily  enoxaparin Injectable 40 milliGRAM(s) SubCutaneous every 24 hours    Other:  acetaminophen    Suspension .. 650 milliGRAM(s) Oral every 6 hours PRN  aluminum hydroxide/magnesium hydroxide/simethicone Suspension 30 milliLiter(s) Oral every 4 hours PRN  amantadine Syrup 100 milliGRAM(s) Oral two times a day  insulin glargine Injectable (LANTUS) 12 Unit(s) SubCutaneous every morning  insulin glargine Injectable (LANTUS) 12 Unit(s) SubCutaneous at bedtime  insulin lispro (ADMELOG) corrective regimen sliding scale   SubCutaneous every 6 hours  insulin lispro Injectable (ADMELOG) 6 Unit(s) SubCutaneous every 6 hours  melatonin 3 milliGRAM(s) Oral at bedtime PRN  nicotine -   7 mG/24Hr(s) Patch 1 Patch Transdermal daily  ondansetron Injectable 4 milliGRAM(s) IV Push every 8 hours PRN  polyethylene glycol 3350 17 Gram(s) Oral daily  potassium chloride    Tablet ER 40 milliEquivalent(s) Oral once  senna Syrup 10 milliLiter(s) Oral daily  silver sulfADIAZINE 1% Cream 1 Application(s) Topical two times a day    acetaminophen    Suspension .. 650 milliGRAM(s) Oral every 6 hours PRN  albuterol/ipratropium for Nebulization 3 milliLiter(s) Nebulizer every 6 hours  aluminum hydroxide/magnesium hydroxide/simethicone Suspension 30 milliLiter(s) Oral every 4 hours PRN  amantadine Syrup 100 milliGRAM(s) Oral two times a day  aspirin  chewable 81 milliGRAM(s) Oral daily  doxazosin 2 milliGRAM(s) Oral at bedtime  enoxaparin Injectable 40 milliGRAM(s) SubCutaneous every 24 hours  insulin glargine Injectable (LANTUS) 12 Unit(s) SubCutaneous every morning  insulin glargine Injectable (LANTUS) 12 Unit(s) SubCutaneous at bedtime  insulin lispro (ADMELOG) corrective regimen sliding scale   SubCutaneous every 6 hours  insulin lispro Injectable (ADMELOG) 6 Unit(s) SubCutaneous every 6 hours  melatonin 3 milliGRAM(s) Oral at bedtime PRN  meropenem  IVPB      meropenem  IVPB 1000 milliGRAM(s) IV Intermittent once  meropenem  IVPB 1000 milliGRAM(s) IV Intermittent every 8 hours  nicotine -   7 mG/24Hr(s) Patch 1 Patch Transdermal daily  ondansetron Injectable 4 milliGRAM(s) IV Push every 8 hours PRN  polyethylene glycol 3350 17 Gram(s) Oral daily  potassium chloride    Tablet ER 40 milliEquivalent(s) Oral once  senna Syrup 10 milliLiter(s) Oral daily  silver sulfADIAZINE 1% Cream 1 Application(s) Topical two times a day  vancomycin  IVPB 1500 milliGRAM(s) IV Intermittent every 12 hours    Drug Dosing Weight  Height (cm): 177.8 (05 Mar 2023 03:36)  Weight (kg): 86.2 (05 Mar 2023 03:36)  BMI (kg/m2): 27.3 (05 Mar 2023 03:36)  BSA (m2): 2.04 (05 Mar 2023 03:36)    CENTRAL LINE: [ ] YES [x ] NO  LOCATION:   DATE INSERTED:  REMOVE: [ ] YES [ ] NO  EXPLAIN:    MELCHOR: [x ] YES [ ] NO    DATE INSERTED:  REMOVE:  [ ] YES [ ] NO  EXPLAIN:    PAST MEDICAL & SURGICAL HISTORY:  History of subdural hemorrhage      PEG (percutaneous endoscopic gastrostomy) status      DM (diabetes mellitus)      S/P craniotomy      03-12 @ 07:01  -  03-13 @ 07:00  --------------------------------------------------------  IN: 0 mL / OUT: 800 mL / NET: -800 mL    PHYSICAL EXAM:    GENERAL: NAD  HEAD:  Atraumatic, Asymmetrical skull 2/2 craniotomy hx.   EYES: Pupils round and reactive, conjunctiva and sclera clear  ENMT: No tonsillar erythema, exudates, or enlargement; Moist mucous membranes, Good dentition, No lesions  NECK: Supple, No JVD.   NERVOUS SYSTEM: Opens eyes spontaneously. Does not move exts. Does not follow commands. Does not track.   CHEST/LUNG: Diminished RLL>LLL  No rales, rhonchi, wheezing, or rubs. Trach in place.   HEART: Regular rate and rhythm; No murmurs, rubs, or gallops  ABDOMEN: Soft, Nontender, Nondistended; Bowel sounds present. PEG tube in place.   EXTREMITIES:  2+ Peripheral Pulses, No clubbing, cyanosis, or edema  LYMPH: No lymphadenopathy noted  SKIN: Stage 1 Pressure Injury to the Coccyx and Bilateral Heels, as evident by non-blanchable erythema        LABS:  CBC Full  -  ( 13 Mar 2023 06:48 )  WBC Count : 12.32 K/uL  RBC Count : 2.79 M/uL  Hemoglobin : 8.0 g/dL  Hematocrit : 25.2 %  Platelet Count - Automated : 456 K/uL  Mean Cell Volume : 90.3 fl  Mean Cell Hemoglobin : 28.7 pg  Mean Cell Hemoglobin Concentration : 31.7 gm/dL  Auto Neutrophil # : x  Auto Lymphocyte # : x  Auto Monocyte # : x  Auto Eosinophil # : x  Auto Basophil # : x  Auto Neutrophil % : x  Auto Lymphocyte % : x  Auto Monocyte % : x  Auto Eosinophil % : x  Auto Basophil % : x    03-13    137  |  103  |  17  ----------------------------<  191<H>  3.4<L>   |  27  |  0.58    Ca    8.6      13 Mar 2023 06:48  Phos  2.7     03-13  Mg     2.5     03-13    TPro  7.2  /  Alb  1.9<L>  /  TBili  0.6  /  DBili  x   /  AST  61<H>  /  ALT  89<H>  /  AlkPhos  238<H>  03-13    [ x ]  DVT Prophylaxis Lovenox.   [  ] Glucerna 1.5     RADIOLOGY & ADDITIONAL STUDIES:    < from: Xray Chest 1 View- PORTABLE-Urgent (Xray Chest 1 View- PORTABLE-Urgent .) (03.10.23 @ 11:07) >  ACC: [id number] EXAM:  XR CHEST PORTABLE URGENT 1V   ORDERED BY: ANTHONY RESENDIZ     PROCEDURE DATE:  03/10/2023          INTERPRETATION:  Chest one view    HISTORY: Fever    COMPARISON STUDY: 3/5/2023    Frontal expiratory view of the chest shows the heart to be normal in   size. Tracheostomy tube is again noted.    The lungs show mild right atelectasis and there is no evidence of   pneumothorax nor pleural effusion.    IMPRESSION:  No focal infiltrate.      Thank you for the courtesy of this referral.    --- End of Report ---      DOLORES CAM MD; Attending Interventional Radiologist  This document has been electronically signed. Mar 10 2023  2:43PM    < end of copied text >      Plan of care discussed with intensivist.

## 2023-03-13 NOTE — PROGRESS NOTE ADULT - PROBLEM SELECTOR PLAN 8
Yes Passive ROM exercises daily.  Turn and position every 2 hours  Strict aspiration precautions.  Keep head of bed elevated at all times.

## 2023-03-13 NOTE — PROGRESS NOTE ADULT - PROBLEM SELECTOR PLAN 6
Continue Lantus 12 units BID  Lispro 6U every 6 hours  Continue sliding scale coverage.  Monitor glucose.

## 2023-03-13 NOTE — PROGRESS NOTE ADULT - PROBLEM SELECTOR PLAN 3
Secondary to SDH and craniotomy  Craniotomy at [facility] 1/23  Maintain safety precautions  Strict aspiration precautions.  Seizure precautions.  Off note: Request that family bring helmet to protect Crani site).

## 2023-03-13 NOTE — PROGRESS NOTE ADULT - ASSESSMENT
62M from [facility], h/o traumatic subdural hemorrhage following a fall down the stairs while drunk, s/p r craniotomy at [facility] in 01/2023, s/p trach/PEG BIBEMS for tachycardia 170s, RR 27, increased secretions admitted to  for sepsis secondary to RUL pneumonia with tracheostomy to 3L NC.  3/6 Febrile 101.7  03/07: No events overnight. On [place]-trach at 3 L and tolerating well. Hemodynamically stable. Cultures in progress. C/w Vanco and Cefepime. F/u Vanco trough. Hypokalemic this AM and replacement ordered.   03/08: Afebrile. Vanco discontinued since MRSA negative. + yeast in sputum; + Candida [place]br[place] in urine (Bran in place with clear urine and afebrile).   Dr. Peres following. Blood cx NGTD. C/w Cefepime.   03/09: Febrile, tachycardic and tachypneic this AM. Code sepsis called. Sepsis w/u in progress.  Started on Vanco. ID following, . Discussed with Dr. Peres. F/u CXR. Worsening Leukocytosis. IVF bolus with LR (2,300 ml)  per protocol. Lactate 1.9.   3/11: afebrile over past 24hrs, hemodynamically stable. Vanco trough 11.7  03/12: Vanco trough this PM. Blood cx remains NGTD. C/w Vanco and Cefepime. ID following. Persistent Transaminitis.  Hep C non-reactive. F/u remaining Hepatitis panel. Patient on high dose statin which could be contributing to transaminitis. Hold for now and re-eval. Given that patient is unable to verbalize/demonstrate if symptomatic will order Liver US.   03/13: Spiked fevers overnight. Tachycardic and hypotensive. IVF bolus given. Blood cx in progress.  F/u Procal and Lactate. Vanco increased to 1500 mg BID and Meropenem added and Cefepime discontinued Discussed wih Dr. Peres and reccs appreciated  Worsening leukocytosis Pending Abdominal US 2/2 transaminitis.

## 2023-03-13 NOTE — PROGRESS NOTE ADULT - PROBLEM SELECTOR PLAN 1
Secondary to pneumonia  S/P Code Sepsis (3/9)  F/U Blood and sputum cultures (3/9) - thus far negative to date  Continue cefepime and vanco  Repeat CXR 3/10 without focal infiltrate  ID following  Febrile today 102.3 rectal  Worsening leukocytosis. Tachy and hypotensive.   IVF bolus  Lactate and Procal   Blood cultures repeated.   Sputum cx   Meropenem started  Vanco dosage increased from 1250 to 1500 mg BID

## 2023-03-14 LAB
ALBUMIN SERPL ELPH-MCNC: 1.9 G/DL — LOW (ref 3.5–5)
ALP SERPL-CCNC: 213 U/L — HIGH (ref 40–120)
ALT FLD-CCNC: 94 U/L DA — HIGH (ref 10–60)
ANION GAP SERPL CALC-SCNC: 3 MMOL/L — LOW (ref 5–17)
AST SERPL-CCNC: 68 U/L — HIGH (ref 10–40)
BASOPHILS # BLD AUTO: 0.07 K/UL — SIGNIFICANT CHANGE UP (ref 0–0.2)
BASOPHILS NFR BLD AUTO: 0.7 % — SIGNIFICANT CHANGE UP (ref 0–2)
BILIRUB SERPL-MCNC: 0.6 MG/DL — SIGNIFICANT CHANGE UP (ref 0.2–1.2)
BUN SERPL-MCNC: 18 MG/DL — SIGNIFICANT CHANGE UP (ref 7–18)
CALCIUM SERPL-MCNC: 8.8 MG/DL — SIGNIFICANT CHANGE UP (ref 8.4–10.5)
CHLORIDE SERPL-SCNC: 103 MMOL/L — SIGNIFICANT CHANGE UP (ref 96–108)
CO2 SERPL-SCNC: 29 MMOL/L — SIGNIFICANT CHANGE UP (ref 22–31)
CREAT SERPL-MCNC: 0.61 MG/DL — SIGNIFICANT CHANGE UP (ref 0.5–1.3)
CULTURE RESULTS: SIGNIFICANT CHANGE UP
CULTURE RESULTS: SIGNIFICANT CHANGE UP
EGFR: 110 ML/MIN/1.73M2 — SIGNIFICANT CHANGE UP
EOSINOPHIL # BLD AUTO: 0.21 K/UL — SIGNIFICANT CHANGE UP (ref 0–0.5)
EOSINOPHIL NFR BLD AUTO: 2 % — SIGNIFICANT CHANGE UP (ref 0–6)
GLUCOSE SERPL-MCNC: 211 MG/DL — HIGH (ref 70–99)
HCT VFR BLD CALC: 25.6 % — LOW (ref 39–50)
HGB BLD-MCNC: 8 G/DL — LOW (ref 13–17)
IMM GRANULOCYTES NFR BLD AUTO: 1.2 % — HIGH (ref 0–0.9)
LACTATE SERPL-SCNC: 1.9 MMOL/L — SIGNIFICANT CHANGE UP (ref 0.7–2)
LYMPHOCYTES # BLD AUTO: 1.39 K/UL — SIGNIFICANT CHANGE UP (ref 1–3.3)
LYMPHOCYTES # BLD AUTO: 13.4 % — SIGNIFICANT CHANGE UP (ref 13–44)
MAGNESIUM SERPL-MCNC: 2.4 MG/DL — SIGNIFICANT CHANGE UP (ref 1.6–2.6)
MCHC RBC-ENTMCNC: 29 PG — SIGNIFICANT CHANGE UP (ref 27–34)
MCHC RBC-ENTMCNC: 31.3 GM/DL — LOW (ref 32–36)
MCV RBC AUTO: 92.8 FL — SIGNIFICANT CHANGE UP (ref 80–100)
MONOCYTES # BLD AUTO: 0.82 K/UL — SIGNIFICANT CHANGE UP (ref 0–0.9)
MONOCYTES NFR BLD AUTO: 7.9 % — SIGNIFICANT CHANGE UP (ref 2–14)
NEUTROPHILS # BLD AUTO: 7.78 K/UL — HIGH (ref 1.8–7.4)
NEUTROPHILS NFR BLD AUTO: 74.8 % — SIGNIFICANT CHANGE UP (ref 43–77)
NRBC # BLD: 0 /100 WBCS — SIGNIFICANT CHANGE UP (ref 0–0)
PHOSPHATE SERPL-MCNC: 2.8 MG/DL — SIGNIFICANT CHANGE UP (ref 2.5–4.5)
PLATELET # BLD AUTO: 446 K/UL — HIGH (ref 150–400)
POTASSIUM SERPL-MCNC: 3.8 MMOL/L — SIGNIFICANT CHANGE UP (ref 3.5–5.3)
POTASSIUM SERPL-SCNC: 3.8 MMOL/L — SIGNIFICANT CHANGE UP (ref 3.5–5.3)
PROT SERPL-MCNC: 7.2 G/DL — SIGNIFICANT CHANGE UP (ref 6–8.3)
RBC # BLD: 2.76 M/UL — LOW (ref 4.2–5.8)
RBC # FLD: 13.7 % — SIGNIFICANT CHANGE UP (ref 10.3–14.5)
SODIUM SERPL-SCNC: 135 MMOL/L — SIGNIFICANT CHANGE UP (ref 135–145)
SPECIMEN SOURCE: SIGNIFICANT CHANGE UP
SPECIMEN SOURCE: SIGNIFICANT CHANGE UP
VANCOMYCIN TROUGH SERPL-MCNC: 14 UG/ML — SIGNIFICANT CHANGE UP (ref 10–20)
WBC # BLD: 10.39 K/UL — SIGNIFICANT CHANGE UP (ref 3.8–10.5)
WBC # FLD AUTO: 10.39 K/UL — SIGNIFICANT CHANGE UP (ref 3.8–10.5)

## 2023-03-14 RX ORDER — TAMSULOSIN HYDROCHLORIDE 0.4 MG/1
0.4 CAPSULE ORAL AT BEDTIME
Refills: 0 | Status: DISCONTINUED | OUTPATIENT
Start: 2023-03-14 | End: 2023-03-31

## 2023-03-14 RX ADMIN — INSULIN GLARGINE 12 UNIT(S): 100 INJECTION, SOLUTION SUBCUTANEOUS at 00:05

## 2023-03-14 RX ADMIN — MEROPENEM 100 MILLIGRAM(S): 1 INJECTION INTRAVENOUS at 21:45

## 2023-03-14 RX ADMIN — Medication 81 MILLIGRAM(S): at 12:26

## 2023-03-14 RX ADMIN — TAMSULOSIN HYDROCHLORIDE 0.4 MILLIGRAM(S): 0.4 CAPSULE ORAL at 21:44

## 2023-03-14 RX ADMIN — Medication 6 UNIT(S): at 17:23

## 2023-03-14 RX ADMIN — Medication 3 MILLILITER(S): at 20:25

## 2023-03-14 RX ADMIN — MEROPENEM 100 MILLIGRAM(S): 1 INJECTION INTRAVENOUS at 05:31

## 2023-03-14 RX ADMIN — Medication 3 MILLILITER(S): at 03:08

## 2023-03-14 RX ADMIN — Medication 300 MILLIGRAM(S): at 18:54

## 2023-03-14 RX ADMIN — Medication 1 PATCH: at 19:45

## 2023-03-14 RX ADMIN — Medication 650 MILLIGRAM(S): at 19:12

## 2023-03-14 RX ADMIN — TAMSULOSIN HYDROCHLORIDE 0.4 MILLIGRAM(S): 0.4 CAPSULE ORAL at 12:25

## 2023-03-14 RX ADMIN — Medication 1 PATCH: at 12:26

## 2023-03-14 RX ADMIN — ENOXAPARIN SODIUM 40 MILLIGRAM(S): 100 INJECTION SUBCUTANEOUS at 12:25

## 2023-03-14 RX ADMIN — Medication 1: at 06:59

## 2023-03-14 RX ADMIN — Medication 6 UNIT(S): at 00:03

## 2023-03-14 RX ADMIN — SENNA PLUS 10 MILLILITER(S): 8.6 TABLET ORAL at 12:27

## 2023-03-14 RX ADMIN — Medication 650 MILLIGRAM(S): at 18:12

## 2023-03-14 RX ADMIN — MEROPENEM 100 MILLIGRAM(S): 1 INJECTION INTRAVENOUS at 14:09

## 2023-03-14 RX ADMIN — Medication 1 APPLICATION(S): at 17:24

## 2023-03-14 RX ADMIN — Medication 1 APPLICATION(S): at 07:01

## 2023-03-14 RX ADMIN — POLYETHYLENE GLYCOL 3350 17 GRAM(S): 17 POWDER, FOR SOLUTION ORAL at 12:27

## 2023-03-14 RX ADMIN — Medication 6 UNIT(S): at 12:13

## 2023-03-14 RX ADMIN — Medication 6 UNIT(S): at 07:00

## 2023-03-14 RX ADMIN — Medication 1 PATCH: at 07:44

## 2023-03-14 RX ADMIN — Medication 3 MILLILITER(S): at 14:50

## 2023-03-14 RX ADMIN — Medication 100 MILLIGRAM(S): at 05:32

## 2023-03-14 RX ADMIN — Medication 1: at 17:23

## 2023-03-14 RX ADMIN — Medication 3 MILLILITER(S): at 09:02

## 2023-03-14 RX ADMIN — Medication 2: at 12:12

## 2023-03-14 RX ADMIN — Medication 1: at 00:02

## 2023-03-14 RX ADMIN — Medication 300 MILLIGRAM(S): at 05:32

## 2023-03-14 RX ADMIN — INSULIN GLARGINE 12 UNIT(S): 100 INJECTION, SOLUTION SUBCUTANEOUS at 07:43

## 2023-03-14 RX ADMIN — Medication 100 MILLIGRAM(S): at 17:50

## 2023-03-14 NOTE — PROGRESS NOTE ADULT - ASSESSMENT
Fevers - no obvious source at this time  ? infected sacral decubitus      Plan - decrease Vancomycin  Fevers - no obvious source at this time  ? infected sacral decubitus      Plan - Cont  Vancomycin 1500mgs iv q12hrs for now but monitor trough closely as he may overshoot   Cont Meropenem 1000mgs iv q8hrs  if he continues to spike will get a whole body Indium scan. Fevers - no obvious source at this time  possibly having central fevers      Plan - Cont  Vancomycin 1500mgs iv q12hrs for now but monitor trough closely as he may overshoot   Cont Meropenem 1000mgs iv q8hrs  if he continues to spike might consider DCing antibiotics in a few days and placing on Naprosyn. Fevers - ? UTI  possibly having central fevers      Plan - Cont  Vancomycin 1500mgs iv q12hrs for now but monitor trough closely as he may overshoot   Cont Meropenem 1000mgs iv q8hrs

## 2023-03-14 NOTE — PROGRESS NOTE ADULT - NUTRITIONAL ASSESSMENT
3/7/23: Glucerna 1.5 Initial Goal Rate @ 50 ml /hr x 24 hr as tolerated to provide 1200 ml ,1800 calories, Protein 99 gms, free water 911 ml /day.

## 2023-03-14 NOTE — PROGRESS NOTE ADULT - PROBLEM SELECTOR PLAN 2
Continue trach collar. Titrate oxygen as tolerated.  Maintain sat at or above 92%  Monitor oxygen saturation  Continue bronchodilators  Empirically being covered with Vanco and Meropenem antibiotics for pna/Septicemia.   F/U cultures from 3/9 - thus far negative to date. Continue trach collar. Titrate oxygen as tolerated.  Maintain sat at or above 92%  Monitor oxygen saturation  Continue bronchodilators  Empirically being covered with Vanco and Meropenem antibiotics for pna/ Septicemia.   F/U cultures from 3/9 and 3/13 - thus far negative to date.

## 2023-03-14 NOTE — PROGRESS NOTE ADULT - PROBLEM SELECTOR PLAN 9
Continue tube feeds and supplementation.  Dietary consult completed 3/7; recs: Glucerna 1.5 @ 50 ml /hr x 24  Continue bowel regimen with senna, miralax Continue tube feeds and supplementation.  Dietary consult completed 3/7; recs: Glucerna 1.5 @ 50 ml /hr x 24  Continue bowel regimen with senna, miralax,

## 2023-03-14 NOTE — PROGRESS NOTE ADULT - SUBJECTIVE AND OBJECTIVE BOX
62y Male    Meds:  meropenem  IVPB      meropenem  IVPB 1000 milliGRAM(s) IV Intermittent every 8 hours  vancomycin  IVPB 1500 milliGRAM(s) IV Intermittent every 12 hours    Allergies    No Known Allergies    Intolerances        VITALS:  Vital Signs Last 24 Hrs  T(C): 37.1 (14 Mar 2023 13:02), Max: 38.7 (13 Mar 2023 20:15)  T(F): 98.8 (14 Mar 2023 13:02), Max: 101.6 (13 Mar 2023 20:15)  HR: 102 (14 Mar 2023 13:02) (98 - 104)  BP: 132/83 (14 Mar 2023 13:02) (128/77 - 132/83)  BP(mean): 93 (14 Mar 2023 05:36) (93 - 93)  RR: 19 (14 Mar 2023 13:02) (18 - 24)  SpO2: 97% (14 Mar 2023 13:02) (97% - 100%)    Parameters below as of 14 Mar 2023 17:14  Patient On (Oxygen Delivery Method): Hydrotrach  O2 Flow (L/min): 3      LABS/DIAGNOSTIC TESTS:                          8.0    10.39 )-----------( 446      ( 14 Mar 2023 07:39 )             25.6     Lactate, Blood: 1.9 mmol/L (03-14 @ 07:39)      03-14    135  |  103  |  18  ----------------------------<  211<H>  3.8   |  29  |  0.61    Ca    8.8      14 Mar 2023 07:39  Phos  2.8     03-14  Mg     2.4     03-14    TPro  7.2  /  Alb  1.9<L>  /  TBili  0.6  /  DBili  x   /  AST  68<H>  /  ALT  94<H>  /  AlkPhos  213<H>  03-14      LIVER FUNCTIONS - ( 14 Mar 2023 07:39 )  Alb: 1.9 g/dL / Pro: 7.2 g/dL / ALK PHOS: 213 U/L / ALT: 94 U/L DA / AST: 68 U/L / GGT: x             CULTURES: .Sputum Sputum  03-09 @ 23:37   Normal Respiratory Jane present  --    Rare Squamous epithelial cells per low power field  Numerous polymorphonuclear leukocytes per low power field  Few Gram positive cocci in pairs per oil power field  Few Gram Negative Rods per oil power field  Few Yeast like cells per oil power field      .Blood Blood  03-09 @ 09:24   No growth to date.  --  --      Trach Asp Tracheal Aspirate  03-06 @ 00:14   Normal Respiratory Jane present  --    Numerous polymorphonuclear leukocytes per low power field  Few Squamous epithelial cells per low power field  Numerous Gram Variable Rods seen per oil power field  Moderate Yeast like cells seen per oil power field  Few Gram positive cocci in pairsseen per oil power field      .Blood Blood-Peripheral  03-05 @ 04:00   No Growth Final  --  --      .Blood Blood-Peripheral  03-05 @ 03:50   No Growth Final  --  --      Clean Catch Clean Catch (Midstream)  03-05 @ 03:15   50,000 - 99,000 CFU/mL Candida glabrata  "Susceptibilities not performed"  --  --            RADIOLOGY:      ROS:  [  ] UNABLE TO ELICIT 62y Male who is still in the hospital and I was asked to re eval him as he started having fevers. He is currently looking and feeling well and his major complaint is that they are drawing blood on him to frequently. He still has a cough but feels it is improving and has some sputum production, he denies any nausea, vomiting or diarrhea, he has no urinary symptoms either as he has a de la torre in place. He was started on Vanco and Meropenem  empirically. He has an unstageable decubitus ulcer but is not willing to let me examine it at this time.    Meds:  meropenem  IVPB      meropenem  IVPB 1000 milliGRAM(s) IV Intermittent every 8 hours  vancomycin  IVPB 1500 milliGRAM(s) IV Intermittent every 12 hours    Allergies    No Known Allergies    Intolerances        VITALS:  Vital Signs Last 24 Hrs  T(C): 37.1 (14 Mar 2023 13:02), Max: 38.7 (13 Mar 2023 20:15)  T(F): 98.8 (14 Mar 2023 13:02), Max: 101.6 (13 Mar 2023 20:15)  HR: 102 (14 Mar 2023 13:02) (98 - 104)  BP: 132/83 (14 Mar 2023 13:02) (128/77 - 132/83)  BP(mean): 93 (14 Mar 2023 05:36) (93 - 93)  RR: 19 (14 Mar 2023 13:02) (18 - 24)  SpO2: 97% (14 Mar 2023 13:02) (97% - 100%)    Parameters below as of 14 Mar 2023 17:14  Patient On (Oxygen Delivery Method): Hydrotrach  O2 Flow (L/min): 3      LABS/DIAGNOSTIC TESTS:                          8.0    10.39 )-----------( 446      ( 14 Mar 2023 07:39 )             25.6     Lactate, Blood: 1.9 mmol/L (03-14 @ 07:39)      03-14    135  |  103  |  18  ----------------------------<  211<H>  3.8   |  29  |  0.61    Ca    8.8      14 Mar 2023 07:39  Phos  2.8     03-14  Mg     2.4     03-14    TPro  7.2  /  Alb  1.9<L>  /  TBili  0.6  /  DBili  x   /  AST  68<H>  /  ALT  94<H>  /  AlkPhos  213<H>  03-14      LIVER FUNCTIONS - ( 14 Mar 2023 07:39 )  Alb: 1.9 g/dL / Pro: 7.2 g/dL / ALK PHOS: 213 U/L / ALT: 94 U/L DA / AST: 68 U/L / GGT: x             CULTURES: .Sputum Sputum  03-09 @ 23:37   Normal Respiratory Jane present  --    Rare Squamous epithelial cells per low power field  Numerous polymorphonuclear leukocytes per low power field  Few Gram positive cocci in pairs per oil power field  Few Gram Negative Rods per oil power field  Few Yeast like cells per oil power field      .Blood Blood  03-09 @ 09:24   No growth to date.  --  --      Trach Asp Tracheal Aspirate  03-06 @ 00:14   Normal Respiratory Jane present  --    Numerous polymorphonuclear leukocytes per low power field  Few Squamous epithelial cells per low power field  Numerous Gram Variable Rods seen per oil power field  Moderate Yeast like cells seen per oil power field  Few Gram positive cocci in pairsseen per oil power field      .Blood Blood-Peripheral  03-05 @ 04:00   No Growth Final  --  --      .Blood Blood-Peripheral  03-05 @ 03:50   No Growth Final  --  --      Clean Catch Clean Catch (Midstream)  03-05 @ 03:15   50,000 - 99,000 CFU/mL Candida glabrata  "Susceptibilities not performed"  --  --            RADIOLOGY:< from: Xray Chest 1 View- PORTABLE-Urgent (Xray Chest 1 View- PORTABLE-Urgent .) (03.13.23 @ 17:19) >  ACC: [id number] EXAM:  XR CHEST PORTABLE URGENT 1V   ORDERED BY: KASHMIR COLON     PROCEDURE DATE:  03/13/2023          INTERPRETATION:  TIME OF EXAM: March 13, 2023 at 4:53 PM.    CLINICAL INFORMATION: Sepsis. Fever.    COMPARISON:  March 10, 2023.    TECHNIQUE:   AP Portable chest x-ray.    INTERPRETATION:    Heart size and the mediastinum cannot be accurately evaluated on this   projection. Tracheostomy tube in place.  There is new thickened bandlike opacity extending from the right hilum to   the periphery of the right upper lobe.  There is linear atelectasis in the left lower lung.  No pleural effusion or pneumothorax is seen.  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      IMPRESSION:  New thickened bandlike opacity extending from the right   hilum to the periphery of the right upper lobe, of indeterminate nature.   Question artifact, platelike atelectasis, or possibly pleural fluid   within a fissure. Follow-up chest x-ray can be obtained.    Left lower lung linear atelectasis.    --- End of Report ---            LAVERNE SALINAS MD; Attending Radiologist  This document has been electronically signed. Mar 14 2023  9:38AM    < end of copied text >        ROS:  [  ] UNABLE TO ELICIT 62y Male who has been having fevers on and off after completing a course of antibiotics and so  was started on Vanco and Meropenem  empirically. He is responsive to noxious stimuli only. He remains     Meds:  meropenem  IVPB      meropenem  IVPB 1000 milliGRAM(s) IV Intermittent every 8 hours  vancomycin  IVPB 1500 milliGRAM(s) IV Intermittent every 12 hours    Allergies    No Known Allergies    Intolerances        VITALS:  Vital Signs Last 24 Hrs  T(C): 37.1 (14 Mar 2023 13:02), Max: 38.7 (13 Mar 2023 20:15)  T(F): 98.8 (14 Mar 2023 13:02), Max: 101.6 (13 Mar 2023 20:15)  HR: 102 (14 Mar 2023 13:02) (98 - 104)  BP: 132/83 (14 Mar 2023 13:02) (128/77 - 132/83)  BP(mean): 93 (14 Mar 2023 05:36) (93 - 93)  RR: 19 (14 Mar 2023 13:02) (18 - 24)  SpO2: 97% (14 Mar 2023 13:02) (97% - 100%)    Parameters below as of 14 Mar 2023 17:14  Patient On (Oxygen Delivery Method): Hydrotrach  O2 Flow (L/min): 3      LABS/DIAGNOSTIC TESTS:                          8.0    10.39 )-----------( 446      ( 14 Mar 2023 07:39 )             25.6     Lactate, Blood: 1.9 mmol/L (03-14 @ 07:39)      03-14    135  |  103  |  18  ----------------------------<  211<H>  3.8   |  29  |  0.61    Ca    8.8      14 Mar 2023 07:39  Phos  2.8     03-14  Mg     2.4     03-14    TPro  7.2  /  Alb  1.9<L>  /  TBili  0.6  /  DBili  x   /  AST  68<H>  /  ALT  94<H>  /  AlkPhos  213<H>  03-14      LIVER FUNCTIONS - ( 14 Mar 2023 07:39 )  Alb: 1.9 g/dL / Pro: 7.2 g/dL / ALK PHOS: 213 U/L / ALT: 94 U/L DA / AST: 68 U/L / GGT: x             CULTURES: .Sputum Sputum  03-09 @ 23:37   Normal Respiratory Jane present  --    Rare Squamous epithelial cells per low power field  Numerous polymorphonuclear leukocytes per low power field  Few Gram positive cocci in pairs per oil power field  Few Gram Negative Rods per oil power field  Few Yeast like cells per oil power field      .Blood Blood  03-09 @ 09:24   No growth to date.  --  --      Trach Asp Tracheal Aspirate  03-06 @ 00:14   Normal Respiratory Jane present  --    Numerous polymorphonuclear leukocytes per low power field  Few Squamous epithelial cells per low power field  Numerous Gram Variable Rods seen per oil power field  Moderate Yeast like cells seen per oil power field  Few Gram positive cocci in pairsseen per oil power field      .Blood Blood-Peripheral  03-05 @ 04:00   No Growth Final  --  --      .Blood Blood-Peripheral  03-05 @ 03:50   No Growth Final  --  --      Clean Catch Clean Catch (Midstream)  03-05 @ 03:15   50,000 - 99,000 CFU/mL Candida glabrata  "Susceptibilities not performed"  --  --            RADIOLOGY:< from: Xray Chest 1 View- PORTABLE-Urgent (Xray Chest 1 View- PORTABLE-Urgent .) (03.13.23 @ 17:19) >  ACC: [id number] EXAM:  XR CHEST PORTABLE URGENT 1V   ORDERED BY: KASHMIR COLON     PROCEDURE DATE:  03/13/2023          INTERPRETATION:  TIME OF EXAM: March 13, 2023 at 4:53 PM.    CLINICAL INFORMATION: Sepsis. Fever.    COMPARISON:  March 10, 2023.    TECHNIQUE:   AP Portable chest x-ray.    INTERPRETATION:    Heart size and the mediastinum cannot be accurately evaluated on this   projection. Tracheostomy tube in place.  There is new thickened bandlike opacity extending from the right hilum to   the periphery of the right upper lobe.  There is linear atelectasis in the left lower lung.  No pleural effusion or pneumothorax is seen.  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      IMPRESSION:  New thickened bandlike opacity extending from the right   hilum to the periphery of the right upper lobe, of indeterminate nature.   Question artifact, platelike atelectasis, or possibly pleural fluid   within a fissure. Follow-up chest x-ray can be obtained.    Left lower lung linear atelectasis.    --- End of Report ---            LAVERNE SALINAS MD; Attending Radiologist  This document has been electronically signed. Mar 14 2023  9:38AM    < end of copied text >        ROS:  [ x ] UNABLE TO ELICIT 62y Male who has been having fevers on and off after completing a course of antibiotics and so  was started on Vanco and Meropenem  empirically. He is responsive to noxious stimuli only. He has no diarrhea.    Meds:  meropenem  IVPB      meropenem  IVPB 1000 milliGRAM(s) IV Intermittent every 8 hours  vancomycin  IVPB 1500 milliGRAM(s) IV Intermittent every 12 hours    Allergies    No Known Allergies    Intolerances        VITALS:  Vital Signs Last 24 Hrs  T(C): 37.1 (14 Mar 2023 13:02), Max: 38.7 (13 Mar 2023 20:15)  T(F): 98.8 (14 Mar 2023 13:02), Max: 101.6 (13 Mar 2023 20:15)  HR: 102 (14 Mar 2023 13:02) (98 - 104)  BP: 132/83 (14 Mar 2023 13:02) (128/77 - 132/83)  BP(mean): 93 (14 Mar 2023 05:36) (93 - 93)  RR: 19 (14 Mar 2023 13:02) (18 - 24)  SpO2: 97% (14 Mar 2023 13:02) (97% - 100%)    Parameters below as of 14 Mar 2023 17:14  Patient On (Oxygen Delivery Method): Hydrotrach  O2 Flow (L/min): 3      LABS/DIAGNOSTIC TESTS:                          8.0    10.39 )-----------( 446      ( 14 Mar 2023 07:39 )             25.6     Lactate, Blood: 1.9 mmol/L (03-14 @ 07:39)      03-14    135  |  103  |  18  ----------------------------<  211<H>  3.8   |  29  |  0.61    Ca    8.8      14 Mar 2023 07:39  Phos  2.8     03-14  Mg     2.4     03-14    TPro  7.2  /  Alb  1.9<L>  /  TBili  0.6  /  DBili  x   /  AST  68<H>  /  ALT  94<H>  /  AlkPhos  213<H>  03-14      LIVER FUNCTIONS - ( 14 Mar 2023 07:39 )  Alb: 1.9 g/dL / Pro: 7.2 g/dL / ALK PHOS: 213 U/L / ALT: 94 U/L DA / AST: 68 U/L / GGT: x             CULTURES: .Sputum Sputum  03-09 @ 23:37   Normal Respiratory Jane present  --    Rare Squamous epithelial cells per low power field  Numerous polymorphonuclear leukocytes per low power field  Few Gram positive cocci in pairs per oil power field  Few Gram Negative Rods per oil power field  Few Yeast like cells per oil power field      .Blood Blood  03-09 @ 09:24   No growth to date.  --  --      Trach Asp Tracheal Aspirate  03-06 @ 00:14   Normal Respiratory Jane present  --    Numerous polymorphonuclear leukocytes per low power field  Few Squamous epithelial cells per low power field  Numerous Gram Variable Rods seen per oil power field  Moderate Yeast like cells seen per oil power field  Few Gram positive cocci in pairsseen per oil power field      .Blood Blood-Peripheral  03-05 @ 04:00   No Growth Final  --  --      .Blood Blood-Peripheral  03-05 @ 03:50   No Growth Final  --  --      Clean Catch Clean Catch (Midstream)  03-05 @ 03:15   50,000 - 99,000 CFU/mL Candida glabrata  "Susceptibilities not performed"  --  --            RADIOLOGY:< from: Xray Chest 1 View- PORTABLE-Urgent (Xray Chest 1 View- PORTABLE-Urgent .) (03.13.23 @ 17:19) >  ACC: [id number] EXAM:  XR CHEST PORTABLE URGENT 1V   ORDERED BY: KASHMIR COLON     PROCEDURE DATE:  03/13/2023          INTERPRETATION:  TIME OF EXAM: March 13, 2023 at 4:53 PM.    CLINICAL INFORMATION: Sepsis. Fever.    COMPARISON:  March 10, 2023.    TECHNIQUE:   AP Portable chest x-ray.    INTERPRETATION:    Heart size and the mediastinum cannot be accurately evaluated on this   projection. Tracheostomy tube in place.  There is new thickened bandlike opacity extending from the right hilum to   the periphery of the right upper lobe.  There is linear atelectasis in the left lower lung.  No pleural effusion or pneumothorax is seen.  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      IMPRESSION:  New thickened bandlike opacity extending from the right   hilum to the periphery of the right upper lobe, of indeterminate nature.   Question artifact, platelike atelectasis, or possibly pleural fluid   within a fissure. Follow-up chest x-ray can be obtained.    Left lower lung linear atelectasis.    --- End of Report ---            LAVERNE SALINAS MD; Attending Radiologist  This document has been electronically signed. Mar 14 2023  9:38AM    < end of copied text >        ROS:  [ x ] UNABLE TO ELICIT

## 2023-03-14 NOTE — PROGRESS NOTE ADULT - RESPIRATORY
no wheezes/no rales/breath sounds equal/good air movement/rhonchi airway patent/breath sounds equal/good air movement/rales/rhonchi

## 2023-03-14 NOTE — CHART NOTE - NSCHARTNOTEFT_GEN_A_CORE
EVENT:   3/13/23m 8:15pm, patient with dx. sepsis 2/2 PNA, had T - 101.6F, HR - 104, BP - 128/77, RR - 22, O2sat - 99% on 3L hydro trach O2. Blood culture x 2 sets done on 3/13/23, chest x ray - done on 3/13/23 (-)neg. for acute finding. (WBC - 12.34). Tylenol 650 mg - given via PEG tube, ice pack - applied. Bladder scan - done (363 ml residual), urinalysis - sent. Serum lactate - ordered. At 5am, patient's bladder scan showed 547 ml residual urine. He was straight catheterized. Monitor patient urine retention level after voiding.      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 Pt treated with Rocephin 1g from 3/2 but continued to worsen and was sent to the hospital for persistent tachycardia and tachypnea.   OBJECTIVE:  Vital Signs Last 24 Hrs  T(C): 37.2 (14 Mar 2023 05:36), Max: 38.7 (13 Mar 2023 20:15)  T(F): 98.9 (14 Mar 2023 05:36), Max: 101.6 (13 Mar 2023 20:15)  HR: 98 (14 Mar 2023 05:36) (98 - 104)  BP: 131/81 (14 Mar 2023 05:36) (125/79 - 131/81)  BP(mean): 93 (14 Mar 2023 05:36) (93 - 93)  RR: 18 (14 Mar 2023 05:36) (18 - 24)  SpO2: 100% (14 Mar 2023 05:36) (99% - 100%)    Parameters below as of 14 Mar 2023 05:36  Patient On (Oxygen Delivery Method): hydro-trach  O2 Flow (L/min): 3      FOCUSED PHYSICAL EXAM:    LABS:                        8.0    12.32 )-----------( 456      ( 13 Mar 2023 06:48 )             25.2     03-13    137  |  103  |  17  ----------------------------<  191<H>  3.4<L>   |  27  |  0.58    Ca    8.6      13 Mar 2023 06:48  Phos  2.7     03-13  Mg     2.5     03-13    TPro  7.2  /  Alb  1.9<L>  /  TBili  0.6  /  DBili  x   /  AST  61<H>  /  ALT  89<H>  /  AlkPhos  238<H>  03-13    PLAN:   - Follow-up morning labs,   - Monitor patient VS, I &O,   - Continue supportive care and patient comfort.

## 2023-03-14 NOTE — PROGRESS NOTE ADULT - SUBJECTIVE AND OBJECTIVE BOX
WILMA NICOLE    SCU progress note    INTERVAL HPI/OVERNIGHT EVENTS: ***    DNR [ ]   DNI  [  ]    Covid - 19 PCR:     The 4Ms    What Matters Most: see GOC  Age appropriate Medications/Screen for High Risk Medication: Yes  Mentation: see CAM below  Mobility: defer to physical exam    The Confusion Assessment Method (CAM) Diagnostic Algorithm     1: Acute Onset or Fluctuating Course  - Is there evidence of an acute change in mental status from the patient’s baseline? Did the (abnormal) behavior  fluctuate during the day, that is, tend to come and go, or increase and decrease in severity?  [ ] YES [ ] NO     2: Inattention  - Did the patient have difficulty focusing attention, being easily distractible, or having difficulty keeping track of what was being said?  [ ] YES [ ] NO     3: Disorganized thinking  -Was the patient’s thinking disorganized or incoherent, such as rambling or irrelevant conversation, unclear or illogical flow of ideas, or unpredictable switching from subject to subject?  [ ] YES [ ] NO    4: Altered Level of consciousness?  [ ] YES [ ] NO    The diagnosis of delirium by CAM requires the presence of features 1 and 2 and either 3 or 4.    PRESSORS: [ ] YES [ ] NO  meropenem  IVPB      meropenem  IVPB 1000 milliGRAM(s) IV Intermittent every 8 hours  vancomycin  IVPB 1500 milliGRAM(s) IV Intermittent every 12 hours    Cardiovascular:  Heart Failure  Acute   Acute on Chronic  Chronic       doxazosin 2 milliGRAM(s) Oral at bedtime    Pulmonary:  albuterol/ipratropium for Nebulization 3 milliLiter(s) Nebulizer every 6 hours    Hematalogic:  aspirin  chewable 81 milliGRAM(s) Oral daily  enoxaparin Injectable 40 milliGRAM(s) SubCutaneous every 24 hours    Other:  acetaminophen    Suspension .. 650 milliGRAM(s) Oral every 6 hours PRN  aluminum hydroxide/magnesium hydroxide/simethicone Suspension 30 milliLiter(s) Oral every 4 hours PRN  amantadine Syrup 100 milliGRAM(s) Oral two times a day  insulin glargine Injectable (LANTUS) 12 Unit(s) SubCutaneous every morning  insulin glargine Injectable (LANTUS) 12 Unit(s) SubCutaneous at bedtime  insulin lispro (ADMELOG) corrective regimen sliding scale   SubCutaneous every 6 hours  insulin lispro Injectable (ADMELOG) 6 Unit(s) SubCutaneous every 6 hours  melatonin 3 milliGRAM(s) Oral at bedtime PRN  nicotine -   7 mG/24Hr(s) Patch 1 Patch Transdermal daily  ondansetron Injectable 4 milliGRAM(s) IV Push every 8 hours PRN  polyethylene glycol 3350 17 Gram(s) Oral daily  senna Syrup 10 milliLiter(s) Oral daily  silver sulfADIAZINE 1% Cream 1 Application(s) Topical two times a day    acetaminophen    Suspension .. 650 milliGRAM(s) Oral every 6 hours PRN  albuterol/ipratropium for Nebulization 3 milliLiter(s) Nebulizer every 6 hours  aluminum hydroxide/magnesium hydroxide/simethicone Suspension 30 milliLiter(s) Oral every 4 hours PRN  amantadine Syrup 100 milliGRAM(s) Oral two times a day  aspirin  chewable 81 milliGRAM(s) Oral daily  doxazosin 2 milliGRAM(s) Oral at bedtime  enoxaparin Injectable 40 milliGRAM(s) SubCutaneous every 24 hours  insulin glargine Injectable (LANTUS) 12 Unit(s) SubCutaneous every morning  insulin glargine Injectable (LANTUS) 12 Unit(s) SubCutaneous at bedtime  insulin lispro (ADMELOG) corrective regimen sliding scale   SubCutaneous every 6 hours  insulin lispro Injectable (ADMELOG) 6 Unit(s) SubCutaneous every 6 hours  melatonin 3 milliGRAM(s) Oral at bedtime PRN  meropenem  IVPB      meropenem  IVPB 1000 milliGRAM(s) IV Intermittent every 8 hours  nicotine -   7 mG/24Hr(s) Patch 1 Patch Transdermal daily  ondansetron Injectable 4 milliGRAM(s) IV Push every 8 hours PRN  polyethylene glycol 3350 17 Gram(s) Oral daily  senna Syrup 10 milliLiter(s) Oral daily  silver sulfADIAZINE 1% Cream 1 Application(s) Topical two times a day  vancomycin  IVPB 1500 milliGRAM(s) IV Intermittent every 12 hours    Drug Dosing Weight  Height (cm): 177.8 (05 Mar 2023 03:36)  Weight (kg): 86.2 (05 Mar 2023 03:36)  BMI (kg/m2): 27.3 (05 Mar 2023 03:36)  BSA (m2): 2.04 (05 Mar 2023 03:36)    CENTRAL LINE: [ ] YES [ ] NO  LOCATION:   DATE INSERTED:  REMOVE: [ ] YES [ ] NO  EXPLAIN:    MELCHOR: [ ] YES [ ] NO    DATE INSERTED:  REMOVE:  [ ] YES [ ] NO  EXPLAIN:    PAST MEDICAL & SURGICAL HISTORY:  History of subdural hemorrhage      PEG (percutaneous endoscopic gastrostomy) status      DM (diabetes mellitus)      S/P craniotomy                   @ 07:01  -  -14 @ 07:00  --------------------------------------------------------  IN: 0 mL / OUT: 800 mL / NET: -800 mL            PHYSICAL EXAM:    GENERAL: NAD, well-groomed, well-developed  HEAD:  Atraumatic, Normocephalic  EYES: EOMI, PERRLA, conjunctiva and sclera clear  ENMT: No tonsillar erythema, exudates, or enlargement; Moist mucous membranes, Good dentition, No lesions  NECK: Supple, No JVD, Normal thyroid  NERVOUS SYSTEM:  Alert & Oriented X3, Good concentration; Motor Strength 5/5 B/L upper and lower extremities; DTRs 2+ intact and symmetric  CHEST/LUNG: Clear to percussion bilaterally; No rales, rhonchi, wheezing, or rubs  HEART: Regular rate and rhythm; No murmurs, rubs, or gallops  ABDOMEN: Soft, Nontender, Nondistended; Bowel sounds present  EXTREMITIES:  2+ Peripheral Pulses, No clubbing, cyanosis, or edema  LYMPH: No lymphadenopathy noted  SKIN: No rashes or lesions      LABS:  CBC Full  -  ( 13 Mar 2023 06:48 )  WBC Count : 12.32 K/uL  RBC Count : 2.79 M/uL  Hemoglobin : 8.0 g/dL  Hematocrit : 25.2 %  Platelet Count - Automated : 456 K/uL  Mean Cell Volume : 90.3 fl  Mean Cell Hemoglobin : 28.7 pg  Mean Cell Hemoglobin Concentration : 31.7 gm/dL  Auto Neutrophil # : x  Auto Lymphocyte # : x  Auto Monocyte # : x  Auto Eosinophil # : x  Auto Basophil # : x  Auto Neutrophil % : x  Auto Lymphocyte % : x  Auto Monocyte % : x  Auto Eosinophil % : x  Auto Basophil % : x        137  |  103  |  17  ----------------------------<  191<H>  3.4<L>   |  27  |  0.58    Ca    8.6      13 Mar 2023 06:48  Phos  2.7       Mg     2.5         TPro  7.2  /  Alb  1.9<L>  /  TBili  0.6  /  DBili  x   /  AST  61<H>  /  ALT  89<H>  /  AlkPhos  238<H>        Urinalysis Basic - ( 13 Mar 2023 23:35 )    Color: Yellow / Appearance: Clear / S.015 / pH: x  Gluc: x / Ketone: Negative  / Bili: Negative / Urobili: Negative   Blood: x / Protein: 30 mg/dL / Nitrite: Negative   Leuk Esterase: Small / RBC: 5-10 /HPF / WBC 6-10 /HPF   Sq Epi: x / Non Sq Epi: x / Bacteria: Moderate /HPF            [  ]  DVT Prophylaxis  [  ]  Nutrition, Brand, Rate         Goal Rate        Abnormal Nutritional Status -  Malnutrition   Cachexia      Morbid Obesity BMI >/=40    RADIOLOGY & ADDITIONAL STUDIES:  ***    Goals of Care Discussion with Family/Proxy/Other   - see note from/family meeting set up for...     WILMA NICOLE    SCU progress note    INTERVAL HPI/OVERNIGHT EVENTS: Overnight T 101.6 F and tachycardia.     Full Code.     Covid - 19 PCR: 3/9/2023 negative.     The 4Ms    What Matters Most: see [facility]  Age appropriate Medications/Screen for High Risk Medication: Yes  Mentation: see CAM below  Mobility: defer to physical exam    The Confusion Assessment Method (CAM) Diagnostic Algorithm     1: Acute Onset or Fluctuating Course  - Is there evidence of an acute change in mental status from the patient’s baseline? Did the (abnormal) behavior  fluctuate during the day, that is, tend to come and go, or increase and decrease in severity?  [ ] YES [] NO Unable to assess     2: Inattention  - Did the patient have difficulty focusing attention, being easily distractible, or having difficulty keeping track of what was being said?  [ ] YES [ ] NO Unable to assess     3: Disorganized thinking  -Was the patient’s thinking disorganized or incoherent, such as rambling or irrelevant conversation, unclear or illogical flow of ideas, or unpredictable switching from subject to subject?  [ ] YES [ ] NO  Unable to assess    4: Altered Level of consciousness?  [ ] YES [ ] NO  Unable to assess    The diagnosis of delirium by CAM requires the presence of features 1 and 2 and either 3 or 4.    PRESSORS: [ ] YES [x ] NO  meropenem  IVPB      meropenem  IVPB 1000 milliGRAM(s) IV Intermittent every 8 hours  vancomycin  IVPB 1500 milliGRAM(s) IV Intermittent every 12 hours    Cardiovascular:  Heart Failure  Acute   Acute on Chronic  Chronic       doxazosin 2 milliGRAM(s) Oral at bedtime    Pulmonary:  albuterol/ipratropium for Nebulization 3 milliLiter(s) Nebulizer every 6 hours    Hematalogic:  aspirin  chewable 81 milliGRAM(s) Oral daily  enoxaparin Injectable 40 milliGRAM(s) SubCutaneous every 24 hours    Other:  acetaminophen    Suspension .. 650 milliGRAM(s) Oral every 6 hours PRN  aluminum hydroxide/magnesium hydroxide/simethicone Suspension 30 milliLiter(s) Oral every 4 hours PRN  amantadine Syrup 100 milliGRAM(s) Oral two times a day  insulin glargine Injectable (LANTUS) 12 Unit(s) SubCutaneous every morning  insulin glargine Injectable (LANTUS) 12 Unit(s) SubCutaneous at bedtime  insulin lispro (ADMELOG) corrective regimen sliding scale   SubCutaneous every 6 hours  insulin lispro Injectable (ADMELOG) 6 Unit(s) SubCutaneous every 6 hours  melatonin 3 milliGRAM(s) Oral at bedtime PRN  nicotine -   7 mG/24Hr(s) Patch 1 Patch Transdermal daily  ondansetron Injectable 4 milliGRAM(s) IV Push every 8 hours PRN  polyethylene glycol 3350 17 Gram(s) Oral daily  senna Syrup 10 milliLiter(s) Oral daily  silver sulfADIAZINE 1% Cream 1 Application(s) Topical two times a day    acetaminophen    Suspension .. 650 milliGRAM(s) Oral every 6 hours PRN  albuterol/ipratropium for Nebulization 3 milliLiter(s) Nebulizer every 6 hours  aluminum hydroxide/magnesium hydroxide/simethicone Suspension 30 milliLiter(s) Oral every 4 hours PRN  amantadine Syrup 100 milliGRAM(s) Oral two times a day  aspirin  chewable 81 milliGRAM(s) Oral daily  doxazosin 2 milliGRAM(s) Oral at bedtime  enoxaparin Injectable 40 milliGRAM(s) SubCutaneous every 24 hours  insulin glargine Injectable (LANTUS) 12 Unit(s) SubCutaneous every morning  insulin glargine Injectable (LANTUS) 12 Unit(s) SubCutaneous at bedtime  insulin lispro (ADMELOG) corrective regimen sliding scale   SubCutaneous every 6 hours  insulin lispro Injectable (ADMELOG) 6 Unit(s) SubCutaneous every 6 hours  melatonin 3 milliGRAM(s) Oral at bedtime PRN  meropenem  IVPB      meropenem  IVPB 1000 milliGRAM(s) IV Intermittent every 8 hours  nicotine -   7 mG/24Hr(s) Patch 1 Patch Transdermal daily  ondansetron Injectable 4 milliGRAM(s) IV Push every 8 hours PRN  polyethylene glycol 3350 17 Gram(s) Oral daily  senna Syrup 10 milliLiter(s) Oral daily  silver sulfADIAZINE 1% Cream 1 Application(s) Topical two times a day  vancomycin  IVPB 1500 milliGRAM(s) IV Intermittent every 12 hours    Drug Dosing Weight  Height (cm): 177.8 (05 Mar 2023 03:36)  Weight (kg): 86.2 (05 Mar 2023 03:36)  BMI (kg/m2): 27.3 (05 Mar 2023 03:36)  BSA (m2): 2.04 (05 Mar 2023 03:36)    CENTRAL LINE: [ ] YES [x ] NO  LOCATION:   DATE INSERTED:  REMOVE: [ ] YES [ ] NO  EXPLAIN:    MELCHOR: [ ] YES [x ] NO    DATE INSERTED:  REMOVE:  [ ] YES [ ] NO  EXPLAIN:    PAST MEDICAL & SURGICAL HISTORY:  History of subdural hemorrhage    PEG (percutaneous endoscopic gastrostomy) status    DM (diabetes mellitus)    S/P craniotomy    03-13 @ 07:01  -  03-14 @ 07:00  --------------------------------------------------------  IN: 0 mL / OUT: 800 mL / NET: -800 mL    PHYSICAL EXAM:  GENERAL: NAD.   HEAD:  Atraumatic, Skull asymmetrical 2/2 hx of craniotomy.   EYES: conjunctiva and sclera clear. Pupils round and reactive.   ENMT: Moist mucous membranes. Trach.   NECK: Supple, No JVD.   NERVOUS SYSTEM:  Opens spontaneously. No tracking. Does not moves extremities.   CHEST/LUNG: Diminished lung sounds R>L.  No rales, rhonchi, wheezing, or rubs. On 3L Hydro trach   HEART: Regular rate and rhythm; No murmurs, rubs, or gallops  ABDOMEN: Soft, Nontender, Nondistended; Bowel sounds present  EXTREMITIES:  2+ Peripheral Pulses, No clubbing, cyanosis, or edema  LYMPH: No lymphadenopathy noted  SKIN: No rashes or lesions      LABS:  CBC Full  -  ( 13 Mar 2023 06:48 )  WBC Count : 12.32 K/uL  RBC Count : 2.79 M/uL  Hemoglobin : 8.0 g/dL  Hematocrit : 25.2 %  Platelet Count - Automated : 456 K/uL  Mean Cell Volume : 90.3 fl  Mean Cell Hemoglobin : 28.7 pg  Mean Cell Hemoglobin Concentration : 31.7 gm/dL  Auto Neutrophil # : x  Auto Lymphocyte # : x  Auto Monocyte # : x  Auto Eosinophil # : x  Auto Basophil # : x  Auto Neutrophil % : x  Auto Lymphocyte % : x  Auto Monocyte % : x  Auto Eosinophil % : x  Auto Basophil % : x        137  |  103  |  17  ----------------------------<  191<H>  3.4<L>   |  27  |  0.58    Ca    8.6      13 Mar 2023 06:48  Phos  2.7       Mg     2.5         TPro  7.2  /  Alb  1.9<L>  /  TBili  0.6  /  DBili  x   /  AST  61<H>  /  ALT  89<H>  /  AlkPhos  238<H>        Urinalysis Basic - ( 13 Mar 2023 23:35 )    Color: Yellow / Appearance: Clear / S.015 / pH: x  Gluc: x / Ketone: Negative  / Bili: Negative / Urobili: Negative   Blood: x / Protein: 30 mg/dL / Nitrite: Negative   Leuk Esterase: Small / RBC: 5-10 /HPF / WBC 6-10 /HPF   Sq Epi: x / Non Sq Epi: x / Bacteria: Moderate /HPF    [  ]  DVT Prophylaxis  [  ]  Nutrition, Brand, Rate         Goal Rate        Abnormal Nutritional Status -  Malnutrition   Cachexia      Morbid Obesity BMI >/=40    RADIOLOGY & ADDITIONAL STUDIES:    < from: Xray Chest 1 View- PORTABLE-Urgent (Xray Chest 1 View- PORTABLE-Urgent .) (23 @ 17:19) >  ACC: [id number] EXAM:  XR CHEST PORTABLE URGENT 1V   ORDERED BY: KASHMIR COLON     PROCEDURE DATE:  2023          INTERPRETATION:  TIME OF EXAM: 2023 at 4:53 PM.    CLINICAL INFORMATION: Sepsis. Fever.    COMPARISON:  March 10, 2023.    TECHNIQUE:   AP Portable chest x-ray.    INTERPRETATION:    Heart size and the mediastinum cannot be accurately evaluated on this   projection. Tracheostomy tube in place.  There is new thickened bandlike opacity extending from the right hilum to   the periphery of the right upper lobe.  There is linear atelectasis in the left lower lung.  No pleural effusion or pneumothorax is seen.  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      IMPRESSION:  New thickened bandlike opacity extending from the right   hilum to the periphery of the right upper lobe, of indeterminate nature.   Question artifact, platelike atelectasis, or possibly pleural fluid   within a fissure. Follow-up chest x-ray can be obtained.    Left lower lung linear atelectasis.    --- End of Report ---            LAVERNE SALINAS MD; Attending Radiologist  This document has been electronically signed. Mar 14 2023  9:38AM    < end of copied text >  < from: US Abdomen Upper Quadrant Right (23 @ 11:18) >  ACC: [id number] EXAM:  US ABDOMEN RT UPR QUADRANT   ORDERED BY: KASHMIR COLON     PROCEDURE DATE:  2023          INTERPRETATION:  CLINICAL INFORMATION: Persistent transaminitis. Sepsis.    COMPARISON: None available.    TECHNIQUE: Sonography of the right upper quadrant. The examination was   technically limited secondary to a combination of overlying bowel gas and   difficulty with patient positioning.    FINDINGS:  Liver: Within normal limits.  Bile ducts: Normal caliber. Common bile duct measures 5 mm.  Gallbladder: Within normal limits.  Pancreas: Visualized portions are within normal limits.  Right kidney: 11.8 cm. No hydronephrosis.  Ascites: None.  IVC and aorta: Visualized portions normal in caliber.    IMPRESSION:  Technically limited study, otherwise unremarkable right upper quadrant   ultrasound.    --- End of Report ---      EM TORREZ MD; Attending Radiologist  This document has been electronically signed. Mar 13 2023 12:47PM    < end of copied text >      Plan of care discussed with intensivist and family.

## 2023-03-14 NOTE — PROGRESS NOTE ADULT - PROBLEM SELECTOR PLAN 5
Antihypertensives currently on hold secondary to sepsis/hypotension Antihypertensives currently on hold secondary to sepsis/hypotension.

## 2023-03-14 NOTE — PROGRESS NOTE ADULT - PROBLEM SELECTOR PLAN 1
Secondary to pneumonia  S/P Code Sepsis (3/9)  F/U Blood and sputum cultures (3/9) - thus far negative to date  Continue cefepime and vanco  Repeat CXR 3/10 without focal infiltrate  ID following  Febrile today 102.3 rectal  Worsening leukocytosis. Tachy and hypotensive.   IVF bolus  Lactate and Procal   Blood cultures repeated.   Sputum cx   Meropenem started  Vanco dosage increased from 1250 to 1500 mg BID Secondary to pneumonia  S/P Code Sepsis (3/9)  F/U Blood and sputum cultures (3/9) - thus far negative to date  F/U cultures from 3/13 (remains febrile).   S/p Cefepime  CXR 3/10 without focal infiltrate.   CXR 3/13 no acute findings.   ID following  Leukocytosis improved.   S/p IVF bolus  Lactate wnl  and Procal elevated  F/u cultures.   F/u Sputum cx   Continue Jose Elias and Vanco

## 2023-03-14 NOTE — PROGRESS NOTE ADULT - PROBLEM SELECTOR PLAN 10
DVT and GI prophylaxis  Continue antibiotics   Cefepime discontinued.   Vanco increased from 1250 to 1500 mg BID  Meropenem added in the setting of fevers.   Monitor Vanco trough   F/U cultures - thus far NGTD  Repeat blood cultures  Persistent transaminitis. Held high dose statin and F/u Liver US.   Likely will return to [facility] when optimized. DVT and GI prophylaxis  Continue antibiotics   Cefepime discontinued.   C/w Jose Elias and Vanco.   Monitor Vanco trough   F/U cultures - thus far NGTD  Repeat blood cultures  Persistent transaminitis. Held high dose statin   Likely will return to [facility] when optimized.

## 2023-03-14 NOTE — PROGRESS NOTE ADULT - PROBLEM SELECTOR PLAN 4
Possibly secondary to sepsis vs drug induced (On high dose statin).   Hold statin for now  Liver US pending  Continue to monitor daily.  F/u remaining Hepatitis panel   Avoid hepatotoxic agents. Possibly secondary to sepsis vs drug induced (On high dose statin).   Hold statin for now  Liver US unremarkable.   Continue to monitor daily.  Avoid hepatotoxic agents.

## 2023-03-14 NOTE — PROGRESS NOTE ADULT - PROBLEM SELECTOR PLAN 3
Secondary to SDH and craniotomy  Craniotomy at [facility] 1/23  Maintain safety precautions  Strict aspiration precautions.  Seizure precautions.  Off note: Request that family bring helmet to protect Crani site). Secondary to SDH and craniotomy.   Craniotomy at [facility] 1/23  Maintain safety precautions  Strict aspiration precautions.  Seizure precautions.  Off note: Request that family bring helmet to protect Crani site).

## 2023-03-14 NOTE — PROGRESS NOTE ADULT - ASSESSMENT
62M from [facility], h/o traumatic subdural hemorrhage following a fall down the stairs while drunk, s/p r craniotomy at [facility] in 01/2023, s/p trach/PEG BIBEMS for tachycardia 170s, RR 27, increased secretions admitted to  for sepsis secondary to RUL pneumonia with tracheostomy to 3L NC.  3/6 Febrile 101.7  03/07: No events overnight. On [place]-trach at 3 L and tolerating well. Hemodynamically stable. Cultures in progress. C/w Vanco and Cefepime. F/u Vanco trough. Hypokalemic this AM and replacement ordered.   03/08: Afebrile. Vanco discontinued since MRSA negative. + yeast in sputum; + Candida Galbrata in urine (Bran in place with clear urine and afebrile).   Dr. Peres following. Blood cx NGTD. C/w Cefepime.   03/09: Febrile, tachycardic and tachypneic this AM. Code sepsis called. Sepsis w/u in progress.  Started on Vanco. ID following, . Discussed with Dr. Peres. F/u CXR. Worsening Leukocytosis. IVF bolus with LR (2,300 ml)  per protocol. Lactate 1.9.   3/11: afebrile over past 24hrs, hemodynamically stable. Vanco trough 11.7  03/12: Vanco trough this PM. Blood cx remains NGTD. C/w Vanco and Cefepime. ID following. Persistent Transaminitis.  Hep C non-reactive. F/u remaining Hepatitis panel. Patient on high dose statin which could be contributing to transaminitis. Hold for now and re-eval. Given that patient is unable to verbalize/demonstrate if symptomatic will order Liver US.   03/13: Spiked fevers overnight. Tachycardic and hypotensive. IVF bolus given. Blood cx in progress.  F/u Procal and Lactate. Vanco increased to 1500 mg BID and Meropenem added and Cefepime discontinued Discussed wih Dr. Peres and reccs appreciated  Worsening leukocytosis Pending Abdominal US 2/2 transaminitis.   03/14: 62M from [facility], h/o traumatic subdural hemorrhage following a fall down the stairs while drunk, s/p r craniotomy at [facility] in 01/2023, s/p trach/PEG BIBEMS for tachycardia 170s, RR 27, increased secretions admitted to  for sepsis secondary to RUL pneumonia with tracheostomy to 3L NC.  3/6 Febrile 101.7  03/07: No events overnight. On [place]-trach at 3 L and tolerating well. Hemodynamically stable. Cultures in progress. C/w Vanco and Cefepime. F/u Vanco trough. Hypokalemic this AM and replacement ordered.   03/08: Afebrile. Vanco discontinued since MRSA negative. + yeast in sputum; + Candida Galbrata in urine (Bran in place with clear urine and afebrile).   Dr. Peres following. Blood cx NGTD. C/w Cefepime.   03/09: Febrile, tachycardic and tachypneic this AM. Code sepsis called. Sepsis w/u in progress.  Started on Vanco. ID following, . Discussed with Dr. Peres. F/u CXR. Worsening Leukocytosis. IVF bolus with LR (2,300 ml)  per protocol. Lactate 1.9.   3/11: afebrile over past 24hrs, hemodynamically stable. Vanco trough 11.7  03/12: Vanco trough this PM. Blood cx remains NGTD. C/w Vanco and Cefepime. ID following. Persistent Transaminitis.  Hep C non-reactive. F/u remaining Hepatitis panel. Patient on high dose statin which could be contributing to transaminitis. Hold for now and re-eval. Given that patient is unable to verbalize/demonstrate if symptomatic will order Liver US.   03/13: Spiked fevers overnight. Tachycardic and hypotensive. IVF bolus given. Blood cx in progress.  F/u Procal and Lactate. Vanco increased to 1500 mg BID and Meropenem added and Cefepime discontinued Discussed wih Dr. Peres and reccs appreciated  Worsening leukocytosis Pending Abdominal US 2/2 transaminitis.   03/14: Febrile. Urinary retention. On Flomax. Bladder scan and Straight cath Q 4-6 hours. On Vanco and Jose Elias. F/u cx. Abd US unremarkable. CXR 3/13 negative for acute findings.

## 2023-03-14 NOTE — CHART NOTE - NSCHARTNOTEFT_GEN_A_CORE
Assessment:   62yMalePatient is a 62y old  Male who presents with a chief complaint of Sepsis secondary to pneumonia (14 Mar 2023 07:47) Pt visited. Pt  w Tracheostomy. TF Glucerna 1.5 infusing @ 40 ml /hr x 24 hr providing 960 ml,1440 calories, Protein 79 gms, free water 729 ml/day. D/W RN TF tolerated. labs  Noted. Pt with SCD device. Meds Noted. Will suggest Increase TF to 50 ml /hr x 24 hr.      Factors impacting intake: [ ] none [ ] nausea  [ ] vomiting [ ] diarrhea [ ] constipation  [ ]chewing problems [ ] swallowing issues  [ ] other:     Diet Presciption: Diet, NPO with Tube Feed:   Tube Feeding Modality: Gastrostomy  Glucerna 1.5 Guero  Total Volume for 24 Hours (mL): 960  Continuous  Starting Tube Feed Rate {mL per Hour}: 10  Increase Tube Feed Rate by (mL): 10     Every 4 hours  Until Goal Tube Feed Rate (mL per Hour): 40  Tube Feed Duration (in Hours): 24  Tube Feed Start Time: 12:00 (03-05-23 @ 07:24)    Intake:  NPO w TF     Current Weight:   % Weight Change Bed scale With out SCD device 161 LB    Pertinent Medications: MEDICATIONS  (STANDING):  albuterol/ipratropium for Nebulization 3 milliLiter(s) Nebulizer every 6 hours  amantadine Syrup 100 milliGRAM(s) Oral two times a day  aspirin  chewable 81 milliGRAM(s) Oral daily  enoxaparin Injectable 40 milliGRAM(s) SubCutaneous every 24 hours  insulin glargine Injectable (LANTUS) 12 Unit(s) SubCutaneous every morning  insulin glargine Injectable (LANTUS) 12 Unit(s) SubCutaneous at bedtime  insulin lispro (ADMELOG) corrective regimen sliding scale   SubCutaneous every 6 hours  insulin lispro Injectable (ADMELOG) 6 Unit(s) SubCutaneous every 6 hours  meropenem  IVPB      meropenem  IVPB 1000 milliGRAM(s) IV Intermittent every 8 hours  nicotine -   7 mG/24Hr(s) Patch 1 Patch Transdermal daily  polyethylene glycol 3350 17 Gram(s) Oral daily  senna Syrup 10 milliLiter(s) Oral daily  silver sulfADIAZINE 1% Cream 1 Application(s) Topical two times a day  tamsulosin 0.4 milliGRAM(s) Oral at bedtime  vancomycin  IVPB 1500 milliGRAM(s) IV Intermittent every 12 hours    MEDICATIONS  (PRN):  acetaminophen    Suspension .. 650 milliGRAM(s) Oral every 6 hours PRN Temp greater or equal to 38C (100.4F), Mild Pain (1 - 3)  aluminum hydroxide/magnesium hydroxide/simethicone Suspension 30 milliLiter(s) Oral every 4 hours PRN Dyspepsia  melatonin 3 milliGRAM(s) Oral at bedtime PRN Insomnia  ondansetron Injectable 4 milliGRAM(s) IV Push every 8 hours PRN Nausea and/or Vomiting    Pertinent Labs: 03-14 Na135 mmol/L Glu 211 mg/dL<H> K+ 3.8 mmol/L Cr  0.61 mg/dL BUN 18 mg/dL 03-14 Phos 2.8 mg/dL 03-14 Alb 1.9 g/dL<L>     CAPILLARY BLOOD GLUCOSE      POCT Blood Glucose.: 219 mg/dL (14 Mar 2023 11:31)  POCT Blood Glucose.: 184 mg/dL (14 Mar 2023 06:52)  POCT Blood Glucose.: 171 mg/dL (13 Mar 2023 23:57)  POCT Blood Glucose.: 152 mg/dL (13 Mar 2023 16:44)  POCT Blood Glucose.: 195 mg/dL (13 Mar 2023 11:59)    Skin: B/L Heel stage 1,     Estimated Needs:   [ ] no change since previous assessment  [ ] recalculated:     Previous Nutrition Diagnosis:   [ ] Inadequate Energy Intake [ ]Inadequate Oral Intake [ ] Excessive Energy Intake   [ ] Underweight [ ] Increased Nutrient Needs [ ] Overweight/Obesity   [ ] Altered GI Function [ ] Unintended Weight Loss [ ] Food & Nutrition Related Knowledge Deficit [ ] Malnutrition     Nutrition Diagnosis is [ ] ongoing  [ ] resolved [ ] not applicable     New Nutrition Diagnosis: [ ] not applicable       Interventions:   Recommend  [ ] Change Diet To:  [ ] Nutrition Supplement  [ x] Nutrition Support Suggest Glucerna 1.5 Initial Goal rate @ 50 ml/hr x 24 hr as tolerated to provide 1200 ml,1800 calories, 99 Gms of protein.    [ ] Other:     Monitoring and Evaluation:   [ ] PO intake [ x ] Tolerance to diet prescription [ x ] weights [ x ] labs[ x ] follow up per protocol  [ ] other:

## 2023-03-15 LAB
ALBUMIN SERPL ELPH-MCNC: 2 G/DL — LOW (ref 3.5–5)
ALP SERPL-CCNC: 224 U/L — HIGH (ref 40–120)
ALT FLD-CCNC: 87 U/L DA — HIGH (ref 10–60)
ANION GAP SERPL CALC-SCNC: 7 MMOL/L — SIGNIFICANT CHANGE UP (ref 5–17)
AST SERPL-CCNC: 57 U/L — HIGH (ref 10–40)
BILIRUB SERPL-MCNC: 0.5 MG/DL — SIGNIFICANT CHANGE UP (ref 0.2–1.2)
BUN SERPL-MCNC: 15 MG/DL — SIGNIFICANT CHANGE UP (ref 7–18)
CALCIUM SERPL-MCNC: 9 MG/DL — SIGNIFICANT CHANGE UP (ref 8.4–10.5)
CHLORIDE SERPL-SCNC: 100 MMOL/L — SIGNIFICANT CHANGE UP (ref 96–108)
CO2 SERPL-SCNC: 28 MMOL/L — SIGNIFICANT CHANGE UP (ref 22–31)
CREAT SERPL-MCNC: 0.54 MG/DL — SIGNIFICANT CHANGE UP (ref 0.5–1.3)
EGFR: 114 ML/MIN/1.73M2 — SIGNIFICANT CHANGE UP
GLUCOSE SERPL-MCNC: 203 MG/DL — HIGH (ref 70–99)
HCT VFR BLD CALC: 26 % — LOW (ref 39–50)
HGB BLD-MCNC: 8.1 G/DL — LOW (ref 13–17)
MAGNESIUM SERPL-MCNC: 2.3 MG/DL — SIGNIFICANT CHANGE UP (ref 1.6–2.6)
MCHC RBC-ENTMCNC: 28.1 PG — SIGNIFICANT CHANGE UP (ref 27–34)
MCHC RBC-ENTMCNC: 31.2 GM/DL — LOW (ref 32–36)
MCV RBC AUTO: 90.3 FL — SIGNIFICANT CHANGE UP (ref 80–100)
NRBC # BLD: 0 /100 WBCS — SIGNIFICANT CHANGE UP (ref 0–0)
PHOSPHATE SERPL-MCNC: 2.7 MG/DL — SIGNIFICANT CHANGE UP (ref 2.5–4.5)
PLATELET # BLD AUTO: 465 K/UL — HIGH (ref 150–400)
POTASSIUM SERPL-MCNC: 4 MMOL/L — SIGNIFICANT CHANGE UP (ref 3.5–5.3)
POTASSIUM SERPL-SCNC: 4 MMOL/L — SIGNIFICANT CHANGE UP (ref 3.5–5.3)
PROT SERPL-MCNC: 7.3 G/DL — SIGNIFICANT CHANGE UP (ref 6–8.3)
RBC # BLD: 2.88 M/UL — LOW (ref 4.2–5.8)
RBC # FLD: 13.5 % — SIGNIFICANT CHANGE UP (ref 10.3–14.5)
SODIUM SERPL-SCNC: 135 MMOL/L — SIGNIFICANT CHANGE UP (ref 135–145)
WBC # BLD: 9.78 K/UL — SIGNIFICANT CHANGE UP (ref 3.8–10.5)
WBC # FLD AUTO: 9.78 K/UL — SIGNIFICANT CHANGE UP (ref 3.8–10.5)

## 2023-03-15 RX ADMIN — TAMSULOSIN HYDROCHLORIDE 0.4 MILLIGRAM(S): 0.4 CAPSULE ORAL at 21:52

## 2023-03-15 RX ADMIN — ENOXAPARIN SODIUM 40 MILLIGRAM(S): 100 INJECTION SUBCUTANEOUS at 11:33

## 2023-03-15 RX ADMIN — SENNA PLUS 10 MILLILITER(S): 8.6 TABLET ORAL at 11:33

## 2023-03-15 RX ADMIN — Medication 1: at 12:31

## 2023-03-15 RX ADMIN — Medication 1 PATCH: at 13:09

## 2023-03-15 RX ADMIN — Medication 100 MILLIGRAM(S): at 17:56

## 2023-03-15 RX ADMIN — Medication 1 APPLICATION(S): at 17:57

## 2023-03-15 RX ADMIN — Medication 6 UNIT(S): at 07:17

## 2023-03-15 RX ADMIN — Medication 3 MILLILITER(S): at 20:14

## 2023-03-15 RX ADMIN — MEROPENEM 100 MILLIGRAM(S): 1 INJECTION INTRAVENOUS at 21:52

## 2023-03-15 RX ADMIN — INSULIN GLARGINE 12 UNIT(S): 100 INJECTION, SOLUTION SUBCUTANEOUS at 00:16

## 2023-03-15 RX ADMIN — MEROPENEM 100 MILLIGRAM(S): 1 INJECTION INTRAVENOUS at 14:03

## 2023-03-15 RX ADMIN — Medication 6 UNIT(S): at 00:32

## 2023-03-15 RX ADMIN — Medication 300 MILLIGRAM(S): at 17:56

## 2023-03-15 RX ADMIN — Medication 1: at 07:16

## 2023-03-15 RX ADMIN — Medication 1 PATCH: at 20:05

## 2023-03-15 RX ADMIN — Medication 3 MILLILITER(S): at 09:07

## 2023-03-15 RX ADMIN — Medication 1 PATCH: at 11:34

## 2023-03-15 RX ADMIN — Medication 1 APPLICATION(S): at 06:10

## 2023-03-15 RX ADMIN — Medication 300 MILLIGRAM(S): at 06:50

## 2023-03-15 RX ADMIN — Medication 6 UNIT(S): at 12:32

## 2023-03-15 RX ADMIN — Medication 3 MILLILITER(S): at 03:25

## 2023-03-15 RX ADMIN — Medication 3 MILLILITER(S): at 15:40

## 2023-03-15 RX ADMIN — Medication 1 PATCH: at 07:31

## 2023-03-15 RX ADMIN — Medication 6 UNIT(S): at 17:55

## 2023-03-15 RX ADMIN — MEROPENEM 100 MILLIGRAM(S): 1 INJECTION INTRAVENOUS at 06:10

## 2023-03-15 RX ADMIN — INSULIN GLARGINE 12 UNIT(S): 100 INJECTION, SOLUTION SUBCUTANEOUS at 07:40

## 2023-03-15 RX ADMIN — POLYETHYLENE GLYCOL 3350 17 GRAM(S): 17 POWDER, FOR SOLUTION ORAL at 11:34

## 2023-03-15 RX ADMIN — Medication 100 MILLIGRAM(S): at 06:08

## 2023-03-15 RX ADMIN — Medication 1: at 00:31

## 2023-03-15 RX ADMIN — Medication 81 MILLIGRAM(S): at 11:33

## 2023-03-15 NOTE — PROGRESS NOTE ADULT - PROBLEM SELECTOR PLAN 6
Lantus 12U in am and HS  Continue 6U lispro every 6 hours and continue sliding scale coverage every 6 hours.

## 2023-03-15 NOTE — PROGRESS NOTE ADULT - PROBLEM SELECTOR PLAN 10
DVT and GI prophylaxis.  Continue antibiotics as per ID meropemem and vanco.  Monitor vanco trough.  F/U repeated blood cultures from 3/13  Repeat CXR in am  ID f/u.

## 2023-03-15 NOTE — PROGRESS NOTE ADULT - PROBLEM SELECTOR PLAN 2
Continue hydro-trach collar.  Currently tolerating 2LPM via hydro-trach collar.  Monitor oxygen saturation.  Continue bronchodilators  Antibiotics as per ID

## 2023-03-15 NOTE — PROGRESS NOTE ADULT - PROBLEM SELECTOR PLAN 3
Secondary to SDH and craniotomy  Craniotomy at [facility] 1/23  Maintain safety precautions  Strict aspiration precautions.  Seizure precautions.

## 2023-03-15 NOTE — PROGRESS NOTE ADULT - PROBLEM SELECTOR PLAN 1
likely secondary to pneumonia.  F/U repeated blood cultures on 3/13  Continue meropenum and vanco.  ID DR Peres. ID f/u  Repeat CXR in am.

## 2023-03-15 NOTE — PROGRESS NOTE ADULT - SUBJECTIVE AND OBJECTIVE BOX
WILMA NICOLE    SCU progress note    INTERVAL HPI/OVERNIGHT EVENTS: ***tmax 100.5 yesterday. Afebrile so far today.    DNR [ ]   DNI  [  ]   FULL CODE    Covid - 19 PCR: Negative 3/9    The 4Ms    What Matters Most: see GOC  Age appropriate Medications/Screen for High Risk Medication: Yes  Mentation: see CAM below  Mobility: defer to physical exam    The Confusion Assessment Method (CAM) Diagnostic Algorithm     1: Acute Onset or Fluctuating Course  - Is there evidence of an acute change in mental status from the patient’s baseline? Did the (abnormal) behavior  fluctuate during the day, that is, tend to come and go, or increase and decrease in severity?  [ ] YES [x ] NO     2: Inattention  - Did the patient have difficulty focusing attention, being easily distractible, or having difficulty keeping track of what was being said?  [ ] YES [ ] NO   Unable to access     3: Disorganized thinking  -Was the patient’s thinking disorganized or incoherent, such as rambling or irrelevant conversation, unclear or illogical flow of ideas, or unpredictable switching from subject to subject?  [ ] YES [ ] NO   Unable to access    4: Altered Level of consciousness?  [x ] YES [ ] NO    The diagnosis of delirium by CAM requires the presence of features 1 and 2 and either 3 or 4.    PRESSORS: [ ] YES [x ] NO  meropenem  IVPB      meropenem  IVPB 1000 milliGRAM(s) IV Intermittent every 8 hours  vancomycin  IVPB 1500 milliGRAM(s) IV Intermittent every 12 hours    Cardiovascular:  Heart Failure  Acute   Acute on Chronic  Chronic         Pulmonary:  albuterol/ipratropium for Nebulization 3 milliLiter(s) Nebulizer every 6 hours    Hematalogic:  aspirin  chewable 81 milliGRAM(s) Oral daily  enoxaparin Injectable 40 milliGRAM(s) SubCutaneous every 24 hours    Other:  acetaminophen    Suspension .. 650 milliGRAM(s) Oral every 6 hours PRN  aluminum hydroxide/magnesium hydroxide/simethicone Suspension 30 milliLiter(s) Oral every 4 hours PRN  amantadine Syrup 100 milliGRAM(s) Oral two times a day  insulin glargine Injectable (LANTUS) 12 Unit(s) SubCutaneous every morning  insulin glargine Injectable (LANTUS) 12 Unit(s) SubCutaneous at bedtime  insulin lispro (ADMELOG) corrective regimen sliding scale   SubCutaneous every 6 hours  insulin lispro Injectable (ADMELOG) 6 Unit(s) SubCutaneous every 6 hours  melatonin 3 milliGRAM(s) Oral at bedtime PRN  nicotine -   7 mG/24Hr(s) Patch 1 Patch Transdermal daily  ondansetron Injectable 4 milliGRAM(s) IV Push every 8 hours PRN  polyethylene glycol 3350 17 Gram(s) Oral daily  senna Syrup 10 milliLiter(s) Oral daily  silver sulfADIAZINE 1% Cream 1 Application(s) Topical two times a day  tamsulosin 0.4 milliGRAM(s) Oral at bedtime    acetaminophen    Suspension .. 650 milliGRAM(s) Oral every 6 hours PRN  albuterol/ipratropium for Nebulization 3 milliLiter(s) Nebulizer every 6 hours  aluminum hydroxide/magnesium hydroxide/simethicone Suspension 30 milliLiter(s) Oral every 4 hours PRN  amantadine Syrup 100 milliGRAM(s) Oral two times a day  aspirin  chewable 81 milliGRAM(s) Oral daily  enoxaparin Injectable 40 milliGRAM(s) SubCutaneous every 24 hours  insulin glargine Injectable (LANTUS) 12 Unit(s) SubCutaneous every morning  insulin glargine Injectable (LANTUS) 12 Unit(s) SubCutaneous at bedtime  insulin lispro (ADMELOG) corrective regimen sliding scale   SubCutaneous every 6 hours  insulin lispro Injectable (ADMELOG) 6 Unit(s) SubCutaneous every 6 hours  melatonin 3 milliGRAM(s) Oral at bedtime PRN  meropenem  IVPB      meropenem  IVPB 1000 milliGRAM(s) IV Intermittent every 8 hours  nicotine -   7 mG/24Hr(s) Patch 1 Patch Transdermal daily  ondansetron Injectable 4 milliGRAM(s) IV Push every 8 hours PRN  polyethylene glycol 3350 17 Gram(s) Oral daily  senna Syrup 10 milliLiter(s) Oral daily  silver sulfADIAZINE 1% Cream 1 Application(s) Topical two times a day  tamsulosin 0.4 milliGRAM(s) Oral at bedtime  vancomycin  IVPB 1500 milliGRAM(s) IV Intermittent every 12 hours    Drug Dosing Weight  Height (cm): 177.8 (05 Mar 2023 03:36)  Weight (kg): 86.2 (05 Mar 2023 03:36)  BMI (kg/m2): 27.3 (05 Mar 2023 03:36)  BSA (m2): 2.04 (05 Mar 2023 03:36)    CENTRAL LINE: [ ] YES [x ] NO  LOCATION:   DATE INSERTED:  REMOVE: [ ] YES [ ] NO  EXPLAIN:    MELCHOR: [x ] YES [ ] NO    DATE INSERTED:  3/15/23  REMOVE:  [ ] YES [x ] NO  EXPLAIN: Chronic. Failed multiple TOV    PAST MEDICAL & SURGICAL HISTORY:  History of subdural hemorrhage      PEG (percutaneous endoscopic gastrostomy) status      DM (diabetes mellitus)      S/P craniotomy                  -14 @ 07:01  -  03-15 @ 07:00  --------------------------------------------------------  IN: 0 mL / OUT: 600 mL / NET: -600 mL            PHYSICAL EXAM:    GENERAL: NAD  HEAD:  Right sided craniofacial deformity  EYES: PERRLA, conjunctiva and sclera clear  ENMT: No tonsillar erythema, exudates  NECK: Supple, No JVD, tracheostomy intact  NERVOUS SYSTEM:  Awake. Does not follow commands. Nonverbal at baseline. Does not track with eyes. Does not move extremities.  CHEST/LUNG: Diminished breath sounds bilaterally R>L.  HEART: Regular rate and rhythm; No murmurs, rubs, or gallops  ABDOMEN: +Peg intact  Soft, Nontender, Nondistended; Bowel sounds present  EXTREMITIES: No spontaneous movement 2+ Peripheral Pulses, No clubbing, cyanosis, or edema  LYMPH: No lymphadenopathy noted  SKIN: Stage 1 coccyx and bilateral heels      LABS:  CBC Full  -  ( 15 Mar 2023 08:55 )  WBC Count : 9.78 K/uL  RBC Count : 2.88 M/uL  Hemoglobin : 8.1 g/dL  Hematocrit : 26.0 %  Platelet Count - Automated : 465 K/uL  Mean Cell Volume : 90.3 fl  Mean Cell Hemoglobin : 28.1 pg  Mean Cell Hemoglobin Concentration : 31.2 gm/dL  Auto Neutrophil # : x  Auto Lymphocyte # : x  Auto Monocyte # : x  Auto Eosinophil # : x  Auto Basophil # : x  Auto Neutrophil % : x  Auto Lymphocyte % : x  Auto Monocyte % : x  Auto Eosinophil % : x  Auto Basophil % : x    03-15    135  |  100  |  15  ----------------------------<  203<H>  4.0   |  28  |  0.54    Ca    9.0      15 Mar 2023 08:55  Phos  2.7     03-15  Mg     2.3     03-15    TPro  7.3  /  Alb  2.0<L>  /  TBili  0.5  /  DBili  x   /  AST  57<H>  /  ALT  87<H>  /  AlkPhos  224<H>  03-15      Urinalysis Basic - ( 13 Mar 2023 23:35 )    Color: Yellow / Appearance: Clear / S.015 / pH: x  Gluc: x / Ketone: Negative  / Bili: Negative / Urobili: Negative   Blood: x / Protein: 30 mg/dL / Nitrite: Negative   Leuk Esterase: Small / RBC: 5-10 /HPF / WBC 6-10 /HPF   Sq Epi: x / Non Sq Epi: x / Bacteria: Moderate /HPF            [  ]  DVT Prophylaxis  [  ]  Nutrition, Brand, Rate         Goal Rate        Abnormal Nutritional Status -  Malnutrition   Cachexia          RADIOLOGY & ADDITIONAL STUDIES:  ***    Goals of Care Discussion with Family/Proxy/Other   - see note from 3/05/23

## 2023-03-15 NOTE — PROGRESS NOTE ADULT - ASSESSMENT
62M from [facility], h/o traumatic subdural hemorrhage following a fall down the stairs while drunk, s/p r craniotomy at [facility] in 01/2023, s/p trach/PEG BIBEMS for tachycardia 170s, RR 27, increased secretions admitted to  for sepsis secondary to RUL pneumonia with tracheostomy to 3L NC.  3/6 Febrile 101.7  03/07: No events overnight. On [place]-trach at 3 L and tolerating well. Hemodynamically stable. Cultures in progress. C/w Vanco and Cefepime. F/u Vanco trough. Hypokalemic this AM and replacement ordered.   03/08: Afebrile. Vanco discontinued since MRSA negative. + yeast in sputum; + Candida Galbrata in urine (Bran in place with clear urine and afebrile).   Dr. Peres following. Blood cx NGTD. C/w Cefepime.   03/09: Febrile, tachycardic and tachypneic this AM. Code sepsis called. Sepsis w/u in progress.  Started on Vanco. ID following, . Discussed with Dr. Peres. F/u CXR. Worsening Leukocytosis. IVF bolus with LR (2,300 ml)  per protocol. Lactate 1.9.   3/11: afebrile over past 24hrs, hemodynamically stable. Vanco trough 11.7  03/12: Vanco trough this PM. Blood cx remains NGTD. C/w Vanco and Cefepime. ID following. Persistent Transaminitis.  Hep C non-reactive. F/u remaining Hepatitis panel. Patient on high dose statin which could be contributing to transaminitis. Hold for now and re-eval. Given that patient is unable to verbalize/demonstrate if symptomatic will order Liver US.   03/13: Spiked fevers overnight. Tachycardic and hypotensive. IVF bolus given. Blood cx in progress.  F/u Procal and Lactate. Vanco increased to 1500 mg BID and Meropenem added and Cefepime discontinued Discussed wih Dr. Peres and reccs appreciated  Worsening leukocytosis Pending Abdominal US 2/2 transaminitis.   03/14: Febrile. Urinary retention. On Flomax. Bladder scan and Straight cath Q 4-6 hours. On Vanco and Jose Elias. F/u cx. Abd US unremarkable. CXR 3/13 negative for acute findings.   3/15 Bladder scan 550. Bran cath reinserted.

## 2023-03-16 LAB
ALBUMIN SERPL ELPH-MCNC: 2.1 G/DL — LOW (ref 3.5–5)
ALP SERPL-CCNC: 232 U/L — HIGH (ref 40–120)
ALT FLD-CCNC: 85 U/L DA — HIGH (ref 10–60)
ANION GAP SERPL CALC-SCNC: 8 MMOL/L — SIGNIFICANT CHANGE UP (ref 5–17)
AST SERPL-CCNC: 57 U/L — HIGH (ref 10–40)
BILIRUB SERPL-MCNC: 0.5 MG/DL — SIGNIFICANT CHANGE UP (ref 0.2–1.2)
BUN SERPL-MCNC: 17 MG/DL — SIGNIFICANT CHANGE UP (ref 7–18)
CALCIUM SERPL-MCNC: 9.2 MG/DL — SIGNIFICANT CHANGE UP (ref 8.4–10.5)
CHLORIDE SERPL-SCNC: 99 MMOL/L — SIGNIFICANT CHANGE UP (ref 96–108)
CO2 SERPL-SCNC: 28 MMOL/L — SIGNIFICANT CHANGE UP (ref 22–31)
CREAT SERPL-MCNC: 0.58 MG/DL — SIGNIFICANT CHANGE UP (ref 0.5–1.3)
EGFR: 112 ML/MIN/1.73M2 — SIGNIFICANT CHANGE UP
GLUCOSE SERPL-MCNC: 155 MG/DL — HIGH (ref 70–99)
HCT VFR BLD CALC: 27.4 % — LOW (ref 39–50)
HGB BLD-MCNC: 8.8 G/DL — LOW (ref 13–17)
MAGNESIUM SERPL-MCNC: 2.3 MG/DL — SIGNIFICANT CHANGE UP (ref 1.6–2.6)
MCHC RBC-ENTMCNC: 28.3 PG — SIGNIFICANT CHANGE UP (ref 27–34)
MCHC RBC-ENTMCNC: 32.1 GM/DL — SIGNIFICANT CHANGE UP (ref 32–36)
MCV RBC AUTO: 88.1 FL — SIGNIFICANT CHANGE UP (ref 80–100)
NRBC # BLD: 0 /100 WBCS — SIGNIFICANT CHANGE UP (ref 0–0)
PHOSPHATE SERPL-MCNC: 2.7 MG/DL — SIGNIFICANT CHANGE UP (ref 2.5–4.5)
PLATELET # BLD AUTO: 469 K/UL — HIGH (ref 150–400)
POTASSIUM SERPL-MCNC: 3.9 MMOL/L — SIGNIFICANT CHANGE UP (ref 3.5–5.3)
POTASSIUM SERPL-SCNC: 3.9 MMOL/L — SIGNIFICANT CHANGE UP (ref 3.5–5.3)
PROT SERPL-MCNC: 7.8 G/DL — SIGNIFICANT CHANGE UP (ref 6–8.3)
RBC # BLD: 3.11 M/UL — LOW (ref 4.2–5.8)
RBC # FLD: 13.7 % — SIGNIFICANT CHANGE UP (ref 10.3–14.5)
SODIUM SERPL-SCNC: 135 MMOL/L — SIGNIFICANT CHANGE UP (ref 135–145)
VANCOMYCIN TROUGH SERPL-MCNC: 15.7 UG/ML — SIGNIFICANT CHANGE UP (ref 10–20)
WBC # BLD: 9.97 K/UL — SIGNIFICANT CHANGE UP (ref 3.8–10.5)
WBC # FLD AUTO: 9.97 K/UL — SIGNIFICANT CHANGE UP (ref 3.8–10.5)

## 2023-03-16 RX ADMIN — Medication 1: at 00:06

## 2023-03-16 RX ADMIN — MEROPENEM 100 MILLIGRAM(S): 1 INJECTION INTRAVENOUS at 13:14

## 2023-03-16 RX ADMIN — POLYETHYLENE GLYCOL 3350 17 GRAM(S): 17 POWDER, FOR SOLUTION ORAL at 11:41

## 2023-03-16 RX ADMIN — INSULIN GLARGINE 12 UNIT(S): 100 INJECTION, SOLUTION SUBCUTANEOUS at 08:22

## 2023-03-16 RX ADMIN — Medication 1 APPLICATION(S): at 05:26

## 2023-03-16 RX ADMIN — MEROPENEM 100 MILLIGRAM(S): 1 INJECTION INTRAVENOUS at 05:25

## 2023-03-16 RX ADMIN — SENNA PLUS 10 MILLILITER(S): 8.6 TABLET ORAL at 11:40

## 2023-03-16 RX ADMIN — Medication 1 PATCH: at 12:40

## 2023-03-16 RX ADMIN — Medication 6 UNIT(S): at 11:42

## 2023-03-16 RX ADMIN — Medication 100 MILLIGRAM(S): at 05:26

## 2023-03-16 RX ADMIN — Medication 100 MILLIGRAM(S): at 17:20

## 2023-03-16 RX ADMIN — Medication 81 MILLIGRAM(S): at 11:40

## 2023-03-16 RX ADMIN — Medication 1: at 17:21

## 2023-03-16 RX ADMIN — Medication 1: at 06:33

## 2023-03-16 RX ADMIN — Medication 3 MILLILITER(S): at 20:17

## 2023-03-16 RX ADMIN — Medication 1 APPLICATION(S): at 17:24

## 2023-03-16 RX ADMIN — Medication 6 UNIT(S): at 06:33

## 2023-03-16 RX ADMIN — MEROPENEM 100 MILLIGRAM(S): 1 INJECTION INTRAVENOUS at 21:58

## 2023-03-16 RX ADMIN — Medication 1 PATCH: at 19:14

## 2023-03-16 RX ADMIN — Medication 6 UNIT(S): at 00:07

## 2023-03-16 RX ADMIN — Medication 6 UNIT(S): at 17:22

## 2023-03-16 RX ADMIN — TAMSULOSIN HYDROCHLORIDE 0.4 MILLIGRAM(S): 0.4 CAPSULE ORAL at 21:58

## 2023-03-16 RX ADMIN — Medication 1: at 11:42

## 2023-03-16 RX ADMIN — Medication 3 MILLILITER(S): at 14:49

## 2023-03-16 RX ADMIN — Medication 1 PATCH: at 08:23

## 2023-03-16 RX ADMIN — Medication 3 MILLILITER(S): at 08:59

## 2023-03-16 RX ADMIN — ENOXAPARIN SODIUM 40 MILLIGRAM(S): 100 INJECTION SUBCUTANEOUS at 10:09

## 2023-03-16 RX ADMIN — Medication 1 PATCH: at 11:41

## 2023-03-16 RX ADMIN — Medication 300 MILLIGRAM(S): at 12:07

## 2023-03-16 RX ADMIN — INSULIN GLARGINE 12 UNIT(S): 100 INJECTION, SOLUTION SUBCUTANEOUS at 21:59

## 2023-03-16 RX ADMIN — Medication 3 MILLILITER(S): at 03:18

## 2023-03-16 RX ADMIN — INSULIN GLARGINE 12 UNIT(S): 100 INJECTION, SOLUTION SUBCUTANEOUS at 00:07

## 2023-03-16 NOTE — PROGRESS NOTE ADULT - PROBLEM SELECTOR PLAN 3
Secondary to SDH and craniotomy  Craniotomy at [facility] 1/23  Maintain safety precautions  Strict aspiration precautions.  Seizure precautions.

## 2023-03-16 NOTE — PROGRESS NOTE ADULT - PROBLEM SELECTOR PLAN 10
DVT and GI prophylaxis.  Continue antibiotics as per ID meropemem and vanco.  Monitor vanco trough.  F/U repeated blood cultures from 3/13  ID f/u.

## 2023-03-16 NOTE — PROGRESS NOTE ADULT - ASSESSMENT
62M from [facility], h/o traumatic subdural hemorrhage following a fall down the stairs while drunk, s/p r craniotomy at [facility] in 01/2023, s/p trach/PEG BIBEMS for tachycardia 170s, RR 27, increased secretions admitted to  for sepsis secondary to RUL pneumonia with tracheostomy to 3L NC.  3/6 Febrile 101.7  03/07: No events overnight. On [place]-trach at 3 L and tolerating well. Hemodynamically stable. Cultures in progress. C/w Vanco and Cefepime. F/u Vanco trough. Hypokalemic this AM and replacement ordered.   03/08: Afebrile. Vanco discontinued since MRSA negative. + yeast in sputum; + Candida Galbrata in urine (Bran in place with clear urine and afebrile).   Dr. Peres following. Blood cx NGTD. C/w Cefepime.   03/09: Febrile, tachycardic and tachypneic this AM. Code sepsis called. Sepsis w/u in progress.  Started on Vanco. ID following, . Discussed with Dr. Peres. F/u CXR. Worsening Leukocytosis. IVF bolus with LR (2,300 ml)  per protocol. Lactate 1.9.   3/11: afebrile over past 24hrs, hemodynamically stable. Vanco trough 11.7  03/12: Vanco trough this PM. Blood cx remains NGTD. C/w Vanco and Cefepime. ID following. Persistent Transaminitis.  Hep C non-reactive. F/u remaining Hepatitis panel. Patient on high dose statin which could be contributing to transaminitis. Hold for now and re-eval. Given that patient is unable to verbalize/demonstrate if symptomatic will order Liver US.   03/13: Spiked fevers overnight. Tachycardic and hypotensive. IVF bolus given. Blood cx in progress.  F/u Procal and Lactate. Vanco increased to 1500 mg BID and Meropenem added and Cefepime discontinued Discussed wih Dr. Peres and reccs appreciated  Worsening leukocytosis Pending Abdominal US 2/2 transaminitis.   03/14: Febrile. Urinary retention. On Flomax. Bladder scan and Straight cath Q 4-6 hours. On Vanco and Jose Elias. F/u cx. Abd US unremarkable. CXR 3/13 negative for acute findings.   3/15 Bladder scan 550. Bran cath reinserted.

## 2023-03-16 NOTE — PROGRESS NOTE ADULT - SUBJECTIVE AND OBJECTIVE BOX
WILMA NICOLE    SCU progress note    INTERVAL HPI/OVERNIGHT EVENTS: ***No overnight events    DNR [ ]   DNI  [  ]   FULL CODE    Covid - 19 PCR: Negative 3/9    The 4Ms    What Matters Most: see GO  Age appropriate Medications/Screen for High Risk Medication: Yes  Mentation: see CAM below  Mobility: defer to physical exam    The Confusion Assessment Method (CAM) Diagnostic Algorithm     1: Acute Onset or Fluctuating Course  - Is there evidence of an acute change in mental status from the patient’s baseline? Did the (abnormal) behavior  fluctuate during the day, that is, tend to come and go, or increase and decrease in severity?  [ ] YES [x ] NO     2: Inattention  - Did the patient have difficulty focusing attention, being easily distractible, or having difficulty keeping track of what was being said?  [ ] YES [x ] NO     3: Disorganized thinking  -Was the patient’s thinking disorganized or incoherent, such as rambling or irrelevant conversation, unclear or illogical flow of ideas, or unpredictable switching from subject to subject?  [ ] YES [x ] NO    4: Altered Level of consciousness?  [ ] YES [x ] NO    The diagnosis of delirium by CAM requires the presence of features 1 and 2 and either 3 or 4.    PRESSORS: [ ] YES [x ] NO  meropenem  IVPB      meropenem  IVPB 1000 milliGRAM(s) IV Intermittent every 8 hours  vancomycin  IVPB 1500 milliGRAM(s) IV Intermittent every 12 hours    Cardiovascular:  Heart Failure  Acute   Acute on Chronic  Chronic         Pulmonary:  albuterol/ipratropium for Nebulization 3 milliLiter(s) Nebulizer every 6 hours    Hematalogic:  aspirin  chewable 81 milliGRAM(s) Oral daily  enoxaparin Injectable 40 milliGRAM(s) SubCutaneous every 24 hours    Other:  acetaminophen    Suspension .. 650 milliGRAM(s) Oral every 6 hours PRN  aluminum hydroxide/magnesium hydroxide/simethicone Suspension 30 milliLiter(s) Oral every 4 hours PRN  amantadine Syrup 100 milliGRAM(s) Oral two times a day  insulin glargine Injectable (LANTUS) 12 Unit(s) SubCutaneous every morning  insulin glargine Injectable (LANTUS) 12 Unit(s) SubCutaneous at bedtime  insulin lispro (ADMELOG) corrective regimen sliding scale   SubCutaneous every 6 hours  insulin lispro Injectable (ADMELOG) 6 Unit(s) SubCutaneous every 6 hours  melatonin 3 milliGRAM(s) Oral at bedtime PRN  nicotine -   7 mG/24Hr(s) Patch 1 Patch Transdermal daily  ondansetron Injectable 4 milliGRAM(s) IV Push every 8 hours PRN  polyethylene glycol 3350 17 Gram(s) Oral daily  senna Syrup 10 milliLiter(s) Oral daily  silver sulfADIAZINE 1% Cream 1 Application(s) Topical two times a day  tamsulosin 0.4 milliGRAM(s) Oral at bedtime    acetaminophen    Suspension .. 650 milliGRAM(s) Oral every 6 hours PRN  albuterol/ipratropium for Nebulization 3 milliLiter(s) Nebulizer every 6 hours  aluminum hydroxide/magnesium hydroxide/simethicone Suspension 30 milliLiter(s) Oral every 4 hours PRN  amantadine Syrup 100 milliGRAM(s) Oral two times a day  aspirin  chewable 81 milliGRAM(s) Oral daily  enoxaparin Injectable 40 milliGRAM(s) SubCutaneous every 24 hours  insulin glargine Injectable (LANTUS) 12 Unit(s) SubCutaneous every morning  insulin glargine Injectable (LANTUS) 12 Unit(s) SubCutaneous at bedtime  insulin lispro (ADMELOG) corrective regimen sliding scale   SubCutaneous every 6 hours  insulin lispro Injectable (ADMELOG) 6 Unit(s) SubCutaneous every 6 hours  melatonin 3 milliGRAM(s) Oral at bedtime PRN  meropenem  IVPB      meropenem  IVPB 1000 milliGRAM(s) IV Intermittent every 8 hours  nicotine -   7 mG/24Hr(s) Patch 1 Patch Transdermal daily  ondansetron Injectable 4 milliGRAM(s) IV Push every 8 hours PRN  polyethylene glycol 3350 17 Gram(s) Oral daily  senna Syrup 10 milliLiter(s) Oral daily  silver sulfADIAZINE 1% Cream 1 Application(s) Topical two times a day  tamsulosin 0.4 milliGRAM(s) Oral at bedtime  vancomycin  IVPB 1500 milliGRAM(s) IV Intermittent every 12 hours    Drug Dosing Weight  Height (cm): 177.8 (05 Mar 2023 03:36)  Weight (kg): 86.2 (05 Mar 2023 03:36)  BMI (kg/m2): 27.3 (05 Mar 2023 03:36)  BSA (m2): 2.04 (05 Mar 2023 03:36)    CENTRAL LINE: [ ] YES [x ] NO  LOCATION:   DATE INSERTED:  REMOVE: [ ] YES [ ] NO  EXPLAIN:    MELCHOR: [x ] YES [ ] NO    DATE INSERTED:  3/16  REMOVE:  [ ] YES [x ] NO  EXPLAIN:  Failed multiple TOV    PAST MEDICAL & SURGICAL HISTORY:  History of subdural hemorrhage      PEG (percutaneous endoscopic gastrostomy) status      DM (diabetes mellitus)      S/P craniotomy                  03-15 @ 07:01  -  03-16 @ 07:00  --------------------------------------------------------  IN: 0 mL / OUT: 1350 mL / NET: -1350 mL            PHYSICAL EXAM:    GENERAL: NAD  HEAD:  Right sided craniofacial deformity  EYES: EOMI, PERRLA, conjunctiva and sclera clear  ENMT: No tonsillar erythema, exudates  NECK: Supple, No JVD, tracheostomy intact  NERVOUS SYSTEM:  Awake. Does not follow commands. Nonverbal at baseline. Does not track with eyes. No spontaneous movement of extremities.  CHEST/LUNG: Diminished breath sounds bilateral bases.  HEART: Regular rate and rhythm; No murmurs, rubs, or gallops  ABDOMEN: +Peg intact. Soft, Nontender, Nondistended; Bowel sounds present  EXTREMITIES:  No spontaneous movement 2+ Peripheral Pulses, No clubbing, cyanosis, or edema  LYMPH: No lymphadenopathy noted  SKIN: Stage 1 coccyx and bilateral heels.      LABS:  CBC Full  -  ( 16 Mar 2023 06:24 )  WBC Count : 9.97 K/uL  RBC Count : 3.11 M/uL  Hemoglobin : 8.8 g/dL  Hematocrit : 27.4 %  Platelet Count - Automated : 469 K/uL  Mean Cell Volume : 88.1 fl  Mean Cell Hemoglobin : 28.3 pg  Mean Cell Hemoglobin Concentration : 32.1 gm/dL  Auto Neutrophil # : x  Auto Lymphocyte # : x  Auto Monocyte # : x  Auto Eosinophil # : x  Auto Basophil # : x  Auto Neutrophil % : x  Auto Lymphocyte % : x  Auto Monocyte % : x  Auto Eosinophil % : x  Auto Basophil % : x    03-16    135  |  99  |  17  ----------------------------<  155<H>  3.9   |  28  |  0.58    Ca    9.2      16 Mar 2023 06:24  Phos  2.7     03-16  Mg     2.3     03-16    TPro  7.8  /  Alb  2.1<L>  /  TBili  0.5  /  DBili  x   /  AST  57<H>  /  ALT  85<H>  /  AlkPhos  232<H>  03-16              [  ]  DVT Prophylaxis  [  ]  Nutrition, Brand, Rate         Goal Rate        Abnormal Nutritional Status -  Malnutrition   Cachexia         RADIOLOGY & ADDITIONAL STUDIES:  ***  < from: Xray Chest 1 View- PORTABLE-Urgent (Xray Chest 1 View- PORTABLE-Urgent .) (03.13.23 @ 17:19) >  INTERPRETATION:    Heart size and the mediastinum cannot be accurately evaluated on this   projection. Tracheostomy tube in place.  There is new thickened bandlike opacity extending from the right hilum to   the periphery of the right upper lobe.  There is linear atelectasis in the left lower lung.  No pleural effusion or pneumothorax is seen.  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      IMPRESSION:  New thickened bandlike opacity extending from the right   hilum to the periphery of the right upper lobe, of indeterminate nature.   Question artifact, platelike atelectasis, or possibly pleural fluid   within a fissure. Follow-up chest x-ray can be obtained.    Left lower lung linear atelectasis.    < end of copied text >    Goals of Care Discussion with Family/Proxy/Other   - see note from 3/05/23

## 2023-03-16 NOTE — PROGRESS NOTE ADULT - PROBLEM SELECTOR PLAN 2
Continue hydro-trach collar.  Currently tolerating 2LPM via hydro-trach collar.  Monitor oxygen saturation.  Continue bronchodilators  Antibiotics as per ID.

## 2023-03-17 LAB
ALBUMIN SERPL ELPH-MCNC: 2.1 G/DL — LOW (ref 3.5–5)
ALP SERPL-CCNC: 227 U/L — HIGH (ref 40–120)
ALT FLD-CCNC: 76 U/L DA — HIGH (ref 10–60)
ANION GAP SERPL CALC-SCNC: 7 MMOL/L — SIGNIFICANT CHANGE UP (ref 5–17)
AST SERPL-CCNC: 43 U/L — HIGH (ref 10–40)
BILIRUB SERPL-MCNC: 0.6 MG/DL — SIGNIFICANT CHANGE UP (ref 0.2–1.2)
BUN SERPL-MCNC: 17 MG/DL — SIGNIFICANT CHANGE UP (ref 7–18)
CALCIUM SERPL-MCNC: 9 MG/DL — SIGNIFICANT CHANGE UP (ref 8.4–10.5)
CHLORIDE SERPL-SCNC: 100 MMOL/L — SIGNIFICANT CHANGE UP (ref 96–108)
CO2 SERPL-SCNC: 28 MMOL/L — SIGNIFICANT CHANGE UP (ref 22–31)
CREAT SERPL-MCNC: 0.6 MG/DL — SIGNIFICANT CHANGE UP (ref 0.5–1.3)
EGFR: 111 ML/MIN/1.73M2 — SIGNIFICANT CHANGE UP
GLUCOSE SERPL-MCNC: 161 MG/DL — HIGH (ref 70–99)
HCT VFR BLD CALC: 26.1 % — LOW (ref 39–50)
HGB BLD-MCNC: 8.5 G/DL — LOW (ref 13–17)
MAGNESIUM SERPL-MCNC: 2.4 MG/DL — SIGNIFICANT CHANGE UP (ref 1.6–2.6)
MCHC RBC-ENTMCNC: 28.6 PG — SIGNIFICANT CHANGE UP (ref 27–34)
MCHC RBC-ENTMCNC: 32.6 GM/DL — SIGNIFICANT CHANGE UP (ref 32–36)
MCV RBC AUTO: 87.9 FL — SIGNIFICANT CHANGE UP (ref 80–100)
NRBC # BLD: 0 /100 WBCS — SIGNIFICANT CHANGE UP (ref 0–0)
PHOSPHATE SERPL-MCNC: 2.9 MG/DL — SIGNIFICANT CHANGE UP (ref 2.5–4.5)
PLATELET # BLD AUTO: 480 K/UL — HIGH (ref 150–400)
POTASSIUM SERPL-MCNC: 3.9 MMOL/L — SIGNIFICANT CHANGE UP (ref 3.5–5.3)
POTASSIUM SERPL-SCNC: 3.9 MMOL/L — SIGNIFICANT CHANGE UP (ref 3.5–5.3)
PROT SERPL-MCNC: 7.6 G/DL — SIGNIFICANT CHANGE UP (ref 6–8.3)
RBC # BLD: 2.97 M/UL — LOW (ref 4.2–5.8)
RBC # FLD: 13.7 % — SIGNIFICANT CHANGE UP (ref 10.3–14.5)
SODIUM SERPL-SCNC: 135 MMOL/L — SIGNIFICANT CHANGE UP (ref 135–145)
WBC # BLD: 9.39 K/UL — SIGNIFICANT CHANGE UP (ref 3.8–10.5)
WBC # FLD AUTO: 9.39 K/UL — SIGNIFICANT CHANGE UP (ref 3.8–10.5)

## 2023-03-17 RX ADMIN — Medication 6 UNIT(S): at 11:48

## 2023-03-17 RX ADMIN — MEROPENEM 100 MILLIGRAM(S): 1 INJECTION INTRAVENOUS at 05:58

## 2023-03-17 RX ADMIN — MEROPENEM 100 MILLIGRAM(S): 1 INJECTION INTRAVENOUS at 21:13

## 2023-03-17 RX ADMIN — Medication 650 MILLIGRAM(S): at 11:52

## 2023-03-17 RX ADMIN — Medication 6 UNIT(S): at 06:37

## 2023-03-17 RX ADMIN — Medication 1: at 11:49

## 2023-03-17 RX ADMIN — Medication 6 UNIT(S): at 18:29

## 2023-03-17 RX ADMIN — TAMSULOSIN HYDROCHLORIDE 0.4 MILLIGRAM(S): 0.4 CAPSULE ORAL at 21:13

## 2023-03-17 RX ADMIN — Medication 6 UNIT(S): at 00:28

## 2023-03-17 RX ADMIN — Medication 1: at 06:36

## 2023-03-17 RX ADMIN — Medication 81 MILLIGRAM(S): at 11:48

## 2023-03-17 RX ADMIN — Medication 300 MILLIGRAM(S): at 11:47

## 2023-03-17 RX ADMIN — Medication 3 MILLILITER(S): at 03:07

## 2023-03-17 RX ADMIN — POLYETHYLENE GLYCOL 3350 17 GRAM(S): 17 POWDER, FOR SOLUTION ORAL at 11:48

## 2023-03-17 RX ADMIN — ENOXAPARIN SODIUM 40 MILLIGRAM(S): 100 INJECTION SUBCUTANEOUS at 09:59

## 2023-03-17 RX ADMIN — Medication 100 MILLIGRAM(S): at 05:58

## 2023-03-17 RX ADMIN — MEROPENEM 100 MILLIGRAM(S): 1 INJECTION INTRAVENOUS at 13:20

## 2023-03-17 RX ADMIN — Medication 650 MILLIGRAM(S): at 13:18

## 2023-03-17 RX ADMIN — INSULIN GLARGINE 12 UNIT(S): 100 INJECTION, SOLUTION SUBCUTANEOUS at 08:31

## 2023-03-17 RX ADMIN — Medication 1 PATCH: at 11:47

## 2023-03-17 RX ADMIN — Medication 1 PATCH: at 11:45

## 2023-03-17 RX ADMIN — Medication 3 MILLILITER(S): at 09:43

## 2023-03-17 RX ADMIN — Medication 300 MILLIGRAM(S): at 00:29

## 2023-03-17 RX ADMIN — INSULIN GLARGINE 12 UNIT(S): 100 INJECTION, SOLUTION SUBCUTANEOUS at 22:26

## 2023-03-17 RX ADMIN — Medication 100 MILLIGRAM(S): at 18:30

## 2023-03-17 RX ADMIN — Medication 1 APPLICATION(S): at 18:30

## 2023-03-17 RX ADMIN — SENNA PLUS 10 MILLILITER(S): 8.6 TABLET ORAL at 11:48

## 2023-03-17 RX ADMIN — Medication 1 PATCH: at 07:50

## 2023-03-17 RX ADMIN — Medication 3 MILLILITER(S): at 14:33

## 2023-03-17 RX ADMIN — Medication 3 MILLILITER(S): at 20:27

## 2023-03-17 RX ADMIN — Medication 1 PATCH: at 21:10

## 2023-03-17 NOTE — PROGRESS NOTE ADULT - PROBLEM SELECTOR PLAN 10
DVT and GI prophylaxis.  Continue antibiotics as per ID meropemem and vanco.  Monitor vanco trough.  Repeated blood cultures from 3/13  negative  ID f/u for antibiotic plan.  Discharge planning underway.

## 2023-03-17 NOTE — PROGRESS NOTE ADULT - PROBLEM SELECTOR PLAN 5
Currently normotensive.  Antihypertensives currently on hold secondary to sepsis  Adjust medications as need.

## 2023-03-17 NOTE — PROGRESS NOTE ADULT - SUBJECTIVE AND OBJECTIVE BOX
WILMA NICOLE    SCU progress note    INTERVAL HPI/OVERNIGHT EVENTS: ***Afebrile    DNR [ ]   DNI  [  ]   Full Code    Covid - 19 PCR: Negative 3/9    The 4Ms    What Matters Most: see GO  Age appropriate Medications/Screen for High Risk Medication: Yes  Mentation: see CAM below  Mobility: defer to physical exam    The Confusion Assessment Method (CAM) Diagnostic Algorithm     1: Acute Onset or Fluctuating Course  - Is there evidence of an acute change in mental status from the patient’s baseline? Did the (abnormal) behavior  fluctuate during the day, that is, tend to come and go, or increase and decrease in severity?  [ ] YES [x ] NO     2: Inattention  - Did the patient have difficulty focusing attention, being easily distractible, or having difficulty keeping track of what was being said?  [ ] YES [ ] NO  Unable to access     3: Disorganized thinking  -Was the patient’s thinking disorganized or incoherent, such as rambling or irrelevant conversation, unclear or illogical flow of ideas, or unpredictable switching from subject to subject?  [ ] YES [ ] NO   Unable to access    4: Altered Level of consciousness?  [ ] YES [ ] NO    The diagnosis of delirium by CAM requires the presence of features 1 and 2 and either 3 or 4.    PRESSORS: [ ] YES [x ] NO  meropenem  IVPB      meropenem  IVPB 1000 milliGRAM(s) IV Intermittent every 8 hours  vancomycin  IVPB 1500 milliGRAM(s) IV Intermittent every 12 hours    Cardiovascular:  Heart Failure  Acute   Acute on Chronic  Chronic         Pulmonary:  albuterol/ipratropium for Nebulization 3 milliLiter(s) Nebulizer every 6 hours    Hematalogic:  aspirin  chewable 81 milliGRAM(s) Oral daily  enoxaparin Injectable 40 milliGRAM(s) SubCutaneous every 24 hours    Other:  acetaminophen    Suspension .. 650 milliGRAM(s) Oral every 6 hours PRN  aluminum hydroxide/magnesium hydroxide/simethicone Suspension 30 milliLiter(s) Oral every 4 hours PRN  amantadine Syrup 100 milliGRAM(s) Oral two times a day  insulin glargine Injectable (LANTUS) 12 Unit(s) SubCutaneous every morning  insulin glargine Injectable (LANTUS) 12 Unit(s) SubCutaneous at bedtime  insulin lispro (ADMELOG) corrective regimen sliding scale   SubCutaneous every 6 hours  insulin lispro Injectable (ADMELOG) 6 Unit(s) SubCutaneous every 6 hours  melatonin 3 milliGRAM(s) Oral at bedtime PRN  nicotine -   7 mG/24Hr(s) Patch 1 Patch Transdermal daily  ondansetron Injectable 4 milliGRAM(s) IV Push every 8 hours PRN  polyethylene glycol 3350 17 Gram(s) Oral daily  senna Syrup 10 milliLiter(s) Oral daily  silver sulfADIAZINE 1% Cream 1 Application(s) Topical two times a day  tamsulosin 0.4 milliGRAM(s) Oral at bedtime    acetaminophen    Suspension .. 650 milliGRAM(s) Oral every 6 hours PRN  albuterol/ipratropium for Nebulization 3 milliLiter(s) Nebulizer every 6 hours  aluminum hydroxide/magnesium hydroxide/simethicone Suspension 30 milliLiter(s) Oral every 4 hours PRN  amantadine Syrup 100 milliGRAM(s) Oral two times a day  aspirin  chewable 81 milliGRAM(s) Oral daily  enoxaparin Injectable 40 milliGRAM(s) SubCutaneous every 24 hours  insulin glargine Injectable (LANTUS) 12 Unit(s) SubCutaneous every morning  insulin glargine Injectable (LANTUS) 12 Unit(s) SubCutaneous at bedtime  insulin lispro (ADMELOG) corrective regimen sliding scale   SubCutaneous every 6 hours  insulin lispro Injectable (ADMELOG) 6 Unit(s) SubCutaneous every 6 hours  melatonin 3 milliGRAM(s) Oral at bedtime PRN  meropenem  IVPB      meropenem  IVPB 1000 milliGRAM(s) IV Intermittent every 8 hours  nicotine -   7 mG/24Hr(s) Patch 1 Patch Transdermal daily  ondansetron Injectable 4 milliGRAM(s) IV Push every 8 hours PRN  polyethylene glycol 3350 17 Gram(s) Oral daily  senna Syrup 10 milliLiter(s) Oral daily  silver sulfADIAZINE 1% Cream 1 Application(s) Topical two times a day  tamsulosin 0.4 milliGRAM(s) Oral at bedtime  vancomycin  IVPB 1500 milliGRAM(s) IV Intermittent every 12 hours    Drug Dosing Weight  Height (cm): 177.8 (05 Mar 2023 03:36)  Weight (kg): 86.2 (05 Mar 2023 03:36)  BMI (kg/m2): 27.3 (05 Mar 2023 03:36)  BSA (m2): 2.04 (05 Mar 2023 03:36)    CENTRAL LINE: [ ] YES [x ] NO  LOCATION:   DATE INSERTED:  REMOVE: [ ] YES [ ] NO  EXPLAIN:    MELCHOR: [x ] YES [ ] NO    DATE INSERTED: 3/15  REMOVE:  [ ] YES [x ] NO  EXPLAIN:  No failed multiple TOV    PAST MEDICAL & SURGICAL HISTORY:  History of subdural hemorrhage      PEG (percutaneous endoscopic gastrostomy) status      DM (diabetes mellitus)      S/P craniotomy                  03-16 @ 07:01  -  03-17 @ 07:00  --------------------------------------------------------  IN: 0 mL / OUT: 1400 mL / NET: -1400 mL            PHYSICAL EXAM:    GENERAL: NAD,   HEAD:  Right sided craniofacial deformity  EYES: PERRLA, conjunctiva and sclera clear  ENMT: No tonsillar erythema, exudates  NECK: Supple, No JVD, tracheostomy intact  NERVOUS SYSTEM:  Awake. Nonverbal at baseline. Does not follow commands. No spontaneous movement of extremities  CHEST/LUNG: Diminished breath sounds bilateral bases. Continues to have thick copious secretions.  HEART: Regular rate and rhythm; No murmurs, rubs, or gallops  ABDOMEN: +Peg intact Soft, Nontender, Nondistended; Bowel sounds present  EXTREMITIES:  No spontaneous movement of extremities. 2+ Peripheral Pulses, No clubbing, cyanosis, or edema  LYMPH: No lymphadenopathy noted  SKIN: Stage 1 coccyx and bilateral heels.      LABS:  CBC Full  -  ( 17 Mar 2023 06:41 )  WBC Count : 9.39 K/uL  RBC Count : 2.97 M/uL  Hemoglobin : 8.5 g/dL  Hematocrit : 26.1 %  Platelet Count - Automated : 480 K/uL  Mean Cell Volume : 87.9 fl  Mean Cell Hemoglobin : 28.6 pg  Mean Cell Hemoglobin Concentration : 32.6 gm/dL  Auto Neutrophil # : x  Auto Lymphocyte # : x  Auto Monocyte # : x  Auto Eosinophil # : x  Auto Basophil # : x  Auto Neutrophil % : x  Auto Lymphocyte % : x  Auto Monocyte % : x  Auto Eosinophil % : x  Auto Basophil % : x    03-16    135  |  99  |  17  ----------------------------<  155<H>  3.9   |  28  |  0.58    Ca    9.2      16 Mar 2023 06:24  Phos  2.7     03-16  Mg     2.3     03-16    TPro  7.8  /  Alb  2.1<L>  /  TBili  0.5  /  DBili  x   /  AST  57<H>  /  ALT  85<H>  /  AlkPhos  232<H>  03-16              [  ]  DVT Prophylaxis  [  ]  Nutrition, Brand, Rate         Goal Rate        Abnormal Nutritional Status -  Malnutrition   Cachexia          RADIOLOGY & ADDITIONAL STUDIES:  ***  < from: Xray Chest 1 View- PORTABLE-Urgent (Xray Chest 1 View- PORTABLE-Urgent .) (03.13.23 @ 17:19) >  Heart size and the mediastinum cannot be accurately evaluated on this   projection. Tracheostomy tube in place.  There is new thickened bandlike opacity extending from the right hilum to   the periphery of the right upper lobe.  There is linear atelectasis in the left lower lung.  No pleural effusion or pneumothorax is seen.  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      IMPRESSION:  New thickened bandlike opacity extending from the right   hilum to the periphery of the right upper lobe, of indeterminate nature.   Question artifact, platelike atelectasis, or possibly pleural fluid   within a fissure. Follow-up chest x-ray can be obtained.    Left lower lung linear atelectasis.      < end of copied text >  < from: US Abdomen Upper Quadrant Right (03.13.23 @ 11:18) >    FINDINGS:  Liver: Within normal limits.  Bile ducts: Normal caliber. Common bile duct measures 5 mm.  Gallbladder: Within normal limits.  Pancreas: Visualized portions are within normal limits.  Right kidney: 11.8 cm. No hydronephrosis.  Ascites: None.  IVC and aorta: Visualized portions normal in caliber.    IMPRESSION:  Technically limited study, otherwise unremarkable right upper quadrant   ultrasound.    --- End of Report ---      < end of copied text >    Goals of Care Discussion with Family/Proxy/Other   - see note from 3/05/23

## 2023-03-17 NOTE — PROGRESS NOTE ADULT - SUBJECTIVE AND OBJECTIVE BOX
WILMA NICOLE    SCU progress note    INTERVAL HPI/OVERNIGHT EVENTS: ***Afebrile    DNR [ ]   DNI  [  ]   Full Code    Covid - 19 PCR: Negative 3/9    The 4Ms    What Matters Most: see GO  Age appropriate Medications/Screen for High Risk Medication: Yes  Mentation: see CAM below  Mobility: defer to physical exam    The Confusion Assessment Method (CAM) Diagnostic Algorithm     1: Acute Onset or Fluctuating Course  - Is there evidence of an acute change in mental status from the patient’s baseline? Did the (abnormal) behavior  fluctuate during the day, that is, tend to come and go, or increase and decrease in severity?  [ ] YES [x ] NO     2: Inattention  - Did the patient have difficulty focusing attention, being easily distractible, or having difficulty keeping track of what was being said?  [ ] YES [ ] NO  Unable to access     3: Disorganized thinking  -Was the patient’s thinking disorganized or incoherent, such as rambling or irrelevant conversation, unclear or illogical flow of ideas, or unpredictable switching from subject to subject?  [ ] YES [ ] NO   Unable to access    4: Altered Level of consciousness?  [ ] YES [ ] NO    The diagnosis of delirium by CAM requires the presence of features 1 and 2 and either 3 or 4.    PRESSORS: [ ] YES [x ] NO  meropenem  IVPB      meropenem  IVPB 1000 milliGRAM(s) IV Intermittent every 8 hours  vancomycin  IVPB 1500 milliGRAM(s) IV Intermittent every 12 hours    Cardiovascular:  Heart Failure  Acute   Acute on Chronic  Chronic         Pulmonary:  albuterol/ipratropium for Nebulization 3 milliLiter(s) Nebulizer every 6 hours    Hematalogic:  aspirin  chewable 81 milliGRAM(s) Oral daily  enoxaparin Injectable 40 milliGRAM(s) SubCutaneous every 24 hours    Other:  acetaminophen    Suspension .. 650 milliGRAM(s) Oral every 6 hours PRN  aluminum hydroxide/magnesium hydroxide/simethicone Suspension 30 milliLiter(s) Oral every 4 hours PRN  amantadine Syrup 100 milliGRAM(s) Oral two times a day  insulin glargine Injectable (LANTUS) 12 Unit(s) SubCutaneous every morning  insulin glargine Injectable (LANTUS) 12 Unit(s) SubCutaneous at bedtime  insulin lispro (ADMELOG) corrective regimen sliding scale   SubCutaneous every 6 hours  insulin lispro Injectable (ADMELOG) 6 Unit(s) SubCutaneous every 6 hours  melatonin 3 milliGRAM(s) Oral at bedtime PRN  nicotine -   7 mG/24Hr(s) Patch 1 Patch Transdermal daily  ondansetron Injectable 4 milliGRAM(s) IV Push every 8 hours PRN  polyethylene glycol 3350 17 Gram(s) Oral daily  senna Syrup 10 milliLiter(s) Oral daily  silver sulfADIAZINE 1% Cream 1 Application(s) Topical two times a day  tamsulosin 0.4 milliGRAM(s) Oral at bedtime    acetaminophen    Suspension .. 650 milliGRAM(s) Oral every 6 hours PRN  albuterol/ipratropium for Nebulization 3 milliLiter(s) Nebulizer every 6 hours  aluminum hydroxide/magnesium hydroxide/simethicone Suspension 30 milliLiter(s) Oral every 4 hours PRN  amantadine Syrup 100 milliGRAM(s) Oral two times a day  aspirin  chewable 81 milliGRAM(s) Oral daily  enoxaparin Injectable 40 milliGRAM(s) SubCutaneous every 24 hours  insulin glargine Injectable (LANTUS) 12 Unit(s) SubCutaneous every morning  insulin glargine Injectable (LANTUS) 12 Unit(s) SubCutaneous at bedtime  insulin lispro (ADMELOG) corrective regimen sliding scale   SubCutaneous every 6 hours  insulin lispro Injectable (ADMELOG) 6 Unit(s) SubCutaneous every 6 hours  melatonin 3 milliGRAM(s) Oral at bedtime PRN  meropenem  IVPB      meropenem  IVPB 1000 milliGRAM(s) IV Intermittent every 8 hours  nicotine -   7 mG/24Hr(s) Patch 1 Patch Transdermal daily  ondansetron Injectable 4 milliGRAM(s) IV Push every 8 hours PRN  polyethylene glycol 3350 17 Gram(s) Oral daily  senna Syrup 10 milliLiter(s) Oral daily  silver sulfADIAZINE 1% Cream 1 Application(s) Topical two times a day  tamsulosin 0.4 milliGRAM(s) Oral at bedtime  vancomycin  IVPB 1500 milliGRAM(s) IV Intermittent every 12 hours    Drug Dosing Weight  Height (cm): 177.8 (05 Mar 2023 03:36)  Weight (kg): 86.2 (05 Mar 2023 03:36)  BMI (kg/m2): 27.3 (05 Mar 2023 03:36)  BSA (m2): 2.04 (05 Mar 2023 03:36)    CENTRAL LINE: [ ] YES [x ] NO  LOCATION:   DATE INSERTED:  REMOVE: [ ] YES [ ] NO  EXPLAIN:    MELCHOR: [x ] YES [ ] NO    DATE INSERTED: 3/15  REMOVE:  [ ] YES [x ] NO  EXPLAIN:  No failed multiple TOV    PAST MEDICAL & SURGICAL HISTORY:  History of subdural hemorrhage      PEG (percutaneous endoscopic gastrostomy) status      DM (diabetes mellitus)      S/P craniotomy                  03-16 @ 07:01  -  03-17 @ 07:00  --------------------------------------------------------  IN: 0 mL / OUT: 1400 mL / NET: -1400 mL            PHYSICAL EXAM:    GENERAL: NAD,   HEAD:  Right sided craniofacial deformity  EYES: PERRLA, conjunctiva and sclera clear  ENMT: No tonsillar erythema, exudates  NECK: Supple, No JVD, tracheostomy intact  NERVOUS SYSTEM:  Awake. Nonverbal at baseline. Does not follow commands. No spontaneous movement of extremities  CHEST/LUNG: Diminished breath sounds bilateral bases. Continues to have thick copious secretions.  HEART: Regular rate and rhythm; No murmurs, rubs, or gallops  ABDOMEN: +Peg intact Soft, Nontender, Nondistended; Bowel sounds present  EXTREMITIES:  No spontaneous movement of extremities. 2+ Peripheral Pulses, No clubbing, cyanosis, or edema  LYMPH: No lymphadenopathy noted  SKIN: Stage 1 coccyx and bilateral heels.      LABS:  CBC Full  -  ( 17 Mar 2023 06:41 )  WBC Count : 9.39 K/uL  RBC Count : 2.97 M/uL  Hemoglobin : 8.5 g/dL  Hematocrit : 26.1 %  Platelet Count - Automated : 480 K/uL  Mean Cell Volume : 87.9 fl  Mean Cell Hemoglobin : 28.6 pg  Mean Cell Hemoglobin Concentration : 32.6 gm/dL  Auto Neutrophil # : x  Auto Lymphocyte # : x  Auto Monocyte # : x  Auto Eosinophil # : x  Auto Basophil # : x  Auto Neutrophil % : x  Auto Lymphocyte % : x  Auto Monocyte % : x  Auto Eosinophil % : x  Auto Basophil % : x    03-16    135  |  99  |  17  ----------------------------<  155<H>  3.9   |  28  |  0.58    Ca    9.2      16 Mar 2023 06:24  Phos  2.7     03-16  Mg     2.3     03-16    TPro  7.8  /  Alb  2.1<L>  /  TBili  0.5  /  DBili  x   /  AST  57<H>  /  ALT  85<H>  /  AlkPhos  232<H>  03-16              [  ]  DVT Prophylaxis  [  ]  Nutrition, Brand, Rate         Goal Rate        Abnormal Nutritional Status -  Malnutrition   Cachexia          RADIOLOGY & ADDITIONAL STUDIES:  ***  < from: Xray Chest 1 View- PORTABLE-Urgent (Xray Chest 1 View- PORTABLE-Urgent .) (03.13.23 @ 17:19) >  Heart size and the mediastinum cannot be accurately evaluated on this   projection. Tracheostomy tube in place.  There is new thickened bandlike opacity extending from the right hilum to   the periphery of the right upper lobe.  There is linear atelectasis in the left lower lung.  No pleural effusion or pneumothorax is seen.  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      IMPRESSION:  New thickened bandlike opacity extending from the right   hilum to the periphery of the right upper lobe, of indeterminate nature.   Question artifact, platelike atelectasis, or possibly pleural fluid   within a fissure. Follow-up chest x-ray can be obtained.    Left lower lung linear atelectasis.      < end of copied text >  < from: US Abdomen Upper Quadrant Right (03.13.23 @ 11:18) >    FINDINGS:  Liver: Within normal limits.  Bile ducts: Normal caliber. Common bile duct measures 5 mm.  Gallbladder: Within normal limits.  Pancreas: Visualized portions are within normal limits.  Right kidney: 11.8 cm. No hydronephrosis.  Ascites: None.  IVC and aorta: Visualized portions normal in caliber.    IMPRESSION:  Technically limited study, otherwise unremarkable right upper quadrant   ultrasound.    --- End of Report ---      < end of copied text >

## 2023-03-17 NOTE — PROGRESS NOTE ADULT - PROBLEM SELECTOR PLAN 3
Secondary to SDH and craniotomy  Craniotomy at [facility] 1/23  Maintain safety precautions  Strict aspiration precautions.  Seizure precautions.

## 2023-03-17 NOTE — PROGRESS NOTE ADULT - ASSESSMENT
62M from [facility], h/o traumatic subdural hemorrhage following a fall down the stairs while drunk, s/p r craniotomy at [facility] in 01/2023, s/p trach/PEG BIBEMS for tachycardia 170s, RR 27, increased secretions admitted to  for sepsis secondary to RUL pneumonia with tracheostomy to 3L NC.  3/6 Febrile 101.7  03/07: No events overnight. On [place]-trach at 3 L and tolerating well. Hemodynamically stable. Cultures in progress. C/w Vanco and Cefepime. F/u Vanco trough. Hypokalemic this AM and replacement ordered.   03/08: Afebrile. Vanco discontinued since MRSA negative. + yeast in sputum; + Candida Galbrata in urine (Bran in place with clear urine and afebrile).   Dr. Peres following. Blood cx NGTD. C/w Cefepime.   03/09: Febrile, tachycardic and tachypneic this AM. Code sepsis called. Sepsis w/u in progress.  Started on Vanco. ID following, . Discussed with Dr. Peres. F/u CXR. Worsening Leukocytosis. IVF bolus with LR (2,300 ml)  per protocol. Lactate 1.9.   3/11: afebrile over past 24hrs, hemodynamically stable. Vanco trough 11.7  03/12: Vanco trough this PM. Blood cx remains NGTD. C/w Vanco and Cefepime. ID following. Persistent Transaminitis.  Hep C non-reactive. F/u remaining Hepatitis panel. Patient on high dose statin which could be contributing to transaminitis. Hold for now and re-eval. Given that patient is unable to verbalize/demonstrate if symptomatic will order Liver US.   03/13: Spiked fevers overnight. Tachycardic and hypotensive. IVF bolus given. Blood cx in progress.  F/u Procal and Lactate. Vanco increased to 1500 mg BID and Meropenem added and Cefepime discontinued Discussed wih Dr. Peres and reccs appreciated  Worsening leukocytosis Pending Abdominal US 2/2 transaminitis.   03/14: Febrile. Urinary retention. On Flomax. Bladder scan and Straight cath Q 4-6 hours. On Vanco and Jose Elias. F/u cx. Abd US unremarkable. CXR 3/13 negative for acute findings.   3/15 Bladder scan 550. Bran cath reinserted.

## 2023-03-17 NOTE — PROGRESS NOTE ADULT - ASSESSMENT
62M from [facility], h/o traumatic subdural hemorrhage following a fall down the stairs while drunk, s/p r craniotomy at [facility] in 01/2023, s/p trach/PEG BIBEMS for tachycardia 170s, RR 27, increased secretions admitted to  for sepsis secondary to RUL pneumonia with tracheostomy to 3L NC.  3/6 Febrile 101.7  03/07: No events overnight. On [place]-trach at 3 L and tolerating well. Hemodynamically stable. Cultures in progress. C/w Vanco and Cefepime. F/u Vanco trough. Hypokalemic this AM and replacement ordered.   03/08: Afebrile. Vanco discontinued since MRSA negative. + yeast in sputum; + Candida Galbrata in urine (Bran in place with clear urine and afebrile).   Dr. Peres following. Blood cx NGTD. C/w Cefepime.   03/09: Febrile, tachycardic and tachypneic this AM. Code sepsis called. Sepsis w/u in progress.  Started on Vanco. ID following, . Discussed with Dr. Peres. F/u CXR. Worsening Leukocytosis. IVF bolus with LR (2,300 ml)  per protocol. Lactate 1.9.   3/11: afebrile over past 24hrs, hemodynamically stable. Vanco trough 11.7  03/12: Vanco trough this PM. Blood cx remains NGTD. C/w Vanco and Cefepime. ID following. Persistent Transaminitis.  Hep C non-reactive. F/u remaining Hepatitis panel. Patient on high dose statin which could be contributing to transaminitis. Hold for now and re-eval. Given that patient is unable to verbalize/demonstrate if symptomatic will order Liver US.   03/13: Spiked fevers overnight. Tachycardic and hypotensive. IVF bolus given. Blood cx in progress.  F/u Procal and Lactate. Vanco increased to 1500 mg BID and Meropenem added and Cefepime discontinued Discussed wih Dr. Peres and reccs appreciated  Worsening leukocytosis Pending Abdominal US 2/2 transaminitis.   03/14: Febrile. Urinary retention. On Flomax. Bladder scan and Straight cath Q 4-6 hours. On Vanco and Jose Elias. F/u cx. Abd US unremarkable. CXR 3/13 negative for acute findings.   3/15 Bladder scan 550. Bran cath reinserted.

## 2023-03-17 NOTE — PROGRESS NOTE ADULT - PROBLEM SELECTOR PLAN 2
Continue hydro-trach collar.  Currently tolerating 2LPM via hydro-trach collar.  Monitor oxygen saturation.  Continue bronchodilators  Antibiotics as per ID.  Keep head of bed elevated at all times.

## 2023-03-17 NOTE — PROGRESS NOTE ADULT - PROBLEM SELECTOR PLAN 3
Secondary to SDH and craniotomy  Craniotomy at [facility] 1/23  Maintain safety precautions  Strict aspiration precautions.  Seizure precautions.

## 2023-03-17 NOTE — PROGRESS NOTE ADULT - PROBLEM SELECTOR PLAN 1
likely secondary to pneumonia.  Repeated blood cultures on 3/13 Negative  Continue meropenum and vanco.  ID DR Peres. ID f/u

## 2023-03-18 LAB
ALBUMIN SERPL ELPH-MCNC: 2.1 G/DL — LOW (ref 3.5–5)
ALP SERPL-CCNC: 223 U/L — HIGH (ref 40–120)
ALT FLD-CCNC: 69 U/L DA — HIGH (ref 10–60)
ANION GAP SERPL CALC-SCNC: 5 MMOL/L — SIGNIFICANT CHANGE UP (ref 5–17)
AST SERPL-CCNC: 40 U/L — SIGNIFICANT CHANGE UP (ref 10–40)
BILIRUB SERPL-MCNC: 0.6 MG/DL — SIGNIFICANT CHANGE UP (ref 0.2–1.2)
BUN SERPL-MCNC: 17 MG/DL — SIGNIFICANT CHANGE UP (ref 7–18)
CALCIUM SERPL-MCNC: 9.1 MG/DL — SIGNIFICANT CHANGE UP (ref 8.4–10.5)
CHLORIDE SERPL-SCNC: 100 MMOL/L — SIGNIFICANT CHANGE UP (ref 96–108)
CO2 SERPL-SCNC: 29 MMOL/L — SIGNIFICANT CHANGE UP (ref 22–31)
CREAT SERPL-MCNC: 0.64 MG/DL — SIGNIFICANT CHANGE UP (ref 0.5–1.3)
CULTURE RESULTS: SIGNIFICANT CHANGE UP
CULTURE RESULTS: SIGNIFICANT CHANGE UP
EGFR: 108 ML/MIN/1.73M2 — SIGNIFICANT CHANGE UP
GLUCOSE SERPL-MCNC: 134 MG/DL — HIGH (ref 70–99)
HCT VFR BLD CALC: 26.3 % — LOW (ref 39–50)
HGB BLD-MCNC: 8.4 G/DL — LOW (ref 13–17)
MAGNESIUM SERPL-MCNC: 2.5 MG/DL — SIGNIFICANT CHANGE UP (ref 1.6–2.6)
MCHC RBC-ENTMCNC: 27.7 PG — SIGNIFICANT CHANGE UP (ref 27–34)
MCHC RBC-ENTMCNC: 31.9 GM/DL — LOW (ref 32–36)
MCV RBC AUTO: 86.8 FL — SIGNIFICANT CHANGE UP (ref 80–100)
NRBC # BLD: 0 /100 WBCS — SIGNIFICANT CHANGE UP (ref 0–0)
PHOSPHATE SERPL-MCNC: 3.6 MG/DL — SIGNIFICANT CHANGE UP (ref 2.5–4.5)
PLATELET # BLD AUTO: 458 K/UL — HIGH (ref 150–400)
POTASSIUM SERPL-MCNC: 4 MMOL/L — SIGNIFICANT CHANGE UP (ref 3.5–5.3)
POTASSIUM SERPL-SCNC: 4 MMOL/L — SIGNIFICANT CHANGE UP (ref 3.5–5.3)
PROT SERPL-MCNC: 7.8 G/DL — SIGNIFICANT CHANGE UP (ref 6–8.3)
RBC # BLD: 3.03 M/UL — LOW (ref 4.2–5.8)
RBC # FLD: 14.2 % — SIGNIFICANT CHANGE UP (ref 10.3–14.5)
SODIUM SERPL-SCNC: 134 MMOL/L — LOW (ref 135–145)
SPECIMEN SOURCE: SIGNIFICANT CHANGE UP
SPECIMEN SOURCE: SIGNIFICANT CHANGE UP
VANCOMYCIN TROUGH SERPL-MCNC: 13.2 UG/ML — SIGNIFICANT CHANGE UP (ref 10–20)
VANCOMYCIN TROUGH SERPL-MCNC: 22.4 UG/ML — HIGH (ref 10–20)
WBC # BLD: 9.07 K/UL — SIGNIFICANT CHANGE UP (ref 3.8–10.5)
WBC # FLD AUTO: 9.07 K/UL — SIGNIFICANT CHANGE UP (ref 3.8–10.5)

## 2023-03-18 RX ADMIN — POLYETHYLENE GLYCOL 3350 17 GRAM(S): 17 POWDER, FOR SOLUTION ORAL at 11:56

## 2023-03-18 RX ADMIN — ENOXAPARIN SODIUM 40 MILLIGRAM(S): 100 INJECTION SUBCUTANEOUS at 11:56

## 2023-03-18 RX ADMIN — TAMSULOSIN HYDROCHLORIDE 0.4 MILLIGRAM(S): 0.4 CAPSULE ORAL at 21:49

## 2023-03-18 RX ADMIN — Medication 1 APPLICATION(S): at 17:32

## 2023-03-18 RX ADMIN — INSULIN GLARGINE 12 UNIT(S): 100 INJECTION, SOLUTION SUBCUTANEOUS at 21:49

## 2023-03-18 RX ADMIN — Medication 3 MILLILITER(S): at 03:19

## 2023-03-18 RX ADMIN — Medication 6 UNIT(S): at 00:14

## 2023-03-18 RX ADMIN — MEROPENEM 100 MILLIGRAM(S): 1 INJECTION INTRAVENOUS at 05:37

## 2023-03-18 RX ADMIN — Medication 100 MILLIGRAM(S): at 18:56

## 2023-03-18 RX ADMIN — Medication 1: at 17:20

## 2023-03-18 RX ADMIN — MEROPENEM 100 MILLIGRAM(S): 1 INJECTION INTRAVENOUS at 21:49

## 2023-03-18 RX ADMIN — Medication 1 APPLICATION(S): at 05:34

## 2023-03-18 RX ADMIN — Medication 3 MILLILITER(S): at 14:10

## 2023-03-18 RX ADMIN — Medication 3 MILLILITER(S): at 20:36

## 2023-03-18 RX ADMIN — Medication 3 MILLILITER(S): at 08:41

## 2023-03-18 RX ADMIN — Medication 6 UNIT(S): at 11:58

## 2023-03-18 RX ADMIN — Medication 1 PATCH: at 11:56

## 2023-03-18 RX ADMIN — SENNA PLUS 10 MILLILITER(S): 8.6 TABLET ORAL at 11:55

## 2023-03-18 RX ADMIN — Medication 81 MILLIGRAM(S): at 11:55

## 2023-03-18 RX ADMIN — Medication 300 MILLIGRAM(S): at 13:06

## 2023-03-18 RX ADMIN — Medication 6 UNIT(S): at 06:18

## 2023-03-18 RX ADMIN — Medication 1 PATCH: at 11:57

## 2023-03-18 RX ADMIN — Medication 100 MILLIGRAM(S): at 05:35

## 2023-03-18 RX ADMIN — Medication 6 UNIT(S): at 17:21

## 2023-03-18 RX ADMIN — Medication 1 PATCH: at 19:28

## 2023-03-18 RX ADMIN — Medication 1 PATCH: at 07:30

## 2023-03-18 RX ADMIN — INSULIN GLARGINE 12 UNIT(S): 100 INJECTION, SOLUTION SUBCUTANEOUS at 08:06

## 2023-03-18 RX ADMIN — MEROPENEM 100 MILLIGRAM(S): 1 INJECTION INTRAVENOUS at 13:31

## 2023-03-18 NOTE — PROGRESS NOTE ADULT - PROBLEM SELECTOR PLAN 3
Secondary to SDH and craniotomy  Craniotomy at [facility] 1/23  Maintain safety precautions  Strict aspiration precautions.  Seizure precautions.

## 2023-03-18 NOTE — PROGRESS NOTE ADULT - SUBJECTIVE AND OBJECTIVE BOX
WILMA NICOLE    SCU progress note    INTERVAL HPI/OVERNIGHT EVENTS: No acute events overnight     DNR [ ]   DNI  [ ] FULL CODE    Covid - 19 PCR: negative     The 4Ms    What Matters Most: see GOC  Age appropriate Medications/Screen for High Risk Medication: Yes  Mentation: see CAM below  Mobility: defer to physical exam    The Confusion Assessment Method (CAM) Diagnostic Algorithm     1: Acute Onset or Fluctuating Course  - Is there evidence of an acute change in mental status from the patient’s baseline? Did the (abnormal) behavior  fluctuate during the day, that is, tend to come and go, or increase and decrease in severity?  [ ] YES [ x] NO     2: Inattention  - Did the patient have difficulty focusing attention, being easily distractible, or having difficulty keeping track of what was being said?  [ ] YES [ x] NO     3: Disorganized thinking  -Was the patient’s thinking disorganized or incoherent, such as rambling or irrelevant conversation, unclear or illogical flow of ideas, or unpredictable switching from subject to subject?  [ ] YES [x ] NO    4: Altered Level of consciousness?  [ ] YES [x ] NO    The diagnosis of delirium by CAM requires the presence of features 1 and 2 and either 3 or 4.    PRESSORS: [ ] YES [x ] NO  meropenem  IVPB      meropenem  IVPB 1000 milliGRAM(s) IV Intermittent every 8 hours  vancomycin  IVPB 1500 milliGRAM(s) IV Intermittent every 12 hours    Cardiovascular:  Heart Failure  Acute   Acute on Chronic  Chronic         Pulmonary:  albuterol/ipratropium for Nebulization 3 milliLiter(s) Nebulizer every 6 hours    Hematalogic:  aspirin  chewable 81 milliGRAM(s) Oral daily  enoxaparin Injectable 40 milliGRAM(s) SubCutaneous every 24 hours    Other:  acetaminophen    Suspension .. 650 milliGRAM(s) Oral every 6 hours PRN  aluminum hydroxide/magnesium hydroxide/simethicone Suspension 30 milliLiter(s) Oral every 4 hours PRN  amantadine Syrup 100 milliGRAM(s) Oral two times a day  insulin glargine Injectable (LANTUS) 12 Unit(s) SubCutaneous every morning  insulin glargine Injectable (LANTUS) 12 Unit(s) SubCutaneous at bedtime  insulin lispro (ADMELOG) corrective regimen sliding scale   SubCutaneous every 6 hours  insulin lispro Injectable (ADMELOG) 6 Unit(s) SubCutaneous every 6 hours  melatonin 3 milliGRAM(s) Oral at bedtime PRN  nicotine -   7 mG/24Hr(s) Patch 1 Patch Transdermal daily  ondansetron Injectable 4 milliGRAM(s) IV Push every 8 hours PRN  polyethylene glycol 3350 17 Gram(s) Oral daily  senna Syrup 10 milliLiter(s) Oral daily  silver sulfADIAZINE 1% Cream 1 Application(s) Topical two times a day  tamsulosin 0.4 milliGRAM(s) Oral at bedtime    acetaminophen    Suspension .. 650 milliGRAM(s) Oral every 6 hours PRN  albuterol/ipratropium for Nebulization 3 milliLiter(s) Nebulizer every 6 hours  aluminum hydroxide/magnesium hydroxide/simethicone Suspension 30 milliLiter(s) Oral every 4 hours PRN  amantadine Syrup 100 milliGRAM(s) Oral two times a day  aspirin  chewable 81 milliGRAM(s) Oral daily  enoxaparin Injectable 40 milliGRAM(s) SubCutaneous every 24 hours  insulin glargine Injectable (LANTUS) 12 Unit(s) SubCutaneous every morning  insulin glargine Injectable (LANTUS) 12 Unit(s) SubCutaneous at bedtime  insulin lispro (ADMELOG) corrective regimen sliding scale   SubCutaneous every 6 hours  insulin lispro Injectable (ADMELOG) 6 Unit(s) SubCutaneous every 6 hours  melatonin 3 milliGRAM(s) Oral at bedtime PRN  meropenem  IVPB      meropenem  IVPB 1000 milliGRAM(s) IV Intermittent every 8 hours  nicotine -   7 mG/24Hr(s) Patch 1 Patch Transdermal daily  ondansetron Injectable 4 milliGRAM(s) IV Push every 8 hours PRN  polyethylene glycol 3350 17 Gram(s) Oral daily  senna Syrup 10 milliLiter(s) Oral daily  silver sulfADIAZINE 1% Cream 1 Application(s) Topical two times a day  tamsulosin 0.4 milliGRAM(s) Oral at bedtime  vancomycin  IVPB 1500 milliGRAM(s) IV Intermittent every 12 hours    Drug Dosing Weight  Height (cm): 177.8 (05 Mar 2023 03:36)  Weight (kg): 86.2 (05 Mar 2023 03:36)  BMI (kg/m2): 27.3 (05 Mar 2023 03:36)  BSA (m2): 2.04 (05 Mar 2023 03:36)    CENTRAL LINE: [ ] YES [x ] NO  LOCATION:   DATE INSERTED:  REMOVE: [ ] YES [ ] NO  EXPLAIN:    MELCHOR: [x ] YES [ ] NO    DATE INSERTED:  REMOVE:  [ ] YES [ ] NO  EXPLAIN:    PAST MEDICAL & SURGICAL HISTORY:  History of subdural hemorrhage      PEG (percutaneous endoscopic gastrostomy) status      DM (diabetes mellitus)      S/P craniotomy        03-17 @ 07:01  -  03-18 @ 07:00  --------------------------------------------------------  IN: 840 mL / OUT: 701 mL / NET: 139 mL        PHYSICAL EXAM:    GENERAL: NAD, tached to vent   HEAD:  R sided craniofacial deformity  EYES: EOMI, PERRLA, conjunctiva and sclera clear  ENMT: No tonsillar erythema, exudates, or enlargement; Moist mucous membranes, Good dentition, No lesions  NECK: Supple, No JVD, Normal thyroid  NERVOUS SYSTEM:  arousal to painful stimuli  CHEST/LUNG: Clear to auscultate bilaterally  HEART: Regular rate and rhythm; No murmurs, rubs, or gallops  ABDOMEN: Soft, Nontender, Nondistended; Bowel sounds present  EXTREMITIES:  2+ Peripheral Pulses, No clubbing, cyanosis, or edema  LYMPH: No lymphadenopathy noted  SKIN: sacral bilat heels pressure ulcer      LABS:  CBC Full  -  ( 18 Mar 2023 07:55 )  WBC Count : 9.07 K/uL  RBC Count : 3.03 M/uL  Hemoglobin : 8.4 g/dL  Hematocrit : 26.3 %  Platelet Count - Automated : 458 K/uL  Mean Cell Volume : 86.8 fl  Mean Cell Hemoglobin : 27.7 pg  Mean Cell Hemoglobin Concentration : 31.9 gm/dL  Auto Neutrophil # : x  Auto Lymphocyte # : x  Auto Monocyte # : x  Auto Eosinophil # : x  Auto Basophil # : x  Auto Neutrophil % : x  Auto Lymphocyte % : x  Auto Monocyte % : x  Auto Eosinophil % : x  Auto Basophil % : x    03-18    134<L>  |  100  |  17  ----------------------------<  134<H>  4.0   |  29  |  0.64    Ca    9.1      18 Mar 2023 07:55  Phos  3.6     03-18  Mg     2.5     03-18    TPro  7.8  /  Alb  2.1<L>  /  TBili  0.6  /  DBili  x   /  AST  40  /  ALT  69<H>  /  AlkPhos  223<H>  03-18      [  x]  DVT Prophylaxis  [ x ]  Nutrition, Brand, Rate: Glucerna at 40ml/hr         Goal Rate        Abnormal Nutritional Status -  Malnutrition   Cachexia      Morbid Obesity BMI >/=40    RADIOLOGY & ADDITIONAL STUDIES:     Goals of Care Discussion with Family/Proxy/Other   - see note from/family meeting set up for [facility]UL [facility]

## 2023-03-18 NOTE — PROGRESS NOTE ADULT - PROBLEM SELECTOR PLAN 2
Continue hydro-trach collar, saturating well  Currently tolerating 2LPM via hydro-trach collar.  Monitor oxygen saturation.  Continue bronchodilators  Antibiotics as per ID.  Keep head of bed elevated at all times.

## 2023-03-18 NOTE — PROGRESS NOTE ADULT - ASSESSMENT
62M from [facility], h/o traumatic subdural hemorrhage following a fall down the stairs while drunk, s/p r craniotomy at [facility] in 01/2023, s/p trach/PEG BIBEMS for tachycardia 170s, RR 27, increased secretions admitted to  for sepsis secondary to RUL pneumonia with tracheostomy to 3L NC.  3/6 Febrile 101.7  03/07: No events overnight. On [place]-trach at 3 L and tolerating well. Hemodynamically stable. Cultures in progress. C/w Vanco and Cefepime. F/u Vanco trough. Hypokalemic this AM and replacement ordered.   03/08: Afebrile. Vanco discontinued since MRSA negative. + yeast in sputum; + Candida Galbrata in urine (Bran in place with clear urine and afebrile).   Dr. Peres following. Blood cx NGTD. C/w Cefepime.   03/09: Febrile, tachycardic and tachypneic this AM. Code sepsis called. Sepsis w/u in progress.  Started on Vanco. ID following, . Discussed with Dr. Peres. F/u CXR. Worsening Leukocytosis. IVF bolus with LR (2,300 ml)  per protocol. Lactate 1.9.   3/11: afebrile over past 24hrs, hemodynamically stable. Vanco trough 11.7  03/12: Vanco trough this PM. Blood cx remains NGTD. C/w Vanco and Cefepime. ID following. Persistent Transaminitis.  Hep C non-reactive. F/u remaining Hepatitis panel. Patient on high dose statin which could be contributing to transaminitis. Hold for now and re-eval. Given that patient is unable to verbalize/demonstrate if symptomatic will order Liver US.   03/13: Spiked fevers overnight. Tachycardic and hypotensive. IVF bolus given. Blood cx in progress.  F/u Procal and Lactate. Vanco increased to 1500 mg BID and Meropenem added and Cefepime discontinued Discussed wih Dr. Peres and reccs appreciated  Worsening leukocytosis Pending Abdominal US 2/2 transaminitis.   03/14: Febrile. Urinary retention. On Flomax. Bladder scan and Straight cath Q 4-6 hours. On Vanco and Jose Elias. F/u cx. Abd US unremarkable. CXR 3/13 negative for acute findings.   3/15 Bladder scan 550. Bran cath reinserted.

## 2023-03-18 NOTE — CHART NOTE - NSCHARTNOTEFT_GEN_A_CORE
EVENT: Vancomycin Level, Trough (03.17.23 @ 23:55)   Vancomycin Level, Trough: 22.4ug/mL    BRIEF HPI: 62M from [facility], h/o traumatic subdural hemorrhage following a fall down the stairs while drunk, s/p r craniotomy at [facility] in 01/2023, s/p trach/PEG BIB EMS for tachycardia 170s, RR 27, increased secretions admitted to  for sepsis secondary to RUL pneumonia with tracheostomy to 3L NC.  Complicated hospital course, now elevated vanco level.    Vital Signs Last 24 Hrs  T(C): 37.3 (17 Mar 2023 21:31), Max: 38 (17 Mar 2023 11:45)  T(F): 99.2 (17 Mar 2023 21:31), Max: 100.4 (17 Mar 2023 11:45)  HR: 91 (18 Mar 2023 03:53) (91 - 111)  BP: 136/84 (17 Mar 2023 21:31) (113/71 - 136/84)  BP(mean): --  RR: 20 (18 Mar 2023 03:53) (17 - 20)  SpO2: 100% (18 Mar 2023 03:53) (98% - 100%)    Parameters below as of 18 Mar 2023 03:53  Patient On (Oxygen Delivery Method): Hydro trach  O2 Flow (L/min): 3    PLAN:  Hold vanco and repeat trough    FOLLOW UP : repeat trough

## 2023-03-18 NOTE — PROGRESS NOTE ADULT - PROBLEM SELECTOR PLAN 1
likely secondary to pneumonia.  Repeated blood cultures on 3/13 Negative  Continue meropenum and vanco.  vanco trough 22.4 yesterday, will repeat today  ID DR Peres. ID f/u

## 2023-03-18 NOTE — PROGRESS NOTE ADULT - ASSESSMENT
62M from [facility], h/o traumatic subdural hemorrhage following a fall down the stairs while drunk, s/p r craniotomy at [facility] in 01/2023, s/p trach/PEG BIBEMS for tachycardia 170s, RR 27, increased secretions admitted to  for sepsis secondary to RUL pneumonia with tracheostomy to 3L NC.  3/6 Febrile 101.7  03/07: No events overnight. On [place]-trach at 3 L and tolerating well. Hemodynamically stable. Cultures in progress. C/w Vanco and Cefepime. F/u Vanco trough. Hypokalemic this AM and replacement ordered.   03/08: Afebrile. Vanco discontinued since MRSA negative. + yeast in sputum; + Candida Galbrata in urine (Bran in place with clear urine and afebrile).   Dr. Peres following. Blood cx NGTD. C/w Cefepime.   03/09: Febrile, tachycardic and tachypneic this AM. Code sepsis called. Sepsis w/u in progress.  Started on Vanco. ID following, . Discussed with Dr. Peres. F/u CXR. Worsening Leukocytosis. IVF bolus with LR (2,300 ml)  per protocol. Lactate 1.9.   3/11: afebrile over past 24hrs, hemodynamically stable. Vanco trough 11.7  03/12: Vanco trough this PM. Blood cx remains NGTD. C/w Vanco and Cefepime. ID following. Persistent Transaminitis.  Hep C non-reactive. F/u remaining Hepatitis panel. Patient on high dose statin which could be contributing to transaminitis. Hold for now and re-eval. Given that patient is unable to verbalize/demonstrate if symptomatic will order Liver US.   03/13: Spiked fevers overnight. Tachycardic and hypotensive. IVF bolus given. Blood cx in progress.  F/u Procal and Lactate. Vanco increased to 1500 mg BID and Meropenem added and Cefepime discontinued Discussed wih Dr. Peres and reccs appreciated  Worsening leukocytosis Pending Abdominal US 2/2 transaminitis.   03/14: Febrile. Urinary retention. On Flomax. Bladder scan and Straight cath Q 4-6 hours. On Vanco and Jose Elias. F/u cx. Abd US unremarkable. CXR 3/13 negative for acute findings.   3/15 Bladder scan 550. Bran cath reinserted.

## 2023-03-18 NOTE — PROGRESS NOTE ADULT - SUBJECTIVE AND OBJECTIVE BOX
pt seen and examined    MEDICATIONS  (STANDING):  albuterol/ipratropium for Nebulization 3 milliLiter(s) Nebulizer every 6 hours  amantadine Syrup 100 milliGRAM(s) Oral two times a day  aspirin  chewable 81 milliGRAM(s) Oral daily  enoxaparin Injectable 40 milliGRAM(s) SubCutaneous every 24 hours  insulin glargine Injectable (LANTUS) 12 Unit(s) SubCutaneous every morning  insulin glargine Injectable (LANTUS) 12 Unit(s) SubCutaneous at bedtime  insulin lispro (ADMELOG) corrective regimen sliding scale   SubCutaneous every 6 hours  insulin lispro Injectable (ADMELOG) 6 Unit(s) SubCutaneous every 6 hours  meropenem  IVPB      meropenem  IVPB 1000 milliGRAM(s) IV Intermittent every 8 hours  nicotine -   7 mG/24Hr(s) Patch 1 Patch Transdermal daily  polyethylene glycol 3350 17 Gram(s) Oral daily  senna Syrup 10 milliLiter(s) Oral daily  silver sulfADIAZINE 1% Cream 1 Application(s) Topical two times a day  tamsulosin 0.4 milliGRAM(s) Oral at bedtime  vancomycin  IVPB 1500 milliGRAM(s) IV Intermittent every 12 hours    MEDICATIONS  (PRN):  acetaminophen    Suspension .. 650 milliGRAM(s) Oral every 6 hours PRN Temp greater or equal to 38C (100.4F), Mild Pain (1 - 3)  aluminum hydroxide/magnesium hydroxide/simethicone Suspension 30 milliLiter(s) Oral every 4 hours PRN Dyspepsia  melatonin 3 milliGRAM(s) Oral at bedtime PRN Insomnia  ondansetron Injectable 4 milliGRAM(s) IV Push every 8 hours PRN Nausea and/or Vomiting    Vital Signs Last 24 Hrs  T(C): 37.3 (03-18-23 @ 20:00), Max: 37.3 (03-18-23 @ 12:57)  T(F): 99.1 (03-18-23 @ 20:00), Max: 99.1 (03-18-23 @ 12:57)  HR: 103 (03-18-23 @ 20:36) (78 - 106)  BP: 135/92 (03-18-23 @ 20:00) (108/81 - 135/92)  BP(mean): 107 (03-18-23 @ 20:00) (107 - 107)  RR: 18 (03-18-23 @ 20:36) (17 - 20)  SpO2: 100% (03-18-23 @ 20:36) (100% - 100%)      PAST MEDICAL & SURGICAL HISTORY:  History of subdural hemorrhage      PEG (percutaneous endoscopic gastrostomy) status      DM (diabetes mellitus)      S/P craniotomy                  03-16 @ 07:01  -  03-17 @ 07:00  --------------------------------------------------------  IN: 0 mL / OUT: 1400 mL / NET: -1400 mL            PHYSICAL EXAM:    GENERAL: NAD,   HEAD:  Right sided craniofacial deformity  EYES: PERRLA, conjunctiva and sclera clear  ENMT: No tonsillar erythema, exudates  NECK: Supple, No JVD, tracheostomy intact  NERVOUS SYSTEM:  Awake. Nonverbal at baseline. Does not follow commands. No spontaneous movement of extremities  CHEST/LUNG: Diminished breath sounds bilateral bases. Continues to have thick copious secretions.  HEART: Regular rate and rhythm; No murmurs, rubs, or gallops  ABDOMEN: +Peg intact Soft, Nontender, Nondistended; Bowel sounds present  EXTREMITIES:  No spontaneous movement of extremities. 2+ Peripheral Pulses, No clubbing, cyanosis, or edema  LYMPH: No lymphadenopathy noted  SKIN: Stage 1 coccyx and bilateral heels.      LABS:  CBC Full  -  ( 17 Mar 2023 06:41 )  WBC Count : 9.39 K/uL  RBC Count : 2.97 M/uL  Hemoglobin : 8.5 g/dL  Hematocrit : 26.1 %  Platelet Count - Automated : 480 K/uL  Mean Cell Volume : 87.9 fl  Mean Cell Hemoglobin : 28.6 pg  Mean Cell Hemoglobin Concentration : 32.6 gm/dL  Auto Neutrophil # : x  Auto Lymphocyte # : x  Auto Monocyte # : x  Auto Eosinophil # : x  Auto Basophil # : x  Auto Neutrophil % : x  Auto Lymphocyte % : x  Auto Monocyte % : x  Auto Eosinophil % : x  Auto Basophil % : x    03-16    135  |  99  |  17  ----------------------------<  155<H>  3.9   |  28  |  0.58    Ca    9.2      16 Mar 2023 06:24  Phos  2.7     03-16  Mg     2.3     03-16    TPro  7.8  /  Alb  2.1<L>  /  TBili  0.5  /  DBili  x   /  AST  57<H>  /  ALT  85<H>  /  AlkPhos  232<H>  03-16              [  ]  DVT Prophylaxis  [  ]  Nutrition, Brand, Rate         Goal Rate        Abnormal Nutritional Status -  Malnutrition   Cachexia          RADIOLOGY & ADDITIONAL STUDIES:  ***  < from: Xray Chest 1 View- PORTABLE-Urgent (Xray Chest 1 View- PORTABLE-Urgent .) (03.13.23 @ 17:19) >  Heart size and the mediastinum cannot be accurately evaluated on this   projection. Tracheostomy tube in place.  There is new thickened bandlike opacity extending from the right hilum to   the periphery of the right upper lobe.  There is linear atelectasis in the left lower lung.  No pleural effusion or pneumothorax is seen.  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      IMPRESSION:  New thickened bandlike opacity extending from the right   hilum to the periphery of the right upper lobe, of indeterminate nature.   Question artifact, platelike atelectasis, or possibly pleural fluid   within a fissure. Follow-up chest x-ray can be obtained.    Left lower lung linear atelectasis.      < end of copied text >  < from: US Abdomen Upper Quadrant Right (03.13.23 @ 11:18) >    FINDINGS:  Liver: Within normal limits.  Bile ducts: Normal caliber. Common bile duct measures 5 mm.  Gallbladder: Within normal limits.  Pancreas: Visualized portions are within normal limits.  Right kidney: 11.8 cm. No hydronephrosis.  Ascites: None.  IVC and aorta: Visualized portions normal in caliber.    IMPRESSION:  Technically limited study, otherwise unremarkable right upper quadrant   ultrasound.    --- End of Report ---      < end of copied text >

## 2023-03-18 NOTE — PROGRESS NOTE ADULT - PROBLEM SELECTOR PLAN 3
Secondary to SDH and craniotomy  Craniotomy at [facility] 1/23  Maintain safety precautions  Strict aspiration precautions.  Seizure precautions.

## 2023-03-19 LAB
ALBUMIN SERPL ELPH-MCNC: 2.1 G/DL — LOW (ref 3.5–5)
ALP SERPL-CCNC: 243 U/L — HIGH (ref 40–120)
ALT FLD-CCNC: 65 U/L DA — HIGH (ref 10–60)
ANION GAP SERPL CALC-SCNC: 5 MMOL/L — SIGNIFICANT CHANGE UP (ref 5–17)
AST SERPL-CCNC: 41 U/L — HIGH (ref 10–40)
BILIRUB SERPL-MCNC: 0.5 MG/DL — SIGNIFICANT CHANGE UP (ref 0.2–1.2)
BUN SERPL-MCNC: 20 MG/DL — HIGH (ref 7–18)
CALCIUM SERPL-MCNC: 9 MG/DL — SIGNIFICANT CHANGE UP (ref 8.4–10.5)
CHLORIDE SERPL-SCNC: 101 MMOL/L — SIGNIFICANT CHANGE UP (ref 96–108)
CO2 SERPL-SCNC: 30 MMOL/L — SIGNIFICANT CHANGE UP (ref 22–31)
CREAT SERPL-MCNC: 0.62 MG/DL — SIGNIFICANT CHANGE UP (ref 0.5–1.3)
EGFR: 109 ML/MIN/1.73M2 — SIGNIFICANT CHANGE UP
GLUCOSE SERPL-MCNC: 139 MG/DL — HIGH (ref 70–99)
HCT VFR BLD CALC: 27.1 % — LOW (ref 39–50)
HGB BLD-MCNC: 8.4 G/DL — LOW (ref 13–17)
MCHC RBC-ENTMCNC: 28.1 PG — SIGNIFICANT CHANGE UP (ref 27–34)
MCHC RBC-ENTMCNC: 31 GM/DL — LOW (ref 32–36)
MCV RBC AUTO: 90.6 FL — SIGNIFICANT CHANGE UP (ref 80–100)
NRBC # BLD: 0 /100 WBCS — SIGNIFICANT CHANGE UP (ref 0–0)
PLATELET # BLD AUTO: 478 K/UL — HIGH (ref 150–400)
POTASSIUM SERPL-MCNC: 4.2 MMOL/L — SIGNIFICANT CHANGE UP (ref 3.5–5.3)
POTASSIUM SERPL-SCNC: 4.2 MMOL/L — SIGNIFICANT CHANGE UP (ref 3.5–5.3)
PROT SERPL-MCNC: 7.7 G/DL — SIGNIFICANT CHANGE UP (ref 6–8.3)
RBC # BLD: 2.99 M/UL — LOW (ref 4.2–5.8)
RBC # FLD: 14.3 % — SIGNIFICANT CHANGE UP (ref 10.3–14.5)
SODIUM SERPL-SCNC: 136 MMOL/L — SIGNIFICANT CHANGE UP (ref 135–145)
WBC # BLD: 9.53 K/UL — SIGNIFICANT CHANGE UP (ref 3.8–10.5)
WBC # FLD AUTO: 9.53 K/UL — SIGNIFICANT CHANGE UP (ref 3.8–10.5)

## 2023-03-19 RX ADMIN — Medication 3 MILLILITER(S): at 08:35

## 2023-03-19 RX ADMIN — Medication 6 UNIT(S): at 17:06

## 2023-03-19 RX ADMIN — Medication 6 UNIT(S): at 23:06

## 2023-03-19 RX ADMIN — INSULIN GLARGINE 12 UNIT(S): 100 INJECTION, SOLUTION SUBCUTANEOUS at 22:58

## 2023-03-19 RX ADMIN — Medication 650 MILLIGRAM(S): at 21:52

## 2023-03-19 RX ADMIN — Medication 100 MILLIGRAM(S): at 18:41

## 2023-03-19 RX ADMIN — POLYETHYLENE GLYCOL 3350 17 GRAM(S): 17 POWDER, FOR SOLUTION ORAL at 12:29

## 2023-03-19 RX ADMIN — INSULIN GLARGINE 12 UNIT(S): 100 INJECTION, SOLUTION SUBCUTANEOUS at 07:59

## 2023-03-19 RX ADMIN — Medication 81 MILLIGRAM(S): at 12:28

## 2023-03-19 RX ADMIN — Medication 1: at 23:07

## 2023-03-19 RX ADMIN — Medication 1 APPLICATION(S): at 17:08

## 2023-03-19 RX ADMIN — Medication 1 PATCH: at 19:35

## 2023-03-19 RX ADMIN — Medication 300 MILLIGRAM(S): at 00:22

## 2023-03-19 RX ADMIN — Medication 6 UNIT(S): at 06:06

## 2023-03-19 RX ADMIN — Medication 1 PATCH: at 07:32

## 2023-03-19 RX ADMIN — Medication 1 PATCH: at 12:29

## 2023-03-19 RX ADMIN — MEROPENEM 100 MILLIGRAM(S): 1 INJECTION INTRAVENOUS at 21:52

## 2023-03-19 RX ADMIN — Medication 3 MILLILITER(S): at 20:07

## 2023-03-19 RX ADMIN — Medication 3 MILLILITER(S): at 15:28

## 2023-03-19 RX ADMIN — TAMSULOSIN HYDROCHLORIDE 0.4 MILLIGRAM(S): 0.4 CAPSULE ORAL at 21:53

## 2023-03-19 RX ADMIN — Medication 1 PATCH: at 12:30

## 2023-03-19 RX ADMIN — Medication 300 MILLIGRAM(S): at 12:30

## 2023-03-19 RX ADMIN — Medication 100 MILLIGRAM(S): at 07:29

## 2023-03-19 RX ADMIN — Medication 1 APPLICATION(S): at 07:29

## 2023-03-19 RX ADMIN — Medication 650 MILLIGRAM(S): at 22:50

## 2023-03-19 RX ADMIN — Medication 6 UNIT(S): at 00:21

## 2023-03-19 RX ADMIN — MEROPENEM 100 MILLIGRAM(S): 1 INJECTION INTRAVENOUS at 06:09

## 2023-03-19 RX ADMIN — Medication 1: at 00:21

## 2023-03-19 RX ADMIN — Medication 3 MILLILITER(S): at 03:27

## 2023-03-19 RX ADMIN — SENNA PLUS 10 MILLILITER(S): 8.6 TABLET ORAL at 12:29

## 2023-03-19 RX ADMIN — MEROPENEM 100 MILLIGRAM(S): 1 INJECTION INTRAVENOUS at 13:44

## 2023-03-19 RX ADMIN — ENOXAPARIN SODIUM 40 MILLIGRAM(S): 100 INJECTION SUBCUTANEOUS at 10:35

## 2023-03-19 RX ADMIN — Medication 6 UNIT(S): at 12:28

## 2023-03-19 NOTE — CHART NOTE - NSCHARTNOTEFT_GEN_A_CORE
Case discussed with pt's wife at bedside and updated regarding pt's current medical condition.  Plan of care discussed and all questions answered.

## 2023-03-19 NOTE — PROGRESS NOTE ADULT - PROBLEM SELECTOR PLAN 3
Secondary to SDH and craniotomy  Craniotomy at [facility] 1/23  Maintain safety precautions  Strict aspiration precautions.  Seizure precautions.

## 2023-03-19 NOTE — CHART NOTE - NSCHARTNOTEFT_GEN_A_CORE
EVENT: Temp 100.5    BRIEF HPI: 62M from [facility], h/o traumatic subdural hemorrhage following a fall down the stairs while drunk, s/p r craniotomy at [facility] in 01/2023, s/p trach/PEG BIB EMS for tachycardia 170s, RR 27, increased secretions admitted to  for sepsis secondary to RUL pneumonia with tracheostomy to 3L NC. Continue to spike fevers.    OBJECTIVE:  Vital Signs Last 24 Hrs  T(C): 38.1 (19 Mar 2023 21:50), Max: 38.1 (19 Mar 2023 21:50)  T(F): 100.5 (19 Mar 2023 21:50), Max: 100.5 (19 Mar 2023 21:50)  HR: 108 (19 Mar 2023 21:22) (96 - 108)  BP: 132/79 (19 Mar 2023 21:22) (132/79 - 137/83)  BP(mean): --  RR: 20 (19 Mar 2023 21:22) (18 - 20)  SpO2: 99% (19 Mar 2023 21:22) (98% - 100%)    Parameters below as of 19 Mar 2023 21:22  Patient On (Oxygen Delivery Method): Hydro Trach  O2 Flow (L/min): 3    FOCUSED PHYSICAL EXAM:  NEURO: Non interactive  RESP: Hydro-trach in place  CV: S1 S2, tach    LABS:                        8.4    9.53  )-----------( 478      ( 19 Mar 2023 07:36 )             27.1     03-19    136  |  101  |  20<H>  ----------------------------<  139<H>  4.2   |  30  |  0.62    Ca    9.0      19 Mar 2023 07:36  Phos  3.6     03-18  Mg     2.5     03-18    TPro  7.7  /  Alb  2.1<L>  /  TBili  0.5  /  DBili  x   /  AST  41<H>  /  ALT  65<H>  /  AlkPhos  243<H>  03-19    Culture - Blood (03.13.23 @ 10:37)    Specimen Source: .Blood     Culture Results:   No Growth Final    Urine Microscopic-Add On (NC) (03.13.23 @ 23:35)    Red Blood Cell - Urine: 5-10 /HPF    White Blood Cell - Urine: 6-10 /HPF    Bacteria: Moderate /HPF    IMAGING:  ACC: [id number] EXAM:  XR CHEST PORTABLE URGENT 1V   ORDERED BY: KASHMIR COLON   PROCEDURE DATE:  03/13/2023    IMPRESSION:  New thickened bandlike opacity extending from the right   hilum to the periphery of the right upper lobe, of indeterminate nature.   Question artifact, platelike atelectasis, or possibly pleural fluid   within a fissure. Follow-up chest x-ray can be obtained.  Left lower lung linear atelectasis.    PROBLEM: SIRS probably due to PNA  PLAN:   1. Cont meropenem  IVPB 1000 lucio GRAM(s) IV Intermittent every 8 hours

## 2023-03-19 NOTE — PHYSICAL THERAPY INITIAL EVALUATION ADULT - GENERAL OBSERVATIONS, REHAB EVAL
Consult received, EMR, radiology and labs reviewed. Patient received supine in bed, nonverbal, PEG tube in place, on hydro trach collar on 3L. Patient agreed to EVALUATION from Physical Therapist.

## 2023-03-19 NOTE — PROGRESS NOTE ADULT - SUBJECTIVE AND OBJECTIVE BOX
pt seen and examined    MEDICATIONS  (STANDING):  albuterol/ipratropium for Nebulization 3 milliLiter(s) Nebulizer every 6 hours  amantadine Syrup 100 milliGRAM(s) Oral two times a day  aspirin  chewable 81 milliGRAM(s) Oral daily  enoxaparin Injectable 40 milliGRAM(s) SubCutaneous every 24 hours  insulin glargine Injectable (LANTUS) 12 Unit(s) SubCutaneous every morning  insulin glargine Injectable (LANTUS) 12 Unit(s) SubCutaneous at bedtime  insulin lispro (ADMELOG) corrective regimen sliding scale   SubCutaneous every 6 hours  insulin lispro Injectable (ADMELOG) 6 Unit(s) SubCutaneous every 6 hours  meropenem  IVPB      meropenem  IVPB 1000 milliGRAM(s) IV Intermittent every 8 hours  nicotine -   7 mG/24Hr(s) Patch 1 Patch Transdermal daily  polyethylene glycol 3350 17 Gram(s) Oral daily  senna Syrup 10 milliLiter(s) Oral daily  silver sulfADIAZINE 1% Cream 1 Application(s) Topical two times a day  tamsulosin 0.4 milliGRAM(s) Oral at bedtime    MEDICATIONS  (PRN):  acetaminophen    Suspension .. 650 milliGRAM(s) Oral every 6 hours PRN Temp greater or equal to 38C (100.4F), Mild Pain (1 - 3)  aluminum hydroxide/magnesium hydroxide/simethicone Suspension 30 milliLiter(s) Oral every 4 hours PRN Dyspepsia  melatonin 3 milliGRAM(s) Oral at bedtime PRN Insomnia  ondansetron Injectable 4 milliGRAM(s) IV Push every 8 hours PRN Nausea and/or Vomiting    Vital Signs Last 24 Hrs  T(C): 37.1 (23 @ 23:01), Max: 38.1 (23 @ 21:50)  T(F): 98.7 (23 @ 23:01), Max: 100.5 (23 @ 21:50)  HR: 108 (23 @ 21:22) (96 - 108)  BP: 132/79 (23 @ 21:22) (132/79 - 137/83)  BP(mean): --  RR: 20 (23 @ 21:22) (18 - 20)  SpO2: 99% (23 @ 21:22) (98% - 100%)    PAST MEDICAL & SURGICAL HISTORY:  History of subdural hemorrhage      PEG (percutaneous endoscopic gastrostomy) status      DM (diabetes mellitus)      S/P craniotomy                  03-16 @ 07:01  -   @ 07:00  --------------------------------------------------------  IN: 0 mL / OUT: 1400 mL / NET: -1400 mL            PHYSICAL EXAM:    GENERAL: NAD,   HEAD:  Right sided craniofacial deformity  EYES: PERRLA, conjunctiva and sclera clear  ENMT: No tonsillar erythema, exudates  NECK: Supple, No JVD, tracheostomy intact  NERVOUS SYSTEM:  Awake. Nonverbal at baseline. Does not follow commands. No spontaneous movement of extremities  CHEST/LUNG: Diminished breath sounds bilateral bases. Continues to have thick copious secretions.  HEART: Regular rate and rhythm; No murmurs, rubs, or gallops  ABDOMEN: +Peg intact Soft, Nontender, Nondistended; Bowel sounds present  EXTREMITIES:  No spontaneous movement of extremities. 2+ Peripheral Pulses, No clubbing, cyanosis, or edema  LYMPH: No lymphadenopathy noted  SKIN: Stage 1 coccyx and bilateral heels.      LABS:      136  |  101  |  20<H>  ----------------------------<  139<H>  4.2   |  30  |  0.62    Ca    9.0      19 Mar 2023 07:36  Phos  3.6       Mg     2.5         TPro  7.7  /  Alb  2.1<L>  /  TBili  0.5  /  DBili      /  AST  41<H>  /  ALT  65<H>  /  AlkPhos  243<H>      Creatinine Trend: 0.62 <--, 0.64 <--, 0.60 <--, 0.58 <--, 0.54 <--, 0.61 <--, 0.58 <--                        8.4    9.53  )-----------( 478      ( 19 Mar 2023 07:36 )             27.1     Urine Studies:  Urinalysis Basic - ( 13 Mar 2023 23:35 )    Color: Yellow / Appearance: Clear / S.015 / pH:   Gluc:  / Ketone: Negative  / Bili: Negative / Urobili: Negative   Blood:  / Protein: 30 mg/dL / Nitrite: Negative   Leuk Esterase: Small / RBC: 5-10 /HPF / WBC 6-10 /HPF   Sq Epi:  / Non Sq Epi:  / Bacteria: Moderate /HPF              LIVER FUNCTIONS - ( 19 Mar 2023 07:36 )  Alb: 2.1 g/dL / Pro: 7.7 g/dL / ALK PHOS: 243 U/L / ALT: 65 U/L DA / AST: 41 U/L / GGT: x               RADIOLOGY & ADDITIONAL STUDIES:  ***  < from: Xray Chest 1 View- PORTABLE-Urgent (Xray Chest 1 View- PORTABLE-Urgent .) (23 @ 17:19) >  Heart size and the mediastinum cannot be accurately evaluated on this   projection. Tracheostomy tube in place.  There is new thickened bandlike opacity extending from the right hilum to   the periphery of the right upper lobe.  There is linear atelectasis in the left lower lung.  No pleural effusion or pneumothorax is seen.  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      IMPRESSION:  New thickened bandlike opacity extending from the right   hilum to the periphery of the right upper lobe, of indeterminate nature.   Question artifact, platelike atelectasis, or possibly pleural fluid   within a fissure. Follow-up chest x-ray can be obtained.    Left lower lung linear atelectasis.      < end of copied text >  < from: US Abdomen Upper Quadrant Right (23 @ 11:18) >    FINDINGS:  Liver: Within normal limits.  Bile ducts: Normal caliber. Common bile duct measures 5 mm.  Gallbladder: Within normal limits.  Pancreas: Visualized portions are within normal limits.  Right kidney: 11.8 cm. No hydronephrosis.  Ascites: None.  IVC and aorta: Visualized portions normal in caliber.    IMPRESSION:  Technically limited study, otherwise unremarkable right upper quadrant   ultrasound.    --- End of Report ---      < end of copied text >

## 2023-03-19 NOTE — PROGRESS NOTE ADULT - PROBLEM SELECTOR PLAN 10
DVT and GI prophylaxis.  Continue antibiotics as per ID meropemem and vanco.  Monitor vanco trough.  Repeated blood cultures from 3/13  negative  ID f/u for antibiotic plan.  Discharge planning underway. DVT and GI prophylaxis.  Continue antibiotics as per ID Meropemem and Vanco.  Monitor vanco trough 3/18, wnl 13.2.  Repeated blood cultures from 3/13  negative  ID f/u for antibiotic plan.  Discharge planning underway  Spouse Mrs. Navya Langston at bedside updated.

## 2023-03-19 NOTE — PHYSICAL THERAPY INITIAL EVALUATION ADULT - PRECAUTIONS/LIMITATIONS, REHAB EVAL
MRI does not show any biliary ductal obstruction. Pancreas normal. Please go ahead and schedule EGD for abdominal pain fall precautions/swallowing precautions

## 2023-03-19 NOTE — PROGRESS NOTE ADULT - PROBLEM SELECTOR PLAN 1
likely secondary to pneumonia.  Repeated blood cultures on 3/13 Negative  Continue meropenum and vanco.  vanco trough 22.4 yesterday, will repeat today  ID DR Peres. ID f/u likely secondary to pneumonia.  Repeated blood cultures on 3/13 Negative  Continue Meropenum and Vanco.  Vanco trough 13.2- wnl, continue current dose   ID DR Peres.  following

## 2023-03-19 NOTE — PHYSICAL THERAPY INITIAL EVALUATION ADULT - PLANNED THERAPY INTERVENTIONS, PT EVAL
In the next few weeks you may receive a [employer] survey regarding your most recent clinic visit with us.  Please take a few moments to accurately evaluate your visit.  We strive for excellence and value your feedback.       If we need to contact you regarding any test results, we will make 2 attempts to reach you at the number you have listed during your office visit today.  If we are unable to reach you, a letter with your results and any further instructions will be mailed to you home.    Please do not hesitate to call our office with any questions or concerns at Dept: [phone].  
balance training/bed mobility training/gait training/strengthening/transfer training

## 2023-03-19 NOTE — PROGRESS NOTE ADULT - ASSESSMENT
62M from [facility], h/o traumatic subdural hemorrhage following a fall down the stairs while drunk, s/p r craniotomy at [facility] in 01/2023, s/p trach/PEG BIBEMS for tachycardia 170s, RR 27, increased secretions admitted to  for sepsis secondary to RUL pneumonia with tracheostomy to 3L NC.  3/6 Febrile 101.7  03/07: No events overnight. On [place]-trach at 3 L and tolerating well. Hemodynamically stable. Cultures in progress. C/w Vanco and Cefepime. F/u Vanco trough. Hypokalemic this AM and replacement ordered.   03/08: Afebrile. Vanco discontinued since MRSA negative. + yeast in sputum; + Candida Galbrata in urine (Bran in place with clear urine and afebrile).   Dr. Peres following. Blood cx NGTD. C/w Cefepime.   03/09: Febrile, tachycardic and tachypneic this AM. Code sepsis called. Sepsis w/u in progress.  Started on Vanco. ID following, . Discussed with Dr. Peres. F/u CXR. Worsening Leukocytosis. IVF bolus with LR (2,300 ml)  per protocol. Lactate 1.9.   3/11: afebrile over past 24hrs, hemodynamically stable. Vanco trough 11.7  03/12: Vanco trough this PM. Blood cx remains NGTD. C/w Vanco and Cefepime. ID following. Persistent Transaminitis.  Hep C non-reactive. F/u remaining Hepatitis panel. Patient on high dose statin which could be contributing to transaminitis. Hold for now and re-eval. Given that patient is unable to verbalize/demonstrate if symptomatic will order Liver US.   03/13: Spiked fevers overnight. Tachycardic and hypotensive. IVF bolus given. Blood cx in progress.  F/u Procal and Lactate. Vanco increased to 1500 mg BID and Meropenem added and Cefepime discontinued Discussed wih Dr. Peres and reccs appreciated  Worsening leukocytosis Pending Abdominal US 2/2 transaminitis.   03/14: Febrile. Urinary retention. On Flomax. Bladder scan and Straight cath Q 4-6 hours. On Vanco and Jose Elias. F/u cx. Abd US unremarkable. CXR 3/13 negative for acute findings.   3/15 Bladder scan 550. Bran cath reinserted.

## 2023-03-19 NOTE — PROGRESS NOTE ADULT - ASSESSMENT
62M from [facility], h/o traumatic subdural hemorrhage following a fall down the stairs while drunk, s/p r craniotomy at [facility] in 01/2023, s/p trach/PEG BIBEMS for tachycardia 170s, RR 27, increased secretions admitted to  for sepsis secondary to RUL pneumonia with tracheostomy to 3L NC.  3/6 Febrile 101.7  03/07: No events overnight. On [place]-trach at 3 L and tolerating well. Hemodynamically stable. Cultures in progress. C/w Vanco and Cefepime. F/u Vanco trough. Hypokalemic this AM and replacement ordered.   03/08: Afebrile. Vanco discontinued since MRSA negative. + yeast in sputum; + Candida Galbrata in urine (Bran in place with clear urine and afebrile).   Dr. Peres following. Blood cx NGTD. C/w Cefepime.   03/09: Febrile, tachycardic and tachypneic this AM. Code sepsis called. Sepsis w/u in progress.  Started on Vanco. ID following, . Discussed with Dr. Peres. F/u CXR. Worsening Leukocytosis. IVF bolus with LR (2,300 ml)  per protocol. Lactate 1.9.   3/11: afebrile over past 24hrs, hemodynamically stable. Vanco trough 11.7  03/12: Vanco trough this PM. Blood cx remains NGTD. C/w Vanco and Cefepime. ID following. Persistent Transaminitis.  Hep C non-reactive. F/u remaining Hepatitis panel. Patient on high dose statin which could be contributing to transaminitis. Hold for now and re-eval. Given that patient is unable to verbalize/demonstrate if symptomatic will order Liver US.   03/13: Spiked fevers overnight. Tachycardic and hypotensive. IVF bolus given. Blood cx in progress.  F/u Procal and Lactate. Vanco increased to 1500 mg BID and Meropenem added and Cefepime discontinued Discussed wih Dr. Peres and reccs appreciated  Worsening leukocytosis Pending Abdominal US 2/2 transaminitis.   03/14: Febrile. Urinary retention. On Flomax. Bladder scan and Straight cath Q 4-6 hours. On Vanco and Jose Elias. F/u cx. Abd US unremarkable. CXR 3/13 negative for acute findings.   3/15 Bladder scan 550. Bran cath reinserted.

## 2023-03-19 NOTE — PROGRESS NOTE ADULT - PROBLEM SELECTOR PLAN 3
Secondary to SDH and craniotomy  Craniotomy at [facility] 1/23  Maintain safety precautions  Strict aspiration precautions.  Seizure precautions.

## 2023-03-19 NOTE — PROGRESS NOTE ADULT - SUBJECTIVE AND OBJECTIVE BOX
WILMA NICOLE    SCU progress note    INTERVAL HPI/OVERNIGHT EVENTS: ***    DNR [ ]   DNI  [  ]    Covid - 19 PCR:     The 4Ms    What Matters Most: see GOC  Age appropriate Medications/Screen for High Risk Medication: Yes  Mentation: see CAM below  Mobility: defer to physical exam    The Confusion Assessment Method (CAM) Diagnostic Algorithm     1: Acute Onset or Fluctuating Course  - Is there evidence of an acute change in mental status from the patient’s baseline? Did the (abnormal) behavior  fluctuate during the day, that is, tend to come and go, or increase and decrease in severity?  [ ] YES [ ] NO     2: Inattention  - Did the patient have difficulty focusing attention, being easily distractible, or having difficulty keeping track of what was being said?  [ ] YES [ ] NO     3: Disorganized thinking  -Was the patient’s thinking disorganized or incoherent, such as rambling or irrelevant conversation, unclear or illogical flow of ideas, or unpredictable switching from subject to subject?  [ ] YES [ ] NO    4: Altered Level of consciousness?  [ ] YES [ ] NO    The diagnosis of delirium by CAM requires the presence of features 1 and 2 and either 3 or 4.    PRESSORS: [ ] YES [ ] NO  meropenem  IVPB      meropenem  IVPB 1000 milliGRAM(s) IV Intermittent every 8 hours  vancomycin  IVPB 1500 milliGRAM(s) IV Intermittent every 12 hours    Cardiovascular:  Heart Failure  Acute   Acute on Chronic  Chronic         Pulmonary:  albuterol/ipratropium for Nebulization 3 milliLiter(s) Nebulizer every 6 hours    Hematalogic:  aspirin  chewable 81 milliGRAM(s) Oral daily  enoxaparin Injectable 40 milliGRAM(s) SubCutaneous every 24 hours    Other:  acetaminophen    Suspension .. 650 milliGRAM(s) Oral every 6 hours PRN  aluminum hydroxide/magnesium hydroxide/simethicone Suspension 30 milliLiter(s) Oral every 4 hours PRN  amantadine Syrup 100 milliGRAM(s) Oral two times a day  insulin glargine Injectable (LANTUS) 12 Unit(s) SubCutaneous every morning  insulin glargine Injectable (LANTUS) 12 Unit(s) SubCutaneous at bedtime  insulin lispro (ADMELOG) corrective regimen sliding scale   SubCutaneous every 6 hours  insulin lispro Injectable (ADMELOG) 6 Unit(s) SubCutaneous every 6 hours  melatonin 3 milliGRAM(s) Oral at bedtime PRN  nicotine -   7 mG/24Hr(s) Patch 1 Patch Transdermal daily  ondansetron Injectable 4 milliGRAM(s) IV Push every 8 hours PRN  polyethylene glycol 3350 17 Gram(s) Oral daily  senna Syrup 10 milliLiter(s) Oral daily  silver sulfADIAZINE 1% Cream 1 Application(s) Topical two times a day  tamsulosin 0.4 milliGRAM(s) Oral at bedtime    acetaminophen    Suspension .. 650 milliGRAM(s) Oral every 6 hours PRN  albuterol/ipratropium for Nebulization 3 milliLiter(s) Nebulizer every 6 hours  aluminum hydroxide/magnesium hydroxide/simethicone Suspension 30 milliLiter(s) Oral every 4 hours PRN  amantadine Syrup 100 milliGRAM(s) Oral two times a day  aspirin  chewable 81 milliGRAM(s) Oral daily  enoxaparin Injectable 40 milliGRAM(s) SubCutaneous every 24 hours  insulin glargine Injectable (LANTUS) 12 Unit(s) SubCutaneous every morning  insulin glargine Injectable (LANTUS) 12 Unit(s) SubCutaneous at bedtime  insulin lispro (ADMELOG) corrective regimen sliding scale   SubCutaneous every 6 hours  insulin lispro Injectable (ADMELOG) 6 Unit(s) SubCutaneous every 6 hours  melatonin 3 milliGRAM(s) Oral at bedtime PRN  meropenem  IVPB      meropenem  IVPB 1000 milliGRAM(s) IV Intermittent every 8 hours  nicotine -   7 mG/24Hr(s) Patch 1 Patch Transdermal daily  ondansetron Injectable 4 milliGRAM(s) IV Push every 8 hours PRN  polyethylene glycol 3350 17 Gram(s) Oral daily  senna Syrup 10 milliLiter(s) Oral daily  silver sulfADIAZINE 1% Cream 1 Application(s) Topical two times a day  tamsulosin 0.4 milliGRAM(s) Oral at bedtime  vancomycin  IVPB 1500 milliGRAM(s) IV Intermittent every 12 hours    Drug Dosing Weight  Height (cm): 177.8 (05 Mar 2023 03:36)  Weight (kg): 86.2 (05 Mar 2023 03:36)  BMI (kg/m2): 27.3 (05 Mar 2023 03:36)  BSA (m2): 2.04 (05 Mar 2023 03:36)    CENTRAL LINE: [ ] YES [ ] NO  LOCATION:   DATE INSERTED:  REMOVE: [ ] YES [ ] NO  EXPLAIN:    MELCHOR: [ ] YES [ ] NO    DATE INSERTED:  REMOVE:  [ ] YES [ ] NO  EXPLAIN:    PAST MEDICAL & SURGICAL HISTORY:  History of subdural hemorrhage      PEG (percutaneous endoscopic gastrostomy) status      DM (diabetes mellitus)      S/P craniotomy                  03-18 @ 07:01  -  03-19 @ 07:00  --------------------------------------------------------  IN: 0 mL / OUT: 1200 mL / NET: -1200 mL            PHYSICAL EXAM:    GENERAL: NAD, well-groomed, well-developed  HEAD:  Atraumatic, Normocephalic  EYES: EOMI, PERRLA, conjunctiva and sclera clear  ENMT: No tonsillar erythema, exudates, or enlargement; Moist mucous membranes, Good dentition, No lesions  NECK: Supple, No JVD, Normal thyroid  NERVOUS SYSTEM:  Alert & Oriented X3, Good concentration; Motor Strength 5/5 B/L upper and lower extremities; DTRs 2+ intact and symmetric  CHEST/LUNG: Clear to percussion bilaterally; No rales, rhonchi, wheezing, or rubs  HEART: Regular rate and rhythm; No murmurs, rubs, or gallops  ABDOMEN: Soft, Nontender, Nondistended; Bowel sounds present  EXTREMITIES:  2+ Peripheral Pulses, No clubbing, cyanosis, or edema  LYMPH: No lymphadenopathy noted  SKIN: No rashes or lesions      LABS:  CBC Full  -  ( 19 Mar 2023 07:36 )  WBC Count : 9.53 K/uL  RBC Count : 2.99 M/uL  Hemoglobin : 8.4 g/dL  Hematocrit : 27.1 %  Platelet Count - Automated : 478 K/uL  Mean Cell Volume : 90.6 fl  Mean Cell Hemoglobin : 28.1 pg  Mean Cell Hemoglobin Concentration : 31.0 gm/dL  Auto Neutrophil # : x  Auto Lymphocyte # : x  Auto Monocyte # : x  Auto Eosinophil # : x  Auto Basophil # : x  Auto Neutrophil % : x  Auto Lymphocyte % : x  Auto Monocyte % : x  Auto Eosinophil % : x  Auto Basophil % : x    03-19    136  |  101  |  20<H>  ----------------------------<  139<H>  4.2   |  30  |  0.62    Ca    9.0      19 Mar 2023 07:36  Phos  3.6     03-18  Mg     2.5     03-18    TPro  7.7  /  Alb  2.1<L>  /  TBili  0.5  /  DBili  x   /  AST  41<H>  /  ALT  65<H>  /  AlkPhos  243<H>  03-19              [  ]  DVT Prophylaxis  [  ]  Nutrition, Brand, Rate         Goal Rate        Abnormal Nutritional Status -  Malnutrition   Cachexia      Morbid Obesity BMI >/=40    RADIOLOGY & ADDITIONAL STUDIES:  ***    Goals of Care Discussion with Family/Proxy/Other   - see note from/family meeting set up for...     WILMA NICOLE    SCU progress note    INTERVAL HPI/OVERNIGHT EVENTS: No acute events overnight. Tolerating hydrotrach and sating well.      DNR [ ]   DNI  [  ]- FULL CODE    Covid - 19 PCR: SARS-CoV-2: Oj (09 Mar 2023 10:48)        The 4Ms    What Matters Most: see GOC  Age appropriate Medications/Screen for High Risk Medication: Yes  Mentation: see CAM below  Mobility: defer to physical exam    The Confusion Assessment Method (CAM) Diagnostic Algorithm     1: Acute Onset or Fluctuating Course  - Is there evidence of an acute change in mental status from the patient’s baseline? Did the (abnormal) behavior  fluctuate during the day, that is, tend to come and go, or increase and decrease in severity?  [ ] YES [x ] NO     2: Inattention  - Did the patient have difficulty focusing attention, being easily distractible, or having difficulty keeping track of what was being said?  [ ] YES [ ] NO- Unable to assess     3: Disorganized thinking  -Was the patient’s thinking disorganized or incoherent, such as rambling or irrelevant conversation, unclear or illogical flow of ideas, or unpredictable switching from subject to subject?  [ ] YES [ ] NO- Unable to assess    4: Altered Level of consciousness?  [ ] YES [ ] NO- Unable to assess    The diagnosis of delirium by CAM requires the presence of features 1 and 2 and either 3 or 4.    PRESSORS: [ ] YES [x ] NO  meropenem  IVPB      meropenem  IVPB 1000 milliGRAM(s) IV Intermittent every 8 hours  vancomycin  IVPB 1500 milliGRAM(s) IV Intermittent every 12 hours    Cardiovascular:  Heart Failure  Acute   Acute on Chronic  Chronic         Pulmonary:  albuterol/ipratropium for Nebulization 3 milliLiter(s) Nebulizer every 6 hours    Hematalogic:  aspirin  chewable 81 milliGRAM(s) Oral daily  enoxaparin Injectable 40 milliGRAM(s) SubCutaneous every 24 hours    Other:  acetaminophen    Suspension .. 650 milliGRAM(s) Oral every 6 hours PRN  aluminum hydroxide/magnesium hydroxide/simethicone Suspension 30 milliLiter(s) Oral every 4 hours PRN  amantadine Syrup 100 milliGRAM(s) Oral two times a day  insulin glargine Injectable (LANTUS) 12 Unit(s) SubCutaneous every morning  insulin glargine Injectable (LANTUS) 12 Unit(s) SubCutaneous at bedtime  insulin lispro (ADMELOG) corrective regimen sliding scale   SubCutaneous every 6 hours  insulin lispro Injectable (ADMELOG) 6 Unit(s) SubCutaneous every 6 hours  melatonin 3 milliGRAM(s) Oral at bedtime PRN  nicotine -   7 mG/24Hr(s) Patch 1 Patch Transdermal daily  ondansetron Injectable 4 milliGRAM(s) IV Push every 8 hours PRN  polyethylene glycol 3350 17 Gram(s) Oral daily  senna Syrup 10 milliLiter(s) Oral daily  silver sulfADIAZINE 1% Cream 1 Application(s) Topical two times a day  tamsulosin 0.4 milliGRAM(s) Oral at bedtime    acetaminophen    Suspension .. 650 milliGRAM(s) Oral every 6 hours PRN  albuterol/ipratropium for Nebulization 3 milliLiter(s) Nebulizer every 6 hours  aluminum hydroxide/magnesium hydroxide/simethicone Suspension 30 milliLiter(s) Oral every 4 hours PRN  amantadine Syrup 100 milliGRAM(s) Oral two times a day  aspirin  chewable 81 milliGRAM(s) Oral daily  enoxaparin Injectable 40 milliGRAM(s) SubCutaneous every 24 hours  insulin glargine Injectable (LANTUS) 12 Unit(s) SubCutaneous every morning  insulin glargine Injectable (LANTUS) 12 Unit(s) SubCutaneous at bedtime  insulin lispro (ADMELOG) corrective regimen sliding scale   SubCutaneous every 6 hours  insulin lispro Injectable (ADMELOG) 6 Unit(s) SubCutaneous every 6 hours  melatonin 3 milliGRAM(s) Oral at bedtime PRN  meropenem  IVPB      meropenem  IVPB 1000 milliGRAM(s) IV Intermittent every 8 hours  nicotine -   7 mG/24Hr(s) Patch 1 Patch Transdermal daily  ondansetron Injectable 4 milliGRAM(s) IV Push every 8 hours PRN  polyethylene glycol 3350 17 Gram(s) Oral daily  senna Syrup 10 milliLiter(s) Oral daily  silver sulfADIAZINE 1% Cream 1 Application(s) Topical two times a day  tamsulosin 0.4 milliGRAM(s) Oral at bedtime  vancomycin  IVPB 1500 milliGRAM(s) IV Intermittent every 12 hours    Drug Dosing Weight  Height (cm): 177.8 (05 Mar 2023 03:36)  Weight (kg): 86.2 (05 Mar 2023 03:36)  BMI (kg/m2): 27.3 (05 Mar 2023 03:36)  BSA (m2): 2.04 (05 Mar 2023 03:36)    CENTRAL LINE: [ ] YES [x ] NO  LOCATION:   DATE INSERTED:  REMOVE: [ ] YES [ ] NO  EXPLAIN:    MELCHOR: [x ] YES [ ] NO    DATE INSERTED:  REMOVE:  [ ] YES [x ] NO  EXPLAIN: Retention 3/16/23    PAST MEDICAL & SURGICAL HISTORY:  History of subdural hemorrhage      PEG (percutaneous endoscopic gastrostomy) status      DM (diabetes mellitus)      S/P craniotomy                  03-18 @ 07:01  -  03-19 @ 07:00  --------------------------------------------------------  IN: 0 mL / OUT: 1200 mL / NET: -1200 mL            PHYSICAL EXAM:    GENERAL: NAD, well-groomed, well-developed  HEAD:  Rt parietal deformity w/ midline sx scar  EYES: PERRLA, conjunctiva and sclera clear  ENMT: limited exam, not opening mouth on commands, moist mucous membranes, Good dentition,  NECK: Supple, No JVD, Normal thyroid  NERVOUS SYSTEM:  Awake, does not follow commands, no voluntary movements of extremities, functional quad from recent traumatic SDH   CHEST/LUNG: b/l lungs w/ minimal rhonchi at the base, No rales, wheezing, or rubs  HEART: Regular rate and rhythm; No murmurs, rubs, or gallops  ABDOMEN: + peg, sft, nt, nd, no rebound or guarding, + bs   EXTREMITIES:  2+ Peripheral Pulses, No clubbing, cyanosis, or edema  LYMPH: No lymphadenopathy noted  SKIN: b/l heels and sacral pressure ulcers      LABS:  CBC Full  -  ( 19 Mar 2023 07:36 )  WBC Count : 9.53 K/uL  RBC Count : 2.99 M/uL  Hemoglobin : 8.4 g/dL  Hematocrit : 27.1 %  Platelet Count - Automated : 478 K/uL  Mean Cell Volume : 90.6 fl  Mean Cell Hemoglobin : 28.1 pg  Mean Cell Hemoglobin Concentration : 31.0 gm/dL  Auto Neutrophil # : x  Auto Lymphocyte # : x  Auto Monocyte # : x  Auto Eosinophil # : x  Auto Basophil # : x  Auto Neutrophil % : x  Auto Lymphocyte % : x  Auto Monocyte % : x  Auto Eosinophil % : x  Auto Basophil % : x    03-19    136  |  101  |  20<H>  ----------------------------<  139<H>  4.2   |  30  |  0.62    Ca    9.0      19 Mar 2023 07:36  Phos  3.6     03-18  Mg     2.5     03-18    TPro  7.7  /  Alb  2.1<L>  /  TBili  0.5  /  DBili  x   /  AST  41<H>  /  ALT  65<H>  /  AlkPhos  243<H>  03-19              [  ]  DVT Prophylaxis  [  ]  Nutrition, Brand, Rate         Goal Rate        Abnormal Nutritional Status -  Malnutrition   Cachexia      Morbid Obesity BMI >/=40    RADIOLOGY & ADDITIONAL STUDIES:  ***    Goals of Care Discussion with Family/Proxy/Other   - see note from/family meeting set up for...

## 2023-03-20 LAB
ALBUMIN SERPL ELPH-MCNC: 2.1 G/DL — LOW (ref 3.5–5)
ALP SERPL-CCNC: 232 U/L — HIGH (ref 40–120)
ALT FLD-CCNC: 60 U/L DA — SIGNIFICANT CHANGE UP (ref 10–60)
ANION GAP SERPL CALC-SCNC: 5 MMOL/L — SIGNIFICANT CHANGE UP (ref 5–17)
AST SERPL-CCNC: 47 U/L — HIGH (ref 10–40)
BILIRUB SERPL-MCNC: 0.5 MG/DL — SIGNIFICANT CHANGE UP (ref 0.2–1.2)
BUN SERPL-MCNC: 22 MG/DL — HIGH (ref 7–18)
CALCIUM SERPL-MCNC: 9.2 MG/DL — SIGNIFICANT CHANGE UP (ref 8.4–10.5)
CHLORIDE SERPL-SCNC: 102 MMOL/L — SIGNIFICANT CHANGE UP (ref 96–108)
CO2 SERPL-SCNC: 31 MMOL/L — SIGNIFICANT CHANGE UP (ref 22–31)
CREAT SERPL-MCNC: 0.64 MG/DL — SIGNIFICANT CHANGE UP (ref 0.5–1.3)
EGFR: 108 ML/MIN/1.73M2 — SIGNIFICANT CHANGE UP
GLUCOSE SERPL-MCNC: 132 MG/DL — HIGH (ref 70–99)
HCT VFR BLD CALC: 26.5 % — LOW (ref 39–50)
HGB BLD-MCNC: 8.3 G/DL — LOW (ref 13–17)
MAGNESIUM SERPL-MCNC: 2.5 MG/DL — SIGNIFICANT CHANGE UP (ref 1.6–2.6)
MCHC RBC-ENTMCNC: 28.4 PG — SIGNIFICANT CHANGE UP (ref 27–34)
MCHC RBC-ENTMCNC: 31.3 GM/DL — LOW (ref 32–36)
MCV RBC AUTO: 90.8 FL — SIGNIFICANT CHANGE UP (ref 80–100)
NRBC # BLD: 0 /100 WBCS — SIGNIFICANT CHANGE UP (ref 0–0)
PHOSPHATE SERPL-MCNC: 3.3 MG/DL — SIGNIFICANT CHANGE UP (ref 2.5–4.5)
PLATELET # BLD AUTO: 457 K/UL — HIGH (ref 150–400)
POTASSIUM SERPL-MCNC: 4.1 MMOL/L — SIGNIFICANT CHANGE UP (ref 3.5–5.3)
POTASSIUM SERPL-SCNC: 4.1 MMOL/L — SIGNIFICANT CHANGE UP (ref 3.5–5.3)
PROT SERPL-MCNC: 7.6 G/DL — SIGNIFICANT CHANGE UP (ref 6–8.3)
RBC # BLD: 2.92 M/UL — LOW (ref 4.2–5.8)
RBC # FLD: 14.3 % — SIGNIFICANT CHANGE UP (ref 10.3–14.5)
SARS-COV-2 RNA SPEC QL NAA+PROBE: SIGNIFICANT CHANGE UP
SODIUM SERPL-SCNC: 138 MMOL/L — SIGNIFICANT CHANGE UP (ref 135–145)
WBC # BLD: 7.76 K/UL — SIGNIFICANT CHANGE UP (ref 3.8–10.5)
WBC # FLD AUTO: 7.76 K/UL — SIGNIFICANT CHANGE UP (ref 3.8–10.5)

## 2023-03-20 PROCEDURE — 71045 X-RAY EXAM CHEST 1 VIEW: CPT | Mod: 26

## 2023-03-20 RX ADMIN — Medication 3 MILLILITER(S): at 02:06

## 2023-03-20 RX ADMIN — Medication 3 MILLILITER(S): at 08:15

## 2023-03-20 RX ADMIN — Medication 1: at 11:50

## 2023-03-20 RX ADMIN — Medication 6 UNIT(S): at 11:51

## 2023-03-20 RX ADMIN — TAMSULOSIN HYDROCHLORIDE 0.4 MILLIGRAM(S): 0.4 CAPSULE ORAL at 21:35

## 2023-03-20 RX ADMIN — Medication 6 UNIT(S): at 23:58

## 2023-03-20 RX ADMIN — Medication 1 PATCH: at 07:48

## 2023-03-20 RX ADMIN — Medication 6 UNIT(S): at 06:01

## 2023-03-20 RX ADMIN — Medication 3 MILLILITER(S): at 20:09

## 2023-03-20 RX ADMIN — Medication 1 APPLICATION(S): at 17:04

## 2023-03-20 RX ADMIN — Medication 100 MILLIGRAM(S): at 05:12

## 2023-03-20 RX ADMIN — MEROPENEM 100 MILLIGRAM(S): 1 INJECTION INTRAVENOUS at 05:12

## 2023-03-20 RX ADMIN — Medication 1 APPLICATION(S): at 05:13

## 2023-03-20 RX ADMIN — Medication 6 UNIT(S): at 17:03

## 2023-03-20 RX ADMIN — Medication 1 PATCH: at 12:02

## 2023-03-20 RX ADMIN — Medication 81 MILLIGRAM(S): at 11:02

## 2023-03-20 RX ADMIN — INSULIN GLARGINE 12 UNIT(S): 100 INJECTION, SOLUTION SUBCUTANEOUS at 23:09

## 2023-03-20 RX ADMIN — INSULIN GLARGINE 12 UNIT(S): 100 INJECTION, SOLUTION SUBCUTANEOUS at 09:01

## 2023-03-20 RX ADMIN — Medication 1 PATCH: at 19:31

## 2023-03-20 RX ADMIN — Medication 3 MILLILITER(S): at 14:17

## 2023-03-20 RX ADMIN — Medication 100 MILLIGRAM(S): at 17:03

## 2023-03-20 RX ADMIN — ENOXAPARIN SODIUM 40 MILLIGRAM(S): 100 INJECTION SUBCUTANEOUS at 11:02

## 2023-03-20 RX ADMIN — Medication 1 PATCH: at 11:03

## 2023-03-20 NOTE — PROGRESS NOTE ADULT - SUBJECTIVE AND OBJECTIVE BOX
pt seen and examined    MEDICATIONS  (STANDING):  albuterol/ipratropium for Nebulization 3 milliLiter(s) Nebulizer every 6 hours  amantadine Syrup 100 milliGRAM(s) Oral two times a day  aspirin  chewable 81 milliGRAM(s) Oral daily  enoxaparin Injectable 40 milliGRAM(s) SubCutaneous every 24 hours  insulin glargine Injectable (LANTUS) 12 Unit(s) SubCutaneous every morning  insulin glargine Injectable (LANTUS) 12 Unit(s) SubCutaneous at bedtime  insulin lispro (ADMELOG) corrective regimen sliding scale   SubCutaneous every 6 hours  insulin lispro Injectable (ADMELOG) 6 Unit(s) SubCutaneous every 6 hours  nicotine -   7 mG/24Hr(s) Patch 1 Patch Transdermal daily  polyethylene glycol 3350 17 Gram(s) Oral daily  senna Syrup 10 milliLiter(s) Oral daily  silver sulfADIAZINE 1% Cream 1 Application(s) Topical two times a day  tamsulosin 0.4 milliGRAM(s) Oral at bedtime    MEDICATIONS  (PRN):  acetaminophen    Suspension .. 650 milliGRAM(s) Oral every 6 hours PRN Temp greater or equal to 38C (100.4F), Mild Pain (1 - 3)  aluminum hydroxide/magnesium hydroxide/simethicone Suspension 30 milliLiter(s) Oral every 4 hours PRN Dyspepsia  melatonin 3 milliGRAM(s) Oral at bedtime PRN Insomnia  ondansetron Injectable 4 milliGRAM(s) IV Push every 8 hours PRN Nausea and/or Vomiting    Vital Signs Last 24 Hrs  T(C): 37.4 (23 @ 20:55), Max: 37.7 (23 @ 12:04)  T(F): 99.4 (23 @ 20:55), Max: 99.8 (23 @ 12:04)  HR: 108 (23 @ 20:55) (99 - 108)  BP: 129/78 (23 @ 20:55) (129/78 - 136/84)  BP(mean): --  RR: 20 (23 @ 20:55) (19 - 20)  SpO2: 100% (23 @ 20:55) (100% - 100%)        PAST MEDICAL & SURGICAL HISTORY:  History of subdural hemorrhage      PEG (percutaneous endoscopic gastrostomy) status      DM (diabetes mellitus)      S/P craniotomy                    PHYSICAL EXAM:    GENERAL: NAD,   HEAD:  Right sided craniofacial deformity  EYES: PERRLA, conjunctiva and sclera clear  ENMT: No tonsillar erythema, exudates  NECK: Supple, No JVD, tracheostomy intact  NERVOUS SYSTEM:  Awake. Nonverbal at baseline. Does not follow commands. No spontaneous movement of extremities  CHEST/LUNG: Diminished breath sounds bilateral bases. Continues to have thick copious secretions.  HEART: Regular rate and rhythm; No murmurs, rubs, or gallops  ABDOMEN: +Peg intact Soft, Nontender, Nondistended; Bowel sounds present  EXTREMITIES:  No spontaneous movement of extremities. 2+ Peripheral Pulses, No clubbing, cyanosis, or edema  LYMPH: No lymphadenopathy noted  SKIN: Stage 1 coccyx and bilateral heels.      LABS:      138  |  102  |  22<H>  ----------------------------<  132<H>  4.1   |  31  |  0.64    Ca    9.2      20 Mar 2023 07:05  Phos  3.3       Mg     2.5         TPro  7.6  /  Alb  2.1<L>  /  TBili  0.5  /  DBili      /  AST  47<H>  /  ALT  60  /  AlkPhos  232<H>      Creatinine Trend: 0.64 <--, 0.62 <--, 0.64 <--, 0.60 <--, 0.58 <--, 0.54 <--, 0.61 <--                        8.3    7.76  )-----------( 457      ( 20 Mar 2023 07:05 )             26.5     Urine Studies:  Urinalysis Basic - ( 13 Mar 2023 23:35 )    Color: Yellow / Appearance: Clear / S.015 / pH:   Gluc:  / Ketone: Negative  / Bili: Negative / Urobili: Negative   Blood:  / Protein: 30 mg/dL / Nitrite: Negative   Leuk Esterase: Small / RBC: 5-10 /HPF / WBC 6-10 /HPF   Sq Epi:  / Non Sq Epi:  / Bacteria: Moderate /HPF              LIVER FUNCTIONS - ( 20 Mar 2023 07:05 )  Alb: 2.1 g/dL / Pro: 7.6 g/dL / ALK PHOS: 232 U/L / ALT: 60 U/L DA / AST: 47 U/L / GGT: x                       LIVER FUNCTIONS - ( 19 Mar 2023 07:36 )  Alb: 2.1 g/dL / Pro: 7.7 g/dL / ALK PHOS: 243 U/L / ALT: 65 U/L DA / AST: 41 U/L / GGT: x               RADIOLOGY & ADDITIONAL STUDIES:  ***  < from: Xray Chest 1 View- PORTABLE-Urgent (Xray Chest 1 View- PORTABLE-Urgent .) (23 @ 17:19) >  Heart size and the mediastinum cannot be accurately evaluated on this   projection. Tracheostomy tube in place.  There is new thickened bandlike opacity extending from the right hilum to   the periphery of the right upper lobe.  There is linear atelectasis in the left lower lung.  No pleural effusion or pneumothorax is seen.  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      IMPRESSION:  New thickened bandlike opacity extending from the right   hilum to the periphery of the right upper lobe, of indeterminate nature.   Question artifact, platelike atelectasis, or possibly pleural fluid   within a fissure. Follow-up chest x-ray can be obtained.    Left lower lung linear atelectasis.      < end of copied text >  < from: US Abdomen Upper Quadrant Right (23 @ 11:18) >    FINDINGS:  Liver: Within normal limits.  Bile ducts: Normal caliber. Common bile duct measures 5 mm.  Gallbladder: Within normal limits.  Pancreas: Visualized portions are within normal limits.  Right kidney: 11.8 cm. No hydronephrosis.  Ascites: None.  IVC and aorta: Visualized portions normal in caliber.    IMPRESSION:  Technically limited study, otherwise unremarkable right upper quadrant   ultrasound.    --- End of Report ---      < end of copied text >

## 2023-03-20 NOTE — PROGRESS NOTE ADULT - SUBJECTIVE AND OBJECTIVE BOX
Time of Visit:  Patient seen and examined.     MEDICATIONS  (STANDING):  albuterol/ipratropium for Nebulization 3 milliLiter(s) Nebulizer every 6 hours  amantadine Syrup 100 milliGRAM(s) Oral two times a day  aspirin  chewable 81 milliGRAM(s) Oral daily  enoxaparin Injectable 40 milliGRAM(s) SubCutaneous every 24 hours  insulin glargine Injectable (LANTUS) 12 Unit(s) SubCutaneous every morning  insulin glargine Injectable (LANTUS) 12 Unit(s) SubCutaneous at bedtime  insulin lispro (ADMELOG) corrective regimen sliding scale   SubCutaneous every 6 hours  insulin lispro Injectable (ADMELOG) 6 Unit(s) SubCutaneous every 6 hours  nicotine -   7 mG/24Hr(s) Patch 1 Patch Transdermal daily  polyethylene glycol 3350 17 Gram(s) Oral daily  senna Syrup 10 milliLiter(s) Oral daily  silver sulfADIAZINE 1% Cream 1 Application(s) Topical two times a day  tamsulosin 0.4 milliGRAM(s) Oral at bedtime      MEDICATIONS  (PRN):  acetaminophen    Suspension .. 650 milliGRAM(s) Oral every 6 hours PRN Temp greater or equal to 38C (100.4F), Mild Pain (1 - 3)  aluminum hydroxide/magnesium hydroxide/simethicone Suspension 30 milliLiter(s) Oral every 4 hours PRN Dyspepsia  melatonin 3 milliGRAM(s) Oral at bedtime PRN Insomnia  ondansetron Injectable 4 milliGRAM(s) IV Push every 8 hours PRN Nausea and/or Vomiting       Medications up to date at time of exam.      PHYSICAL EXAMINATION:    Vital Signs Last 24 Hrs  T(C): 37.7 (20 Mar 2023 12:04), Max: 38.1 (19 Mar 2023 21:50)  T(F): 99.8 (20 Mar 2023 12:04), Max: 100.5 (19 Mar 2023 21:50)  HR: 102 (20 Mar 2023 12:04) (99 - 108)  BP: 134/86 (20 Mar 2023 12:04) (132/79 - 136/84)  BP(mean): --  RR: 19 (20 Mar 2023 12:04) (18 - 20)  SpO2: 100% (20 Mar 2023 12:04) (99% - 100%)    Parameters below as of 20 Mar 2023 12:04  Patient On (Oxygen Delivery Method): hydrotrach      General ; Non verbal. No acute distress.     HEENT: Craniotomy suture site, non infected . No nasal tenderness.  Mucous membranes are moist.     NECK: Has Tracheostomy cuffed #7.5, non infected.      LUNGS: Non labored. No wheezing . No use of accessory muscle.     HEART: S1 S2 Regular rate and no murmur.    ABDOMEN: Soft, nontender, and nondistended. Active bowel sounds. +ve Peg.     EXTREMITIES: Total care. Non ambulatory. B/L UE with +ve edema.     NEUROLOGIC: Cognitive impaired .       LABS:                        8.3    7.76  )-----------( 457      ( 20 Mar 2023 07:05 )             26.5     03-20    138  |  102  |  22<H>  ----------------------------<  132<H>  4.1   |  31  |  0.64    Ca    9.2      20 Mar 2023 07:05  Phos  3.3     03-20  Mg     2.5     03-20    TPro  7.6  /  Alb  2.1<L>  /  TBili  0.5  /  DBili  x   /  AST  47<H>  /  ALT  60  /  AlkPhos  232<H>  03-20                        MICROBIOLOGY: (if applicable)    RADIOLOGY & ADDITIONAL STUDIES:  EKG:   CXR:  ECHO:    IMPRESSION: 62y Male PAST MEDICAL & SURGICAL HISTORY:  History of subdural hemorrhage      PEG (percutaneous endoscopic gastrostomy) status      DM (diabetes mellitus)      S/P craniotomy    Impression: This is a 63 Y/O Male from [facility] . Hx of Traumatic Subdural Hemorrhage following a fall down the stairs while drunk, had s/p Craniotomy at [facility] on  with s/p Trach / Peg . Admitted to  with Acute on chronic hypoxic respiratory failure secondary to RUL Pneumonia. 03-20-23 Negative PCR for Covid 19.     Suggestion:   O2 saturation 98% with HME O2 supplementation at 3L via trach collar.  Oral, trach care, suction.  Not a candidate for speaking valve, trach capping at present time.   Not a candidate for decannulation at this time.   Continue Duoneb via nebulization Q 6 Hours.  DVT / GI prophylactic. On Lovenox 40mg SQ daily.   Aspiration precautions with HOB elevation especially during Peg feeding.

## 2023-03-20 NOTE — PROGRESS NOTE ADULT - PROBLEM SELECTOR PLAN 3
Secondary to SDH and craniotomy  Craniotomy at [facility] 1/23  Maintain safety precautions  Strict aspiration precautions.  Seizure precautions.

## 2023-03-20 NOTE — CHART NOTE - NSCHARTNOTEFT_GEN_A_CORE
Wife updated on test results and treatment plan.  Discharge plans discussed.  All questions answered.

## 2023-03-20 NOTE — PROGRESS NOTE ADULT - SUBJECTIVE AND OBJECTIVE BOX
WILMA NICOLE    SCU progress note    INTERVAL HPI/OVERNIGHT EVENTS: ***Elevated temperature overnight 100.5    DNR [ ]   DNI  [  ]  FULL CODE    Covid - 19 PCR: Negative 3/9    The 4Ms    What Matters Most: see [facility]  Age appropriate Medications/Screen for High Risk Medication: Yes  Mentation: see CAM below  Mobility: defer to physical exam    The Confusion Assessment Method (CAM) Diagnostic Algorithm     1: Acute Onset or Fluctuating Course  - Is there evidence of an acute change in mental status from the patient’s baseline? Did the (abnormal) behavior  fluctuate during the day, that is, tend to come and go, or increase and decrease in severity?  [ ] YES [x ] NO     2: Inattention  - Did the patient have difficulty focusing attention, being easily distractible, or having difficulty keeping track of what was being said?  [ ] YES [ ] NO   Unable micky access     3: Disorganized thinking  -Was the patient’s thinking disorganized or incoherent, such as rambling or irrelevant conversation, unclear or illogical flow of ideas, or unpredictable switching from subject to subject?  [ ] YES [ ] NO   Unable to access    4: Altered Level of consciousness?  [x ] YES [ ] NO    The diagnosis of delirium by CAM requires the presence of features 1 and 2 and either 3 or 4.    PRESSORS: [ ] YES x] NO    Cardiovascular:  Heart Failure  Acute   Acute on Chronic  Chronic         Pulmonary:  albuterol/ipratropium for Nebulization 3 milliLiter(s) Nebulizer every 6 hours    Hematalogic:  aspirin  chewable 81 milliGRAM(s) Oral daily  enoxaparin Injectable 40 milliGRAM(s) SubCutaneous every 24 hours    Other:  acetaminophen    Suspension .. 650 milliGRAM(s) Oral every 6 hours PRN  aluminum hydroxide/magnesium hydroxide/simethicone Suspension 30 milliLiter(s) Oral every 4 hours PRN  amantadine Syrup 100 milliGRAM(s) Oral two times a day  insulin glargine Injectable (LANTUS) 12 Unit(s) SubCutaneous every morning  insulin glargine Injectable (LANTUS) 12 Unit(s) SubCutaneous at bedtime  insulin lispro (ADMELOG) corrective regimen sliding scale   SubCutaneous every 6 hours  insulin lispro Injectable (ADMELOG) 6 Unit(s) SubCutaneous every 6 hours  melatonin 3 milliGRAM(s) Oral at bedtime PRN  nicotine -   7 mG/24Hr(s) Patch 1 Patch Transdermal daily  ondansetron Injectable 4 milliGRAM(s) IV Push every 8 hours PRN  polyethylene glycol 3350 17 Gram(s) Oral daily  senna Syrup 10 milliLiter(s) Oral daily  silver sulfADIAZINE 1% Cream 1 Application(s) Topical two times a day  tamsulosin 0.4 milliGRAM(s) Oral at bedtime    acetaminophen    Suspension .. 650 milliGRAM(s) Oral every 6 hours PRN  albuterol/ipratropium for Nebulization 3 milliLiter(s) Nebulizer every 6 hours  aluminum hydroxide/magnesium hydroxide/simethicone Suspension 30 milliLiter(s) Oral every 4 hours PRN  amantadine Syrup 100 milliGRAM(s) Oral two times a day  aspirin  chewable 81 milliGRAM(s) Oral daily  enoxaparin Injectable 40 milliGRAM(s) SubCutaneous every 24 hours  insulin glargine Injectable (LANTUS) 12 Unit(s) SubCutaneous every morning  insulin glargine Injectable (LANTUS) 12 Unit(s) SubCutaneous at bedtime  insulin lispro (ADMELOG) corrective regimen sliding scale   SubCutaneous every 6 hours  insulin lispro Injectable (ADMELOG) 6 Unit(s) SubCutaneous every 6 hours  melatonin 3 milliGRAM(s) Oral at bedtime PRN  nicotine -   7 mG/24Hr(s) Patch 1 Patch Transdermal daily  ondansetron Injectable 4 milliGRAM(s) IV Push every 8 hours PRN  polyethylene glycol 3350 17 Gram(s) Oral daily  senna Syrup 10 milliLiter(s) Oral daily  silver sulfADIAZINE 1% Cream 1 Application(s) Topical two times a day  tamsulosin 0.4 milliGRAM(s) Oral at bedtime    Drug Dosing Weight  Height (cm): 177.8 (05 Mar 2023 03:36)  Weight (kg): 86.2 (05 Mar 2023 03:36)  BMI (kg/m2): 27.3 (05 Mar 2023 03:36)  BSA (m2): 2.04 (05 Mar 2023 03:36)    CENTRAL LINE: [ ] YES [x ] NO  LOCATION:   DATE INSERTED:  REMOVE: [ ] YES [ ] NO  EXPLAIN:    MELCHOR: x] YES [ ] NO    DATE INSERTED:  REMOVE:  [ ] YES x] NO  EXPLAIN:   Failed multiple TOV    PAST MEDICAL & SURGICAL HISTORY:  History of subdural hemorrhage      PEG (percutaneous endoscopic gastrostomy) status      DM (diabetes mellitus)      S/P craniotomy                  03-19 @ 07:01  -  03-20 @ 07:00  --------------------------------------------------------  IN: 0 mL / OUT: 1200 mL / NET: -1200 mL            PHYSICAL EXAM:    GENERAL: NAD. Febrile overnight 100.5  HEAD:  +Right craniofacial deformity  EYES: PERRLA, conjunctiva and sclera clear  ENMT: No tonsillar erythema, exudates  NECK: Supple, No JVD, tracheostomy intact  NERVOUS SYSTEM:  Awake. Nonverbal at baseline. Does not track with eyes. Does not follow commands. No spontaneous movement of extremities.  CHEST/LUNG: Diminished breath sounds bilateral bases  HEART: Regular rate and rhythm; No murmurs, rubs, or gallops  ABDOMEN: +Peg, Soft, Nontender, Nondistended; Bowel sounds present  EXTREMITIES:  No spontaneous movement of extremities. 2+ Peripheral Pulses, No clubbing, cyanosis, or edema  LYMPH: No lymphadenopathy noted  SKIN:  Stage 1 bilateral heels and coccyx      LABS:  CBC Full  -  ( 19 Mar 2023 07:36 )  WBC Count : 9.53 K/uL  RBC Count : 2.99 M/uL  Hemoglobin : 8.4 g/dL  Hematocrit : 27.1 %  Platelet Count - Automated : 478 K/uL  Mean Cell Volume : 90.6 fl  Mean Cell Hemoglobin : 28.1 pg  Mean Cell Hemoglobin Concentration : 31.0 gm/dL  Auto Neutrophil # : x  Auto Lymphocyte # : x  Auto Monocyte # : x  Auto Eosinophil # : x  Auto Basophil # : x  Auto Neutrophil % : x  Auto Lymphocyte % : x  Auto Monocyte % : x  Auto Eosinophil % : x  Auto Basophil % : x    03-19    136  |  101  |  20<H>  ----------------------------<  139<H>  4.2   |  30  |  0.62    Ca    9.0      19 Mar 2023 07:36  Phos  3.6     03-18  Mg     2.5     03-18    TPro  7.7  /  Alb  2.1<L>  /  TBili  0.5  /  DBili  x   /  AST  41<H>  /  ALT  65<H>  /  AlkPhos  243<H>  03-19              [  ]  DVT Prophylaxis  [  ]  Nutrition, Brand, Rate         Goal Rate        Abnormal Nutritional Status -  Malnutrition   Cachexia        RADIOLOGY & ADDITIONAL STUDIES:  ***  < from: Xray Chest 1 View- PORTABLE-Urgent (Xray Chest 1 View- PORTABLE-Urgent .) (03.13.23 @ 17:19) >  INTERPRETATION:    Heart size and the mediastinum cannot be accurately evaluated on this   projection. Tracheostomy tube in place.  There is new thickened bandlike opacity extending from the right hilum to   the periphery of the right upper lobe.  There is linear atelectasis in the left lower lung.  No pleural effusion or pneumothorax is seen.  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      IMPRESSION:  New thickened bandlike opacity extending from the right   hilum to the periphery of the right upper lobe, of indeterminate nature.   Question artifact, platelike atelectasis, or possibly pleural fluid   within a fissure. Follow-up chest x-ray can be obtained.    Left lower lung linear atelectasis.    --- End of Report ---    < end of copied text >    Goals of Care Discussion with Family/Proxy/Other   - see note from 3/05/23

## 2023-03-20 NOTE — PROGRESS NOTE ADULT - PROBLEM SELECTOR PLAN 1
Continue hydro-trach collar, saturating well  Currently tolerating 2LPM via hydro-trach collar. Continues to have copious secretions.  Monitor oxygen saturation.  Febrile overnight Tmax 100.5  Continue bronchodilators  Antibiotics as per ID.  Keep head of bed elevated at all times.  Repeat CXR pending

## 2023-03-20 NOTE — PROGRESS NOTE ADULT - PROBLEM SELECTOR PLAN 2
likely secondary to pneumonia.  Repeated blood cultures on 3/13 Negative  Continue Meropenum and Vanco.  ID DR Peres.  following.

## 2023-03-20 NOTE — PROGRESS NOTE ADULT - ASSESSMENT
62M from [facility], h/o traumatic subdural hemorrhage following a fall down the stairs while drunk, s/p r craniotomy at [facility] in 01/2023, s/p trach/PEG BIBEMS for tachycardia 170s, RR 27, increased secretions admitted to  for sepsis secondary to RUL pneumonia with tracheostomy to 3L NC.  3/6 Febrile 101.7  03/07: No events overnight. On [place]-trach at 3 L and tolerating well. Hemodynamically stable. Cultures in progress. C/w Vanco and Cefepime. F/u Vanco trough. Hypokalemic this AM and replacement ordered.   03/08: Afebrile. Vanco discontinued since MRSA negative. + yeast in sputum; + Candida Galbrata in urine (Bran in place with clear urine and afebrile).   Dr. Peres following. Blood cx NGTD. C/w Cefepime.   03/09: Febrile, tachycardic and tachypneic this AM. Code sepsis called. Sepsis w/u in progress.  Started on Vanco. ID following, . Discussed with Dr. Peres. F/u CXR. Worsening Leukocytosis. IVF bolus with LR (2,300 ml)  per protocol. Lactate 1.9.   3/11: afebrile over past 24hrs, hemodynamically stable. Vanco trough 11.7  03/12: Vanco trough this PM. Blood cx remains NGTD. C/w Vanco and Cefepime. ID following. Persistent Transaminitis.  Hep C non-reactive. F/u remaining Hepatitis panel. Patient on high dose statin which could be contributing to transaminitis. Hold for now and re-eval. Given that patient is unable to verbalize/demonstrate if symptomatic will order Liver US.   03/13: Spiked fevers overnight. Tachycardic and hypotensive. IVF bolus given. Blood cx in progress.  F/u Procal and Lactate. Vanco increased to 1500 mg BID and Meropenem added and Cefepime discontinued Discussed wih Dr. Peres and reccs appreciated  Worsening leukocytosis Pending Abdominal US 2/2 transaminitis.   03/14: Febrile. Urinary retention. On Flomax. Bladder scan and Straight cath Q 4-6 hours. On Vanco and Jose Elias. F/u cx. Abd US unremarkable. CXR 3/13 negative for acute findings.   3/15 Bladder scan 550. Bran cath reinserted.

## 2023-03-20 NOTE — PROGRESS NOTE ADULT - PROBLEM SELECTOR PLAN 3
Secondary to SDH and craniotomy  Craniotomy at [facility] 1/23  Maintain safety precautions  Strict aspiration precautions.  Seizure precautions.

## 2023-03-20 NOTE — PROGRESS NOTE ADULT - ASSESSMENT
62M from [facility], h/o traumatic subdural hemorrhage following a fall down the stairs while drunk, s/p r craniotomy at [facility] in 01/2023, s/p trach/PEG BIBEMS for tachycardia 170s, RR 27, increased secretions admitted to  for sepsis secondary to RUL pneumonia with tracheostomy to 3L NC.  3/6 Febrile 101.7  03/07: No events overnight. On [place]-trach at 3 L and tolerating well. Hemodynamically stable. Cultures in progress. C/w Vanco and Cefepime. F/u Vanco trough. Hypokalemic this AM and replacement ordered.   03/08: Afebrile. Vanco discontinued since MRSA negative. + yeast in sputum; + Candida Galbrata in urine (Bran in place with clear urine and afebrile).   Dr. Peres following. Blood cx NGTD. C/w Cefepime.   03/09: Febrile, tachycardic and tachypneic this AM. Code sepsis called. Sepsis w/u in progress.  Started on Vanco. ID following, . Discussed with Dr. Peres. F/u CXR. Worsening Leukocytosis. IVF bolus with LR (2,300 ml)  per protocol. Lactate 1.9.   3/11: afebrile over past 24hrs, hemodynamically stable. Vanco trough 11.7  03/12: Vanco trough this PM. Blood cx remains NGTD. C/w Vanco and Cefepime. ID following. Persistent Transaminitis.  Hep C non-reactive. F/u remaining Hepatitis panel. Patient on high dose statin which could be contributing to transaminitis. Hold for now and re-eval. Given that patient is unable to verbalize/demonstrate if symptomatic will order Liver US.   03/13: Spiked fevers overnight. Tachycardic and hypotensive. IVF bolus given. Blood cx in progress.  F/u Procal and Lactate. Vanco increased to 1500 mg BID and Meropenem added and Cefepime discontinued Discussed wih Dr. Peres and reccs appreciated  Worsening leukocytosis Pending Abdominal US 2/2 transaminitis.   03/14: Febrile. Urinary retention. On Flomax. Bladder scan and Straight cath Q 4-6 hours. On Vanco and Jose Elias. F/u cx. Abd US unremarkable. CXR 3/13 negative for acute findings.   3/15 Bladder scan 550. Bran cath reinserted.

## 2023-03-20 NOTE — PROGRESS NOTE ADULT - PROBLEM SELECTOR PLAN 10
DVT and GI prophylaxis.  Continue antibiotics as per ID Meropemem and Vanco.  Repeated blood cultures from 3/13  negative  ID f/u for antibiotic plan.  Elevated temp overnight. 100.5  Repeat CXR pending.  Discharge planning underway

## 2023-03-21 LAB
ALBUMIN SERPL ELPH-MCNC: 2.2 G/DL — LOW (ref 3.5–5)
ALP SERPL-CCNC: 244 U/L — HIGH (ref 40–120)
ALT FLD-CCNC: 64 U/L DA — HIGH (ref 10–60)
ANION GAP SERPL CALC-SCNC: 2 MMOL/L — LOW (ref 5–17)
AST SERPL-CCNC: 48 U/L — HIGH (ref 10–40)
BILIRUB SERPL-MCNC: 0.5 MG/DL — SIGNIFICANT CHANGE UP (ref 0.2–1.2)
BUN SERPL-MCNC: 27 MG/DL — HIGH (ref 7–18)
CALCIUM SERPL-MCNC: 9.6 MG/DL — SIGNIFICANT CHANGE UP (ref 8.4–10.5)
CHLORIDE SERPL-SCNC: 104 MMOL/L — SIGNIFICANT CHANGE UP (ref 96–108)
CO2 SERPL-SCNC: 32 MMOL/L — HIGH (ref 22–31)
CREAT SERPL-MCNC: 0.72 MG/DL — SIGNIFICANT CHANGE UP (ref 0.5–1.3)
EGFR: 105 ML/MIN/1.73M2 — SIGNIFICANT CHANGE UP
GLUCOSE SERPL-MCNC: 136 MG/DL — HIGH (ref 70–99)
HCT VFR BLD CALC: 26.4 % — LOW (ref 39–50)
HGB BLD-MCNC: 8.3 G/DL — LOW (ref 13–17)
MCHC RBC-ENTMCNC: 28.3 PG — SIGNIFICANT CHANGE UP (ref 27–34)
MCHC RBC-ENTMCNC: 31.4 GM/DL — LOW (ref 32–36)
MCV RBC AUTO: 90.1 FL — SIGNIFICANT CHANGE UP (ref 80–100)
NRBC # BLD: 0 /100 WBCS — SIGNIFICANT CHANGE UP (ref 0–0)
PLATELET # BLD AUTO: 447 K/UL — HIGH (ref 150–400)
POTASSIUM SERPL-MCNC: 4.2 MMOL/L — SIGNIFICANT CHANGE UP (ref 3.5–5.3)
POTASSIUM SERPL-SCNC: 4.2 MMOL/L — SIGNIFICANT CHANGE UP (ref 3.5–5.3)
PROCALCITONIN SERPL-MCNC: 0.23 NG/ML — HIGH (ref 0.02–0.1)
PROT SERPL-MCNC: 7.7 G/DL — SIGNIFICANT CHANGE UP (ref 6–8.3)
RBC # BLD: 2.93 M/UL — LOW (ref 4.2–5.8)
RBC # FLD: 14.4 % — SIGNIFICANT CHANGE UP (ref 10.3–14.5)
SODIUM SERPL-SCNC: 138 MMOL/L — SIGNIFICANT CHANGE UP (ref 135–145)
WBC # BLD: 7.55 K/UL — SIGNIFICANT CHANGE UP (ref 3.8–10.5)
WBC # FLD AUTO: 7.55 K/UL — SIGNIFICANT CHANGE UP (ref 3.8–10.5)

## 2023-03-21 RX ADMIN — Medication 6 UNIT(S): at 23:56

## 2023-03-21 RX ADMIN — Medication 3 MILLILITER(S): at 08:07

## 2023-03-21 RX ADMIN — TAMSULOSIN HYDROCHLORIDE 0.4 MILLIGRAM(S): 0.4 CAPSULE ORAL at 21:08

## 2023-03-21 RX ADMIN — Medication 1 PATCH: at 11:39

## 2023-03-21 RX ADMIN — Medication 3 MILLILITER(S): at 03:24

## 2023-03-21 RX ADMIN — Medication 1 PATCH: at 07:30

## 2023-03-21 RX ADMIN — Medication 3 MILLILITER(S): at 14:07

## 2023-03-21 RX ADMIN — Medication 1 PATCH: at 20:00

## 2023-03-21 RX ADMIN — Medication 1 APPLICATION(S): at 17:05

## 2023-03-21 RX ADMIN — Medication 6 UNIT(S): at 07:24

## 2023-03-21 RX ADMIN — Medication 100 MILLIGRAM(S): at 17:50

## 2023-03-21 RX ADMIN — Medication 3 MILLILITER(S): at 20:23

## 2023-03-21 RX ADMIN — Medication 81 MILLIGRAM(S): at 11:05

## 2023-03-21 RX ADMIN — Medication 1 PATCH: at 11:04

## 2023-03-21 RX ADMIN — INSULIN GLARGINE 12 UNIT(S): 100 INJECTION, SOLUTION SUBCUTANEOUS at 08:45

## 2023-03-21 RX ADMIN — ENOXAPARIN SODIUM 40 MILLIGRAM(S): 100 INJECTION SUBCUTANEOUS at 12:00

## 2023-03-21 RX ADMIN — INSULIN GLARGINE 12 UNIT(S): 100 INJECTION, SOLUTION SUBCUTANEOUS at 22:01

## 2023-03-21 RX ADMIN — Medication 1: at 23:50

## 2023-03-21 RX ADMIN — Medication 6 UNIT(S): at 17:05

## 2023-03-21 RX ADMIN — POLYETHYLENE GLYCOL 3350 17 GRAM(S): 17 POWDER, FOR SOLUTION ORAL at 11:04

## 2023-03-21 RX ADMIN — Medication 100 MILLIGRAM(S): at 05:25

## 2023-03-21 RX ADMIN — SENNA PLUS 10 MILLILITER(S): 8.6 TABLET ORAL at 11:04

## 2023-03-21 RX ADMIN — Medication 6 UNIT(S): at 12:01

## 2023-03-21 RX ADMIN — Medication 1 APPLICATION(S): at 05:24

## 2023-03-21 NOTE — PROGRESS NOTE ADULT - PROBLEM SELECTOR PLAN 1
Continue hydro-trach collar, saturating well  Currently tolerating 2LPM via hydro-trach collar. Continues to have copious secretions.  Monitor oxygen saturation.  Continue bronchodilators  Antibiotics as per ID.  Keep head of bed elevated at all times.  CXR yesterday shows improvement in aeration.

## 2023-03-21 NOTE — PROGRESS NOTE ADULT - PROBLEM SELECTOR PLAN 10
DVT and GI prophylaxis.  Continue antibiotics as per ID Meropemem and Vanco.  Repeated blood cultures from 3/13  negative  ID f/u for antibiotic plan.  Afebrile  Repeat CXR shows improvement.  Discharge planning underway.  Medically stable for discharge.

## 2023-03-21 NOTE — PROGRESS NOTE ADULT - SUBJECTIVE AND OBJECTIVE BOX
WILMA NICOLE    SCU progress note    INTERVAL HPI/OVERNIGHT EVENTS: ***No overnight events. Afebrile    DNR [ ]   DNI  [  ]   FULL CODE    Covid - 19 PCR: Negative 3/20    The 4Ms    What Matters Most: see [facility]  Age appropriate Medications/Screen for High Risk Medication: Yes  Mentation: see CAM below  Mobility: defer to physical exam    The Confusion Assessment Method (CAM) Diagnostic Algorithm     1: Acute Onset or Fluctuating Course  - Is there evidence of an acute change in mental status from the patient’s baseline? Did the (abnormal) behavior  fluctuate during the day, that is, tend to come and go, or increase and decrease in severity?  [ ] YES [ x] NO     2: Inattention  - Did the patient have difficulty focusing attention, being easily distractible, or having difficulty keeping track of what was being said?  [ ] YES [ ] NO  Unable to access     3: Disorganized thinking  -Was the patient’s thinking disorganized or incoherent, such as rambling or irrelevant conversation, unclear or illogical flow of ideas, or unpredictable switching from subject to subject?  [ ] YES [ ] NO   Unable to access    4: Altered Level of consciousness?  [x ] YES [ ] NO    The diagnosis of delirium by CAM requires the presence of features 1 and 2 and either 3 or 4.    PRESSORS: [ ] YES [x ] NO    Cardiovascular:  Heart Failure  Acute   Acute on Chronic  Chronic         Pulmonary:  albuterol/ipratropium for Nebulization 3 milliLiter(s) Nebulizer every 6 hours    Hematalogic:  aspirin  chewable 81 milliGRAM(s) Oral daily  enoxaparin Injectable 40 milliGRAM(s) SubCutaneous every 24 hours    Other:  acetaminophen    Suspension .. 650 milliGRAM(s) Oral every 6 hours PRN  aluminum hydroxide/magnesium hydroxide/simethicone Suspension 30 milliLiter(s) Oral every 4 hours PRN  amantadine Syrup 100 milliGRAM(s) Oral two times a day  insulin glargine Injectable (LANTUS) 12 Unit(s) SubCutaneous every morning  insulin glargine Injectable (LANTUS) 12 Unit(s) SubCutaneous at bedtime  insulin lispro (ADMELOG) corrective regimen sliding scale   SubCutaneous every 6 hours  insulin lispro Injectable (ADMELOG) 6 Unit(s) SubCutaneous every 6 hours  melatonin 3 milliGRAM(s) Oral at bedtime PRN  nicotine -   7 mG/24Hr(s) Patch 1 Patch Transdermal daily  ondansetron Injectable 4 milliGRAM(s) IV Push every 8 hours PRN  polyethylene glycol 3350 17 Gram(s) Oral daily  senna Syrup 10 milliLiter(s) Oral daily  silver sulfADIAZINE 1% Cream 1 Application(s) Topical two times a day  tamsulosin 0.4 milliGRAM(s) Oral at bedtime    acetaminophen    Suspension .. 650 milliGRAM(s) Oral every 6 hours PRN  albuterol/ipratropium for Nebulization 3 milliLiter(s) Nebulizer every 6 hours  aluminum hydroxide/magnesium hydroxide/simethicone Suspension 30 milliLiter(s) Oral every 4 hours PRN  amantadine Syrup 100 milliGRAM(s) Oral two times a day  aspirin  chewable 81 milliGRAM(s) Oral daily  enoxaparin Injectable 40 milliGRAM(s) SubCutaneous every 24 hours  insulin glargine Injectable (LANTUS) 12 Unit(s) SubCutaneous every morning  insulin glargine Injectable (LANTUS) 12 Unit(s) SubCutaneous at bedtime  insulin lispro (ADMELOG) corrective regimen sliding scale   SubCutaneous every 6 hours  insulin lispro Injectable (ADMELOG) 6 Unit(s) SubCutaneous every 6 hours  melatonin 3 milliGRAM(s) Oral at bedtime PRN  nicotine -   7 mG/24Hr(s) Patch 1 Patch Transdermal daily  ondansetron Injectable 4 milliGRAM(s) IV Push every 8 hours PRN  polyethylene glycol 3350 17 Gram(s) Oral daily  senna Syrup 10 milliLiter(s) Oral daily  silver sulfADIAZINE 1% Cream 1 Application(s) Topical two times a day  tamsulosin 0.4 milliGRAM(s) Oral at bedtime    Drug Dosing Weight  Height (cm): 177.8 (05 Mar 2023 03:36)  Weight (kg): 86.2 (05 Mar 2023 03:36)  BMI (kg/m2): 27.3 (05 Mar 2023 03:36)  BSA (m2): 2.04 (05 Mar 2023 03:36)    CENTRAL LINE: [ ] YES [x ] NO  LOCATION:   DATE INSERTED:  REMOVE: [ ] YES [ ] NO  EXPLAIN:    MELCHOR: x] YES [ ] NO    DATE INSERTED:  REMOVE:  [ ] YES [ x] NO  EXPLAIN:  Failed multiple TOV    PAST MEDICAL & SURGICAL HISTORY:  History of subdural hemorrhage      PEG (percutaneous endoscopic gastrostomy) status      DM (diabetes mellitus)      S/P craniotomy                  03-20 @ 07:01  -  03-21 @ 07:00  --------------------------------------------------------  IN: 0 mL / OUT: 250 mL / NET: -250 mL            PHYSICAL EXAM:    GENERAL: NAD,  afebrile  HEAD:  +Right craniofacial deformity  EYES: PERRLA, conjunctiva and sclera clear  ENMT: No tonsillar erythema, exudates  NECK: Supple, No JVD, tracheostomy intact  NERVOUS SYSTEM:  Awake and alert. Nonverbal at baseline. Does not follow commands. No spontaneous movement of extremities.   BL foot drop   CHEST/LUNG: Diminished breath sounds bilateral bases.  HEART: Regular rate and rhythm; No murmurs, rubs, or gallops  ABDOMEN: +Peg intact. Soft, Nontender, Nondistended; Bowel sounds present  EXTREMITIES: No spontaneous movement of extremities.  2+ Peripheral Pulses, No clubbing, cyanosis, or edema  LYMPH: No lymphadenopathy noted  SKIN: Stage 1 bilateral heels and coccyx      LABS:  CBC Full  -  ( 21 Mar 2023 07:14 )  WBC Count : 7.55 K/uL  RBC Count : 2.93 M/uL  Hemoglobin : 8.3 g/dL  Hematocrit : 26.4 %  Platelet Count - Automated : 447 K/uL  Mean Cell Volume : 90.1 fl  Mean Cell Hemoglobin : 28.3 pg  Mean Cell Hemoglobin Concentration : 31.4 gm/dL  Auto Neutrophil # : x  Auto Lymphocyte # : x  Auto Monocyte # : x  Auto Eosinophil # : x  Auto Basophil # : x  Auto Neutrophil % : x  Auto Lymphocyte % : x  Auto Monocyte % : x  Auto Eosinophil % : x  Auto Basophil % : x    03-20    138  |  102  |  22<H>  ----------------------------<  132<H>  4.1   |  31  |  0.64    Ca    9.2      20 Mar 2023 07:05  Phos  3.3     03-20  Mg     2.5     03-20    TPro  7.6  /  Alb  2.1<L>  /  TBili  0.5  /  DBili  x   /  AST  47<H>  /  ALT  60  /  AlkPhos  232<H>  03-20              [  ]  DVT Prophylaxis  [  ]  Nutrition, Brand, Rate         Goal Rate        Abnormal Nutritional Status -  Malnutrition   Cachexia         RADIOLOGY & ADDITIONAL STUDIES:  ***  < from: Xray Chest 1 View- PORTABLE-Urgent (Xray Chest 1 View- PORTABLE-Urgent .) (03.13.23 @ 17:19) >  INTERPRETATION:    Heart size and the mediastinum cannot be accurately evaluated on this   projection. Tracheostomy tube in place.  There is new thickened bandlike opacity extending from the right hilum to   the periphery of the right upper lobe.  There is linear atelectasis in the left lower lung.  No pleural effusion or pneumothorax is seen.  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      IMPRESSION:  New thickened bandlike opacity extending from the right   hilum to the periphery of the right upper lobe, of indeterminate nature.   Question artifact, platelike atelectasis, or possibly pleural fluid   within a fissure. Follow-up chest x-ray can be obtained.    Left lower lung linear atelectasis.    < end of copied text >  < from: US Abdomen Upper Quadrant Right (03.13.23 @ 11:18) >  FINDINGS:  Liver: Within normal limits.  Bile ducts: Normal caliber. Common bile duct measures 5 mm.  Gallbladder: Within normal limits.  Pancreas: Visualized portions are within normal limits.  Right kidney: 11.8 cm. No hydronephrosis.  Ascites: None.  IVC and aorta: Visualized portions normal in caliber.    IMPRESSION:  Technically limited study, otherwise unremarkable right upper quadrant   ultrasound.    --- End of Report ---    < end of copied text >    Goals of Care Discussion with Family/Proxy/Other   - see note from 3/05/23

## 2023-03-21 NOTE — PROGRESS NOTE ADULT - ASSESSMENT
62M from [facility], h/o traumatic subdural hemorrhage following a fall down the stairs while drunk, s/p r craniotomy at [facility] in 01/2023, s/p trach/PEG BIBEMS for tachycardia 170s, RR 27, increased secretions admitted to  for sepsis secondary to RUL pneumonia with tracheostomy to 3L NC.  3/6 Febrile 101.7  03/07: No events overnight. On [place]-trach at 3 L and tolerating well. Hemodynamically stable. Cultures in progress. C/w Vanco and Cefepime. F/u Vanco trough. Hypokalemic this AM and replacement ordered.   03/08: Afebrile. Vanco discontinued since MRSA negative. + yeast in sputum; + Candida Galbrata in urine (Bran in place with clear urine and afebrile).   Dr. Peres following. Blood cx NGTD. C/w Cefepime.   03/09: Febrile, tachycardic and tachypneic this AM. Code sepsis called. Sepsis w/u in progress.  Started on Vanco. ID following, . Discussed with Dr. Peres. F/u CXR. Worsening Leukocytosis. IVF bolus with LR (2,300 ml)  per protocol. Lactate 1.9.   3/11: afebrile over past 24hrs, hemodynamically stable. Vanco trough 11.7  03/12: Vanco trough this PM. Blood cx remains NGTD. C/w Vanco and Cefepime. ID following. Persistent Transaminitis.  Hep C non-reactive. F/u remaining Hepatitis panel. Patient on high dose statin which could be contributing to transaminitis. Hold for now and re-eval. Given that patient is unable to verbalize/demonstrate if symptomatic will order Liver US.   03/13: Spiked fevers overnight. Tachycardic and hypotensive. IVF bolus given. Blood cx in progress.  F/u Procal and Lactate. Vanco increased to 1500 mg BID and Meropenem added and Cefepime discontinued Discussed wih Dr. Peres and reccs appreciated  Worsening leukocytosis Pending Abdominal US 2/2 transaminitis.   03/14: Febrile. Urinary retention. On Flomax. Bladder scan and Straight cath Q 4-6 hours. On Vanco and Jose Elias. F/u cx. Abd US unremarkable. CXR 3/13 negative for acute findings.   3/15 Bladder scan 550. Bran cath reinserted.

## 2023-03-21 NOTE — PROGRESS NOTE ADULT - PROBLEM SELECTOR PLAN 3
Secondary to SDH and craniotomy  Craniotomy at [facility] 1/23  Maintain safety precautions  Strict aspiration precautions.  Seizure precautions.

## 2023-03-21 NOTE — PROGRESS NOTE ADULT - PROBLEM SELECTOR PLAN 8
Called patient to make him aware his PCP office is trying to get ahold of him to discuss blood pressure     Patient states he will call his PCP office now Passive ROM exercises daily.  Turn and position every 2 hours  Strict aspiration precautions.  Keep head of bed elevated at all times.

## 2023-03-21 NOTE — PROGRESS NOTE ADULT - SUBJECTIVE AND OBJECTIVE BOX
62y Male who is doing better overall, he is more responsive and opens his eyes to tactile and verbal stimuli and even grunts occasionally when asked a question. His fevers have improved. He completed his antibiotic course this morning.     Meds:    Allergies    No Known Allergies    Intolerances        VITALS:  Vital Signs Last 24 Hrs  T(C): 37.1 (21 Mar 2023 13:46), Max: 37.4 (20 Mar 2023 20:55)  T(F): 98.8 (21 Mar 2023 13:46), Max: 99.4 (20 Mar 2023 20:55)  HR: 100 (21 Mar 2023 13:46) (98 - 109)  BP: 95/48 (21 Mar 2023 13:46) (95/48 - 129/78)  BP(mean): --  RR: 20 (21 Mar 2023 13:46) (20 - 20)  SpO2: 100% (21 Mar 2023 13:46) (97% - 100%)    Parameters below as of 21 Mar 2023 13:46  Patient On (Oxygen Delivery Method): HydroTrach  O2 Flow (L/min): 3      LABS/DIAGNOSTIC TESTS:                          8.3    7.55  )-----------( 447      ( 21 Mar 2023 07:14 )             26.4         03-21    138  |  104  |  27<H>  ----------------------------<  136<H>  4.2   |  32<H>  |  0.72    Ca    9.6      21 Mar 2023 07:14  Phos  3.3     03-20  Mg     2.5     03-20    TPro  7.7  /  Alb  2.2<L>  /  TBili  0.5  /  DBili  x   /  AST  48<H>  /  ALT  64<H>  /  AlkPhos  244<H>  03-21      LIVER FUNCTIONS - ( 21 Mar 2023 07:14 )  Alb: 2.2 g/dL / Pro: 7.7 g/dL / ALK PHOS: 244 U/L / ALT: 64 U/L DA / AST: 48 U/L / GGT: x             CULTURES: .Blood Blood  03-13 @ 10:37   No Growth Final  --  --      .Blood Blood  03-13 @ 10:30   No Growth Final  --  --      .Sputum Sputum  03-09 @ 23:37   Normal Respiratory Jane present  --    Rare Squamous epithelial cells per low power field  Numerous polymorphonuclear leukocytes per low power field  Few Gram positive cocci in pairs per oil power field  Few Gram Negative Rods per oil power field  Few Yeast like cells per oil power field      .Blood Blood  03-09 @ 09:24   No Growth Final  --  --      Trach Asp Tracheal Aspirate  03-06 @ 00:14   Normal Respiratory Jane present  --    Numerous polymorphonuclear leukocytes per low power field  Few Squamous epithelial cells per low power field  Numerous Gram Variable Rods seen per oil power field  Moderate Yeast like cells seen per oil power field  Few Gram positive cocci in pairsseen per oil power field      .Blood Blood-Peripheral  03-05 @ 04:00   No Growth Final  --  --      .Blood Blood-Peripheral  03-05 @ 03:50   No Growth Final  --  --      Clean Catch Clean Catch (Midstream)  03-05 @ 03:15   50,000 - 99,000 CFU/mL Candida glabrata  "Susceptibilities not performed"  --  --            RADIOLOGY:      ROS:  [ x ] UNABLE TO ELICIT

## 2023-03-21 NOTE — PROGRESS NOTE ADULT - PROBLEM SELECTOR PLAN 2
likely secondary to pneumonia.  Repeated blood cultures on 3/13 Negative  Continue Meropenum and Vanco.  ID DR Peres.  following.  Afebrile

## 2023-03-22 LAB
ALBUMIN SERPL ELPH-MCNC: 2.3 G/DL — LOW (ref 3.5–5)
ALP SERPL-CCNC: 256 U/L — HIGH (ref 40–120)
ALT FLD-CCNC: 67 U/L DA — HIGH (ref 10–60)
ANION GAP SERPL CALC-SCNC: 6 MMOL/L — SIGNIFICANT CHANGE UP (ref 5–17)
AST SERPL-CCNC: 46 U/L — HIGH (ref 10–40)
BILIRUB SERPL-MCNC: 0.5 MG/DL — SIGNIFICANT CHANGE UP (ref 0.2–1.2)
BUN SERPL-MCNC: 30 MG/DL — HIGH (ref 7–18)
CALCIUM SERPL-MCNC: 9.4 MG/DL — SIGNIFICANT CHANGE UP (ref 8.4–10.5)
CHLORIDE SERPL-SCNC: 107 MMOL/L — SIGNIFICANT CHANGE UP (ref 96–108)
CO2 SERPL-SCNC: 30 MMOL/L — SIGNIFICANT CHANGE UP (ref 22–31)
CREAT SERPL-MCNC: 0.74 MG/DL — SIGNIFICANT CHANGE UP (ref 0.5–1.3)
EGFR: 104 ML/MIN/1.73M2 — SIGNIFICANT CHANGE UP
GLUCOSE SERPL-MCNC: 135 MG/DL — HIGH (ref 70–99)
HCT VFR BLD CALC: 28.2 % — LOW (ref 39–50)
HGB BLD-MCNC: 8.8 G/DL — LOW (ref 13–17)
MCHC RBC-ENTMCNC: 28 PG — SIGNIFICANT CHANGE UP (ref 27–34)
MCHC RBC-ENTMCNC: 31.2 GM/DL — LOW (ref 32–36)
MCV RBC AUTO: 89.8 FL — SIGNIFICANT CHANGE UP (ref 80–100)
NRBC # BLD: 0 /100 WBCS — SIGNIFICANT CHANGE UP (ref 0–0)
PLATELET # BLD AUTO: 441 K/UL — HIGH (ref 150–400)
POTASSIUM SERPL-MCNC: 4.3 MMOL/L — SIGNIFICANT CHANGE UP (ref 3.5–5.3)
POTASSIUM SERPL-SCNC: 4.3 MMOL/L — SIGNIFICANT CHANGE UP (ref 3.5–5.3)
PROT SERPL-MCNC: 8.1 G/DL — SIGNIFICANT CHANGE UP (ref 6–8.3)
RBC # BLD: 3.14 M/UL — LOW (ref 4.2–5.8)
RBC # FLD: 14.7 % — HIGH (ref 10.3–14.5)
SODIUM SERPL-SCNC: 143 MMOL/L — SIGNIFICANT CHANGE UP (ref 135–145)
WBC # BLD: 7.36 K/UL — SIGNIFICANT CHANGE UP (ref 3.8–10.5)
WBC # FLD AUTO: 7.36 K/UL — SIGNIFICANT CHANGE UP (ref 3.8–10.5)

## 2023-03-22 RX ADMIN — Medication 3 MILLILITER(S): at 08:57

## 2023-03-22 RX ADMIN — Medication 6 UNIT(S): at 17:26

## 2023-03-22 RX ADMIN — Medication 1 PATCH: at 20:07

## 2023-03-22 RX ADMIN — Medication 6 UNIT(S): at 11:43

## 2023-03-22 RX ADMIN — Medication 1 APPLICATION(S): at 05:26

## 2023-03-22 RX ADMIN — INSULIN GLARGINE 12 UNIT(S): 100 INJECTION, SOLUTION SUBCUTANEOUS at 08:10

## 2023-03-22 RX ADMIN — Medication 3 MILLILITER(S): at 16:11

## 2023-03-22 RX ADMIN — Medication 100 MILLIGRAM(S): at 05:27

## 2023-03-22 RX ADMIN — Medication 1 PATCH: at 11:44

## 2023-03-22 RX ADMIN — INSULIN GLARGINE 12 UNIT(S): 100 INJECTION, SOLUTION SUBCUTANEOUS at 22:30

## 2023-03-22 RX ADMIN — Medication 3 MILLILITER(S): at 20:35

## 2023-03-22 RX ADMIN — POLYETHYLENE GLYCOL 3350 17 GRAM(S): 17 POWDER, FOR SOLUTION ORAL at 11:44

## 2023-03-22 RX ADMIN — Medication 650 MILLIGRAM(S): at 22:16

## 2023-03-22 RX ADMIN — Medication 1 PATCH: at 07:24

## 2023-03-22 RX ADMIN — SENNA PLUS 10 MILLILITER(S): 8.6 TABLET ORAL at 11:45

## 2023-03-22 RX ADMIN — Medication 100 MILLIGRAM(S): at 17:22

## 2023-03-22 RX ADMIN — ENOXAPARIN SODIUM 40 MILLIGRAM(S): 100 INJECTION SUBCUTANEOUS at 11:43

## 2023-03-22 RX ADMIN — Medication 1 APPLICATION(S): at 18:40

## 2023-03-22 RX ADMIN — Medication 3 MILLILITER(S): at 03:04

## 2023-03-22 RX ADMIN — TAMSULOSIN HYDROCHLORIDE 0.4 MILLIGRAM(S): 0.4 CAPSULE ORAL at 22:17

## 2023-03-22 RX ADMIN — Medication 6 UNIT(S): at 05:59

## 2023-03-22 RX ADMIN — Medication 81 MILLIGRAM(S): at 11:45

## 2023-03-22 NOTE — PROGRESS NOTE ADULT - PROBLEM SELECTOR PLAN 1
Continue hydro-trach collar, saturating well  Currently tolerating 2LPM via hydro-trach collar. Continues to have copious secretions.  Monitor oxygen saturation.  Continue bronchodilators  Antibiotics completed. Monitor off antibiotics. Procalcitonin elevated 0.23  Keep head of bed elevated at all times.

## 2023-03-22 NOTE — PROGRESS NOTE ADULT - SUBJECTIVE AND OBJECTIVE BOX
Time of Visit:  Patient seen and examined. pat is laying in bed comfortable , o2 via hydrotrach     MEDICATIONS  (STANDING):  albuterol/ipratropium for Nebulization 3 milliLiter(s) Nebulizer every 6 hours  amantadine Syrup 100 milliGRAM(s) Oral two times a day  aspirin  chewable 81 milliGRAM(s) Oral daily  enoxaparin Injectable 40 milliGRAM(s) SubCutaneous every 24 hours  insulin glargine Injectable (LANTUS) 12 Unit(s) SubCutaneous at bedtime  insulin glargine Injectable (LANTUS) 12 Unit(s) SubCutaneous every morning  insulin lispro (ADMELOG) corrective regimen sliding scale   SubCutaneous every 6 hours  insulin lispro Injectable (ADMELOG) 6 Unit(s) SubCutaneous every 6 hours  nicotine -   7 mG/24Hr(s) Patch 1 Patch Transdermal daily  polyethylene glycol 3350 17 Gram(s) Oral daily  senna Syrup 10 milliLiter(s) Oral daily  silver sulfADIAZINE 1% Cream 1 Application(s) Topical two times a day  tamsulosin 0.4 milliGRAM(s) Oral at bedtime      MEDICATIONS  (PRN):  acetaminophen    Suspension .. 650 milliGRAM(s) Oral every 6 hours PRN Temp greater or equal to 38C (100.4F), Mild Pain (1 - 3)  aluminum hydroxide/magnesium hydroxide/simethicone Suspension 30 milliLiter(s) Oral every 4 hours PRN Dyspepsia  melatonin 3 milliGRAM(s) Oral at bedtime PRN Insomnia  ondansetron Injectable 4 milliGRAM(s) IV Push every 8 hours PRN Nausea and/or Vomiting       Medications up to date at time of exam.      PHYSICAL EXAMINATION:  Patient has no new complaints.  GENERAL: The patient is a well-developed, well-nourished, in no apparent distress.     Vital Signs Last 24 Hrs  T(C): 36.7 (22 Mar 2023 16:32), Max: 37.6 (22 Mar 2023 05:11)  T(F): 98.1 (22 Mar 2023 16:32), Max: 99.7 (22 Mar 2023 05:11)  HR: 66 (22 Mar 2023 16:32) (66 - 105)  BP: 168/77 (22 Mar 2023 16:32) (114/72 - 168/77)  BP(mean): --  RR: 18 (22 Mar 2023 16:32) (18 - 20)  SpO2: 100% (22 Mar 2023 16:32) (97% - 100%)    Parameters below as of 22 Mar 2023 13:15  Patient On (Oxygen Delivery Method): hydrotrach  O2 Flow (L/min): 3     (if applicable)    Chest Tube (if applicable)    HEENT: Indentation of skull due to surgery     NECK: Supple, no palpable adenopathy. + trach     LUNGS: Clear to auscultation, no wheezing, rales, or rhonchi.    HEART: Regular rate and rhythm without murmur.    ABDOMEN: Soft, nontender, and nondistended.  No hepatosplenomegaly is noted. + trach     EXTREMITIES: Without any cyanosis, clubbing, rash, lesions or edema.    NEUROLOGIC: eyes open     SKIN: Warm, dry, good turgor.      LABS:                        8.8    7.36  )-----------( 441      ( 22 Mar 2023 08:43 )             28.2     03-22    143  |  107  |  30<H>  ----------------------------<  135<H>  4.3   |  30  |  0.74    Ca    9.4      22 Mar 2023 08:43    TPro  8.1  /  Alb  2.3<L>  /  TBili  0.5  /  DBili  x   /  AST  46<H>  /  ALT  67<H>  /  AlkPhos  256<H>  03-22                    Procalcitonin, Serum: 0.23 ng/mL (03-21-23 @ 07:14)      MICROBIOLOGY: (if applicable)    RADIOLOGY & ADDITIONAL STUDIES:  EKG:   CXR:  ECHO:    IMPRESSION: 62y Male PAST MEDICAL & SURGICAL HISTORY:  History of subdural hemorrhage      PEG (percutaneous endoscopic gastrostomy) status      DM (diabetes mellitus)      S/P craniotomy       p/w       IMP: This is a 62 yr old man  from [facility], h/o traumatic subdural hemorrhage following a fall down the stairs while drunk, s/p r craniotomy at [facility] in 01/2023, s/p trach/PEG BIBEMS for tachycardia 170s, RR 27, increased secretions admitted to  for sepsis secondary to RUL pneumonia with tracheostomy to 3L NC.  3/6 Febrile 101.7  03/07: No events overnight. On [place]-trach at 3 L and tolerating well. Hemodynamically stable. Cultures in progress. C/w Vanco and Cefepime. F/u Vanco trough. Hypokalemic this AM and replacement ordered.   03/08: Afebrile. Vanco discontinued since MRSA negative. + yeast in sputum; + Candida Galbrata in urine (Bran in place with clear urine and afebrile).   Dr. Peres following. Blood cx NGTD. C/w Cefepime.   03/09: Febrile, tachycardic and tachypneic this AM. Code sepsis called. Sepsis w/u in progress.  Started on Vanco. ID following, . Discussed with Dr. Peres. F/u CXR. Worsening Leukocytosis. IVF bolus with LR (2,300 ml)  per protocol. Lactate 1.9.   3/11: afebrile over past 24hrs, hemodynamically stable. Vanco trough 11.7  03/12: Vanco trough this PM. Blood cx remains NGTD. C/w Vanco and Cefepime. ID following. Persistent Transaminitis.  Hep C non-reactive. F/u remaining Hepatitis panel. Patient on high dose statin which could be contributing to transaminitis. Hold for now and re-eval. Given that patient is unable to verbalize/demonstrate if symptomatic will order Liver US.   03/13: Spiked fevers overnight. Tachycardic and hypotensive. IVF bolus given. Blood cx in progress.  F/u Procal and Lactate. Vanco increased to 1500 mg BID and Meropenem added and Cefepime discontinued Discussed wih Dr. Peres and reccs appreciated  Worsening leukocytosis Pending Abdominal US 2/2 transaminitis.   03/14: Febrile. Urinary retention. On Flomax. Bladder scan and Straight cath Q 4-6 hours. On Vanco and Jose Elias. F/u cx. Abd US unremarkable. CXR 3/13 negative for acute findings.   3/15 Bladder scan 550. Bran cath reinserted.      Plan   - Continue O2 supp via TC ( hydrotrach )   - Duonebs   - Trach care  - Asp precaution   - Completed course of antibx   - DVT GI prophy

## 2023-03-22 NOTE — CHART NOTE - NSCHARTNOTEFT_GEN_A_CORE
EVENT: Notified by RN, is feb    HPI:   62M from [facility], h/o traumatic subdural hemorrhage following a fall down the stairs while drunk, s/p r craniotomy at [facility] in 01/2023, s/p trach/PEG BIBEMS for tachycardia 170s, RR 27, increased secretions admitted to  for sepsis secondary to RUL pneumonia with tracheostomy to 3L NC.  3/6 Febrile 101.7  03/07: No events overnight. On [place]-trach at 3 L and tolerating well. Hemodynamically stable. Cultures in progress. C/w Vanco and Cefepime. F/u Vanco trough. Hypokalemic this AM and replacement ordered.   03/08: Afebrile. Vanco discontinued since MRSA negative. + yeast in sputum; + Candida Galbrata in urine (Bran in place with clear urine and afebrile).   Dr. Peres following. Blood cx NGTD. C/w Cefepime.   03/09: Febrile, tachycardic and tachypneic this AM. Code sepsis called. Sepsis w/u in progress.  Started on Vanco. ID following, . Discussed with Dr. Peres. F/u CXR. Worsening Leukocytosis. IVF bolus with LR (2,300 ml)  per protocol. Lactate 1.9.   3/11: afebrile over past 24hrs, hemodynamically stable. Vanco trough 11.7  03/12: Vanco trough this PM. Blood cx remains NGTD. C/w Vanco and Cefepime. ID following. Persistent Transaminitis.  Hep C non-reactive. F/u remaining Hepatitis panel. Patient on high dose statin which could be contributing to transaminitis. Hold for now and re-eval. Given that patient is unable to verbalize/demonstrate if symptomatic will order Liver US.   03/13: Spiked fevers overnight. Tachycardic and hypotensive. IVF bolus given. Blood cx in progress.  F/u Procal and Lactate. Vanco increased to 1500 mg BID and Meropenem added and Cefepime discontinued Discussed wi Dr. Peres and reccs appreciated  Worsening leukocytosis Pending Abdominal US 2/2 transaminitis.   03/14: Febrile. Urinary retention. On Flomax. Bladder scan and Straight cath Q 4-6 hours. On Vanco and Jose Elias. F/u cx. Abd US unremarkable. CXR 3/13 negative for acute findings.   3/15 Bladder scan 550. Bran cath reinserted.    SUBJECTIVE:    OBJECTIVE:  Vital Signs Last 24 Hrs  T(C): 38.1 (22 Mar 2023 21:40), Max: 38.1 (22 Mar 2023 21:40)  T(F): 100.5 (22 Mar 2023 21:40), Max: 100.5 (22 Mar 2023 21:40)  HR: 98 (22 Mar 2023 21:00) (98 - 105)  BP: 139/85 (22 Mar 2023 21:00) (114/72 - 139/85)  BP(mean): --  RR: 20 (22 Mar 2023 21:00) (18 - 20)  SpO2: 100% (22 Mar 2023 21:00) (97% - 100%)    Parameters below as of 22 Mar 2023 21:00  Patient On (Oxygen Delivery Method): Hydro Trach        PHYSICAL EXAM:  Neuro: Awake and alert, oriented to person, place, and time  Cardiovascular: + S1, S2, no murmurs, rubs, or bruits  Respiratory: clear to auscultation bilaterally with good air entry   GI: Abdomen soft, non-tender, bowel sounds present   : Non distended;   Skin: warm and dry; no rash      LABS:                        8.8    7.36  )-----------( 441      ( 22 Mar 2023 08:43 )             28.2     03-22    143  |  107  |  30<H>  ----------------------------<  135<H>  4.3   |  30  |  0.74    Ca    9.4      22 Mar 2023 08:43    TPro  8.1  /  Alb  2.3<L>  /  TBili  0.5  /  DBili  x   /  AST  46<H>  /  ALT  67<H>  /  AlkPhos  256<H>  03-22        EKG:   IMAGING:    ASSESSMENT/PROBLEM:    PLAN:     FOLLOW UP / RESULT: EVENT: Notified by RN, is febrile, low grade temp of 100.5    HPI:   62M from [facility], h/o traumatic subdural hemorrhage following a fall down the stairs while drunk, s/p r craniotomy at [facility] in 01/2023, s/p trach/PEG BIBEMS for tachycardia 170s, RR 27, increased secretions admitted to  for sepsis secondary to RUL pneumonia with tracheostomy to 3L NC.  3/6 Febrile 101.7  03/07: No events overnight. On [place]-trach at 3 L and tolerating well. Hemodynamically stable. Cultures in progress. C/w Vanco and Cefepime. F/u Vanco trough. Hypokalemic this AM and replacement ordered.   03/08: Afebrile. Vanco discontinued since MRSA negative. + yeast in sputum; + Candida Galbrata in urine (Bran in place with clear urine and afebrile).   Dr. Peres following. Blood cx NGTD. C/w Cefepime.   03/09: Febrile, tachycardic and tachypneic this AM. Code sepsis called. Sepsis w/u in progress.  Started on Vanco. ID following, . Discussed with Dr. Peres. F/u CXR. Worsening Leukocytosis. IVF bolus with LR (2,300 ml)  per protocol. Lactate 1.9.   3/11: afebrile over past 24hrs, hemodynamically stable. Vanco trough 11.7  03/12: Vanco trough this PM. Blood cx remains NGTD. C/w Vanco and Cefepime. ID following. Persistent Transaminitis.  Hep C non-reactive. F/u remaining Hepatitis panel. Patient on high dose statin which could be contributing to transaminitis. Hold for now and re-eval. Given that patient is unable to verbalize/demonstrate if symptomatic will order Liver US.   03/13: Spiked fevers overnight. Tachycardic and hypotensive. IVF bolus given. Blood cx in progress.  F/u Procal and Lactate. Vanco increased to 1500 mg BID and Meropenem added and Cefepime discontinued Discussed wih Dr. Peres and reccs appreciated  Worsening leukocytosis Pending Abdominal US 2/2 transaminitis.   03/14: Febrile. Urinary retention. On Flomax. Bladder scan and Straight cath Q 4-6 hours. On Vanco and Jose Elias. F/u cx. Abd US unremarkable. CXR 3/13 negative for acute findings.   3/15 Bladder scan 550. Bran cath reinserted.    SUBJECTIVE: Pt seen and examined at bedside, awake and alert, nonverbal. Unable to follow commands.     OBJECTIVE:  Vital Signs Last 24 Hrs  T(C): 38.1 (22 Mar 2023 21:40), Max: 38.1 (22 Mar 2023 21:40)  T(F): 100.5 (22 Mar 2023 21:40), Max: 100.5 (22 Mar 2023 21:40)  HR: 98 (22 Mar 2023 21:00) (98 - 105)  BP: 139/85 (22 Mar 2023 21:00) (114/72 - 139/85)  BP(mean): --  RR: 20 (22 Mar 2023 21:00) (18 - 20)  SpO2: 100% (22 Mar 2023 21:00) (97% - 100%)    Parameters below as of 22 Mar 2023 21:00  Patient On (Oxygen Delivery Method): Hydro Trach      PHYSICAL EXAM:  GENERAL: NAD, afebrile  HEAD:  +Right craniofacial deformity  EYES: PERRLA, conjunctiva and sclera clear  ENMT: No tonsillar erythema, exudates  NECK: Supple, No JVD, tracheostomy intact  NERVOUS SYSTEM:  Awake and alert. Does not follow commands. No tracking with eyes. Nonverbal at baseline. No spontaneous movement of extremities. BL foot drop  CHEST/LUNG: Diminished breath sounds bilateral bases.  HEART: Regular rate and rhythm; No murmurs, rubs, or gallops  ABDOMEN: +Peg Intact  Soft, Nontender, Nondistended; Bowel sounds present  EXTREMITIES: No spontaneous movement of extremities.  2+ Peripheral Pulses, No clubbing, cyanosis, or edema  LYMPH: No lymphadenopathy noted  SKIN:   Stage 1 bilateral heels and coccyx      LABS:                        8.8    7.36  )-----------( 441      ( 22 Mar 2023 08:43 )             28.2     03-22    143  |  107  |  30<H>  ----------------------------<  135<H>  4.3   |  30  |  0.74    Ca    9.4      22 Mar 2023 08:43    TPro  8.1  /  Alb  2.3<L>  /  TBili  0.5  /  DBili  x   /  AST  46<H>  /  ALT  67<H>  /  AlkPhos  256<H>  03-22        EKG:   IMAGING:    ASSESSMENT/PROBLEM: Low grade temp likely in the setting of sepsis due to aspiration pneumonia    PLAN:     -Tylenol 650 mg q6 PRN, give dose now  -Antibiotics completed. Continue to monitor off antibiotics. Repeated blood cultures from 3/13  negative.  -Continue current/supportive measures    FOLLOW UP / RESULT:    -Reassess temp per hospital policy

## 2023-03-22 NOTE — PROGRESS NOTE ADULT - PROBLEM SELECTOR PLAN 2
Resolved. Likely secondary to aspiration pneumonia.  Afebrile.  Antibiotics completed  Monitor off antibiotics.  Repeated blood cultures on 3/13 negative.

## 2023-03-22 NOTE — PROGRESS NOTE ADULT - PROBLEM SELECTOR PLAN 10
DVT and GI prophylaxis.  Antibiotics completed. Continue to monitor off antibiotics.  Repeated blood cultures from 3/13  negative  Afebrile  Repeat CXR shows improvement.  Discharge planning underway.  Medically stable for discharge. DVT and GI prophylaxis.  Antibiotics completed. Continue to monitor off antibiotics.  Repeated blood cultures from 3/13  negative  Afebrile  Repeat CXR shows improvement.  Discharge planning underway.  Appropriate for Long Term Care.   Medically stable for discharge.

## 2023-03-22 NOTE — PROGRESS NOTE ADULT - SUBJECTIVE AND OBJECTIVE BOX
WILMA NICOLE    SCU progress note    INTERVAL HPI/OVERNIGHT EVENTS: ***No overnight events. Afebrile.    DNR [ ]   DNI  [  ]  Full Code    Covid - 19 PCR: Negative 3/20    The 4Ms    What Matters Most: see [facility]  Age appropriate Medications/Screen for High Risk Medication: Yes  Mentation: see CAM below  Mobility: defer to physical exam    The Confusion Assessment Method (CAM) Diagnostic Algorithm     1: Acute Onset or Fluctuating Course  - Is there evidence of an acute change in mental status from the patient’s baseline? Did the (abnormal) behavior  fluctuate during the day, that is, tend to come and go, or increase and decrease in severity?  [ ] YES [x ] NO     2: Inattention  - Did the patient have difficulty focusing attention, being easily distractible, or having difficulty keeping track of what was being said?  [ ] YES [ ] NO  Unable to access     3: Disorganized thinking  -Was the patient’s thinking disorganized or incoherent, such as rambling or irrelevant conversation, unclear or illogical flow of ideas, or unpredictable switching from subject to subject?  [ ] YES [ ] NO   Unable to access    4: Altered Level of consciousness?  [ ] YES [x ] NO    The diagnosis of delirium by CAM requires the presence of features 1 and 2 and either 3 or 4.    PRESSORS: [ ] YES [x ] NO    Cardiovascular:  Heart Failure  Acute   Acute on Chronic  Chronic         Pulmonary:  albuterol/ipratropium for Nebulization 3 milliLiter(s) Nebulizer every 6 hours    Hematalogic:  aspirin  chewable 81 milliGRAM(s) Oral daily  enoxaparin Injectable 40 milliGRAM(s) SubCutaneous every 24 hours    Other:  acetaminophen    Suspension .. 650 milliGRAM(s) Oral every 6 hours PRN  aluminum hydroxide/magnesium hydroxide/simethicone Suspension 30 milliLiter(s) Oral every 4 hours PRN  amantadine Syrup 100 milliGRAM(s) Oral two times a day  insulin glargine Injectable (LANTUS) 12 Unit(s) SubCutaneous every morning  insulin glargine Injectable (LANTUS) 12 Unit(s) SubCutaneous at bedtime  insulin lispro (ADMELOG) corrective regimen sliding scale   SubCutaneous every 6 hours  insulin lispro Injectable (ADMELOG) 6 Unit(s) SubCutaneous every 6 hours  melatonin 3 milliGRAM(s) Oral at bedtime PRN  nicotine -   7 mG/24Hr(s) Patch 1 Patch Transdermal daily  ondansetron Injectable 4 milliGRAM(s) IV Push every 8 hours PRN  polyethylene glycol 3350 17 Gram(s) Oral daily  senna Syrup 10 milliLiter(s) Oral daily  silver sulfADIAZINE 1% Cream 1 Application(s) Topical two times a day  tamsulosin 0.4 milliGRAM(s) Oral at bedtime    acetaminophen    Suspension .. 650 milliGRAM(s) Oral every 6 hours PRN  albuterol/ipratropium for Nebulization 3 milliLiter(s) Nebulizer every 6 hours  aluminum hydroxide/magnesium hydroxide/simethicone Suspension 30 milliLiter(s) Oral every 4 hours PRN  amantadine Syrup 100 milliGRAM(s) Oral two times a day  aspirin  chewable 81 milliGRAM(s) Oral daily  enoxaparin Injectable 40 milliGRAM(s) SubCutaneous every 24 hours  insulin glargine Injectable (LANTUS) 12 Unit(s) SubCutaneous every morning  insulin glargine Injectable (LANTUS) 12 Unit(s) SubCutaneous at bedtime  insulin lispro (ADMELOG) corrective regimen sliding scale   SubCutaneous every 6 hours  insulin lispro Injectable (ADMELOG) 6 Unit(s) SubCutaneous every 6 hours  melatonin 3 milliGRAM(s) Oral at bedtime PRN  nicotine -   7 mG/24Hr(s) Patch 1 Patch Transdermal daily  ondansetron Injectable 4 milliGRAM(s) IV Push every 8 hours PRN  polyethylene glycol 3350 17 Gram(s) Oral daily  senna Syrup 10 milliLiter(s) Oral daily  silver sulfADIAZINE 1% Cream 1 Application(s) Topical two times a day  tamsulosin 0.4 milliGRAM(s) Oral at bedtime    Drug Dosing Weight  Height (cm): 177.8 (05 Mar 2023 03:36)  Weight (kg): 86.2 (05 Mar 2023 03:36)  BMI (kg/m2): 27.3 (05 Mar 2023 03:36)  BSA (m2): 2.04 (05 Mar 2023 03:36)    CENTRAL LINE: [ ] YES [x ] NO  LOCATION:   DATE INSERTED:  REMOVE: [ ] YES [ ] NO  EXPLAIN:    MELCHOR: [x ] YES [ ] NO    DATE INSERTED:  REMOVE:  [ ] YES [ x] NO  EXPLAIN:   Failed multiple TOV    PAST MEDICAL & SURGICAL HISTORY:  History of subdural hemorrhage      PEG (percutaneous endoscopic gastrostomy) status      DM (diabetes mellitus)      S/P craniotomy                  03-21 @ 07:01  -  03-22 @ 07:00  --------------------------------------------------------  IN: 0 mL / OUT: 1302 mL / NET: -1302 mL            PHYSICAL EXAM:    GENERAL: NAD, afebrile  HEAD:  +Right craniofacial deformity  EYES: PERRLA, conjunctiva and sclera clear  ENMT: No tonsillar erythema, exudates  NECK: Supple, No JVD, tracheostomy intact  NERVOUS SYSTEM:  Awake and alert. Does not follow commands. No tracking with eyes. Nonverbal at baseline. No spontaneous movement of extremities. BL foot drop  CHEST/LUNG: Diminished breath sounds bilateral bases.  HEART: Regular rate and rhythm; No murmurs, rubs, or gallops  ABDOMEN: +Peg Intact  Soft, Nontender, Nondistended; Bowel sounds present  EXTREMITIES: No spontaneous movement of extremities.  2+ Peripheral Pulses, No clubbing, cyanosis, or edema  LYMPH: No lymphadenopathy noted  SKIN:   Stage 1 bilateral heels and coccyx      LABS:  CBC Full  -  ( 21 Mar 2023 07:14 )  WBC Count : 7.55 K/uL  RBC Count : 2.93 M/uL  Hemoglobin : 8.3 g/dL  Hematocrit : 26.4 %  Platelet Count - Automated : 447 K/uL  Mean Cell Volume : 90.1 fl  Mean Cell Hemoglobin : 28.3 pg  Mean Cell Hemoglobin Concentration : 31.4 gm/dL  Auto Neutrophil # : x  Auto Lymphocyte # : x  Auto Monocyte # : x  Auto Eosinophil # : x  Auto Basophil # : x  Auto Neutrophil % : x  Auto Lymphocyte % : x  Auto Monocyte % : x  Auto Eosinophil % : x  Auto Basophil % : x    03-21    138  |  104  |  27<H>  ----------------------------<  136<H>  4.2   |  32<H>  |  0.72    Ca    9.6      21 Mar 2023 07:14    TPro  7.7  /  Alb  2.2<L>  /  TBili  0.5  /  DBili  x   /  AST  48<H>  /  ALT  64<H>  /  AlkPhos  244<H>  03-21              [  ]  DVT Prophylaxis  [  ]  Nutrition, Brand, Rate         Goal Rate        Abnormal Nutritional Status -  Malnutrition   Cachexia        RADIOLOGY & ADDITIONAL STUDIES:  ***  < from: Xray Chest 1 View- PORTABLE-Urgent (Xray Chest 1 View- PORTABLE-Urgent .) (03.20.23 @ 10:07) >  INTERPRETATION:    The heart is not enlarged. The mediastinum is not accurately evaluated on   this image.  Tracheostomy tube in place.  Mildly elevated right hemidiaphragm.  Indeterminate 1 x 1.4 cm nodular opacity in the left retrocardiac region.   Remainder of lungs are clear.  No pleural effusion or pneumothorax.  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        IMPRESSION:  Mildly elevated right hemidiaphragm.    1 x 1.4 cm nodular opacity in the left retrocardiac region, of   indeterminate nature. Correlation with an unenhanced CT scan of the chest   could be performed for further evaluation, if felt to be clinically   indicated.    < end of copied text >  < from: US Abdomen Upper Quadrant Right (03.13.23 @ 11:18) >  TECHNIQUE: Sonography of the right upper quadrant. The examination was   technically limited secondary to a combination of overlying bowel gas and   difficulty with patient positioning.    FINDINGS:  Liver: Within normal limits.  Bile ducts: Normal caliber. Common bile duct measures 5 mm.  Gallbladder: Within normal limits.  Pancreas: Visualized portions are within normal limits.  Right kidney: 11.8 cm. No hydronephrosis.  Ascites: None.  IVC and aorta: Visualized portions normal in caliber.    IMPRESSION:  Technically limited study, otherwise unremarkable right upper quadrant   ultrasound.    --- End of Report ---    < end of copied text >    Goals of Care Discussion with Family/Proxy/Other   - see note from 3/03/23

## 2023-03-22 NOTE — PROGRESS NOTE ADULT - PROBLEM SELECTOR PLAN 8
Passive ROM exercises daily.  Turn and position every 2 hours  Strict aspiration precautions.  Keep head of bed elevated at all times. Passive ROM exercises daily.  Turn and position every 2 hours  Strict aspiration precautions.  Keep head of bed elevated at all times.  Patient appropriate for Long Term Care

## 2023-03-22 NOTE — PROGRESS NOTE ADULT - PROBLEM SELECTOR PLAN 3
Secondary to SDH and craniotomy  Craniotomy at [facility] 1/23  Maintain safety precautions  Strict aspiration precautions.  Seizure precautions.

## 2023-03-23 LAB
ALBUMIN SERPL ELPH-MCNC: 2.4 G/DL — LOW (ref 3.5–5)
ALP SERPL-CCNC: 257 U/L — HIGH (ref 40–120)
ALT FLD-CCNC: 67 U/L DA — HIGH (ref 10–60)
ANION GAP SERPL CALC-SCNC: 6 MMOL/L — SIGNIFICANT CHANGE UP (ref 5–17)
AST SERPL-CCNC: 47 U/L — HIGH (ref 10–40)
BILIRUB SERPL-MCNC: 0.4 MG/DL — SIGNIFICANT CHANGE UP (ref 0.2–1.2)
BUN SERPL-MCNC: 33 MG/DL — HIGH (ref 7–18)
CALCIUM SERPL-MCNC: 9.3 MG/DL — SIGNIFICANT CHANGE UP (ref 8.4–10.5)
CHLORIDE SERPL-SCNC: 108 MMOL/L — SIGNIFICANT CHANGE UP (ref 96–108)
CO2 SERPL-SCNC: 29 MMOL/L — SIGNIFICANT CHANGE UP (ref 22–31)
CREAT SERPL-MCNC: 0.78 MG/DL — SIGNIFICANT CHANGE UP (ref 0.5–1.3)
EGFR: 102 ML/MIN/1.73M2 — SIGNIFICANT CHANGE UP
GLUCOSE SERPL-MCNC: 155 MG/DL — HIGH (ref 70–99)
HCT VFR BLD CALC: 28.5 % — LOW (ref 39–50)
HGB BLD-MCNC: 8.8 G/DL — LOW (ref 13–17)
MAGNESIUM SERPL-MCNC: 2.6 MG/DL — SIGNIFICANT CHANGE UP (ref 1.6–2.6)
MCHC RBC-ENTMCNC: 28.2 PG — SIGNIFICANT CHANGE UP (ref 27–34)
MCHC RBC-ENTMCNC: 30.9 GM/DL — LOW (ref 32–36)
MCV RBC AUTO: 91.3 FL — SIGNIFICANT CHANGE UP (ref 80–100)
NRBC # BLD: 0 /100 WBCS — SIGNIFICANT CHANGE UP (ref 0–0)
PHOSPHATE SERPL-MCNC: 4.1 MG/DL — SIGNIFICANT CHANGE UP (ref 2.5–4.5)
PLATELET # BLD AUTO: 432 K/UL — HIGH (ref 150–400)
POTASSIUM SERPL-MCNC: 4.1 MMOL/L — SIGNIFICANT CHANGE UP (ref 3.5–5.3)
POTASSIUM SERPL-SCNC: 4.1 MMOL/L — SIGNIFICANT CHANGE UP (ref 3.5–5.3)
PROT SERPL-MCNC: 8.1 G/DL — SIGNIFICANT CHANGE UP (ref 6–8.3)
RBC # BLD: 3.12 M/UL — LOW (ref 4.2–5.8)
RBC # FLD: 14.7 % — HIGH (ref 10.3–14.5)
SODIUM SERPL-SCNC: 143 MMOL/L — SIGNIFICANT CHANGE UP (ref 135–145)
WBC # BLD: 7.39 K/UL — SIGNIFICANT CHANGE UP (ref 3.8–10.5)
WBC # FLD AUTO: 7.39 K/UL — SIGNIFICANT CHANGE UP (ref 3.8–10.5)

## 2023-03-23 RX ADMIN — TAMSULOSIN HYDROCHLORIDE 0.4 MILLIGRAM(S): 0.4 CAPSULE ORAL at 22:36

## 2023-03-23 RX ADMIN — ENOXAPARIN SODIUM 40 MILLIGRAM(S): 100 INJECTION SUBCUTANEOUS at 10:00

## 2023-03-23 RX ADMIN — Medication 100 MILLIGRAM(S): at 05:58

## 2023-03-23 RX ADMIN — Medication 650 MILLIGRAM(S): at 00:06

## 2023-03-23 RX ADMIN — INSULIN GLARGINE 12 UNIT(S): 100 INJECTION, SOLUTION SUBCUTANEOUS at 08:32

## 2023-03-23 RX ADMIN — Medication 1 PATCH: at 18:49

## 2023-03-23 RX ADMIN — Medication 3 MILLILITER(S): at 20:24

## 2023-03-23 RX ADMIN — Medication 6 UNIT(S): at 00:09

## 2023-03-23 RX ADMIN — Medication 3 MILLILITER(S): at 08:30

## 2023-03-23 RX ADMIN — Medication 6 UNIT(S): at 17:05

## 2023-03-23 RX ADMIN — Medication 81 MILLIGRAM(S): at 11:54

## 2023-03-23 RX ADMIN — INSULIN GLARGINE 12 UNIT(S): 100 INJECTION, SOLUTION SUBCUTANEOUS at 22:35

## 2023-03-23 RX ADMIN — POLYETHYLENE GLYCOL 3350 17 GRAM(S): 17 POWDER, FOR SOLUTION ORAL at 11:53

## 2023-03-23 RX ADMIN — Medication 6 UNIT(S): at 23:02

## 2023-03-23 RX ADMIN — Medication 1 PATCH: at 11:54

## 2023-03-23 RX ADMIN — Medication 1 APPLICATION(S): at 17:06

## 2023-03-23 RX ADMIN — Medication 3 MILLILITER(S): at 15:06

## 2023-03-23 RX ADMIN — Medication 6 UNIT(S): at 05:59

## 2023-03-23 RX ADMIN — Medication 1: at 23:01

## 2023-03-23 RX ADMIN — SENNA PLUS 10 MILLILITER(S): 8.6 TABLET ORAL at 11:54

## 2023-03-23 RX ADMIN — Medication 1 APPLICATION(S): at 05:58

## 2023-03-23 RX ADMIN — Medication 1 PATCH: at 11:52

## 2023-03-23 RX ADMIN — Medication 1 PATCH: at 07:08

## 2023-03-23 RX ADMIN — Medication 6 UNIT(S): at 11:53

## 2023-03-23 RX ADMIN — Medication 100 MILLIGRAM(S): at 17:06

## 2023-03-23 RX ADMIN — Medication 3 MILLILITER(S): at 03:38

## 2023-03-23 NOTE — CHART NOTE - NSCHARTNOTEFT_GEN_A_CORE
Assessment:   62yMalePatient is a 62y old  Male who presents with a chief complaint of Sepsis secondary to pneumonia (23 Mar 2023 11:05). Pt visited. Pt with Tracheostomy. TF Glucerna 1.5 infusing @40 ML /HR X 24 HR Providing 960 ml,1440 calories, Protein 79 gms, free water 729 ml/day. Pt with SCD device. Bed scale 161 lb, Pt w Pressure ulcer Stage 1 @ coccyx, B/L Heel .      Factors impacting intake: [ ] none [ ] nausea  [ ] vomiting [ ] diarrhea [ ] constipation  [ ]chewing problems [x ] swallowing issues  [ ] other:     Diet Prescription: Diet, NPO with Tube Feed:   Tube Feeding Modality: Gastrostomy  Glucerna 1.5 Guero  Total Volume for 24 Hours (mL): 960  Continuous  Starting Tube Feed Rate {mL per Hour}: 10  Increase Tube Feed Rate by (mL): 10     Every 4 hours  Until Goal Tube Feed Rate (mL per Hour): 40  Tube Feed Duration (in Hours): 24  Tube Feed Start Time: 12:00  Free Water Flush   Total Volume per Flush (mL): 50   Frequency: Every 6 Hours (03-18-23 @ 20:06)    Intake: NPO w TF    Current Weight:   % Weight Change 161    Pertinent Medications: MEDICATIONS  (STANDING):  albuterol/ipratropium for Nebulization 3 milliLiter(s) Nebulizer every 6 hours  amantadine Syrup 100 milliGRAM(s) Oral two times a day  aspirin  chewable 81 milliGRAM(s) Oral daily  enoxaparin Injectable 40 milliGRAM(s) SubCutaneous every 24 hours  insulin glargine Injectable (LANTUS) 12 Unit(s) SubCutaneous every morning  insulin glargine Injectable (LANTUS) 12 Unit(s) SubCutaneous at bedtime  insulin lispro (ADMELOG) corrective regimen sliding scale   SubCutaneous every 6 hours  insulin lispro Injectable (ADMELOG) 6 Unit(s) SubCutaneous every 6 hours  nicotine -   7 mG/24Hr(s) Patch 1 Patch Transdermal daily  polyethylene glycol 3350 17 Gram(s) Oral daily  senna Syrup 10 milliLiter(s) Oral daily  silver sulfADIAZINE 1% Cream 1 Application(s) Topical two times a day  tamsulosin 0.4 milliGRAM(s) Oral at bedtime    MEDICATIONS  (PRN):  acetaminophen    Suspension .. 650 milliGRAM(s) Oral every 6 hours PRN Temp greater or equal to 38C (100.4F), Mild Pain (1 - 3)  aluminum hydroxide/magnesium hydroxide/simethicone Suspension 30 milliLiter(s) Oral every 4 hours PRN Dyspepsia  melatonin 3 milliGRAM(s) Oral at bedtime PRN Insomnia  ondansetron Injectable 4 milliGRAM(s) IV Push every 8 hours PRN Nausea and/or Vomiting    Pertinent Labs: 03-23 Na143 mmol/L Glu 155 mg/dL<H> K+ 4.1 mmol/L Cr  0.78 mg/dL BUN 33 mg/dL<H> 03-23 Phos 4.1 mg/dL 03-23 Alb 2.4 g/dL<L>     CAPILLARY BLOOD GLUCOSE      POCT Blood Glucose.: 139 mg/dL (23 Mar 2023 11:45)  POCT Blood Glucose.: 139 mg/dL (23 Mar 2023 08:12)  POCT Blood Glucose.: 134 mg/dL (23 Mar 2023 05:38)  POCT Blood Glucose.: 149 mg/dL (23 Mar 2023 00:01)  POCT Blood Glucose.: 139 mg/dL (22 Mar 2023 22:20)  POCT Blood Glucose.: 145 mg/dL (22 Mar 2023 17:13)    Skin: stage 1 Pressure ulcers    Estimated Needs:   [ ] no change since previous assessment  [ ] recalculated:     Previous Nutrition Diagnosis:   [ ] Inadequate Energy Intake [ ]Inadequate Oral Intake [ ] Excessive Energy Intake   [ ] Underweight [ ] Increased Nutrient Needs [ ] Overweight/Obesity   [ ] Altered GI Function [ ] Unintended Weight Loss [ ] Food & Nutrition Related Knowledge Deficit [ ] Malnutrition   (x) swallowing     Nutrition Diagnosis is x ] ongoing  [ ] resolved [ ] not applicable     New Nutrition Diagnosis: [ ] not applicable       Interventions:   Recommend  [ ] Change Diet To:  [ ] Nutrition Supplement  [x ] Nutrition Support  Glucerna 1.5 Initail Goal rate @ 50 ml /hr x 24  hr to providing 1200. 1800 calories, Protein 99 gms)  [ x] Other: May consider Adding extra free water flush Via Peg. BUN 33.    Monitoring and Evaluation:   [ ] PO intake [ x ] Tolerance to diet prescription [ x ] weights [ x ] labs[ x ] follow up per protocol  [ ] other:

## 2023-03-23 NOTE — PROGRESS NOTE ADULT - ASSESSMENT
62M from [facility], h/o traumatic subdural hemorrhage following a fall down the stairs while drunk, s/p r craniotomy at [facility] in 01/2023, s/p trach/PEG BIBEMS for tachycardia 170s, RR 27, increased secretions admitted to  for sepsis secondary to RUL pneumonia with tracheostomy to 3L NC.  3/6 Febrile 101.7  03/07: No events overnight. On [place]-trach at 3 L and tolerating well. Hemodynamically stable. Cultures in progress. C/w Vanco and Cefepime. F/u Vanco trough. Hypokalemic this AM and replacement ordered.   03/08: Afebrile. Vanco discontinued since MRSA negative. + yeast in sputum; + Candida [place] in urine (Bran in place with clear urine and afebrile).   Dr. Peres following. Blood cx NGTD. C/w Cefepime.   03/09: Febrile, tachycardic and tachypneic this AM. Code sepsis called. Sepsis w/u in progress.  Started on Vanco. ID following, . Discussed with Dr. Peres. F/u CXR. Worsening Leukocytosis. IVF bolus with LR (2,300 ml)  per protocol. Lactate 1.9.   3/11: afebrile over past 24hrs, hemodynamically stable. Vanco trough 11.7  03/12: Vanco trough this PM. Blood cx remains NGTD. C/w Vanco and Cefepime. ID following. Persistent Transaminitis.  Hep C non-reactive. F/u remaining Hepatitis panel. Patient on high dose statin which could be contributing to transaminitis. Hold for now and re-eval. Given that patient is unable to verbalize/demonstrate if symptomatic will order Liver US.   03/13: Spiked fevers overnight. Tachycardic and hypotensive. IVF bolus given. Blood cx in progress.  F/u Procal and Lactate. Vanco increased to 1500 mg BID and Meropenem added and Cefepime discontinued Discussed wih Dr. Peres and reccs appreciated  Worsening leukocytosis Pending Abdominal US 2/2 transaminitis.   03/14: Febrile. Urinary retention. On Flomax. Bladder scan and Straight cath Q 4-6 hours. On Vanco and Jose Elias. F/u cx. Abd US unremarkable. CXR 3/13 negative for acute findings.   3/15 Bladder scan 550. Bran cath reinserted.  3/23/23 Patient febrile overnight 100.5 likely central fever, no leukocytosis

## 2023-03-23 NOTE — PROGRESS NOTE ADULT - SUBJECTIVE AND OBJECTIVE BOX
WILMA NICOLE    SCU progress note    INTERVAL HPI/OVERNIGHT EVENTS: ***    DNR [ ]   DNI  [  ]    Covid - 19 PCR:     The 4Ms    What Matters Most: see GOC  Age appropriate Medications/Screen for High Risk Medication: Yes  Mentation: see CAM below  Mobility: defer to physical exam    The Confusion Assessment Method (CAM) Diagnostic Algorithm     1: Acute Onset or Fluctuating Course  - Is there evidence of an acute change in mental status from the patient’s baseline? Did the (abnormal) behavior  fluctuate during the day, that is, tend to come and go, or increase and decrease in severity?  [ ] YES [ ] NO     2: Inattention  - Did the patient have difficulty focusing attention, being easily distractible, or having difficulty keeping track of what was being said?  [ ] YES [ ] NO     3: Disorganized thinking  -Was the patient’s thinking disorganized or incoherent, such as rambling or irrelevant conversation, unclear or illogical flow of ideas, or unpredictable switching from subject to subject?  [ ] YES [ ] NO    4: Altered Level of consciousness?  [ ] YES [ ] NO    The diagnosis of delirium by CAM requires the presence of features 1 and 2 and either 3 or 4.    PRESSORS: [ ] YES [ ] NO    Cardiovascular:  Heart Failure  Acute   Acute on Chronic  Chronic         Pulmonary:  albuterol/ipratropium for Nebulization 3 milliLiter(s) Nebulizer every 6 hours    Hematalogic:  aspirin  chewable 81 milliGRAM(s) Oral daily  enoxaparin Injectable 40 milliGRAM(s) SubCutaneous every 24 hours    Other:  acetaminophen    Suspension .. 650 milliGRAM(s) Oral every 6 hours PRN  aluminum hydroxide/magnesium hydroxide/simethicone Suspension 30 milliLiter(s) Oral every 4 hours PRN  amantadine Syrup 100 milliGRAM(s) Oral two times a day  insulin glargine Injectable (LANTUS) 12 Unit(s) SubCutaneous every morning  insulin glargine Injectable (LANTUS) 12 Unit(s) SubCutaneous at bedtime  insulin lispro (ADMELOG) corrective regimen sliding scale   SubCutaneous every 6 hours  insulin lispro Injectable (ADMELOG) 6 Unit(s) SubCutaneous every 6 hours  melatonin 3 milliGRAM(s) Oral at bedtime PRN  nicotine -   7 mG/24Hr(s) Patch 1 Patch Transdermal daily  ondansetron Injectable 4 milliGRAM(s) IV Push every 8 hours PRN  polyethylene glycol 3350 17 Gram(s) Oral daily  senna Syrup 10 milliLiter(s) Oral daily  silver sulfADIAZINE 1% Cream 1 Application(s) Topical two times a day  tamsulosin 0.4 milliGRAM(s) Oral at bedtime    acetaminophen    Suspension .. 650 milliGRAM(s) Oral every 6 hours PRN  albuterol/ipratropium for Nebulization 3 milliLiter(s) Nebulizer every 6 hours  aluminum hydroxide/magnesium hydroxide/simethicone Suspension 30 milliLiter(s) Oral every 4 hours PRN  amantadine Syrup 100 milliGRAM(s) Oral two times a day  aspirin  chewable 81 milliGRAM(s) Oral daily  enoxaparin Injectable 40 milliGRAM(s) SubCutaneous every 24 hours  insulin glargine Injectable (LANTUS) 12 Unit(s) SubCutaneous every morning  insulin glargine Injectable (LANTUS) 12 Unit(s) SubCutaneous at bedtime  insulin lispro (ADMELOG) corrective regimen sliding scale   SubCutaneous every 6 hours  insulin lispro Injectable (ADMELOG) 6 Unit(s) SubCutaneous every 6 hours  melatonin 3 milliGRAM(s) Oral at bedtime PRN  nicotine -   7 mG/24Hr(s) Patch 1 Patch Transdermal daily  ondansetron Injectable 4 milliGRAM(s) IV Push every 8 hours PRN  polyethylene glycol 3350 17 Gram(s) Oral daily  senna Syrup 10 milliLiter(s) Oral daily  silver sulfADIAZINE 1% Cream 1 Application(s) Topical two times a day  tamsulosin 0.4 milliGRAM(s) Oral at bedtime    Drug Dosing Weight  Height (cm): 177.8 (05 Mar 2023 03:36)  Weight (kg): 86.2 (05 Mar 2023 03:36)  BMI (kg/m2): 27.3 (05 Mar 2023 03:36)  BSA (m2): 2.04 (05 Mar 2023 03:36)    CENTRAL LINE: [ ] YES [ ] NO  LOCATION:   DATE INSERTED:  REMOVE: [ ] YES [ ] NO  EXPLAIN:    MELCHOR: [ ] YES [ ] NO    DATE INSERTED:  REMOVE:  [ ] YES [ ] NO  EXPLAIN:    PAST MEDICAL & SURGICAL HISTORY:  History of subdural hemorrhage      PEG (percutaneous endoscopic gastrostomy) status      DM (diabetes mellitus)      S/P craniotomy                  03-22 @ 07:01 - 03-23 @ 07:00  --------------------------------------------------------  IN: 0 mL / OUT: 351 mL / NET: -351 mL            PHYSICAL EXAM:    GENERAL: NAD, well-groomed, well-developed  HEAD:  Atraumatic, Normocephalic  EYES: EOMI, PERRLA, conjunctiva and sclera clear  ENMT: No tonsillar erythema, exudates, or enlargement; Moist mucous membranes, Good dentition, No lesions  NECK: Supple, No JVD, Normal thyroid  NERVOUS SYSTEM:  Alert & Oriented X3, Good concentration; Motor Strength 5/5 B/L upper and lower extremities; DTRs 2+ intact and symmetric  CHEST/LUNG: Clear to percussion bilaterally; No rales, rhonchi, wheezing, or rubs  HEART: Regular rate and rhythm; No murmurs, rubs, or gallops  ABDOMEN: Soft, Nontender, Nondistended; Bowel sounds present  EXTREMITIES:  2+ Peripheral Pulses, No clubbing, cyanosis, or edema  LYMPH: No lymphadenopathy noted  SKIN: No rashes or lesions      LABS:  CBC Full  -  ( 23 Mar 2023 06:46 )  WBC Count : 7.39 K/uL  RBC Count : 3.12 M/uL  Hemoglobin : 8.8 g/dL  Hematocrit : 28.5 %  Platelet Count - Automated : 432 K/uL  Mean Cell Volume : 91.3 fl  Mean Cell Hemoglobin : 28.2 pg  Mean Cell Hemoglobin Concentration : 30.9 gm/dL  Auto Neutrophil # : x  Auto Lymphocyte # : x  Auto Monocyte # : x  Auto Eosinophil # : x  Auto Basophil # : x  Auto Neutrophil % : x  Auto Lymphocyte % : x  Auto Monocyte % : x  Auto Eosinophil % : x  Auto Basophil % : x    03-23    143  |  108  |  33<H>  ----------------------------<  155<H>  4.1   |  29  |  0.78    Ca    9.3      23 Mar 2023 06:46  Phos  4.1     03-23  Mg     2.6     03-23    TPro  8.1  /  Alb  2.4<L>  /  TBili  0.4  /  DBili  x   /  AST  47<H>  /  ALT  67<H>  /  AlkPhos  257<H>  03-23              [  ]  DVT Prophylaxis  [  ]  Nutrition, Brand, Rate         Goal Rate        Abnormal Nutritional Status -  Malnutrition   Cachexia      Morbid Obesity BMI >/=40    RADIOLOGY & ADDITIONAL STUDIES:  ***    Goals of Care Discussion with Family/Proxy/Other   - see note from/family meeting set up for...     WILMA NICOLE    SCU progress note    INTERVAL HPI/OVERNIGHT EVENTS: *** Patient overnight febrile 100.5 likely central fever, no leukocytosis. Patient comfortable in bed.     DNR [ ]   DNI  [  ] Full Code    Covid - 19 PCR: negaitve 3/20    The 4Ms    What Matters Most: see GOC  Age appropriate Medications/Screen for High Risk Medication: Yes  Mentation: see CAM below  Mobility: defer to physical exam    The Confusion Assessment Method (CAM) Diagnostic Algorithm     1: Acute Onset or Fluctuating Course  - Is there evidence of an acute change in mental status from the patient’s baseline? Did the (abnormal) behavior  fluctuate during the day, that is, tend to come and go, or increase and decrease in severity?  [ ] YES [ ] NO unable to assess     2: Inattention  - Did the patient have difficulty focusing attention, being easily distractible, or having difficulty keeping track of what was being said?  [ ] YES [ ] NO     3: Disorganized thinking  -Was the patient’s thinking disorganized or incoherent, such as rambling or irrelevant conversation, unclear or illogical flow of ideas, or unpredictable switching from subject to subject?  [ ] YES [ ] NO    4: Altered Level of consciousness?  [ ] YES [ ] NO    The diagnosis of delirium by CAM requires the presence of features 1 and 2 and either 3 or 4.    PRESSORS: [ ] YES [ ] NO    Cardiovascular:  Heart Failure  Acute   Acute on Chronic  Chronic         Pulmonary:  albuterol/ipratropium for Nebulization 3 milliLiter(s) Nebulizer every 6 hours    Hematalogic:  aspirin  chewable 81 milliGRAM(s) Oral daily  enoxaparin Injectable 40 milliGRAM(s) SubCutaneous every 24 hours    Other:  acetaminophen    Suspension .. 650 milliGRAM(s) Oral every 6 hours PRN  aluminum hydroxide/magnesium hydroxide/simethicone Suspension 30 milliLiter(s) Oral every 4 hours PRN  amantadine Syrup 100 milliGRAM(s) Oral two times a day  insulin glargine Injectable (LANTUS) 12 Unit(s) SubCutaneous every morning  insulin glargine Injectable (LANTUS) 12 Unit(s) SubCutaneous at bedtime  insulin lispro (ADMELOG) corrective regimen sliding scale   SubCutaneous every 6 hours  insulin lispro Injectable (ADMELOG) 6 Unit(s) SubCutaneous every 6 hours  melatonin 3 milliGRAM(s) Oral at bedtime PRN  nicotine -   7 mG/24Hr(s) Patch 1 Patch Transdermal daily  ondansetron Injectable 4 milliGRAM(s) IV Push every 8 hours PRN  polyethylene glycol 3350 17 Gram(s) Oral daily  senna Syrup 10 milliLiter(s) Oral daily  silver sulfADIAZINE 1% Cream 1 Application(s) Topical two times a day  tamsulosin 0.4 milliGRAM(s) Oral at bedtime    acetaminophen    Suspension .. 650 milliGRAM(s) Oral every 6 hours PRN  albuterol/ipratropium for Nebulization 3 milliLiter(s) Nebulizer every 6 hours  aluminum hydroxide/magnesium hydroxide/simethicone Suspension 30 milliLiter(s) Oral every 4 hours PRN  amantadine Syrup 100 milliGRAM(s) Oral two times a day  aspirin  chewable 81 milliGRAM(s) Oral daily  enoxaparin Injectable 40 milliGRAM(s) SubCutaneous every 24 hours  insulin glargine Injectable (LANTUS) 12 Unit(s) SubCutaneous every morning  insulin glargine Injectable (LANTUS) 12 Unit(s) SubCutaneous at bedtime  insulin lispro (ADMELOG) corrective regimen sliding scale   SubCutaneous every 6 hours  insulin lispro Injectable (ADMELOG) 6 Unit(s) SubCutaneous every 6 hours  melatonin 3 milliGRAM(s) Oral at bedtime PRN  nicotine -   7 mG/24Hr(s) Patch 1 Patch Transdermal daily  ondansetron Injectable 4 milliGRAM(s) IV Push every 8 hours PRN  polyethylene glycol 3350 17 Gram(s) Oral daily  senna Syrup 10 milliLiter(s) Oral daily  silver sulfADIAZINE 1% Cream 1 Application(s) Topical two times a day  tamsulosin 0.4 milliGRAM(s) Oral at bedtime    Drug Dosing Weight  Height (cm): 177.8 (05 Mar 2023 03:36)  Weight (kg): 86.2 (05 Mar 2023 03:36)  BMI (kg/m2): 27.3 (05 Mar 2023 03:36)  BSA (m2): 2.04 (05 Mar 2023 03:36)    CENTRAL LINE: [ ] YES [ ] NO  LOCATION:   DATE INSERTED:  REMOVE: [ ] YES [ ] NO  EXPLAIN:    MELCHOR: [ ] YES [ ] NO    DATE INSERTED:  REMOVE:  [ ] YES [ ] NO  EXPLAIN:    PAST MEDICAL & SURGICAL HISTORY:  History of subdural hemorrhage      PEG (percutaneous endoscopic gastrostomy) status      DM (diabetes mellitus)      S/P craniotomy                  03-22 @ 07:01  -  03-23 @ 07:00  --------------------------------------------------------  IN: 0 mL / OUT: 351 mL / NET: -351 mL            PHYSICAL EXAM:    GENERAL: NAD, well-groomed, well-developed  HEAD:  Atraumatic, Normocephalic  EYES: EOMI, PERRLA, conjunctiva and sclera clear  ENMT: No tonsillar erythema, exudates, or enlargement; Moist mucous membranes, Good dentition, No lesions  NECK: Supple, No JVD, Normal thyroid  NERVOUS SYSTEM:  Alert & Oriented X3, Good concentration; Motor Strength 5/5 B/L upper and lower extremities; DTRs 2+ intact and symmetric  CHEST/LUNG: Clear to percussion bilaterally; No rales, rhonchi, wheezing, or rubs  HEART: Regular rate and rhythm; No murmurs, rubs, or gallops  ABDOMEN: Soft, Nontender, Nondistended; Bowel sounds present  EXTREMITIES:  2+ Peripheral Pulses, No clubbing, cyanosis, or edema  LYMPH: No lymphadenopathy noted  SKIN: No rashes or lesions      LABS:  CBC Full  -  ( 23 Mar 2023 06:46 )  WBC Count : 7.39 K/uL  RBC Count : 3.12 M/uL  Hemoglobin : 8.8 g/dL  Hematocrit : 28.5 %  Platelet Count - Automated : 432 K/uL  Mean Cell Volume : 91.3 fl  Mean Cell Hemoglobin : 28.2 pg  Mean Cell Hemoglobin Concentration : 30.9 gm/dL  Auto Neutrophil # : x  Auto Lymphocyte # : x  Auto Monocyte # : x  Auto Eosinophil # : x  Auto Basophil # : x  Auto Neutrophil % : x  Auto Lymphocyte % : x  Auto Monocyte % : x  Auto Eosinophil % : x  Auto Basophil % : x    03-23    143  |  108  |  33<H>  ----------------------------<  155<H>  4.1   |  29  |  0.78    Ca    9.3      23 Mar 2023 06:46  Phos  4.1     03-23  Mg     2.6     03-23    TPro  8.1  /  Alb  2.4<L>  /  TBili  0.4  /  DBili  x   /  AST  47<H>  /  ALT  67<H>  /  AlkPhos  257<H>  03-23              [  ]  DVT Prophylaxis  [  ]  Nutrition, Brand, Rate         Goal Rate        Abnormal Nutritional Status -  Malnutrition   Cachexia      Morbid Obesity BMI >/=40    RADIOLOGY & ADDITIONAL STUDIES:  ***    Goals of Care Discussion with Family/Proxy/Other   - see note from/family meeting set up for...     WILMA NICOLE    SCU progress note    INTERVAL HPI/OVERNIGHT EVENTS: *** Patient overnight febrile 100.5 likely central fever, no leukocytosis. Patient comfortable in bed.     DNR [ ]   DNI  [  ] Full Code    Covid - 19 PCR: negaitve 3/20    The 4Ms    What Matters Most: see GOC  Age appropriate Medications/Screen for High Risk Medication: Yes  Mentation: see CAM below  Mobility: defer to physical exam    The Confusion Assessment Method (CAM) Diagnostic Algorithm     1: Acute Onset or Fluctuating Course  - Is there evidence of an acute change in mental status from the patient’s baseline? Did the (abnormal) behavior  fluctuate during the day, that is, tend to come and go, or increase and decrease in severity?  [ ] YES [ ] NO unable to assess     2: Inattention  - Did the patient have difficulty focusing attention, being easily distractible, or having difficulty keeping track of what was being said?  [ ] YES [ ] NO unable to assess     3: Disorganized thinking  -Was the patient’s thinking disorganized or incoherent, such as rambling or irrelevant conversation, unclear or illogical flow of ideas, or unpredictable switching from subject to subject?  [ ] YES [ ] NO unable to assess    4: Altered Level of consciousness?  [ ] YES [ ] NO unable to assess    The diagnosis of delirium by CAM requires the presence of features 1 and 2 and either 3 or 4.    PRESSORS: [ ] YES [ x] NO    Cardiovascular:  Heart Failure  Acute on Chronic        Pulmonary:  albuterol/ipratropium for Nebulization 3 milliLiter(s) Nebulizer every 6 hours    Hematalogic:  aspirin  chewable 81 milliGRAM(s) Oral daily  enoxaparin Injectable 40 milliGRAM(s) SubCutaneous every 24 hours    Other:  acetaminophen    Suspension .. 650 milliGRAM(s) Oral every 6 hours PRN  aluminum hydroxide/magnesium hydroxide/simethicone Suspension 30 milliLiter(s) Oral every 4 hours PRN  amantadine Syrup 100 milliGRAM(s) Oral two times a day  insulin glargine Injectable (LANTUS) 12 Unit(s) SubCutaneous every morning  insulin glargine Injectable (LANTUS) 12 Unit(s) SubCutaneous at bedtime  insulin lispro (ADMELOG) corrective regimen sliding scale   SubCutaneous every 6 hours  insulin lispro Injectable (ADMELOG) 6 Unit(s) SubCutaneous every 6 hours  melatonin 3 milliGRAM(s) Oral at bedtime PRN  nicotine -   7 mG/24Hr(s) Patch 1 Patch Transdermal daily  ondansetron Injectable 4 milliGRAM(s) IV Push every 8 hours PRN  polyethylene glycol 3350 17 Gram(s) Oral daily  senna Syrup 10 milliLiter(s) Oral daily  silver sulfADIAZINE 1% Cream 1 Application(s) Topical two times a day  tamsulosin 0.4 milliGRAM(s) Oral at bedtime    acetaminophen    Suspension .. 650 milliGRAM(s) Oral every 6 hours PRN  albuterol/ipratropium for Nebulization 3 milliLiter(s) Nebulizer every 6 hours  aluminum hydroxide/magnesium hydroxide/simethicone Suspension 30 milliLiter(s) Oral every 4 hours PRN  amantadine Syrup 100 milliGRAM(s) Oral two times a day  aspirin  chewable 81 milliGRAM(s) Oral daily  enoxaparin Injectable 40 milliGRAM(s) SubCutaneous every 24 hours  insulin glargine Injectable (LANTUS) 12 Unit(s) SubCutaneous every morning  insulin glargine Injectable (LANTUS) 12 Unit(s) SubCutaneous at bedtime  insulin lispro (ADMELOG) corrective regimen sliding scale   SubCutaneous every 6 hours  insulin lispro Injectable (ADMELOG) 6 Unit(s) SubCutaneous every 6 hours  melatonin 3 milliGRAM(s) Oral at bedtime PRN  nicotine -   7 mG/24Hr(s) Patch 1 Patch Transdermal daily  ondansetron Injectable 4 milliGRAM(s) IV Push every 8 hours PRN  polyethylene glycol 3350 17 Gram(s) Oral daily  senna Syrup 10 milliLiter(s) Oral daily  silver sulfADIAZINE 1% Cream 1 Application(s) Topical two times a day  tamsulosin 0.4 milliGRAM(s) Oral at bedtime    Drug Dosing Weight  Height (cm): 177.8 (05 Mar 2023 03:36)  Weight (kg): 86.2 (05 Mar 2023 03:36)  BMI (kg/m2): 27.3 (05 Mar 2023 03:36)  BSA (m2): 2.04 (05 Mar 2023 03:36)    CENTRAL LINE: [ ] YES [ ] NO  LOCATION:   DATE INSERTED:  REMOVE: [ ] YES [ ] NO  EXPLAIN:    MELCHOR: [x ] YES [ ] NO    DATE INSERTED: 3/15/23  REMOVE:  [ ] YES [ x] NO  EXPLAIN: chronic    PAST MEDICAL & SURGICAL HISTORY:  History of subdural hemorrhage      PEG (percutaneous endoscopic gastrostomy) status      DM (diabetes mellitus)      S/P craniotomy      03-22 @ 07:01  -  03-23 @ 07:00  --------------------------------------------------------  IN: 0 mL / OUT: 351 mL / NET: -351 mL        PHYSICAL EXAM:    GENERAL: NAD, afebrile  HEAD:  +Right craniofacial deformity  EYES: PERRLA, conjunctiva and sclera clear  ENMT: No tonsillar erythema, exudates  NECK: Supple, No JVD, tracheostomy intact  NERVOUS SYSTEM:  Awake and alert. Does not follow commands. No tracking with eyes. Nonverbal at baseline. No spontaneous movement of extremities. BL foot drop  CHEST/LUNG: Diminished breath sounds bilateral bases.  HEART: Regular rate and rhythm; No murmurs, rubs, or gallops  ABDOMEN: +Peg Intact, no rendess or swelling around site,  Soft, Nontender, Nondistended; Bowel sounds present  EXTREMITIES: No spontaneous movement of extremities.  2+ Peripheral Pulses, No clubbing, cyanosis, or edema  LYMPH: No lymphadenopathy noted  SKIN:   Stage 1 bilateral heels and coccyx      LABS:  CBC Full  -  ( 23 Mar 2023 06:46 )  WBC Count : 7.39 K/uL  RBC Count : 3.12 M/uL  Hemoglobin : 8.8 g/dL  Hematocrit : 28.5 %  Platelet Count - Automated : 432 K/uL  Mean Cell Volume : 91.3 fl  Mean Cell Hemoglobin : 28.2 pg  Mean Cell Hemoglobin Concentration : 30.9 gm/dL  Auto Neutrophil # : x  Auto Lymphocyte # : x  Auto Monocyte # : x  Auto Eosinophil # : x  Auto Basophil # : x  Auto Neutrophil % : x  Auto Lymphocyte % : x  Auto Monocyte % : x  Auto Eosinophil % : x  Auto Basophil % : x    03-23    143  |  108  |  33<H>  ----------------------------<  155<H>  4.1   |  29  |  0.78    Ca    9.3      23 Mar 2023 06:46  Phos  4.1     03-23  Mg     2.6     03-23    TPro  8.1  /  Alb  2.4<L>  /  TBili  0.4  /  DBili  x   /  AST  47<H>  /  ALT  67<H>  /  AlkPhos  257<H>  03-23    [x  ]  DVT Prophylaxis  [  ]  Nutrition, Brand, Rate         Goal Rate        Abnormal Nutritional Status -  Malnutrition   Cachexia        RADIOLOGY & ADDITIONAL STUDIES:  ***  < from: Xray Chest 1 View- PORTABLE-Urgent (Xray Chest 1 View- PORTABLE-Urgent .) (03.20.23 @ 10:07) >  INTERPRETATION:    The heart is not enlarged. The mediastinum is not accurately evaluated on   this image.  Tracheostomy tube in place.  Mildly elevated right hemidiaphragm.  Indeterminate 1 x 1.4 cm nodular opacity in the left retrocardiac region.   Remainder of lungs are clear.  No pleural effusion or pneumothorax.  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        IMPRESSION:  Mildly elevated right hemidiaphragm.    1 x 1.4 cm nodular opacity in the left retrocardiac region, of   indeterminate nature. Correlation with an unenhanced CT scan of the chest   could be performed for further evaluation, if felt to be clinically   indicated.    < end of copied text >  < from: US Abdomen Upper Quadrant Right (03.13.23 @ 11:18) >  TECHNIQUE: Sonography of the right upper quadrant. The examination was   technically limited secondary to a combination of overlying bowel gas and   difficulty with patient positioning.    FINDINGS:  Liver: Within normal limits.  Bile ducts: Normal caliber. Common bile duct measures 5 mm.  Gallbladder: Within normal limits.  Pancreas: Visualized portions are within normal limits.  Right kidney: 11.8 cm. No hydronephrosis.  Ascites: None.  IVC and aorta: Visualized portions normal in caliber.    IMPRESSION:  Technically limited study, otherwise unremarkable right upper quadrant   ultrasound.    --- End of Report ---    < end of copied text >      Goals of Care Discussion with Family/Proxy/Other   - see note from 3/5/23

## 2023-03-23 NOTE — PROGRESS NOTE ADULT - SUBJECTIVE AND OBJECTIVE BOX
Time of Visit:  Patient seen and examined.     MEDICATIONS  (STANDING):  albuterol/ipratropium for Nebulization 3 milliLiter(s) Nebulizer every 6 hours  amantadine Syrup 100 milliGRAM(s) Oral two times a day  aspirin  chewable 81 milliGRAM(s) Oral daily  enoxaparin Injectable 40 milliGRAM(s) SubCutaneous every 24 hours  insulin glargine Injectable (LANTUS) 12 Unit(s) SubCutaneous every morning  insulin glargine Injectable (LANTUS) 12 Unit(s) SubCutaneous at bedtime  insulin lispro (ADMELOG) corrective regimen sliding scale   SubCutaneous every 6 hours  insulin lispro Injectable (ADMELOG) 6 Unit(s) SubCutaneous every 6 hours  nicotine -   7 mG/24Hr(s) Patch 1 Patch Transdermal daily  polyethylene glycol 3350 17 Gram(s) Oral daily  senna Syrup 10 milliLiter(s) Oral daily  silver sulfADIAZINE 1% Cream 1 Application(s) Topical two times a day  tamsulosin 0.4 milliGRAM(s) Oral at bedtime      MEDICATIONS  (PRN):  acetaminophen    Suspension .. 650 milliGRAM(s) Oral every 6 hours PRN Temp greater or equal to 38C (100.4F), Mild Pain (1 - 3)  aluminum hydroxide/magnesium hydroxide/simethicone Suspension 30 milliLiter(s) Oral every 4 hours PRN Dyspepsia  melatonin 3 milliGRAM(s) Oral at bedtime PRN Insomnia  ondansetron Injectable 4 milliGRAM(s) IV Push every 8 hours PRN Nausea and/or Vomiting       Medications up to date at time of exam.      PHYSICAL EXAMINATION:  Vital Signs Last 24 Hrs  T(C): 37.8 (23 Mar 2023 05:02), Max: 38.1 (22 Mar 2023 21:40)  T(F): 100.1 (23 Mar 2023 05:02), Max: 100.5 (22 Mar 2023 21:40)  HR: 105 (23 Mar 2023 05:02) (90 - 105)  BP: 122/75 (23 Mar 2023 05:02) (122/75 - 139/85)  BP(mean): --  RR: 20 (23 Mar 2023 05:02) (18 - 20)  SpO2: 96% (23 Mar 2023 05:02) (96% - 100%)    Parameters below as of 23 Mar 2023 05:02  Patient On (Oxygen Delivery Method): hydro trach  O2 Flow (L/min): 3     General ; Non verbal. No acute distress.     HEENT: +Right craniofacial deformity . No nasal tenderness.  Mucous membranes are moist.     NECK: Has Tracheostomy cuffed #7.5, non infected.      LUNGS: Non labored. No wheezing . No use of accessory muscle.     HEART: S1 S2 Regular rate and no murmur.    ABDOMEN: Soft, nontender, and nondistended. Active bowel sounds. +ve Peg.     EXTREMITIES: Total care. Non ambulatory.      NEUROLOGIC: Cognitive impaired .       LABS:                        8.8    7.39  )-----------( 432      ( 23 Mar 2023 06:46 )             28.5     03-23    143  |  108  |  33<H>  ----------------------------<  155<H>  4.1   |  29  |  0.78    Ca    9.3      23 Mar 2023 06:46  Phos  4.1     03-23  Mg     2.6     03-23    TPro  8.1  /  Alb  2.4<L>  /  TBili  0.4  /  DBili  x   /  AST  47<H>  /  ALT  67<H>  /  AlkPhos  257<H>  03-23                    Procalcitonin, Serum: 0.23 ng/mL (03-21-23 @ 07:14)      MICROBIOLOGY: (if applicable)    RADIOLOGY & ADDITIONAL STUDIES:  EKG:   CXR:  ECHO:    IMPRESSION: 62y Male PAST MEDICAL & SURGICAL HISTORY:  History of subdural hemorrhage      PEG (percutaneous endoscopic gastrostomy) status      DM (diabetes mellitus)      S/P craniotomy    Impression: This is a 61 Y/O Male from [facility] . Hx of Traumatic Subdural Hemorrhage following a fall down the stairs while drunk, had s/p Craniotomy at [facility] on  with s/p Trach / Peg . Admitted to  with Acute on chronic hypoxic respiratory failure secondary to RUL Pneumonia. 03-20-23 Negative PCR for Covid 19.     Suggestion:   O2 saturation 96% with Hydro trach collar 3L . No need for Vent support at this time.   Oral, trach care, suction.  Not a candidate for speaking valve, trach capping at present time.   Not a candidate for decannulation at this time.   Continue Duoneb via nebulization Q 6 Hours.  DVT / GI prophylactic. On Lovenox 40mg SQ daily.   Aspiration precautions with HOB elevation especially during Peg feeding.

## 2023-03-23 NOTE — PROGRESS NOTE ADULT - PROBLEM SELECTOR PLAN 10
DVT and GI prophylaxis.  Antibiotics completed. Continue to monitor off antibiotics.  Repeated blood cultures from 3/13  negative  Afebrile  Repeat CXR shows improvement.  Discharge planning underway.  Appropriate for Long Term Care.   Medically stable for discharge.

## 2023-03-23 NOTE — PROGRESS NOTE ADULT - PROBLEM SELECTOR PLAN 5
Currently normotensive.  Antihypertensives currently on hold secondary to sepsis  Adjust medications as needed

## 2023-03-23 NOTE — PROGRESS NOTE ADULT - PROBLEM SELECTOR PLAN 8
Passive ROM exercises daily.  Turn and position every 2 hours  Strict aspiration precautions.  Keep head of bed elevated at all times.  Patient appropriate for Long Term Care

## 2023-03-23 NOTE — PROGRESS NOTE ADULT - PROBLEM SELECTOR PLAN 1
Continue hydro-trach collar, saturating well  Currently tolerating 2LPM via hydro-trach collar.   Continue suction via trach and oral   Monitor oxygen saturation.  Continue bronchodilators  Antibiotics completed. Monitor off antibiotics. Procalcitonin elevated 0.23  Keep head of bed elevated at all times.

## 2023-03-23 NOTE — PROGRESS NOTE ADULT - PROBLEM SELECTOR PLAN 3
Secondary to SDH and craniotomy  Craniotomy at [facility] 1/23  Maintain safety precautions  Strict aspiration precautions.  Seizure precautions.

## 2023-03-23 NOTE — PROGRESS NOTE ADULT - PROBLEM SELECTOR PLAN 2
Resolved. Likely secondary to aspiration pneumonia.  Antibiotics completed  Monitor off antibiotics.  Repeated blood cultures on 3/13 negative.

## 2023-03-24 DIAGNOSIS — R33.9 RETENTION OF URINE, UNSPECIFIED: ICD-10-CM

## 2023-03-24 LAB
ALBUMIN SERPL ELPH-MCNC: 2.5 G/DL — LOW (ref 3.5–5)
ALP SERPL-CCNC: 264 U/L — HIGH (ref 40–120)
ALT FLD-CCNC: 77 U/L DA — HIGH (ref 10–60)
ANION GAP SERPL CALC-SCNC: 2 MMOL/L — LOW (ref 5–17)
APTT BLD: 32.3 SEC — SIGNIFICANT CHANGE UP (ref 27.5–35.5)
AST SERPL-CCNC: 56 U/L — HIGH (ref 10–40)
BASE EXCESS BLDV CALC-SCNC: 7.5 MMOL/L — SIGNIFICANT CHANGE UP
BILIRUB SERPL-MCNC: 0.5 MG/DL — SIGNIFICANT CHANGE UP (ref 0.2–1.2)
BLD GP AB SCN SERPL QL: SIGNIFICANT CHANGE UP
BLOOD GAS COMMENTS, VENOUS: SIGNIFICANT CHANGE UP
BUN SERPL-MCNC: 34 MG/DL — HIGH (ref 7–18)
CALCIUM SERPL-MCNC: 9.5 MG/DL — SIGNIFICANT CHANGE UP (ref 8.4–10.5)
CHLORIDE SERPL-SCNC: 110 MMOL/L — HIGH (ref 96–108)
CO2 SERPL-SCNC: 31 MMOL/L — SIGNIFICANT CHANGE UP (ref 22–31)
CREAT SERPL-MCNC: 0.89 MG/DL — SIGNIFICANT CHANGE UP (ref 0.5–1.3)
EGFR: 98 ML/MIN/1.73M2 — SIGNIFICANT CHANGE UP
GLUCOSE SERPL-MCNC: 170 MG/DL — HIGH (ref 70–99)
HCO3 BLDV-SCNC: 33 MMOL/L — HIGH (ref 22–29)
HCT VFR BLD CALC: 29.4 % — LOW (ref 39–50)
HGB BLD-MCNC: 8.9 G/DL — LOW (ref 13–17)
HOROWITZ INDEX BLDV+IHG-RTO: 32 — SIGNIFICANT CHANGE UP
INR BLD: 1.39 RATIO — HIGH (ref 0.88–1.16)
LACTATE SERPL-SCNC: 2 MMOL/L — SIGNIFICANT CHANGE UP (ref 0.7–2)
MAGNESIUM SERPL-MCNC: 2.6 MG/DL — SIGNIFICANT CHANGE UP (ref 1.6–2.6)
MCHC RBC-ENTMCNC: 27.6 PG — SIGNIFICANT CHANGE UP (ref 27–34)
MCHC RBC-ENTMCNC: 30.3 GM/DL — LOW (ref 32–36)
MCV RBC AUTO: 91 FL — SIGNIFICANT CHANGE UP (ref 80–100)
NRBC # BLD: 0 /100 WBCS — SIGNIFICANT CHANGE UP (ref 0–0)
PCO2 BLDV: 47 MMHG — SIGNIFICANT CHANGE UP (ref 42–55)
PH BLDV: 7.45 — HIGH (ref 7.32–7.43)
PLATELET # BLD AUTO: 403 K/UL — HIGH (ref 150–400)
PO2 BLDV: 46 MMHG — SIGNIFICANT CHANGE UP
POTASSIUM SERPL-MCNC: 4 MMOL/L — SIGNIFICANT CHANGE UP (ref 3.5–5.3)
POTASSIUM SERPL-SCNC: 4 MMOL/L — SIGNIFICANT CHANGE UP (ref 3.5–5.3)
PROT SERPL-MCNC: 8.3 G/DL — SIGNIFICANT CHANGE UP (ref 6–8.3)
PROTHROM AB SERPL-ACNC: 16.6 SEC — HIGH (ref 10.5–13.4)
RBC # BLD: 3.23 M/UL — LOW (ref 4.2–5.8)
RBC # FLD: 15.2 % — HIGH (ref 10.3–14.5)
SAO2 % BLDV: 82.2 % — SIGNIFICANT CHANGE UP
SODIUM SERPL-SCNC: 143 MMOL/L — SIGNIFICANT CHANGE UP (ref 135–145)
WBC # BLD: 10.88 K/UL — HIGH (ref 3.8–10.5)
WBC # FLD AUTO: 10.88 K/UL — HIGH (ref 3.8–10.5)

## 2023-03-24 PROCEDURE — 71260 CT THORAX DX C+: CPT | Mod: 26

## 2023-03-24 PROCEDURE — 74177 CT ABD & PELVIS W/CONTRAST: CPT | Mod: 26

## 2023-03-24 PROCEDURE — 36000 PLACE NEEDLE IN VEIN: CPT

## 2023-03-24 PROCEDURE — 76937 US GUIDE VASCULAR ACCESS: CPT | Mod: 26

## 2023-03-24 PROCEDURE — 71045 X-RAY EXAM CHEST 1 VIEW: CPT | Mod: 26

## 2023-03-24 RX ORDER — MEROPENEM 1 G/30ML
1000 INJECTION INTRAVENOUS ONCE
Refills: 0 | Status: COMPLETED | OUTPATIENT
Start: 2023-03-24 | End: 2023-03-24

## 2023-03-24 RX ORDER — FINASTERIDE 5 MG/1
5 TABLET, FILM COATED ORAL DAILY
Refills: 0 | Status: DISCONTINUED | OUTPATIENT
Start: 2023-03-24 | End: 2023-03-31

## 2023-03-24 RX ORDER — MEROPENEM 1 G/30ML
1000 INJECTION INTRAVENOUS EVERY 8 HOURS
Refills: 0 | Status: COMPLETED | OUTPATIENT
Start: 2023-03-24 | End: 2023-03-31

## 2023-03-24 RX ORDER — MEROPENEM 1 G/30ML
INJECTION INTRAVENOUS
Refills: 0 | Status: COMPLETED | OUTPATIENT
Start: 2023-03-24 | End: 2023-03-31

## 2023-03-24 RX ADMIN — Medication 1 PATCH: at 19:09

## 2023-03-24 RX ADMIN — INSULIN GLARGINE 12 UNIT(S): 100 INJECTION, SOLUTION SUBCUTANEOUS at 21:19

## 2023-03-24 RX ADMIN — Medication 100 MILLIGRAM(S): at 18:19

## 2023-03-24 RX ADMIN — Medication 650 MILLIGRAM(S): at 10:05

## 2023-03-24 RX ADMIN — Medication 1 PATCH: at 12:25

## 2023-03-24 RX ADMIN — Medication 1: at 11:53

## 2023-03-24 RX ADMIN — INSULIN GLARGINE 12 UNIT(S): 100 INJECTION, SOLUTION SUBCUTANEOUS at 08:20

## 2023-03-24 RX ADMIN — Medication 6 UNIT(S): at 17:05

## 2023-03-24 RX ADMIN — Medication 3 MILLILITER(S): at 03:34

## 2023-03-24 RX ADMIN — MEROPENEM 100 MILLIGRAM(S): 1 INJECTION INTRAVENOUS at 14:07

## 2023-03-24 RX ADMIN — Medication 650 MILLIGRAM(S): at 10:01

## 2023-03-24 RX ADMIN — Medication 3 MILLILITER(S): at 20:29

## 2023-03-24 RX ADMIN — MEROPENEM 100 MILLIGRAM(S): 1 INJECTION INTRAVENOUS at 21:18

## 2023-03-24 RX ADMIN — ENOXAPARIN SODIUM 40 MILLIGRAM(S): 100 INJECTION SUBCUTANEOUS at 11:53

## 2023-03-24 RX ADMIN — Medication 1 APPLICATION(S): at 05:18

## 2023-03-24 RX ADMIN — Medication 3 MILLILITER(S): at 14:22

## 2023-03-24 RX ADMIN — SENNA PLUS 10 MILLILITER(S): 8.6 TABLET ORAL at 12:25

## 2023-03-24 RX ADMIN — Medication 1 PATCH: at 07:30

## 2023-03-24 RX ADMIN — TAMSULOSIN HYDROCHLORIDE 0.4 MILLIGRAM(S): 0.4 CAPSULE ORAL at 21:21

## 2023-03-24 RX ADMIN — Medication 3 MILLILITER(S): at 09:19

## 2023-03-24 RX ADMIN — Medication 1 PATCH: at 12:26

## 2023-03-24 RX ADMIN — FINASTERIDE 5 MILLIGRAM(S): 5 TABLET, FILM COATED ORAL at 21:20

## 2023-03-24 RX ADMIN — POLYETHYLENE GLYCOL 3350 17 GRAM(S): 17 POWDER, FOR SOLUTION ORAL at 12:25

## 2023-03-24 RX ADMIN — Medication 81 MILLIGRAM(S): at 12:25

## 2023-03-24 RX ADMIN — Medication 100 MILLIGRAM(S): at 05:19

## 2023-03-24 RX ADMIN — Medication 1 APPLICATION(S): at 17:34

## 2023-03-24 RX ADMIN — Medication 6 UNIT(S): at 11:54

## 2023-03-24 NOTE — PROGRESS NOTE ADULT - SUBJECTIVE AND OBJECTIVE BOX
Time of Visit:  Patient seen and examined. pat is laying in bed comfortable. O2 via hydro-trach      MEDICATIONS  (STANDING):  albuterol/ipratropium for Nebulization 3 milliLiter(s) Nebulizer every 6 hours  amantadine Syrup 100 milliGRAM(s) Oral two times a day  aspirin  chewable 81 milliGRAM(s) Oral daily  enoxaparin Injectable 40 milliGRAM(s) SubCutaneous every 24 hours  finasteride 5 milliGRAM(s) Oral daily  insulin glargine Injectable (LANTUS) 12 Unit(s) SubCutaneous every morning  insulin glargine Injectable (LANTUS) 12 Unit(s) SubCutaneous at bedtime  insulin lispro (ADMELOG) corrective regimen sliding scale   SubCutaneous every 6 hours  insulin lispro Injectable (ADMELOG) 6 Unit(s) SubCutaneous every 6 hours  meropenem  IVPB      meropenem  IVPB 1000 milliGRAM(s) IV Intermittent every 8 hours  nicotine -   7 mG/24Hr(s) Patch 1 Patch Transdermal daily  polyethylene glycol 3350 17 Gram(s) Oral daily  senna Syrup 10 milliLiter(s) Oral daily  silver sulfADIAZINE 1% Cream 1 Application(s) Topical two times a day  tamsulosin 0.4 milliGRAM(s) Oral at bedtime      MEDICATIONS  (PRN):  acetaminophen    Suspension .. 650 milliGRAM(s) Oral every 6 hours PRN Temp greater or equal to 38C (100.4F), Mild Pain (1 - 3)  aluminum hydroxide/magnesium hydroxide/simethicone Suspension 30 milliLiter(s) Oral every 4 hours PRN Dyspepsia  melatonin 3 milliGRAM(s) Oral at bedtime PRN Insomnia  ondansetron Injectable 4 milliGRAM(s) IV Push every 8 hours PRN Nausea and/or Vomiting       Medications up to date at time of exam.      PHYSICAL EXAMINATION:  Patient has no new complaints.  GENERAL: The patient is a well-developed, well-nourished, in no apparent distress.     Vital Signs Last 24 Hrs  T(C): 37.8 (24 Mar 2023 12:55), Max: 39.1 (24 Mar 2023 08:30)  T(F): 100 (24 Mar 2023 12:55), Max: 102.3 (24 Mar 2023 08:30)  HR: 102 (24 Mar 2023 12:55) (98 - 107)  BP: 137/87 (24 Mar 2023 12:55) (133/82 - 144/89)  BP(mean): --  RR: 20 (24 Mar 2023 12:55) (19 - 21)  SpO2: 100% (24 Mar 2023 12:55) (98% - 100%)    Parameters below as of 24 Mar 2023 12:55  Patient On (Oxygen Delivery Method): Hydrotrach  O2 Flow (L/min): 3     (if applicable)    Chest Tube (if applicable)    HEENT: Right skull indentation due to craniotomy Extraocular muscles are intact. Mucous membranes are moist.      NECK: Supple, no palpable adenopathy. + trach     LUNGS: Clear to auscultation, no wheezing, rales, or rhonchi.    HEART: Regular rate and rhythm without murmur.    ABDOMEN: Soft, nontender, and nondistended.  No hepatosplenomegaly is noted.    EXTREMITIES: Without any cyanosis, clubbing, rash, lesions or edema.    NEUROLOGIC: Awake, alert, oriented, grossly intact    SKIN: Warm, dry, good turgor.      LABS:                        8.9    10.88 )-----------( 403      ( 24 Mar 2023 07:03 )             29.4     03-24    143  |  110<H>  |  34<H>  ----------------------------<  170<H>  4.0   |  31  |  0.89    Ca    9.5      24 Mar 2023 07:03  Phos  4.1     03-23  Mg     2.6     03-24    TPro  8.3  /  Alb  2.5<L>  /  TBili  0.5  /  DBili  x   /  AST  56<H>  /  ALT  77<H>  /  AlkPhos  264<H>  03-24    PT/INR - ( 24 Mar 2023 10:40 )   PT: 16.6 sec;   INR: 1.39 ratio         PTT - ( 24 Mar 2023 10:40 )  PTT:32.3 sec              Lactate, Blood: 2.0 mmol/L (03-24-23 @ 10:40)        MICROBIOLOGY: (if applicable)    RADIOLOGY & ADDITIONAL STUDIES:  EKG:   CXR:  ECHO:    IMPRESSION: 62y Male PAST MEDICAL & SURGICAL HISTORY:  History of subdural hemorrhage      PEG (percutaneous endoscopic gastrostomy) status      DM (diabetes mellitus)      S/P craniotomy       p/w         IMP: This is a 62 yr old man  from [facility], h/o traumatic subdural hemorrhage following a fall down the stairs while drunk, s/p r craniotomy at [facility] in 01/2023, s/p trach/PEG BIBEMS for tachycardia 170s, RR 27, increased secretions admitted to  for sepsis secondary to RUL pneumonia with tracheostomy to 3L NC.  3/6 Febrile 101.7  03/07: No events overnight. On [place]-trach at 3 L and tolerating well. Hemodynamically stable. Cultures in progress. C/w Vanco and Cefepime. F/u Vanco trough. Hypokalemic this AM and replacement ordered.   03/08: Afebrile. Vanco discontinued since MRSA negative. + yeast in sputum; + Candida Galbrata in urine (Bran in place with clear urine and afebrile).   Dr. Peres following. Blood cx NGTD. C/w Cefepime.   03/09: Febrile, tachycardic and tachypneic this AM. Code sepsis called. Sepsis w/u in progress.  Started on Vanco. ID following, . Discussed with Dr. Peres. F/u CXR. Worsening Leukocytosis. IVF bolus with LR (2,300 ml)  per protocol. Lactate 1.9.   3/11: afebrile over past 24hrs, hemodynamically stable. Vanco trough 11.7  03/12: Vanco trough this PM. Blood cx remains NGTD. C/w Vanco and Cefepime. ID following. Persistent Transaminitis.  Hep C non-reactive. F/u remaining Hepatitis panel. Patient on high dose statin which could be contributing to transaminitis. Hold for now and re-eval. Given that patient is unable to verbalize/demonstrate if symptomatic will order Liver US.   03/13: Spiked fevers overnight. Tachycardic and hypotensive. IVF bolus given. Blood cx in progress.  F/u Procal and Lactate. Vanco increased to 1500 mg BID and Meropenem added and Cefepime discontinued Discussed wih Dr. Peres and reccs appreciated  Worsening leukocytosis Pending Abdominal US 2/2 transaminitis.   03/14: Febrile. Urinary retention. On Flomax. Bladder scan and Straight cath Q 4-6 hours. On Vanco and Jose Elias. F/u cx. Abd US unremarkable. CXR 3/13 negative for acute findings.   3/15 Bladder scan 550. Bran cath reinserted.  3/24 Spiked fever       Plan   - Continue O2 supp via TC ( hydrotrach )   - On meropenem started 3/34, neg calcitonin   - Asp precaution   - Pul hygiene   - f/U sepsis labs    - Duoneb  - Trach care   - DVT GI prophy     d/c with NP covering

## 2023-03-24 NOTE — PROGRESS NOTE ADULT - PROBLEM SELECTOR PLAN 2
possible central fever versus infectious  febrile 102.3 rectal  f/u blood and sputum culture  f/u UA  f/u chest x-ray  f/u RVP  Lactate 2  mild leukocytosis- WBC 10K  INR 1.39  ID Dr. Peres

## 2023-03-24 NOTE — CHART NOTE - NSCHARTNOTEFT_GEN_A_CORE
Spoke with son James at bedside. Explained current condition. Encouraged family to address concerns and emotional support given, all concerns and questions addressed.

## 2023-03-24 NOTE — PROGRESS NOTE ADULT - ASSESSMENT
62M from [facility], h/o traumatic subdural hemorrhage following a fall down the stairs while drunk, s/p r craniotomy at [facility] in 01/2023, s/p trach/PEG BIBEMS for tachycardia 170s, RR 27, increased secretions admitted to  for sepsis secondary to RUL pneumonia with tracheostomy to 3L NC.  3/6 Febrile 101.7  03/07: No events overnight. On [place]-trach at 3 L and tolerating well. Hemodynamically stable. Cultures in progress. C/w Vanco and Cefepime. F/u Vanco trough. Hypokalemic this AM and replacement ordered.   03/08: Afebrile. Vanco discontinued since MRSA negative. + yeast in sputum; + Candida Galbrata in urine (Bran in place with clear urine and afebrile).   Dr. Peres following. Blood cx NGTD. C/w Cefepime.   03/09: Febrile, tachycardic and tachypneic this AM. Code sepsis called. Sepsis w/u in progress.  Started on Vanco. ID following, . Discussed with Dr. Peres. F/u CXR. Worsening Leukocytosis. IVF bolus with LR (2,300 ml)  per protocol. Lactate 1.9.   3/11: afebrile over past 24hrs, hemodynamically stable. Vanco trough 11.7  03/12: Vanco trough this PM. Blood cx remains NGTD. C/w Vanco and Cefepime. ID following. Persistent Transaminitis.  Hep C non-reactive. F/u remaining Hepatitis panel. Patient on high dose statin which could be contributing to transaminitis. Hold for now and re-eval. Given that patient is unable to verbalize/demonstrate if symptomatic will order Liver US.   03/13: Spiked fevers overnight. Tachycardic and hypotensive. IVF bolus given. Blood cx in progress.  F/u Procal and Lactate. Vanco increased to 1500 mg BID and Meropenem added and Cefepime discontinued Discussed wih Dr. Peres and reccs appreciated  Worsening leukocytosis Pending Abdominal US 2/2 transaminitis.   03/14: Febrile. Urinary retention. On Flomax. Bladder scan and Straight cath Q 4-6 hours. On Vanco and Jose Elias. F/u cx. Abd US unremarkable. CXR 3/13 negative for acute findings.   3/15 Bladder scan 550. Bran cath reinserted.  3/23/23 Patient febrile overnight 100.5 likely central fever, no leukocytosis  3/24/23 Patient febrile 102.3 this AM. Patient blood ans sputum culture and UA sent, chest x-ray, RVP ordered. Will start Meropenem, ID aware.

## 2023-03-24 NOTE — PROGRESS NOTE ADULT - SUBJECTIVE AND OBJECTIVE BOX
WILMA NICOLE    SCU progress note    INTERVAL HPI/OVERNIGHT EVENTS: *** No overnight events.    DNR [ ]   DNI  [  ] Full code    Covid - 19 PCR: negative 3/30/23    The 4Ms    What Matters Most: see [facility]  Age appropriate Medications/Screen for High Risk Medication: Yes  Mentation: see CAM below  Mobility: defer to physical exam    The Confusion Assessment Method (CAM) Diagnostic Algorithm     1: Acute Onset or Fluctuating Course  - Is there evidence of an acute change in mental status from the patient’s baseline? Did the (abnormal) behavior  fluctuate during the day, that is, tend to come and go, or increase and decrease in severity?  [ ] YES [ ] NO unable to assess     2: Inattention  - Did the patient have difficulty focusing attention, being easily distractible, or having difficulty keeping track of what was being said?  [ ] YES [ ] NO  unable to assess     3: Disorganized thinking  -Was the patient’s thinking disorganized or incoherent, such as rambling or irrelevant conversation, unclear or illogical flow of ideas, or unpredictable switching from subject to subject?  [ ] YES [ ] NO unable to assess    4: Altered Level of consciousness?  [ ] YES [ ] NO unable to assess    The diagnosis of delirium by CAM requires the presence of features 1 and 2 and either 3 or 4.    PRESSORS: [ ] YES [ x] NO    meropenem  IVPB      meropenem  IVPB 1000 milliGRAM(s) IV Intermittent once  meropenem  IVPB 1000 milliGRAM(s) IV Intermittent every 8 hours    Cardiovascular:  Heart Failure  Acute   Acute on Chronic  Chronic         Pulmonary:  albuterol/ipratropium for Nebulization 3 milliLiter(s) Nebulizer every 6 hours    Hematalogic:  aspirin  chewable 81 milliGRAM(s) Oral daily  enoxaparin Injectable 40 milliGRAM(s) SubCutaneous every 24 hours    Other:  acetaminophen    Suspension .. 650 milliGRAM(s) Oral every 6 hours PRN  aluminum hydroxide/magnesium hydroxide/simethicone Suspension 30 milliLiter(s) Oral every 4 hours PRN  amantadine Syrup 100 milliGRAM(s) Oral two times a day  insulin glargine Injectable (LANTUS) 12 Unit(s) SubCutaneous every morning  insulin glargine Injectable (LANTUS) 12 Unit(s) SubCutaneous at bedtime  insulin lispro (ADMELOG) corrective regimen sliding scale   SubCutaneous every 6 hours  insulin lispro Injectable (ADMELOG) 6 Unit(s) SubCutaneous every 6 hours  melatonin 3 milliGRAM(s) Oral at bedtime PRN  nicotine -   7 mG/24Hr(s) Patch 1 Patch Transdermal daily  ondansetron Injectable 4 milliGRAM(s) IV Push every 8 hours PRN  polyethylene glycol 3350 17 Gram(s) Oral daily  senna Syrup 10 milliLiter(s) Oral daily  silver sulfADIAZINE 1% Cream 1 Application(s) Topical two times a day  tamsulosin 0.4 milliGRAM(s) Oral at bedtime    acetaminophen    Suspension .. 650 milliGRAM(s) Oral every 6 hours PRN  albuterol/ipratropium for Nebulization 3 milliLiter(s) Nebulizer every 6 hours  aluminum hydroxide/magnesium hydroxide/simethicone Suspension 30 milliLiter(s) Oral every 4 hours PRN  amantadine Syrup 100 milliGRAM(s) Oral two times a day  aspirin  chewable 81 milliGRAM(s) Oral daily  enoxaparin Injectable 40 milliGRAM(s) SubCutaneous every 24 hours  insulin glargine Injectable (LANTUS) 12 Unit(s) SubCutaneous every morning  insulin glargine Injectable (LANTUS) 12 Unit(s) SubCutaneous at bedtime  insulin lispro (ADMELOG) corrective regimen sliding scale   SubCutaneous every 6 hours  insulin lispro Injectable (ADMELOG) 6 Unit(s) SubCutaneous every 6 hours  melatonin 3 milliGRAM(s) Oral at bedtime PRN  meropenem  IVPB      meropenem  IVPB 1000 milliGRAM(s) IV Intermittent once  meropenem  IVPB 1000 milliGRAM(s) IV Intermittent every 8 hours  nicotine -   7 mG/24Hr(s) Patch 1 Patch Transdermal daily  ondansetron Injectable 4 milliGRAM(s) IV Push every 8 hours PRN  polyethylene glycol 3350 17 Gram(s) Oral daily  senna Syrup 10 milliLiter(s) Oral daily  silver sulfADIAZINE 1% Cream 1 Application(s) Topical two times a day  tamsulosin 0.4 milliGRAM(s) Oral at bedtime    Drug Dosing Weight  Height (cm): 177.8 (05 Mar 2023 03:36)  Weight (kg): 86.2 (05 Mar 2023 03:36)  BMI (kg/m2): 27.3 (05 Mar 2023 03:36)  BSA (m2): 2.04 (05 Mar 2023 03:36)    CENTRAL LINE: [ ] YES [x ] NO  LOCATION:   DATE INSERTED:  REMOVE: [ ] YES [ ] NO  EXPLAIN:    MELCHOR: [ ] YES [x ] NO    DATE INSERTED:  REMOVE:  [ ] YES [ ] NO  EXPLAIN:    PAST MEDICAL & SURGICAL HISTORY:  History of subdural hemorrhage      PEG (percutaneous endoscopic gastrostomy) status      DM (diabetes mellitus)      S/P craniotomy      03-23 @ 07:01  -  03-24 @ 07:00  --------------------------------------------------------  IN: 0 mL / OUT: 1200 mL / NET: -1200 mL    PHYSICAL EXAM:    GENERAL: NAD, afebrile  HEAD:  +Right craniofacial deformity  EYES: PERRLA, conjunctiva and sclera clear  ENMT: No tonsillar erythema, exudates  NECK: Supple, No JVD, tracheostomy intact  NERVOUS SYSTEM:  Awake and alert. Does not follow commands. No tracking with eyes. Nonverbal at baseline. No spontaneous movement of extremities. BL foot drop  CHEST/LUNG: Diminished breath sounds bilateral bases.  HEART: Regular rate and rhythm; No murmurs, rubs, or gallops  ABDOMEN: +Peg Intact, no redness or swelling around site,  Soft, Nontender, Nondistended; Bowel sounds present  EXTREMITIES: No spontaneous movement of extremities.  2+ Peripheral Pulses, No clubbing, cyanosis, or edema  LYMPH: No lymphadenopathy noted  SKIN:   Stage 1 bilateral heels and coccyx        LABS:  CBC Full  -  ( 24 Mar 2023 07:03 )  WBC Count : 10.88 K/uL  RBC Count : 3.23 M/uL  Hemoglobin : 8.9 g/dL  Hematocrit : 29.4 %  Platelet Count - Automated : 403 K/uL  Mean Cell Volume : 91.0 fl  Mean Cell Hemoglobin : 27.6 pg  Mean Cell Hemoglobin Concentration : 30.3 gm/dL  Auto Neutrophil # : x  Auto Lymphocyte # : x  Auto Monocyte # : x  Auto Eosinophil # : x  Auto Basophil # : x  Auto Neutrophil % : x  Auto Lymphocyte % : x  Auto Monocyte % : x  Auto Eosinophil % : x  Auto Basophil % : x    03-24    143  |  110<H>  |  34<H>  ----------------------------<  170<H>  4.0   |  31  |  0.89    Ca    9.5      24 Mar 2023 07:03  Phos  4.1     03-23  Mg     2.6     03-24    TPro  8.3  /  Alb  2.5<L>  /  TBili  0.5  /  DBili  x   /  AST  56<H>  /  ALT  77<H>  /  AlkPhos  264<H>  03-24    PT/INR - ( 24 Mar 2023 10:40 )   PT: 16.6 sec;   INR: 1.39 ratio         PTT - ( 24 Mar 2023 10:40 )  PTT:32.3 sec      [ x ]  DVT Prophylaxis  [  ]  Nutrition, Brand, Rate         Goal Rate        Abnormal Nutritional Status -  Malnutrition   Cachexia      Morbid Obesity BMI >/=40    RADIOLOGY & ADDITIONAL STUDIES:  ***    < from: Xray Chest 1 View- PORTABLE-Urgent (Xray Chest 1 View- PORTABLE-Urgent .) (03.20.23 @ 10:07) >  INTERPRETATION:    The heart is not enlarged. The mediastinum is not accurately evaluated on   this image.  Tracheostomy tube in place.  Mildly elevated right hemidiaphragm.  Indeterminate 1 x 1.4 cm nodular opacity in the left retrocardiac region.   Remainder of lungs are clear.  No pleural effusion or pneumothorax.  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        IMPRESSION:  Mildly elevated right hemidiaphragm.    1 x 1.4 cm nodular opacity in the left retrocardiac region, of   indeterminate nature. Correlation with an unenhanced CT scan of the chest   could be performed for further evaluation, if felt to be clinically   indicated.    < end of copied text >  < from: US Abdomen Upper Quadrant Right (03.13.23 @ 11:18) >  TECHNIQUE: Sonography of the right upper quadrant. The examination was   technically limited secondary to a combination of overlying bowel gas and   difficulty with patient positioning.    FINDINGS:  Liver: Within normal limits.  Bile ducts: Normal caliber. Common bile duct measures 5 mm.  Gallbladder: Within normal limits.  Pancreas: Visualized portions are within normal limits.  Right kidney: 11.8 cm. No hydronephrosis.  Ascites: None.  IVC and aorta: Visualized portions normal in caliber.    IMPRESSION:  Technically limited study, otherwise unremarkable right upper quadrant   ultrasound.    --- End of Report ---    < end of copied text >      Goals of Care Discussion with Family/Proxy/Other   - see note from 3/5/23

## 2023-03-24 NOTE — PROGRESS NOTE ADULT - PROBLEM SELECTOR PLAN 3
Secondary to SDH and craniotomy  Craniotomy at [facility] 1/23  Maintain safety precautions  Strict aspiration precautions.  Seizure precautions.

## 2023-03-24 NOTE — PROCEDURE NOTE - NSICDXPROCEDURE_GEN_ALL_CORE_FT
PROCEDURES:  Insertion of intravenous catheter with ultrasound guidance 24-Mar-2023 11:55:03  Dorian Donaldson

## 2023-03-25 LAB
ALBUMIN SERPL ELPH-MCNC: 2.4 G/DL — LOW (ref 3.5–5)
ALP SERPL-CCNC: 258 U/L — HIGH (ref 40–120)
ALT FLD-CCNC: 73 U/L DA — HIGH (ref 10–60)
ANION GAP SERPL CALC-SCNC: 5 MMOL/L — SIGNIFICANT CHANGE UP (ref 5–17)
AST SERPL-CCNC: 48 U/L — HIGH (ref 10–40)
BASOPHILS # BLD AUTO: 0.07 K/UL — SIGNIFICANT CHANGE UP (ref 0–0.2)
BASOPHILS NFR BLD AUTO: 0.6 % — SIGNIFICANT CHANGE UP (ref 0–2)
BILIRUB SERPL-MCNC: 0.6 MG/DL — SIGNIFICANT CHANGE UP (ref 0.2–1.2)
BUN SERPL-MCNC: 36 MG/DL — HIGH (ref 7–18)
CALCIUM SERPL-MCNC: 9.4 MG/DL — SIGNIFICANT CHANGE UP (ref 8.4–10.5)
CHLORIDE SERPL-SCNC: 115 MMOL/L — HIGH (ref 96–108)
CO2 SERPL-SCNC: 27 MMOL/L — SIGNIFICANT CHANGE UP (ref 22–31)
CREAT SERPL-MCNC: 0.94 MG/DL — SIGNIFICANT CHANGE UP (ref 0.5–1.3)
EGFR: 93 ML/MIN/1.73M2 — SIGNIFICANT CHANGE UP
EOSINOPHIL # BLD AUTO: 0.08 K/UL — SIGNIFICANT CHANGE UP (ref 0–0.5)
EOSINOPHIL NFR BLD AUTO: 0.7 % — SIGNIFICANT CHANGE UP (ref 0–6)
GLUCOSE SERPL-MCNC: 179 MG/DL — HIGH (ref 70–99)
GRAM STN FLD: SIGNIFICANT CHANGE UP
HCT VFR BLD CALC: 29.7 % — LOW (ref 39–50)
HGB BLD-MCNC: 8.9 G/DL — LOW (ref 13–17)
IMM GRANULOCYTES NFR BLD AUTO: 0.5 % — SIGNIFICANT CHANGE UP (ref 0–0.9)
LYMPHOCYTES # BLD AUTO: 1.52 K/UL — SIGNIFICANT CHANGE UP (ref 1–3.3)
LYMPHOCYTES # BLD AUTO: 14.1 % — SIGNIFICANT CHANGE UP (ref 13–44)
MAGNESIUM SERPL-MCNC: 2.8 MG/DL — HIGH (ref 1.6–2.6)
MCHC RBC-ENTMCNC: 27.7 PG — SIGNIFICANT CHANGE UP (ref 27–34)
MCHC RBC-ENTMCNC: 30 GM/DL — LOW (ref 32–36)
MCV RBC AUTO: 92.5 FL — SIGNIFICANT CHANGE UP (ref 80–100)
MONOCYTES # BLD AUTO: 0.59 K/UL — SIGNIFICANT CHANGE UP (ref 0–0.9)
MONOCYTES NFR BLD AUTO: 5.5 % — SIGNIFICANT CHANGE UP (ref 2–14)
NEUTROPHILS # BLD AUTO: 8.47 K/UL — HIGH (ref 1.8–7.4)
NEUTROPHILS NFR BLD AUTO: 78.6 % — HIGH (ref 43–77)
NRBC # BLD: 0 /100 WBCS — SIGNIFICANT CHANGE UP (ref 0–0)
PHOSPHATE SERPL-MCNC: 4.7 MG/DL — HIGH (ref 2.5–4.5)
PLATELET # BLD AUTO: 353 K/UL — SIGNIFICANT CHANGE UP (ref 150–400)
POTASSIUM SERPL-MCNC: 3.9 MMOL/L — SIGNIFICANT CHANGE UP (ref 3.5–5.3)
POTASSIUM SERPL-SCNC: 3.9 MMOL/L — SIGNIFICANT CHANGE UP (ref 3.5–5.3)
PROT SERPL-MCNC: 8.3 G/DL — SIGNIFICANT CHANGE UP (ref 6–8.3)
RAPID RVP RESULT: SIGNIFICANT CHANGE UP
RBC # BLD: 3.21 M/UL — LOW (ref 4.2–5.8)
RBC # FLD: 15.2 % — HIGH (ref 10.3–14.5)
SARS-COV-2 RNA SPEC QL NAA+PROBE: SIGNIFICANT CHANGE UP
SODIUM SERPL-SCNC: 147 MMOL/L — HIGH (ref 135–145)
SPECIMEN SOURCE: SIGNIFICANT CHANGE UP
WBC # BLD: 10.78 K/UL — HIGH (ref 3.8–10.5)
WBC # FLD AUTO: 10.78 K/UL — HIGH (ref 3.8–10.5)

## 2023-03-25 RX ADMIN — Medication 81 MILLIGRAM(S): at 13:00

## 2023-03-25 RX ADMIN — Medication 3 MILLILITER(S): at 20:56

## 2023-03-25 RX ADMIN — INSULIN GLARGINE 12 UNIT(S): 100 INJECTION, SOLUTION SUBCUTANEOUS at 22:05

## 2023-03-25 RX ADMIN — Medication 1 PATCH: at 11:38

## 2023-03-25 RX ADMIN — Medication 1 PATCH: at 19:04

## 2023-03-25 RX ADMIN — Medication 1: at 00:00

## 2023-03-25 RX ADMIN — Medication 6 UNIT(S): at 18:09

## 2023-03-25 RX ADMIN — Medication 100 MILLIGRAM(S): at 18:10

## 2023-03-25 RX ADMIN — TAMSULOSIN HYDROCHLORIDE 0.4 MILLIGRAM(S): 0.4 CAPSULE ORAL at 22:06

## 2023-03-25 RX ADMIN — ENOXAPARIN SODIUM 40 MILLIGRAM(S): 100 INJECTION SUBCUTANEOUS at 12:56

## 2023-03-25 RX ADMIN — Medication 1 PATCH: at 12:49

## 2023-03-25 RX ADMIN — FINASTERIDE 5 MILLIGRAM(S): 5 TABLET, FILM COATED ORAL at 12:49

## 2023-03-25 RX ADMIN — Medication 6 UNIT(S): at 00:00

## 2023-03-25 RX ADMIN — Medication 1 APPLICATION(S): at 18:09

## 2023-03-25 RX ADMIN — INSULIN GLARGINE 12 UNIT(S): 100 INJECTION, SOLUTION SUBCUTANEOUS at 07:42

## 2023-03-25 RX ADMIN — Medication 3 MILLILITER(S): at 03:00

## 2023-03-25 RX ADMIN — MEROPENEM 100 MILLIGRAM(S): 1 INJECTION INTRAVENOUS at 06:00

## 2023-03-25 RX ADMIN — Medication 3 MILLILITER(S): at 08:27

## 2023-03-25 RX ADMIN — Medication 1: at 18:08

## 2023-03-25 RX ADMIN — Medication 1: at 06:00

## 2023-03-25 RX ADMIN — Medication 1 PATCH: at 12:52

## 2023-03-25 RX ADMIN — Medication 100 MILLIGRAM(S): at 06:00

## 2023-03-25 RX ADMIN — MEROPENEM 100 MILLIGRAM(S): 1 INJECTION INTRAVENOUS at 22:06

## 2023-03-25 RX ADMIN — Medication 6 UNIT(S): at 06:00

## 2023-03-25 RX ADMIN — MEROPENEM 100 MILLIGRAM(S): 1 INJECTION INTRAVENOUS at 13:56

## 2023-03-25 RX ADMIN — Medication 6 UNIT(S): at 13:00

## 2023-03-25 RX ADMIN — Medication 3 MILLILITER(S): at 15:06

## 2023-03-25 NOTE — CHART NOTE - NSCHARTNOTEFT_GEN_A_CORE
Spoke with spouse at bedside. Explained current condition. Encouraged family to address concerns and emotional support given, all concerns and questions addressed.

## 2023-03-25 NOTE — PROGRESS NOTE ADULT - SUBJECTIVE AND OBJECTIVE BOX
WILMA NICOLE    SCU progress note    INTERVAL HPI/OVERNIGHT EVENTS: *** Patient febrile this am, tylenol given. Retaining Urine straight cath attempted once overnight.    DNR [ ]   DNI  [  ] Full Code    Covid - 19 PCR: 3/30/23 negative    The 4Ms    What Matters Most: see GOC  Age appropriate Medications/Screen for High Risk Medication: Yes  Mentation: see CAM below  Mobility: defer to physical exam    The Confusion Assessment Method (CAM) Diagnostic Algorithm     1: Acute Onset or Fluctuating Course  - Is there evidence of an acute change in mental status from the patient’s baseline? Did the (abnormal) behavior  fluctuate during the day, that is, tend to come and go, or increase and decrease in severity?  [ ] YES [ ] NO unable to assess     2: Inattention  - Did the patient have difficulty focusing attention, being easily distractible, or having difficulty keeping track of what was being said?  [ ] YES [ ] NO unable to assess     3: Disorganized thinking  -Was the patient’s thinking disorganized or incoherent, such as rambling or irrelevant conversation, unclear or illogical flow of ideas, or unpredictable switching from subject to subject?  [ ] YES [ ] NO unable to assess    4: Altered Level of consciousness?  [ ] YES [ ] NO unable to assess    The diagnosis of delirium by CAM requires the presence of features 1 and 2 and either 3 or 4.    PRESSORS: [ ] YES [ x] NO  meropenem  IVPB      meropenem  IVPB 1000 milliGRAM(s) IV Intermittent every 8 hours    Cardiovascular:  Heart Failure  Acute   Acute on Chronic  Chronic         Pulmonary:  albuterol/ipratropium for Nebulization 3 milliLiter(s) Nebulizer every 6 hours    Hematalogic:  aspirin  chewable 81 milliGRAM(s) Oral daily  enoxaparin Injectable 40 milliGRAM(s) SubCutaneous every 24 hours    Other:  acetaminophen    Suspension .. 650 milliGRAM(s) Oral every 6 hours PRN  aluminum hydroxide/magnesium hydroxide/simethicone Suspension 30 milliLiter(s) Oral every 4 hours PRN  amantadine Syrup 100 milliGRAM(s) Oral two times a day  finasteride 5 milliGRAM(s) Oral daily  insulin glargine Injectable (LANTUS) 12 Unit(s) SubCutaneous every morning  insulin glargine Injectable (LANTUS) 12 Unit(s) SubCutaneous at bedtime  insulin lispro (ADMELOG) corrective regimen sliding scale   SubCutaneous every 6 hours  insulin lispro Injectable (ADMELOG) 6 Unit(s) SubCutaneous every 6 hours  melatonin 3 milliGRAM(s) Oral at bedtime PRN  nicotine -   7 mG/24Hr(s) Patch 1 Patch Transdermal daily  ondansetron Injectable 4 milliGRAM(s) IV Push every 8 hours PRN  polyethylene glycol 3350 17 Gram(s) Oral daily  senna Syrup 10 milliLiter(s) Oral daily  silver sulfADIAZINE 1% Cream 1 Application(s) Topical two times a day  tamsulosin 0.4 milliGRAM(s) Oral at bedtime    acetaminophen    Suspension .. 650 milliGRAM(s) Oral every 6 hours PRN  albuterol/ipratropium for Nebulization 3 milliLiter(s) Nebulizer every 6 hours  aluminum hydroxide/magnesium hydroxide/simethicone Suspension 30 milliLiter(s) Oral every 4 hours PRN  amantadine Syrup 100 milliGRAM(s) Oral two times a day  aspirin  chewable 81 milliGRAM(s) Oral daily  enoxaparin Injectable 40 milliGRAM(s) SubCutaneous every 24 hours  finasteride 5 milliGRAM(s) Oral daily  insulin glargine Injectable (LANTUS) 12 Unit(s) SubCutaneous every morning  insulin glargine Injectable (LANTUS) 12 Unit(s) SubCutaneous at bedtime  insulin lispro (ADMELOG) corrective regimen sliding scale   SubCutaneous every 6 hours  insulin lispro Injectable (ADMELOG) 6 Unit(s) SubCutaneous every 6 hours  melatonin 3 milliGRAM(s) Oral at bedtime PRN  meropenem  IVPB      meropenem  IVPB 1000 milliGRAM(s) IV Intermittent every 8 hours  nicotine -   7 mG/24Hr(s) Patch 1 Patch Transdermal daily  ondansetron Injectable 4 milliGRAM(s) IV Push every 8 hours PRN  polyethylene glycol 3350 17 Gram(s) Oral daily  senna Syrup 10 milliLiter(s) Oral daily  silver sulfADIAZINE 1% Cream 1 Application(s) Topical two times a day  tamsulosin 0.4 milliGRAM(s) Oral at bedtime    Drug Dosing Weight  Height (cm): 177.8 (05 Mar 2023 03:36)  Weight (kg): 86.2 (05 Mar 2023 03:36)  BMI (kg/m2): 27.3 (05 Mar 2023 03:36)  BSA (m2): 2.04 (05 Mar 2023 03:36)    CENTRAL LINE: [ ] YES [x ] NO  LOCATION:   DATE INSERTED:  REMOVE: [ ] YES [ ] NO  EXPLAIN:    MELCHOR: [ ] YES [x ] NO    DATE INSERTED: 3/24/23  REMOVE:  [ ] YES [ ] NO  EXPLAIN:    PAST MEDICAL & SURGICAL HISTORY:  History of subdural hemorrhage      PEG (percutaneous endoscopic gastrostomy) status      DM (diabetes mellitus)      S/P craniotomy      PHYSICAL EXAM:    GENERAL: NAD, afebrile  HEAD:  +Right craniofacial deformity  EYES: PERRLA, conjunctiva and sclera clear  ENMT: No tonsillar erythema, exudates  NECK: Supple, No JVD, tracheostomy intact  NERVOUS SYSTEM:  Awake and alert. Does not follow commands. No tracking with eyes. Nonverbal at baseline. No spontaneous movement of extremities. BL foot drop  CHEST/LUNG: Diminished breath sounds bilateral bases.  HEART: Regular rate and rhythm; No murmurs, rubs, or gallops  ABDOMEN: +Peg Intact, no redness or swelling around site,  Soft, Nontender, Nondistended; Bowel sounds present  EXTREMITIES: No spontaneous movement of extremities.  2+ Peripheral Pulses, No clubbing, cyanosis, or edema  LYMPH: No lymphadenopathy noted  SKIN:   Stage 1 bilateral heels and coccyx        LABS:  CBC Full  -  ( 25 Mar 2023 07:21 )  WBC Count : 10.78 K/uL  RBC Count : 3.21 M/uL  Hemoglobin : 8.9 g/dL  Hematocrit : 29.7 %  Platelet Count - Automated : 353 K/uL  Mean Cell Volume : 92.5 fl  Mean Cell Hemoglobin : 27.7 pg  Mean Cell Hemoglobin Concentration : 30.0 gm/dL  Auto Neutrophil # : 8.47 K/uL  Auto Lymphocyte # : 1.52 K/uL  Auto Monocyte # : 0.59 K/uL  Auto Eosinophil # : 0.08 K/uL  Auto Basophil # : 0.07 K/uL  Auto Neutrophil % : 78.6 %  Auto Lymphocyte % : 14.1 %  Auto Monocyte % : 5.5 %  Auto Eosinophil % : 0.7 %  Auto Basophil % : 0.6 %    03-25    147<H>  |  115<H>  |  36<H>  ----------------------------<  179<H>  3.9   |  27  |  0.94    Ca    9.4      25 Mar 2023 07:21  Phos  4.7     03-25  Mg     2.8     03-25    TPro  8.3  /  Alb  2.4<L>  /  TBili  0.6  /  DBili  x   /  AST  48<H>  /  ALT  73<H>  /  AlkPhos  258<H>  03-25    PT/INR - ( 24 Mar 2023 10:40 )   PT: 16.6 sec;   INR: 1.39 ratio         PTT - ( 24 Mar 2023 10:40 )  PTT:32.3 sec          [x  ]  DVT Prophylaxis  [  ]  Nutrition, Brand, Rate         Goal Rate        Abnormal Nutritional Status -  Malnutrition   Cachexia      Morbid Obesity BMI >/=40    RADIOLOGY & ADDITIONAL STUDIES:  ***    < from: Xray Chest 1 View- PORTABLE-Urgent (Xray Chest 1 View- PORTABLE-Urgent .) (03.20.23 @ 10:07) >  INTERPRETATION:    The heart is not enlarged. The mediastinum is not accurately evaluated on   this image.  Tracheostomy tube in place.  Mildly elevated right hemidiaphragm.  Indeterminate 1 x 1.4 cm nodular opacity in the left retrocardiac region.   Remainder of lungs are clear.  No pleural effusion or pneumothorax.  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        IMPRESSION:  Mildly elevated right hemidiaphragm.    1 x 1.4 cm nodular opacity in the left retrocardiac region, of   indeterminate nature. Correlation with an unenhanced CT scan of the chest   could be performed for further evaluation, if felt to be clinically   indicated.    < end of copied text >  < from: US Abdomen Upper Quadrant Right (03.13.23 @ 11:18) >  TECHNIQUE: Sonography of the right upper quadrant. The examination was   technically limited secondary to a combination of overlying bowel gas and   difficulty with patient positioning.    FINDINGS:  Liver: Within normal limits.  Bile ducts: Normal caliber. Common bile duct measures 5 mm.  Gallbladder: Within normal limits.  Pancreas: Visualized portions are within normal limits.  Right kidney: 11.8 cm. No hydronephrosis.  Ascites: None.  IVC and aorta: Visualized portions normal in caliber.    IMPRESSION:  Technically limited study, otherwise unremarkable right upper quadrant   ultrasound.    --- End of Report ---    < end of copied text >      Goals of Care Discussion with Family/Proxy/Other   - see note from 3/5/23

## 2023-03-25 NOTE — PROGRESS NOTE ADULT - PROBLEM SELECTOR PLAN 2
possible central fever versus infectious  febrile 102.3 rectal  f/u blood and sputum culture  f/u UA  f/u chest x-ray  f/u RVP  Lactate 2  mild leukocytosis- WBC 10K  INR 1.39  ID Dr. Peres possible central fever versus infectious  febrile 102.3 rectal  f/u blood and sputum culture  f/u UA  f/u CT chest, a/p  Lactate 2  leukocytosis- WBC 10K  INR 1.39  ID Dr. Peres

## 2023-03-25 NOTE — PROGRESS NOTE ADULT - PROBLEM SELECTOR PLAN 3
Secondary to SDH and craniotomy  Craniotomy at [facility] 1/23  Maintain safety precautions  Strict aspiration precautions.  Seizure precautions.

## 2023-03-25 NOTE — PROGRESS NOTE ADULT - ASSESSMENT
62M from [facility], h/o traumatic subdural hemorrhage following a fall down the stairs while drunk, s/p r craniotomy at [facility] in 01/2023, s/p trach/PEG BIBEMS for tachycardia 170s, RR 27, increased secretions admitted to  for sepsis secondary to RUL pneumonia with tracheostomy to 3L NC.  3/6 Febrile 101.7  03/07: No events overnight. On [place]-trach at 3 L and tolerating well. Hemodynamically stable. Cultures in progress. C/w Vanco and Cefepime. F/u Vanco trough. Hypokalemic this AM and replacement ordered.   03/08: Afebrile. Vanco discontinued since MRSA negative. + yeast in sputum; + Candida Galbrata in urine (Bran in place with clear urine and afebrile).   Dr. Peres following. Blood cx NGTD. C/w Cefepime.   03/09: Febrile, tachycardic and tachypneic this AM. Code sepsis called. Sepsis w/u in progress.  Started on Vanco. ID following, . Discussed with Dr. Peres. F/u CXR. Worsening Leukocytosis. IVF bolus with LR (2,300 ml)  per protocol. Lactate 1.9.   3/11: afebrile over past 24hrs, hemodynamically stable. Vanco trough 11.7  03/12: Vanco trough this PM. Blood cx remains NGTD. C/w Vanco and Cefepime. ID following. Persistent Transaminitis.  Hep C non-reactive. F/u remaining Hepatitis panel. Patient on high dose statin which could be contributing to transaminitis. Hold for now and re-eval. Given that patient is unable to verbalize/demonstrate if symptomatic will order Liver US.   03/13: Spiked fevers overnight. Tachycardic and hypotensive. IVF bolus given. Blood cx in progress.  F/u Procal and Lactate. Vanco increased to 1500 mg BID and Meropenem added and Cefepime discontinued Discussed wih Dr. Peres and reccs appreciated  Worsening leukocytosis Pending Abdominal US 2/2 transaminitis.   03/14: Febrile. Urinary retention. On Flomax. Bladder scan and Straight cath Q 4-6 hours. On Vanco and Jose Elias. F/u cx. Abd US unremarkable. CXR 3/13 negative for acute findings.   3/15 Bladder scan 550. Bran cath reinserted.  3/23/23 Patient febrile overnight 100.5 likely central fever, no leukocytosis  3/24/23 Patient febrile 102.3 this AM. Patient blood ans sputum culture and UA sent, chest x-ray, RVP ordered. Will start Meropenem, ID aware.  3/25/23 TOV attempted yesterday, bladder scan 600ml, straight cath, today 550ml, straight cath, continue bladder scan q6h.

## 2023-03-25 NOTE — PROGRESS NOTE ADULT - PROBLEM SELECTOR PLAN 10
started on Tamsulosin since 3/14/23  will attempt TOV today started on Tamsulosin since 3/14/23  Bran catheter removed 3/24/23  continue bladder scan every 6 hrs  straight cathete for urinary retention  continue Flomax and Proscar

## 2023-03-26 LAB
ALBUMIN SERPL ELPH-MCNC: 2.4 G/DL — LOW (ref 3.5–5)
ALP SERPL-CCNC: 253 U/L — HIGH (ref 40–120)
ALT FLD-CCNC: 70 U/L DA — HIGH (ref 10–60)
ANION GAP SERPL CALC-SCNC: 2 MMOL/L — LOW (ref 5–17)
APPEARANCE UR: CLEAR — SIGNIFICANT CHANGE UP
AST SERPL-CCNC: 46 U/L — HIGH (ref 10–40)
BACTERIA # UR AUTO: ABNORMAL /HPF
BILIRUB SERPL-MCNC: 0.5 MG/DL — SIGNIFICANT CHANGE UP (ref 0.2–1.2)
BILIRUB UR-MCNC: NEGATIVE — SIGNIFICANT CHANGE UP
BUN SERPL-MCNC: 39 MG/DL — HIGH (ref 7–18)
CALCIUM SERPL-MCNC: 9.3 MG/DL — SIGNIFICANT CHANGE UP (ref 8.4–10.5)
CHLORIDE SERPL-SCNC: 115 MMOL/L — HIGH (ref 96–108)
CO2 SERPL-SCNC: 30 MMOL/L — SIGNIFICANT CHANGE UP (ref 22–31)
COLOR SPEC: YELLOW — SIGNIFICANT CHANGE UP
COMMENT - URINE: SIGNIFICANT CHANGE UP
CREAT SERPL-MCNC: 0.89 MG/DL — SIGNIFICANT CHANGE UP (ref 0.5–1.3)
CULTURE RESULTS: SIGNIFICANT CHANGE UP
DIFF PNL FLD: ABNORMAL
EGFR: 98 ML/MIN/1.73M2 — SIGNIFICANT CHANGE UP
EPI CELLS # UR: ABNORMAL /HPF
GLUCOSE SERPL-MCNC: 164 MG/DL — HIGH (ref 70–99)
GLUCOSE UR QL: NEGATIVE — SIGNIFICANT CHANGE UP
HCT VFR BLD CALC: 30.7 % — LOW (ref 39–50)
HGB BLD-MCNC: 9.1 G/DL — LOW (ref 13–17)
KETONES UR-MCNC: NEGATIVE — SIGNIFICANT CHANGE UP
LEUKOCYTE ESTERASE UR-ACNC: ABNORMAL
MAGNESIUM SERPL-MCNC: 2.8 MG/DL — HIGH (ref 1.6–2.6)
MCHC RBC-ENTMCNC: 27.6 PG — SIGNIFICANT CHANGE UP (ref 27–34)
MCHC RBC-ENTMCNC: 29.6 GM/DL — LOW (ref 32–36)
MCV RBC AUTO: 93 FL — SIGNIFICANT CHANGE UP (ref 80–100)
NITRITE UR-MCNC: NEGATIVE — SIGNIFICANT CHANGE UP
NRBC # BLD: 0 /100 WBCS — SIGNIFICANT CHANGE UP (ref 0–0)
PH UR: 5 — SIGNIFICANT CHANGE UP (ref 5–8)
PHOSPHATE SERPL-MCNC: 3.7 MG/DL — SIGNIFICANT CHANGE UP (ref 2.5–4.5)
PLATELET # BLD AUTO: 318 K/UL — SIGNIFICANT CHANGE UP (ref 150–400)
POTASSIUM SERPL-MCNC: 3.8 MMOL/L — SIGNIFICANT CHANGE UP (ref 3.5–5.3)
POTASSIUM SERPL-SCNC: 3.8 MMOL/L — SIGNIFICANT CHANGE UP (ref 3.5–5.3)
PROCALCITONIN SERPL-MCNC: 0.32 NG/ML — HIGH (ref 0.02–0.1)
PROT SERPL-MCNC: 8.1 G/DL — SIGNIFICANT CHANGE UP (ref 6–8.3)
PROT UR-MCNC: 100 MG/DL
RBC # BLD: 3.3 M/UL — LOW (ref 4.2–5.8)
RBC # FLD: 15.6 % — HIGH (ref 10.3–14.5)
RBC CASTS # UR COMP ASSIST: ABNORMAL /HPF (ref 0–2)
SODIUM SERPL-SCNC: 147 MMOL/L — HIGH (ref 135–145)
SP GR SPEC: 1.02 — SIGNIFICANT CHANGE UP (ref 1.01–1.02)
UROBILINOGEN FLD QL: 1 MG/DL
WBC # BLD: 10.81 K/UL — HIGH (ref 3.8–10.5)
WBC # FLD AUTO: 10.81 K/UL — HIGH (ref 3.8–10.5)
WBC UR QL: ABNORMAL /HPF (ref 0–5)

## 2023-03-26 RX ADMIN — Medication 3 MILLILITER(S): at 15:20

## 2023-03-26 RX ADMIN — Medication 81 MILLIGRAM(S): at 11:19

## 2023-03-26 RX ADMIN — POLYETHYLENE GLYCOL 3350 17 GRAM(S): 17 POWDER, FOR SOLUTION ORAL at 11:20

## 2023-03-26 RX ADMIN — Medication 1 APPLICATION(S): at 17:16

## 2023-03-26 RX ADMIN — Medication 1 PATCH: at 11:20

## 2023-03-26 RX ADMIN — INSULIN GLARGINE 12 UNIT(S): 100 INJECTION, SOLUTION SUBCUTANEOUS at 21:46

## 2023-03-26 RX ADMIN — Medication 100 MILLIGRAM(S): at 17:16

## 2023-03-26 RX ADMIN — Medication 3 MILLILITER(S): at 09:01

## 2023-03-26 RX ADMIN — SENNA PLUS 10 MILLILITER(S): 8.6 TABLET ORAL at 11:21

## 2023-03-26 RX ADMIN — Medication 650 MILLIGRAM(S): at 21:45

## 2023-03-26 RX ADMIN — MEROPENEM 100 MILLIGRAM(S): 1 INJECTION INTRAVENOUS at 14:20

## 2023-03-26 RX ADMIN — Medication 3 MILLILITER(S): at 03:18

## 2023-03-26 RX ADMIN — FINASTERIDE 5 MILLIGRAM(S): 5 TABLET, FILM COATED ORAL at 11:19

## 2023-03-26 RX ADMIN — Medication 6 UNIT(S): at 05:06

## 2023-03-26 RX ADMIN — Medication 1: at 00:02

## 2023-03-26 RX ADMIN — Medication 1 APPLICATION(S): at 05:12

## 2023-03-26 RX ADMIN — Medication 3 MILLILITER(S): at 20:02

## 2023-03-26 RX ADMIN — ENOXAPARIN SODIUM 40 MILLIGRAM(S): 100 INJECTION SUBCUTANEOUS at 11:19

## 2023-03-26 RX ADMIN — MEROPENEM 100 MILLIGRAM(S): 1 INJECTION INTRAVENOUS at 21:35

## 2023-03-26 RX ADMIN — TAMSULOSIN HYDROCHLORIDE 0.4 MILLIGRAM(S): 0.4 CAPSULE ORAL at 21:36

## 2023-03-26 RX ADMIN — Medication 1: at 11:46

## 2023-03-26 RX ADMIN — Medication 1: at 17:16

## 2023-03-26 RX ADMIN — INSULIN GLARGINE 12 UNIT(S): 100 INJECTION, SOLUTION SUBCUTANEOUS at 09:36

## 2023-03-26 RX ADMIN — Medication 6 UNIT(S): at 00:02

## 2023-03-26 RX ADMIN — Medication 1 PATCH: at 08:13

## 2023-03-26 RX ADMIN — Medication 1 PATCH: at 20:13

## 2023-03-26 RX ADMIN — Medication 6 UNIT(S): at 11:47

## 2023-03-26 RX ADMIN — Medication 100 MILLIGRAM(S): at 05:12

## 2023-03-26 RX ADMIN — MEROPENEM 100 MILLIGRAM(S): 1 INJECTION INTRAVENOUS at 05:12

## 2023-03-26 RX ADMIN — Medication 6 UNIT(S): at 17:16

## 2023-03-26 NOTE — PROGRESS NOTE ADULT - PROBLEM SELECTOR PLAN 9
Passive ROM exercises daily.  Turn and position every 2 hours  Strict aspiration precautions.  Keep head of bed elevated at all times.  Patient appropriate for Long Term Care.

## 2023-03-26 NOTE — PROGRESS NOTE ADULT - PROBLEM SELECTOR PLAN 6
Lantus 12U in am and HS  Continue 6U lispro every 6 hours and continue sliding scale coverage every 6 hours.  Monitor glucose.

## 2023-03-26 NOTE — PROGRESS NOTE ADULT - SUBJECTIVE AND OBJECTIVE BOX
WILMA NICOLE    SCU progress note    INTERVAL HPI/OVERNIGHT EVENTS: ***Tmax 100.9 overnight.    DNR [ ]   DNI  [  ]  FULL CODE    Covid - 19 PCR: Negative 3/25    The 4Ms    What Matters Most: see GO  Age appropriate Medications/Screen for High Risk Medication: Yes  Mentation: see CAM below  Mobility: defer to physical exam    The Confusion Assessment Method (CAM) Diagnostic Algorithm     1: Acute Onset or Fluctuating Course  - Is there evidence of an acute change in mental status from the patient’s baseline? Did the (abnormal) behavior  fluctuate during the day, that is, tend to come and go, or increase and decrease in severity?  [ ] YES x] NO     2: Inattention  - Did the patient have difficulty focusing attention, being easily distractible, or having difficulty keeping track of what was being said?  [ ] YES [ ] NO   unable to access     3: Disorganized thinking  -Was the patient’s thinking disorganized or incoherent, such as rambling or irrelevant conversation, unclear or illogical flow of ideas, or unpredictable switching from subject to subject?  [ ] YES [ ] NO   Unable to access    4: Altered Level of consciousness?  [x ] YES [ ] NO    The diagnosis of delirium by CAM requires the presence of features 1 and 2 and either 3 or 4.    PRESSORS: [ ] YES [x ] NO  meropenem  IVPB      meropenem  IVPB 1000 milliGRAM(s) IV Intermittent every 8 hours    Cardiovascular:  Heart Failure  Acute   Acute on Chronic  Chronic         Pulmonary:  albuterol/ipratropium for Nebulization 3 milliLiter(s) Nebulizer every 6 hours    Hematalogic:  aspirin  chewable 81 milliGRAM(s) Oral daily  enoxaparin Injectable 40 milliGRAM(s) SubCutaneous every 24 hours    Other:  acetaminophen    Suspension .. 650 milliGRAM(s) Oral every 6 hours PRN  aluminum hydroxide/magnesium hydroxide/simethicone Suspension 30 milliLiter(s) Oral every 4 hours PRN  amantadine Syrup 100 milliGRAM(s) Oral two times a day  finasteride 5 milliGRAM(s) Oral daily  insulin glargine Injectable (LANTUS) 12 Unit(s) SubCutaneous every morning  insulin glargine Injectable (LANTUS) 12 Unit(s) SubCutaneous at bedtime  insulin lispro (ADMELOG) corrective regimen sliding scale   SubCutaneous every 6 hours  insulin lispro Injectable (ADMELOG) 6 Unit(s) SubCutaneous every 6 hours  melatonin 3 milliGRAM(s) Oral at bedtime PRN  nicotine -   7 mG/24Hr(s) Patch 1 Patch Transdermal daily  ondansetron Injectable 4 milliGRAM(s) IV Push every 8 hours PRN  polyethylene glycol 3350 17 Gram(s) Oral daily  senna Syrup 10 milliLiter(s) Oral daily  silver sulfADIAZINE 1% Cream 1 Application(s) Topical two times a day  tamsulosin 0.4 milliGRAM(s) Oral at bedtime    acetaminophen    Suspension .. 650 milliGRAM(s) Oral every 6 hours PRN  albuterol/ipratropium for Nebulization 3 milliLiter(s) Nebulizer every 6 hours  aluminum hydroxide/magnesium hydroxide/simethicone Suspension 30 milliLiter(s) Oral every 4 hours PRN  amantadine Syrup 100 milliGRAM(s) Oral two times a day  aspirin  chewable 81 milliGRAM(s) Oral daily  enoxaparin Injectable 40 milliGRAM(s) SubCutaneous every 24 hours  finasteride 5 milliGRAM(s) Oral daily  insulin glargine Injectable (LANTUS) 12 Unit(s) SubCutaneous every morning  insulin glargine Injectable (LANTUS) 12 Unit(s) SubCutaneous at bedtime  insulin lispro (ADMELOG) corrective regimen sliding scale   SubCutaneous every 6 hours  insulin lispro Injectable (ADMELOG) 6 Unit(s) SubCutaneous every 6 hours  melatonin 3 milliGRAM(s) Oral at bedtime PRN  meropenem  IVPB      meropenem  IVPB 1000 milliGRAM(s) IV Intermittent every 8 hours  nicotine -   7 mG/24Hr(s) Patch 1 Patch Transdermal daily  ondansetron Injectable 4 milliGRAM(s) IV Push every 8 hours PRN  polyethylene glycol 3350 17 Gram(s) Oral daily  senna Syrup 10 milliLiter(s) Oral daily  silver sulfADIAZINE 1% Cream 1 Application(s) Topical two times a day  tamsulosin 0.4 milliGRAM(s) Oral at bedtime    Drug Dosing Weight  Height (cm): 177.8 (05 Mar 2023 03:36)  Weight (kg): 86.2 (05 Mar 2023 03:36)  BMI (kg/m2): 27.3 (05 Mar 2023 03:36)  BSA (m2): 2.04 (05 Mar 2023 03:36)    CENTRAL LINE: [ ] YES [x ] NO  LOCATION:   DATE INSERTED:  REMOVE: [ ] YES [ ] NO  EXPLAIN:    MELCHOR: [ ] YES [x ] NO    DATE INSERTED:  REMOVE:  [ ] YES [ ] NO  EXPLAIN:    PAST MEDICAL & SURGICAL HISTORY:  History of subdural hemorrhage      PEG (percutaneous endoscopic gastrostomy) status      DM (diabetes mellitus)      S/P craniotomy                   @ 07:01  -   @ 07:00  --------------------------------------------------------  IN: 0 mL / OUT: 427 mL / NET: -427 mL            PHYSICAL EXAM:    GENERAL: NAD, Tmax 100.9 overnight  HEAD:  Right craniofacial deformity                EYES:  PERRLA, conjunctiva and sclera clear  ENMT: No tonsillar erythema, exudates  NECK: Supple, No JVD, tracheostomy intact  NERVOUS SYSTEM: Awake and alert. Nonverbal at baseline. Does not follow commands. No tracking with eyes No spontaneous movement of extremities. Bilateral foot drop.  CHEST/LUNG: Diminished breath sounds bilateral bases  HEART: Regular rate and rhythm; No murmurs, rubs, or gallops  ABDOMEN: +Peg Soft, Nontender, Nondistended; Bowel sounds present  EXTREMITIES: No spontaneous movement of extremities.  2+ Peripheral Pulses, No clubbing, cyanosis, or edema  LYMPH: No lymphadenopathy noted  SKIN: Stage 1 bilateral heels and coccyx      LABS:  CBC Full  -  ( 26 Mar 2023 07:24 )  WBC Count : 10.81 K/uL  RBC Count : 3.30 M/uL  Hemoglobin : 9.1 g/dL  Hematocrit : 30.7 %  Platelet Count - Automated : 318 K/uL  Mean Cell Volume : 93.0 fl  Mean Cell Hemoglobin : 27.6 pg  Mean Cell Hemoglobin Concentration : 29.6 gm/dL  Auto Neutrophil # : x  Auto Lymphocyte # : x  Auto Monocyte # : x  Auto Eosinophil # : x  Auto Basophil # : x  Auto Neutrophil % : x  Auto Lymphocyte % : x  Auto Monocyte % : x  Auto Eosinophil % : x  Auto Basophil % : x        147<H>  |  115<H>  |  39<H>  ----------------------------<  164<H>  3.8   |  x   |  x     Ca    9.3      26 Mar 2023 07:24  Phos  3.7       Mg     2.8         TPro  x   /  Alb  2.4<L>  /  TBili  x   /  DBili  x   /  AST  x   /  ALT  x   /  AlkPhos  x       PT/INR - ( 24 Mar 2023 10:40 )   PT: 16.6 sec;   INR: 1.39 ratio         PTT - ( 24 Mar 2023 10:40 )  PTT:32.3 sec  Urinalysis Basic - ( 26 Mar 2023 00:33 )    Color: Yellow / Appearance: Clear / S.020 / pH: x  Gluc: x / Ketone: Negative  / Bili: Negative / Urobili: 1 mg/dL   Blood: x / Protein: 100 mg/dL / Nitrite: Negative   Leuk Esterase: Trace / RBC: 2-5 /HPF / WBC 6-10 /HPF   Sq Epi: x / Non Sq Epi: Occasional /HPF / Bacteria: Moderate /HPF            [  ]  DVT Prophylaxis  [  ]  Nutrition, Brand, Rate         Goal Rate        Abnormal Nutritional Status -  Malnutrition   Cachexia          RADIOLOGY & ADDITIONAL STUDIES:  ***  < from: CT Chest w/ IV Cont (23 @ 23:13) >  COMPARISON: None.    CONTRAST/COMPLICATIONS:  IV Contrast: Omnipaque 350 (accession [id number]), IV contrast documented   in unlinked concurrent exam (accession [id number])  90 cc administered   10   cc discarded  Oral Contrast: NONE  Complications: None reported at time of study completion    PROCEDURE:  CT of the Chest, Abdomen and Pelvis was performed.  Sagittal and coronal reformats were performed.    FINDINGS:  CHEST:  LUNGS AND LARGE AIRWAYS:Patent central airways. Bibasilar consolidations   greater on the right. Bilateral lower lobe mucous plugging. Tracheostomy.  PLEURA: No pleural effusion.  VESSELS: Within normal limits.  HEART: Heart size is normal.  No pericardial effusion.  MEDIASTINUM AND GLORY: No lymphadenopathy.  CHEST WALL AND LOWER NECK: Within normal limits.    ABDOMEN AND PELVIS:  LIVER: Within normal limits.  BILE DUCTS: Normal caliber.  GALLBLADDER: Within normal limits.  SPLEEN: Within normal limits.  PANCREAS: Within normal limits.  ADRENALS: Within normal limits.  KIDNEYS/URETERS: Cysts and other lesions too small to characterize.    BLADDER: Small focus of air in the bladder.  REPRODUCTIVE ORGANS: Within normal limits.    BOWEL: No bowel obstruction. A small duodenal diverticulum. Normal   appendix. Gastrostomy tube is in place.  PERITONEUM: No ascites.  VESSELS:  Within normal limits.  RETROPERITONEUM/LYMPH NODES: No lymphadenopathy.  ABDOMINAL WALL: There is enlargement of the right piriformis with   ossification.  BONES: Within normal limits.    IMPRESSION: Bibasilar consolidations and mucous plugging possibly   infectious.    Enlargement and ossification of the right piriformis which could be   traumatic inflammatory or postinfectious in etiology.    < end of copied text >    Goals of Care Discussion with Family/Proxy/Other   - see note from 3/05/23

## 2023-03-26 NOTE — PROGRESS NOTE ADULT - PROBLEM SELECTOR PLAN 2
Continue hydro-trach collar, saturating well  Currently tolerating 2LPM via hydro-trach collar.   Continue suction via trach and oral   Monitor oxygen saturation.  Continue bronchodilators  Meropenem started 3/24.  Procalcitonin elevated 0.23  Keep head of bed elevated at all times.

## 2023-03-26 NOTE — PROGRESS NOTE ADULT - PROBLEM SELECTOR PLAN 8
Started on Tamsulosin since 3/14/23  Bran catheter removed 3/24/23  continue bladder scan every 6 hrs  straight cathete for urinary retention  continue Flomax and Proscar.

## 2023-03-26 NOTE — PROGRESS NOTE ADULT - PROBLEM SELECTOR PLAN 5
Currently normotensive.  Antihypertensives currently on hold secondary to sepsis  Adjust medications as needed.

## 2023-03-26 NOTE — PROGRESS NOTE ADULT - PROBLEM SELECTOR PLAN 3
Secondary to SDH and craniotomy  Craniotomy at [facility] 1/23  Maintain safety precautions  Strict aspiration precautions.  Seizure precautions.

## 2023-03-26 NOTE — PROGRESS NOTE ADULT - ASSESSMENT
62M from [facility], h/o traumatic subdural hemorrhage following a fall down the stairs while drunk, s/p r craniotomy at [facility] in 01/2023, s/p trach/PEG BIBEMS for tachycardia 170s, RR 27, increased secretions admitted to  for sepsis secondary to RUL pneumonia with tracheostomy to 3L NC.  3/6 Febrile 101.7  03/07: No events overnight. On [place]-trach at 3 L and tolerating well. Hemodynamically stable. Cultures in progress. C/w Vanco and Cefepime. F/u Vanco trough. Hypokalemic this AM and replacement ordered.   03/08: Afebrile. Vanco discontinued since MRSA negative. + yeast in sputum; + Candida Galbrata in urine (Bran in place with clear urine and afebrile).   Dr. Peres following. Blood cx NGTD. C/w Cefepime.   03/09: Febrile, tachycardic and tachypneic this AM. Code sepsis called. Sepsis w/u in progress.  Started on Vanco. ID following, . Discussed with Dr. Peres. F/u CXR. Worsening Leukocytosis. IVF bolus with LR (2,300 ml)  per protocol. Lactate 1.9.   3/11: afebrile over past 24hrs, hemodynamically stable. Vanco trough 11.7  03/12: Vanco trough this PM. Blood cx remains NGTD. C/w Vanco and Cefepime. ID following. Persistent Transaminitis.  Hep C non-reactive. F/u remaining Hepatitis panel. Patient on high dose statin which could be contributing to transaminitis. Hold for now and re-eval. Given that patient is unable to verbalize/demonstrate if symptomatic will order Liver US.   03/13: Spiked fevers overnight. Tachycardic and hypotensive. IVF bolus given. Blood cx in progress.  F/u Procal and Lactate. Vanco increased to 1500 mg BID and Meropenem added and Cefepime discontinued Discussed wih Dr. Peres and reccs appreciated  Worsening leukocytosis Pending Abdominal US 2/2 transaminitis.   03/14: Febrile. Urinary retention. On Flomax. Bladder scan and Straight cath Q 4-6 hours. On Vanco and Jose Elias. F/u cx. Abd US unremarkable. CXR 3/13 negative for acute findings.   3/15 Bladder scan 550. Bran cath reinserted.  3/23/23 Patient febrile overnight 100.5 likely central fever, no leukocytosis  3/24/23 Patient febrile 102.3 this AM. Patient blood ans sputum culture and UA sent, chest x-ray, RVP ordered. Will start Meropenem, ID aware.  3/25/23 TOV attempted yesterday, bladder scan 600ml, straight cath, today 550ml, straight cath, continue bladder scan q6h.

## 2023-03-27 LAB
ALBUMIN SERPL ELPH-MCNC: 2.3 G/DL — LOW (ref 3.5–5)
ALP SERPL-CCNC: 235 U/L — HIGH (ref 40–120)
ALT FLD-CCNC: 74 U/L DA — HIGH (ref 10–60)
ANION GAP SERPL CALC-SCNC: 2 MMOL/L — LOW (ref 5–17)
AST SERPL-CCNC: 48 U/L — HIGH (ref 10–40)
BILIRUB SERPL-MCNC: 0.5 MG/DL — SIGNIFICANT CHANGE UP (ref 0.2–1.2)
BUN SERPL-MCNC: 38 MG/DL — HIGH (ref 7–18)
CALCIUM SERPL-MCNC: 9.1 MG/DL — SIGNIFICANT CHANGE UP (ref 8.4–10.5)
CHLORIDE SERPL-SCNC: 117 MMOL/L — HIGH (ref 96–108)
CO2 SERPL-SCNC: 30 MMOL/L — SIGNIFICANT CHANGE UP (ref 22–31)
CREAT SERPL-MCNC: 0.8 MG/DL — SIGNIFICANT CHANGE UP (ref 0.5–1.3)
EGFR: 101 ML/MIN/1.73M2 — SIGNIFICANT CHANGE UP
GLUCOSE SERPL-MCNC: 150 MG/DL — HIGH (ref 70–99)
HCT VFR BLD CALC: 29.4 % — LOW (ref 39–50)
HGB BLD-MCNC: 8.9 G/DL — LOW (ref 13–17)
MAGNESIUM SERPL-MCNC: 2.7 MG/DL — HIGH (ref 1.6–2.6)
MCHC RBC-ENTMCNC: 28.1 PG — SIGNIFICANT CHANGE UP (ref 27–34)
MCHC RBC-ENTMCNC: 30.3 GM/DL — LOW (ref 32–36)
MCV RBC AUTO: 92.7 FL — SIGNIFICANT CHANGE UP (ref 80–100)
NRBC # BLD: 0 /100 WBCS — SIGNIFICANT CHANGE UP (ref 0–0)
PHOSPHATE SERPL-MCNC: 3.9 MG/DL — SIGNIFICANT CHANGE UP (ref 2.5–4.5)
PLATELET # BLD AUTO: 263 K/UL — SIGNIFICANT CHANGE UP (ref 150–400)
POTASSIUM SERPL-MCNC: 3.7 MMOL/L — SIGNIFICANT CHANGE UP (ref 3.5–5.3)
POTASSIUM SERPL-SCNC: 3.7 MMOL/L — SIGNIFICANT CHANGE UP (ref 3.5–5.3)
PROT SERPL-MCNC: 7.5 G/DL — SIGNIFICANT CHANGE UP (ref 6–8.3)
RBC # BLD: 3.17 M/UL — LOW (ref 4.2–5.8)
RBC # FLD: 15.8 % — HIGH (ref 10.3–14.5)
SODIUM SERPL-SCNC: 149 MMOL/L — HIGH (ref 135–145)
WBC # BLD: 11.85 K/UL — HIGH (ref 3.8–10.5)
WBC # FLD AUTO: 11.85 K/UL — HIGH (ref 3.8–10.5)

## 2023-03-27 PROCEDURE — 99233 SBSQ HOSP IP/OBS HIGH 50: CPT

## 2023-03-27 RX ADMIN — INSULIN GLARGINE 12 UNIT(S): 100 INJECTION, SOLUTION SUBCUTANEOUS at 08:29

## 2023-03-27 RX ADMIN — Medication 3 MILLILITER(S): at 08:36

## 2023-03-27 RX ADMIN — TAMSULOSIN HYDROCHLORIDE 0.4 MILLIGRAM(S): 0.4 CAPSULE ORAL at 22:04

## 2023-03-27 RX ADMIN — Medication 1 PATCH: at 12:22

## 2023-03-27 RX ADMIN — Medication 1 PATCH: at 12:20

## 2023-03-27 RX ADMIN — POLYETHYLENE GLYCOL 3350 17 GRAM(S): 17 POWDER, FOR SOLUTION ORAL at 12:20

## 2023-03-27 RX ADMIN — Medication 1: at 00:21

## 2023-03-27 RX ADMIN — MEROPENEM 100 MILLIGRAM(S): 1 INJECTION INTRAVENOUS at 15:03

## 2023-03-27 RX ADMIN — Medication 650 MILLIGRAM(S): at 01:00

## 2023-03-27 RX ADMIN — Medication 6 UNIT(S): at 15:34

## 2023-03-27 RX ADMIN — Medication 100 MILLIGRAM(S): at 17:32

## 2023-03-27 RX ADMIN — Medication 3 MILLILITER(S): at 02:28

## 2023-03-27 RX ADMIN — Medication 6 UNIT(S): at 00:22

## 2023-03-27 RX ADMIN — Medication 6 UNIT(S): at 17:34

## 2023-03-27 RX ADMIN — Medication 1 APPLICATION(S): at 05:25

## 2023-03-27 RX ADMIN — Medication 6 UNIT(S): at 06:50

## 2023-03-27 RX ADMIN — ENOXAPARIN SODIUM 40 MILLIGRAM(S): 100 INJECTION SUBCUTANEOUS at 12:16

## 2023-03-27 RX ADMIN — Medication 1 APPLICATION(S): at 17:32

## 2023-03-27 RX ADMIN — Medication 100 MILLIGRAM(S): at 05:24

## 2023-03-27 RX ADMIN — Medication 1: at 17:33

## 2023-03-27 RX ADMIN — Medication 81 MILLIGRAM(S): at 12:23

## 2023-03-27 RX ADMIN — MEROPENEM 100 MILLIGRAM(S): 1 INJECTION INTRAVENOUS at 22:05

## 2023-03-27 RX ADMIN — Medication 3 MILLILITER(S): at 14:27

## 2023-03-27 RX ADMIN — Medication 3 MILLILITER(S): at 20:00

## 2023-03-27 RX ADMIN — MEROPENEM 100 MILLIGRAM(S): 1 INJECTION INTRAVENOUS at 05:24

## 2023-03-27 RX ADMIN — SENNA PLUS 10 MILLILITER(S): 8.6 TABLET ORAL at 12:20

## 2023-03-27 RX ADMIN — FINASTERIDE 5 MILLIGRAM(S): 5 TABLET, FILM COATED ORAL at 12:18

## 2023-03-27 RX ADMIN — INSULIN GLARGINE 12 UNIT(S): 100 INJECTION, SOLUTION SUBCUTANEOUS at 22:04

## 2023-03-27 RX ADMIN — Medication 1: at 15:35

## 2023-03-27 RX ADMIN — Medication 1 PATCH: at 20:04

## 2023-03-27 RX ADMIN — Medication 1 PATCH: at 07:15

## 2023-03-27 NOTE — PROGRESS NOTE ADULT - SUBJECTIVE AND OBJECTIVE BOX
62y Male    Meds:  meropenem  IVPB 1000 milliGRAM(s) IV Intermittent every 8 hours  meropenem  IVPB        Allergies    No Known Allergies    Intolerances        VITALS:  Vital Signs Last 24 Hrs  T(C): 37 (27 Mar 2023 12:53), Max: 38 (26 Mar 2023 21:28)  T(F): 98.6 (27 Mar 2023 12:53), Max: 100.4 (26 Mar 2023 21:28)  HR: 98 (27 Mar 2023 12:53) (98 - 108)  BP: 126/79 (27 Mar 2023 12:53) (126/79 - 139/98)  BP(mean): --  RR: 18 (27 Mar 2023 12:53) (18 - 21)  SpO2: 98% (27 Mar 2023 12:53) (96% - 98%)    Parameters below as of 27 Mar 2023 12:53  Patient On (Oxygen Delivery Method): hydrotrach        LABS/DIAGNOSTIC TESTS:                          8.9    11.85 )-----------( 263      ( 27 Mar 2023 06:48 )             29.4         03-27    149<H>  |  117<H>  |  38<H>  ----------------------------<  150<H>  3.7   |  30  |  0.80    Ca    9.1      27 Mar 2023 06:48  Phos  3.9     03-27  Mg     2.7     03-27    TPro  7.5  /  Alb  2.3<L>  /  TBili  0.5  /  DBili  x   /  AST  48<H>  /  ALT  74<H>  /  AlkPhos  235<H>  03-27      LIVER FUNCTIONS - ( 27 Mar 2023 06:48 )  Alb: 2.3 g/dL / Pro: 7.5 g/dL / ALK PHOS: 235 U/L / ALT: 74 U/L DA / AST: 48 U/L / GGT: x             CULTURES: .Blood Blood-Peripheral  03-24 @ 10:40   No growth to date.  --  --      .Blood Blood-Peripheral  03-24 @ 10:30   No growth to date.  --  --      Trach Asp Tracheal Aspirate  03-24 @ 10:00   Normal Respiratory Jane present  --    No polymorphonuclear leukocytes per low power field  Few Squamous epithelial cells per low power field  No organisms seen      .Blood Blood  03-13 @ 10:37   No Growth Final  --  --      .Blood Blood  03-13 @ 10:30   No Growth Final  --  --      .Sputum Sputum  03-09 @ 23:37   Normal Respiratory Jane present  --    Rare Squamous epithelial cells per low power field  Numerous polymorphonuclear leukocytes per low power field  Few Gram positive cocci in pairs per oil power field  Few Gram Negative Rods per oil power field  Few Yeast like cells per oil power field      .Blood Blood  03-09 @ 09:24   No Growth Final  --  --      Trach Asp Tracheal Aspirate  03-06 @ 00:14   Normal Respiratory Jane present  --    Numerous polymorphonuclear leukocytes per low power field  Few Squamous epithelial cells per low power field  Numerous Gram Variable Rods seen per oil power field  Moderate Yeast like cells seen per oil power field  Few Gram positive cocci in pairsseen per oil power field      .Blood Blood-Peripheral  03-05 @ 04:00   No Growth Final  --  --      .Blood Blood-Peripheral  03-05 @ 03:50   No Growth Final  --  --      Clean Catch Clean Catch (Midstream)  03-05 @ 03:15   50,000 - 99,000 CFU/mL Candida glabrata  "Susceptibilities not performed"  --  --            RADIOLOGY:      ROS:  [  ] UNABLE TO ELICIT 62y Male who has been having some fevers and hence restarted on Meropenem empirically  and his fevers have been decreasing slowly. He actually looks better today and does not appear to be in an any distress, his Tmax was 100.4 in the last 24 hrs and his WBC count is only marginally elevated. His blood cultures are negative.    Meds:  meropenem  IVPB 1000 milliGRAM(s) IV Intermittent every 8 hours   meropenem  IVPB        Allergies    No Known Allergies    Intolerances        VITALS:  Vital Signs Last 24 Hrs  T(C): 37 (27 Mar 2023 12:53), Max: 38 (26 Mar 2023 21:28)  T(F): 98.6 (27 Mar 2023 12:53), Max: 100.4 (26 Mar 2023 21:28)  HR: 98 (27 Mar 2023 12:53) (98 - 108)  BP: 126/79 (27 Mar 2023 12:53) (126/79 - 139/98)  BP(mean): --  RR: 18 (27 Mar 2023 12:53) (18 - 21)  SpO2: 98% (27 Mar 2023 12:53) (96% - 98%)    Parameters below as of 27 Mar 2023 12:53  Patient On (Oxygen Delivery Method): hydrotrach        LABS/DIAGNOSTIC TESTS:                          8.9    11.85 )-----------( 263      ( 27 Mar 2023 06:48 )             29.4         03-27    149<H>  |  117<H>  |  38<H>  ----------------------------<  150<H>  3.7   |  30  |  0.80    Ca    9.1      27 Mar 2023 06:48  Phos  3.9     03-27  Mg     2.7     03-27    TPro  7.5  /  Alb  2.3<L>  /  TBili  0.5  /  DBili  x   /  AST  48<H>  /  ALT  74<H>  /  AlkPhos  235<H>  03-27      LIVER FUNCTIONS - ( 27 Mar 2023 06:48 )  Alb: 2.3 g/dL / Pro: 7.5 g/dL / ALK PHOS: 235 U/L / ALT: 74 U/L DA / AST: 48 U/L / GGT: x             CULTURES: .Blood Blood-Peripheral  03-24 @ 10:40   No growth to date.  --  --      .Blood Blood-Peripheral  03-24 @ 10:30   No growth to date.  --  --      Trach Asp Tracheal Aspirate  03-24 @ 10:00   Normal Respiratory Jane present  --    No polymorphonuclear leukocytes per low power field  Few Squamous epithelial cells per low power field  No organisms seen      .Blood Blood  03-13 @ 10:37   No Growth Final  --  --      .Blood Blood  03-13 @ 10:30   No Growth Final  --  --      .Sputum Sputum  03-09 @ 23:37   Normal Respiratory Jane present  --    Rare Squamous epithelial cells per low power field  Numerous polymorphonuclear leukocytes per low power field  Few Gram positive cocci in pairs per oil power field  Few Gram Negative Rods per oil power field  Few Yeast like cells per oil power field      .Blood Blood  03-09 @ 09:24   No Growth Final  --  --      Trach Asp Tracheal Aspirate  03-06 @ 00:14   Normal Respiratory Jane present  --    Numerous polymorphonuclear leukocytes per low power field  Few Squamous epithelial cells per low power field  Numerous Gram Variable Rods seen per oil power field  Moderate Yeast like cells seen per oil power field  Few Gram positive cocci in pairsseen per oil power field      .Blood Blood-Peripheral  03-05 @ 04:00   No Growth Final  --  --      .Blood Blood-Peripheral  03-05 @ 03:50   No Growth Final  --  --      Clean Catch Clean Catch (Midstream)  03-05 @ 03:15   50,000 - 99,000 CFU/mL Candida glabrata  "Susceptibilities not performed"  --  --            RADIOLOGY:< from: CT Abdomen and Pelvis w/ IV Cont (03.24.23 @ 23:14) >  ACC: [id number] EXAM:  CT ABDOMEN AND PELVIS IC   ORDERED BY: HAMILTON BRIZUELA     ACC: [id number] EXAM:  CT CHEST IC   ORDERED BY: HAMILTON BRIZUELA     PROCEDURE DATE:  03/24/2023          INTERPRETATION:  CLINICAL INFORMATION: r/o abcess Admitting Dxs: J18.9   PNEUMONIA, UNSPECIFIED ORGANISM HCT    COMPARISON: None.    CONTRAST/COMPLICATIONS:  IV Contrast: Omnipaque 350 (accession [id number]), IV contrast documented   in unlinked concurrent exam (accession [id number])  90 cc administered   10   cc discarded  Oral Contrast: NONE  Complications: None reported at time of study completion    PROCEDURE:  CT of the Chest, Abdomen and Pelvis was performed.  Sagittal and coronal reformats were performed.    FINDINGS:  CHEST:  LUNGS AND LARGE AIRWAYS:Patent central airways. Bibasilar consolidations   greater on the right. Bilateral lower lobe mucous plugging. Tracheostomy.  PLEURA: No pleural effusion.  VESSELS: Within normal limits.  HEART: Heart size is normal.  No pericardial effusion.  MEDIASTINUM AND GLORY: No lymphadenopathy.  CHEST WALL AND LOWER NECK: Within normal limits.    ABDOMEN AND PELVIS:  LIVER: Within normal limits.  BILE DUCTS: Normal caliber.  GALLBLADDER: Within normal limits.  SPLEEN: Within normal limits.  PANCREAS: Within normal limits.  ADRENALS: Within normal limits.  KIDNEYS/URETERS: Cysts and other lesions too small to characterize.    BLADDER: Small focus of air in the bladder.  REPRODUCTIVE ORGANS: Within normal limits.    BOWEL: No bowel obstruction. A small duodenal diverticulum. Normal   appendix. Gastrostomy tube is in place.  PERITONEUM: No ascites.  VESSELS:  Within normal limits.  RETROPERITONEUM/LYMPH NODES: No lymphadenopathy.  ABDOMINAL WALL: There is enlargement of the right piriformis with   ossification.  BONES: Within normal limits.    IMPRESSION: Bibasilar consolidations and mucous plugging possibly   infectious.    Enlargement and ossification of the right piriformis which could be   traumatic inflammatory or postinfectious in etiology.        --- End of Report ---            ALICE PRATHER MD; Attending Radiologist  This document has been electronically signed. Mar 25 2023 11:50AM    < end of copied text >        ROS:  [ x ] UNABLE TO ELICIT

## 2023-03-27 NOTE — PROGRESS NOTE ADULT - MUSCULOSKELETAL
no joint swelling/no joint erythema/no joint warmth

## 2023-03-27 NOTE — CHART NOTE - NSCHARTNOTEFT_GEN_A_CORE
Wife updated on test results and treatment plan.  Discharge plan discussed.  All questions answered.

## 2023-03-27 NOTE — PROGRESS NOTE ADULT - ASSESSMENT
62M from [facility], h/o traumatic subdural hemorrhage following a fall down the stairs while drunk, s/p r craniotomy at [facility] in 01/2023, s/p trach/PEG BIBEMS for tachycardia 170s, RR 27, increased secretions admitted to  for sepsis secondary to RUL pneumonia with tracheostomy to 3L NC.  3/6 Febrile 101.7  03/07: No events overnight. On [place]-trach at 3 L and tolerating well. Hemodynamically stable. Cultures in progress. C/w Vanco and Cefepime. F/u Vanco trough. Hypokalemic this AM and replacement ordered.   03/08: Afebrile. Vanco discontinued since MRSA negative. + yeast in sputum; + Candida Galbrata in urine (Bran in place with clear urine and afebrile).   Dr. Peres following. Blood cx NGTD. C/w Cefepime.   03/09: Febrile, tachycardic and tachypneic this AM. Code sepsis called. Sepsis w/u in progress.  Started on Vanco. ID following, . Discussed with Dr. Peres. F/u CXR. Worsening Leukocytosis. IVF bolus with LR (2,300 ml)  per protocol. Lactate 1.9.   3/11: afebrile over past 24hrs, hemodynamically stable. Vanco trough 11.7  03/12: Vanco trough this PM. Blood cx remains NGTD. C/w Vanco and Cefepime. ID following. Persistent Transaminitis.  Hep C non-reactive. F/u remaining Hepatitis panel. Patient on high dose statin which could be contributing to transaminitis. Hold for now and re-eval. Given that patient is unable to verbalize/demonstrate if symptomatic will order Liver US.   03/13: Spiked fevers overnight. Tachycardic and hypotensive. IVF bolus given. Blood cx in progress.  F/u Procal and Lactate. Vanco increased to 1500 mg BID and Meropenem added and Cefepime discontinued Discussed wih Dr. Peres and reccs appreciated  Worsening leukocytosis Pending Abdominal US 2/2 transaminitis.   03/14: Febrile. Urinary retention. On Flomax. Bladder scan and Straight cath Q 4-6 hours. On Vanco and Jose Elias. F/u cx. Abd US unremarkable. CXR 3/13 negative for acute findings.   3/15 Bladder scan 550. Bran cath reinserted.  3/23/23 Patient febrile overnight 100.5 likely central fever, no leukocytosis  3/24/23 Patient febrile 102.3 this AM. Patient blood ans sputum culture and UA sent, chest x-ray, RVP ordered. Will start Meropenem, ID aware.  3/25/23 TOV attempted yesterday, bladder scan 600ml, straight cath, today 550ml, straight cath, continue bladder scan q6h.

## 2023-03-27 NOTE — PROGRESS NOTE ADULT - PROBLEM SELECTOR PLAN 8
Started on Tamsulosin since 3/14/23  [place] catheter removed 3/24/23  continue bladder scan every 8 hrs  straight catheter for urinary retention If he requires another cath will place new [place]. So far requiring straight cath every 6-8 hours.  continue Flomax and Proscar.

## 2023-03-27 NOTE — PROGRESS NOTE ADULT - PROBLEM SELECTOR PLAN 3
Secondary to SDH and craniotomy  Craniotomy at [facility] 1/23  Maintain safety precautions  Strict aspiration precautions.  Seizure precautions.

## 2023-03-27 NOTE — PROGRESS NOTE ADULT - SUBJECTIVE AND OBJECTIVE BOX
WILMA NICOLE    SCU progress note    INTERVAL HPI/OVERNIGHT EVENTS: ***Tmax 100.4 last night. Maintaining oxygen saturation on RA.    DNR [ ]   DNI  [  ]  Full code    Covid - 19 PCR: Negative 3/25    The 4Ms    What Matters Most: see GO  Age appropriate Medications/Screen for High Risk Medication: Yes  Mentation: see CAM below  Mobility: defer to physical exam    The Confusion Assessment Method (CAM) Diagnostic Algorithm     1: Acute Onset or Fluctuating Course  - Is there evidence of an acute change in mental status from the patient’s baseline? Did the (abnormal) behavior  fluctuate during the day, that is, tend to come and go, or increase and decrease in severity?  [ ] YES [x ] NO     2: Inattention  - Did the patient have difficulty focusing attention, being easily distractible, or having difficulty keeping track of what was being said?  [ ] YES [ ] NO  Unable to access     3: Disorganized thinking  -Was the patient’s thinking disorganized or incoherent, such as rambling or irrelevant conversation, unclear or illogical flow of ideas, or unpredictable switching from subject to subject?  [ ] YES [ ] NO   Unable to access    4: Altered Level of consciousness?  [x ] YES [ ] NO    The diagnosis of delirium by CAM requires the presence of features 1 and 2 and either 3 or 4.    PRESSORS: [ ] YES [x ] NO  meropenem  IVPB      meropenem  IVPB 1000 milliGRAM(s) IV Intermittent every 8 hours    Cardiovascular:  Heart Failure  Acute   Acute on Chronic  Chronic         Pulmonary:  albuterol/ipratropium for Nebulization 3 milliLiter(s) Nebulizer every 6 hours    Hematalogic:  aspirin  chewable 81 milliGRAM(s) Oral daily  enoxaparin Injectable 40 milliGRAM(s) SubCutaneous every 24 hours    Other:  acetaminophen    Suspension .. 650 milliGRAM(s) Oral every 6 hours PRN  aluminum hydroxide/magnesium hydroxide/simethicone Suspension 30 milliLiter(s) Oral every 4 hours PRN  amantadine Syrup 100 milliGRAM(s) Oral two times a day  finasteride 5 milliGRAM(s) Oral daily  insulin glargine Injectable (LANTUS) 12 Unit(s) SubCutaneous every morning  insulin glargine Injectable (LANTUS) 12 Unit(s) SubCutaneous at bedtime  insulin lispro (ADMELOG) corrective regimen sliding scale   SubCutaneous every 6 hours  insulin lispro Injectable (ADMELOG) 6 Unit(s) SubCutaneous every 6 hours  melatonin 3 milliGRAM(s) Oral at bedtime PRN  nicotine -   7 mG/24Hr(s) Patch 1 Patch Transdermal daily  ondansetron Injectable 4 milliGRAM(s) IV Push every 8 hours PRN  polyethylene glycol 3350 17 Gram(s) Oral daily  senna Syrup 10 milliLiter(s) Oral daily  silver sulfADIAZINE 1% Cream 1 Application(s) Topical two times a day  tamsulosin 0.4 milliGRAM(s) Oral at bedtime    acetaminophen    Suspension .. 650 milliGRAM(s) Oral every 6 hours PRN  albuterol/ipratropium for Nebulization 3 milliLiter(s) Nebulizer every 6 hours  aluminum hydroxide/magnesium hydroxide/simethicone Suspension 30 milliLiter(s) Oral every 4 hours PRN  amantadine Syrup 100 milliGRAM(s) Oral two times a day  aspirin  chewable 81 milliGRAM(s) Oral daily  enoxaparin Injectable 40 milliGRAM(s) SubCutaneous every 24 hours  finasteride 5 milliGRAM(s) Oral daily  insulin glargine Injectable (LANTUS) 12 Unit(s) SubCutaneous every morning  insulin glargine Injectable (LANTUS) 12 Unit(s) SubCutaneous at bedtime  insulin lispro (ADMELOG) corrective regimen sliding scale   SubCutaneous every 6 hours  insulin lispro Injectable (ADMELOG) 6 Unit(s) SubCutaneous every 6 hours  melatonin 3 milliGRAM(s) Oral at bedtime PRN  meropenem  IVPB      meropenem  IVPB 1000 milliGRAM(s) IV Intermittent every 8 hours  nicotine -   7 mG/24Hr(s) Patch 1 Patch Transdermal daily  ondansetron Injectable 4 milliGRAM(s) IV Push every 8 hours PRN  polyethylene glycol 3350 17 Gram(s) Oral daily  senna Syrup 10 milliLiter(s) Oral daily  silver sulfADIAZINE 1% Cream 1 Application(s) Topical two times a day  tamsulosin 0.4 milliGRAM(s) Oral at bedtime    Drug Dosing Weight  Height (cm): 177.8 (05 Mar 2023 03:36)  Weight (kg): 86.2 (05 Mar 2023 03:36)  BMI (kg/m2): 27.3 (05 Mar 2023 03:36)  BSA (m2): 2.04 (05 Mar 2023 03:36)    CENTRAL LINE: [ ] YES [x ] NO  LOCATION:   DATE INSERTED:  REMOVE: [ ] YES [ ] NO  EXPLAIN:    MELCHOR: [ ] YES [x ] NO    DATE INSERTED:  REMOVE:  [ ] YES [ ] NO  EXPLAIN:    PAST MEDICAL & SURGICAL HISTORY:  History of subdural hemorrhage      PEG (percutaneous endoscopic gastrostomy) status      DM (diabetes mellitus)      S/P craniotomy                   @ 07:01  -   @ 07:00  --------------------------------------------------------  IN: 0 mL / OUT: 600 mL / NET: -600 mL            PHYSICAL EXAM:    GENERAL: NAD, Tmax 100.4 overnight  HEAD:  Right craniofacial deformity  EYES: PERRLA, conjunctiva and sclera clear  ENMT: No tonsillar erythema, exudates  NECK: Supple, No JVD, tracheostomy intact  NERVOUS SYSTEM:  Awake and alert. Nonverbal at baseline. Intermittent tracking with eyes. Does not follow commands. No spontaneous movement of extremities. BL foot drop   CHEST/LUNG: Diminished breath sounds bilateral bases  HEART: Regular rate and rhythm; No murmurs, rubs, or gallops  ABDOMEN: +Peg intact, Soft, Nontender, Nondistended; Bowel sounds present  EXTREMITIES: No spontaneous movement  2+ Peripheral Pulses, No clubbing, cyanosis, or edema  LYMPH: No lymphadenopathy noted  SKIN: Stage 1 bilateral heels and coccyx      LABS:  CBC Full  -  ( 27 Mar 2023 06:48 )  WBC Count : 11.85 K/uL  RBC Count : 3.17 M/uL  Hemoglobin : 8.9 g/dL  Hematocrit : 29.4 %  Platelet Count - Automated : 263 K/uL  Mean Cell Volume : 92.7 fl  Mean Cell Hemoglobin : 28.1 pg  Mean Cell Hemoglobin Concentration : 30.3 gm/dL  Auto Neutrophil # : x  Auto Lymphocyte # : x  Auto Monocyte # : x  Auto Eosinophil # : x  Auto Basophil # : x  Auto Neutrophil % : x  Auto Lymphocyte % : x  Auto Monocyte % : x  Auto Eosinophil % : x  Auto Basophil % : x        149<H>  |  117<H>  |  38<H>  ----------------------------<  150<H>  3.7   |  30  |  0.80    Ca    9.1      27 Mar 2023 06:48  Phos  3.9       Mg     2.7         TPro  7.5  /  Alb  2.3<L>  /  TBili  0.5  /  DBili  x   /  AST  48<H>  /  ALT  74<H>  /  AlkPhos  235<H>        Urinalysis Basic - ( 26 Mar 2023 00:33 )    Color: Yellow / Appearance: Clear / S.020 / pH: x  Gluc: x / Ketone: Negative  / Bili: Negative / Urobili: 1 mg/dL   Blood: x / Protein: 100 mg/dL / Nitrite: Negative   Leuk Esterase: Trace / RBC: 2-5 /HPF / WBC 6-10 /HPF   Sq Epi: x / Non Sq Epi: Occasional /HPF / Bacteria: Moderate /HPF            [  ]  DVT Prophylaxis  [  ]  Nutrition, Brand, Rate         Goal Rate        Abnormal Nutritional Status -  Malnutrition   Cachexia          RADIOLOGY & ADDITIONAL STUDIES:  ***  < from: CT Chest w/ IV Cont (23 @ 23:13) >  CONTRAST/COMPLICATIONS:  IV Contrast: Omnipaque 350 (accession [id number]), IV contrast documented   in unlinked concurrent exam (accession [id number])  90 cc administered   10   cc discarded  Oral Contrast: NONE  Complications: None reported at time of study completion    PROCEDURE:  CT of the Chest, Abdomen and Pelvis was performed.  Sagittal and coronal reformats were performed.    FINDINGS:  CHEST:  LUNGS AND LARGE AIRWAYS:Patent central airways. Bibasilar consolidations   greater on the right. Bilateral lower lobe mucous plugging. Tracheostomy.  PLEURA: No pleural effusion.  VESSELS: Within normal limits.  HEART: Heart size is normal.  No pericardial effusion.  MEDIASTINUM AND GLORY: No lymphadenopathy.  CHEST WALL AND LOWER NECK: Within normal limits.    ABDOMEN AND PELVIS:  LIVER: Within normal limits.  BILE DUCTS: Normal caliber.  GALLBLADDER: Within normal limits.  SPLEEN: Within normal limits.  PANCREAS: Within normal limits.  ADRENALS: Within normal limits.  KIDNEYS/URETERS: Cysts and other lesions too small to characterize.    BLADDER: Small focus of air in the bladder.  REPRODUCTIVE ORGANS: Within normal limits.    BOWEL: No bowel obstruction. A small duodenal diverticulum. Normal   appendix. Gastrostomy tube is in place.  PERITONEUM: No ascites.  VESSELS:  Within normal limits.  RETROPERITONEUM/LYMPH NODES: No lymphadenopathy.  ABDOMINAL WALL: There is enlargement of the right piriformis with   ossification.  BONES: Within normal limits.    IMPRESSION: Bibasilar consolidations and mucous plugging possibly   infectious.    Enlargement and ossification of the right piriformis which could be   traumatic inflammatory or postinfectious in etiology.      < end of copied text >  < from: US Abdomen Upper Quadrant Right (23 @ 11:18) >    FINDINGS:  Liver: Within normal limits.  Bile ducts: Normal caliber. Common bile duct measures 5 mm.  Gallbladder: Within normal limits.  Pancreas: Visualized portions are within normal limits.  Right kidney: 11.8 cm. No hydronephrosis.  Ascites: None.  IVC and aorta: Visualized portions normal in caliber.    IMPRESSION:  Technically limited study, otherwise unremarkable right upper quadrant   ultrasound.    --- End of Report ---    < end of copied text >    Goals of Care Discussion with Family/Proxy/Other   - see note from 3/05/23

## 2023-03-27 NOTE — PROGRESS NOTE ADULT - ASSESSMENT
Fevers - recurrent  no obvious source except for possible Pneumonia.        Plan - Cont Meropenem 1gm iv q8hrs  get whole body Indium scan

## 2023-03-28 LAB
ALBUMIN SERPL ELPH-MCNC: 2.3 G/DL — LOW (ref 3.5–5)
ALP SERPL-CCNC: 239 U/L — HIGH (ref 40–120)
ALT FLD-CCNC: 63 U/L DA — HIGH (ref 10–60)
ANION GAP SERPL CALC-SCNC: 0 MMOL/L — LOW (ref 5–17)
AST SERPL-CCNC: 40 U/L — SIGNIFICANT CHANGE UP (ref 10–40)
BILIRUB SERPL-MCNC: 0.6 MG/DL — SIGNIFICANT CHANGE UP (ref 0.2–1.2)
BUN SERPL-MCNC: 29 MG/DL — HIGH (ref 7–18)
CALCIUM SERPL-MCNC: 9 MG/DL — SIGNIFICANT CHANGE UP (ref 8.4–10.5)
CHLORIDE SERPL-SCNC: 115 MMOL/L — HIGH (ref 96–108)
CO2 SERPL-SCNC: 28 MMOL/L — SIGNIFICANT CHANGE UP (ref 22–31)
CREAT SERPL-MCNC: 0.72 MG/DL — SIGNIFICANT CHANGE UP (ref 0.5–1.3)
EGFR: 105 ML/MIN/1.73M2 — SIGNIFICANT CHANGE UP
GLUCOSE SERPL-MCNC: 127 MG/DL — HIGH (ref 70–99)
HCT VFR BLD CALC: 28.9 % — LOW (ref 39–50)
HGB BLD-MCNC: 8.7 G/DL — LOW (ref 13–17)
MAGNESIUM SERPL-MCNC: 2.5 MG/DL — SIGNIFICANT CHANGE UP (ref 1.6–2.6)
MCHC RBC-ENTMCNC: 27.8 PG — SIGNIFICANT CHANGE UP (ref 27–34)
MCHC RBC-ENTMCNC: 30.1 GM/DL — LOW (ref 32–36)
MCV RBC AUTO: 92.3 FL — SIGNIFICANT CHANGE UP (ref 80–100)
NRBC # BLD: 0 /100 WBCS — SIGNIFICANT CHANGE UP (ref 0–0)
PHOSPHATE SERPL-MCNC: 2.7 MG/DL — SIGNIFICANT CHANGE UP (ref 2.5–4.5)
PLATELET # BLD AUTO: 260 K/UL — SIGNIFICANT CHANGE UP (ref 150–400)
POTASSIUM SERPL-MCNC: 3.5 MMOL/L — SIGNIFICANT CHANGE UP (ref 3.5–5.3)
POTASSIUM SERPL-SCNC: 3.5 MMOL/L — SIGNIFICANT CHANGE UP (ref 3.5–5.3)
PROT SERPL-MCNC: 7.7 G/DL — SIGNIFICANT CHANGE UP (ref 6–8.3)
RBC # BLD: 3.13 M/UL — LOW (ref 4.2–5.8)
RBC # FLD: 15.9 % — HIGH (ref 10.3–14.5)
SODIUM SERPL-SCNC: 143 MMOL/L — SIGNIFICANT CHANGE UP (ref 135–145)
WBC # BLD: 9.53 K/UL — SIGNIFICANT CHANGE UP (ref 3.8–10.5)
WBC # FLD AUTO: 9.53 K/UL — SIGNIFICANT CHANGE UP (ref 3.8–10.5)

## 2023-03-28 PROCEDURE — 99233 SBSQ HOSP IP/OBS HIGH 50: CPT

## 2023-03-28 RX ADMIN — Medication 100 MILLIGRAM(S): at 17:52

## 2023-03-28 RX ADMIN — Medication 6 UNIT(S): at 05:49

## 2023-03-28 RX ADMIN — Medication 6 UNIT(S): at 12:04

## 2023-03-28 RX ADMIN — Medication 1 PATCH: at 11:26

## 2023-03-28 RX ADMIN — INSULIN GLARGINE 12 UNIT(S): 100 INJECTION, SOLUTION SUBCUTANEOUS at 08:31

## 2023-03-28 RX ADMIN — Medication 6 UNIT(S): at 00:11

## 2023-03-28 RX ADMIN — Medication 1 APPLICATION(S): at 05:40

## 2023-03-28 RX ADMIN — INSULIN GLARGINE 12 UNIT(S): 100 INJECTION, SOLUTION SUBCUTANEOUS at 22:24

## 2023-03-28 RX ADMIN — Medication 1: at 12:03

## 2023-03-28 RX ADMIN — Medication 3 MILLILITER(S): at 02:32

## 2023-03-28 RX ADMIN — Medication 3 MILLILITER(S): at 08:35

## 2023-03-28 RX ADMIN — FINASTERIDE 5 MILLIGRAM(S): 5 TABLET, FILM COATED ORAL at 11:24

## 2023-03-28 RX ADMIN — Medication 3 MILLILITER(S): at 20:32

## 2023-03-28 RX ADMIN — Medication 1 PATCH: at 11:25

## 2023-03-28 RX ADMIN — POLYETHYLENE GLYCOL 3350 17 GRAM(S): 17 POWDER, FOR SOLUTION ORAL at 11:25

## 2023-03-28 RX ADMIN — SENNA PLUS 10 MILLILITER(S): 8.6 TABLET ORAL at 11:24

## 2023-03-28 RX ADMIN — MEROPENEM 100 MILLIGRAM(S): 1 INJECTION INTRAVENOUS at 21:47

## 2023-03-28 RX ADMIN — Medication 1 PATCH: at 07:58

## 2023-03-28 RX ADMIN — MEROPENEM 100 MILLIGRAM(S): 1 INJECTION INTRAVENOUS at 14:03

## 2023-03-28 RX ADMIN — Medication 1: at 17:04

## 2023-03-28 RX ADMIN — Medication 1 APPLICATION(S): at 17:05

## 2023-03-28 RX ADMIN — Medication 1: at 23:42

## 2023-03-28 RX ADMIN — ENOXAPARIN SODIUM 40 MILLIGRAM(S): 100 INJECTION SUBCUTANEOUS at 12:04

## 2023-03-28 RX ADMIN — Medication 100 MILLIGRAM(S): at 05:40

## 2023-03-28 RX ADMIN — MEROPENEM 100 MILLIGRAM(S): 1 INJECTION INTRAVENOUS at 05:40

## 2023-03-28 RX ADMIN — TAMSULOSIN HYDROCHLORIDE 0.4 MILLIGRAM(S): 0.4 CAPSULE ORAL at 21:48

## 2023-03-28 RX ADMIN — Medication 81 MILLIGRAM(S): at 11:24

## 2023-03-28 RX ADMIN — Medication 3 MILLILITER(S): at 14:29

## 2023-03-28 RX ADMIN — Medication 6 UNIT(S): at 17:05

## 2023-03-28 RX ADMIN — Medication 6 UNIT(S): at 23:42

## 2023-03-28 RX ADMIN — Medication 1 PATCH: at 19:09

## 2023-03-28 NOTE — PROGRESS NOTE ADULT - ASSESSMENT
62M from [facility], h/o traumatic subdural hemorrhage following a fall down the stairs while drunk, s/p r craniotomy at [facility] in 01/2023, s/p trach/PEG BIBEMS for tachycardia 170s, RR 27, increased secretions admitted to  for sepsis secondary to RUL pneumonia with tracheostomy to 3L NC.  3/6 Febrile 101.7  03/07: No events overnight. On [place]-trach at 3 L and tolerating well. Hemodynamically stable. Cultures in progress. C/w Vanco and Cefepime. F/u Vanco trough. Hypokalemic this AM and replacement ordered.   03/08: Afebrile. Vanco discontinued since MRSA negative. + yeast in sputum; + Candida Galbrata in urine (Bran in place with clear urine and afebrile).   Dr. Peres following. Blood cx NGTD. C/w Cefepime.   03/09: Febrile, tachycardic and tachypneic this AM. Code sepsis called. Sepsis w/u in progress.  Started on Vanco. ID following, . Discussed with Dr. Peres. F/u CXR. Worsening Leukocytosis. IVF bolus with LR (2,300 ml)  per protocol. Lactate 1.9.   3/11: afebrile over past 24hrs, hemodynamically stable. Vanco trough 11.7  03/12: Vanco trough this PM. Blood cx remains NGTD. C/w Vanco and Cefepime. ID following. Persistent Transaminitis.  Hep C non-reactive. F/u remaining Hepatitis panel. Patient on high dose statin which could be contributing to transaminitis. Hold for now and re-eval. Given that patient is unable to verbalize/demonstrate if symptomatic will order Liver US.   03/13: Spiked fevers overnight. Tachycardic and hypotensive. IVF bolus given. Blood cx in progress.  F/u Procal and Lactate. Vanco increased to 1500 mg BID and Meropenem added and Cefepime discontinued Discussed wih Dr. Peres and reccs appreciated  Worsening leukocytosis Pending Abdominal US 2/2 transaminitis.   03/14: Febrile. Urinary retention. On Flomax. Bladder scan and Straight cath Q 4-6 hours. On Vanco and Jose Elias. F/u cx. Abd US unremarkable. CXR 3/13 negative for acute findings.   3/15 Bladder scan 550. Bran cath reinserted.  3/23/23 Patient febrile overnight 100.5 likely central fever, no leukocytosis  3/24/23 Patient febrile 102.3 this AM. Patient blood ans sputum culture and UA sent, chest x-ray, RVP ordered. Will start Meropenem, ID aware.  3/25/23 TOV attempted yesterday, bladder scan 600ml, straight cath, today 550ml, straight cath, continue bladder scan q6h.   3/27  Continues to retain large amount of urine. Last bladder scan 600ml. Bran cath reinserted.

## 2023-03-28 NOTE — PROGRESS NOTE ADULT - PROBLEM SELECTOR PLAN 11
DVT and GI prophylaxis.  HydroTrach and PEG  F/U final cultures.  First part of Indium scan today.  Afebrile overnight  Will continue meropenem until results of scan are back.

## 2023-03-28 NOTE — PROGRESS NOTE ADULT - PROBLEM SELECTOR PLAN 1
Acute resolved.  Maintaining saturation on RA  Continue to monitor oxygen saturation.  Continue bronchodilators  Continue meropenem until results of Indium scan available. Procalcitonin elevated 0.23  Keep head of bed elevated at all times.

## 2023-03-28 NOTE — PROGRESS NOTE ADULT - PROBLEM SELECTOR PLAN 2
Secondary to aspiration pneumonia.  Will continue meropenem until results of Indium scan are available.  All cultures negative. Procalcitonin slightly elevated.  CXR and CT chest no pna visualized.  First part of Indium scan today..

## 2023-03-28 NOTE — PROGRESS NOTE ADULT - SUBJECTIVE AND OBJECTIVE BOX
WILMA NICOLE    SCU progress note    INTERVAL HPI/OVERNIGHT EVENTS: ***Melchor reinserted last evening. Retaining 600ml    DNR [ ]   DNI  [  ]  Full Code    Covid - 19 PCR: Negative 3/25  The 4Ms    What Matters Most: see GO  Age appropriate Medications/Screen for High Risk Medication: Yes  Mentation: see CAM below  Mobility: defer to physical exam    The Confusion Assessment Method (CAM) Diagnostic Algorithm     1: Acute Onset or Fluctuating Course  - Is there evidence of an acute change in mental status from the patient’s baseline? Did the (abnormal) behavior  fluctuate during the day, that is, tend to come and go, or increase and decrease in severity?  [ ] YES [x ] NO     2: Inattention  - Did the patient have difficulty focusing attention, being easily distractible, or having difficulty keeping track of what was being said?  [ ] YES [ ] NO  unable to access     3: Disorganized thinking  -Was the patient’s thinking disorganized or incoherent, such as rambling or irrelevant conversation, unclear or illogical flow of ideas, or unpredictable switching from subject to subject?  [ ] YES [ ] NO  Unable to access    4: Altered Level of consciousness?  [x ] YES [ ] NO  Patient at baseline    The diagnosis of delirium by CAM requires the presence of features 1 and 2 and either 3 or 4.    PRESSORS: [ ] YES [x ] NO  meropenem  IVPB      meropenem  IVPB 1000 milliGRAM(s) IV Intermittent every 8 hours    Cardiovascular:  Heart Failure  Acute   Acute on Chronic  Chronic         Pulmonary:  albuterol/ipratropium for Nebulization 3 milliLiter(s) Nebulizer every 6 hours    Hematalogic:  aspirin  chewable 81 milliGRAM(s) Oral daily  enoxaparin Injectable 40 milliGRAM(s) SubCutaneous every 24 hours    Other:  acetaminophen    Suspension .. 650 milliGRAM(s) Oral every 6 hours PRN  aluminum hydroxide/magnesium hydroxide/simethicone Suspension 30 milliLiter(s) Oral every 4 hours PRN  amantadine Syrup 100 milliGRAM(s) Oral two times a day  finasteride 5 milliGRAM(s) Oral daily  insulin glargine Injectable (LANTUS) 12 Unit(s) SubCutaneous every morning  insulin glargine Injectable (LANTUS) 12 Unit(s) SubCutaneous at bedtime  insulin lispro (ADMELOG) corrective regimen sliding scale   SubCutaneous every 6 hours  insulin lispro Injectable (ADMELOG) 6 Unit(s) SubCutaneous every 6 hours  melatonin 3 milliGRAM(s) Oral at bedtime PRN  nicotine -   7 mG/24Hr(s) Patch 1 Patch Transdermal daily  ondansetron Injectable 4 milliGRAM(s) IV Push every 8 hours PRN  polyethylene glycol 3350 17 Gram(s) Oral daily  senna Syrup 10 milliLiter(s) Oral daily  silver sulfADIAZINE 1% Cream 1 Application(s) Topical two times a day  tamsulosin 0.4 milliGRAM(s) Oral at bedtime    acetaminophen    Suspension .. 650 milliGRAM(s) Oral every 6 hours PRN  albuterol/ipratropium for Nebulization 3 milliLiter(s) Nebulizer every 6 hours  aluminum hydroxide/magnesium hydroxide/simethicone Suspension 30 milliLiter(s) Oral every 4 hours PRN  amantadine Syrup 100 milliGRAM(s) Oral two times a day  aspirin  chewable 81 milliGRAM(s) Oral daily  enoxaparin Injectable 40 milliGRAM(s) SubCutaneous every 24 hours  finasteride 5 milliGRAM(s) Oral daily  insulin glargine Injectable (LANTUS) 12 Unit(s) SubCutaneous every morning  insulin glargine Injectable (LANTUS) 12 Unit(s) SubCutaneous at bedtime  insulin lispro (ADMELOG) corrective regimen sliding scale   SubCutaneous every 6 hours  insulin lispro Injectable (ADMELOG) 6 Unit(s) SubCutaneous every 6 hours  melatonin 3 milliGRAM(s) Oral at bedtime PRN  meropenem  IVPB      meropenem  IVPB 1000 milliGRAM(s) IV Intermittent every 8 hours  nicotine -   7 mG/24Hr(s) Patch 1 Patch Transdermal daily  ondansetron Injectable 4 milliGRAM(s) IV Push every 8 hours PRN  polyethylene glycol 3350 17 Gram(s) Oral daily  senna Syrup 10 milliLiter(s) Oral daily  silver sulfADIAZINE 1% Cream 1 Application(s) Topical two times a day  tamsulosin 0.4 milliGRAM(s) Oral at bedtime    Drug Dosing Weight  Height (cm): 177.8 (05 Mar 2023 03:36)  Weight (kg): 86.2 (05 Mar 2023 03:36)  BMI (kg/m2): 27.3 (05 Mar 2023 03:36)  BSA (m2): 2.04 (05 Mar 2023 03:36)    CENTRAL LINE: [ ] YES [x ] NO  LOCATION:   DATE INSERTED:  REMOVE: [ ] YES [ ] NO  EXPLAIN:    MELCHOR: [x ] YES [ ] NO    DATE INSERTED:  REMOVE:  [ ] YES [x ] NO  EXPLAIN:  Chronic. Failed multiple TOV    PAST MEDICAL & SURGICAL HISTORY:  History of subdural hemorrhage      PEG (percutaneous endoscopic gastrostomy) status      DM (diabetes mellitus)      S/P craniotomy                  03-27 @ 07:01  -  03-28 @ 07:00  --------------------------------------------------------  IN: 0 mL / OUT: 600 mL / NET: -600 mL            PHYSICAL EXAM:    GENERAL: NAD, Afebrile  HEAD:  Right craniofacial deformity  EYES: PERRLA, conjunctiva and sclera clear  ENMT: No tonsillar erythema, exudates  NECK: Supple, No JVD, tracheostomy intact  NERVOUS SYSTEM: Awake and alert. Nonverbal at baseline. Intermittent tracking with eyes.  CHEST/LUNG: Clear to percussion bilaterally; No rales, rhonchi, wheezing, or rubs  HEART: Regular rate and rhythm; No murmurs, rubs, or gallops  ABDOMEN: Soft, Nontender, Nondistended; Bowel sounds present  EXTREMITIES:  2+ Peripheral Pulses, No clubbing, cyanosis, or edema  LYMPH: No lymphadenopathy noted  SKIN: No rashes or lesions      LABS:  CBC Full  -  ( 28 Mar 2023 06:26 )  WBC Count : 9.53 K/uL  RBC Count : 3.13 M/uL  Hemoglobin : 8.7 g/dL  Hematocrit : 28.9 %  Platelet Count - Automated : 260 K/uL  Mean Cell Volume : 92.3 fl  Mean Cell Hemoglobin : 27.8 pg  Mean Cell Hemoglobin Concentration : 30.1 gm/dL  Auto Neutrophil # : x  Auto Lymphocyte # : x  Auto Monocyte # : x  Auto Eosinophil # : x  Auto Basophil # : x  Auto Neutrophil % : x  Auto Lymphocyte % : x  Auto Monocyte % : x  Auto Eosinophil % : x  Auto Basophil % : x    03-28    143  |  115<H>  |  29<H>  ----------------------------<  127<H>  3.5   |  28  |  0.72    Ca    9.0      28 Mar 2023 06:26  Phos  2.7     03-28  Mg     2.5     03-28    TPro  7.7  /  Alb  2.3<L>  /  TBili  0.6  /  DBili  x   /  AST  40  /  ALT  63<H>  /  AlkPhos  239<H>  03-28              [  ]  DVT Prophylaxis  [  ]  Nutrition, Brand, Rate         Goal Rate        Abnormal Nutritional Status -  Malnutrition   Cachexia      Morbid Obesity BMI >/=40    RADIOLOGY & ADDITIONAL STUDIES:  ***    Goals of Care Discussion with Family/Proxy/Other   - see note from/family meeting set up for...     WILMA NICOLE    SCU progress note    INTERVAL HPI/OVERNIGHT EVENTS: ***Melchor reinserted last evening. Retaining 600ml    DNR [ ]   DNI  [  ]  Full Code    Covid - 19 PCR: Negative 3/25  The 4Ms    What Matters Most: see GO  Age appropriate Medications/Screen for High Risk Medication: Yes  Mentation: see CAM below  Mobility: defer to physical exam    The Confusion Assessment Method (CAM) Diagnostic Algorithm     1: Acute Onset or Fluctuating Course  - Is there evidence of an acute change in mental status from the patient’s baseline? Did the (abnormal) behavior  fluctuate during the day, that is, tend to come and go, or increase and decrease in severity?  [ ] YES [x ] NO     2: Inattention  - Did the patient have difficulty focusing attention, being easily distractible, or having difficulty keeping track of what was being said?  [ ] YES [ ] NO  unable to access     3: Disorganized thinking  -Was the patient’s thinking disorganized or incoherent, such as rambling or irrelevant conversation, unclear or illogical flow of ideas, or unpredictable switching from subject to subject?  [ ] YES [ ] NO  Unable to access    4: Altered Level of consciousness?  [x ] YES [ ] NO  Patient at baseline    The diagnosis of delirium by CAM requires the presence of features 1 and 2 and either 3 or 4.    PRESSORS: [ ] YES [x ] NO  meropenem  IVPB      meropenem  IVPB 1000 milliGRAM(s) IV Intermittent every 8 hours    Cardiovascular:  Heart Failure  Acute   Acute on Chronic  Chronic         Pulmonary:  albuterol/ipratropium for Nebulization 3 milliLiter(s) Nebulizer every 6 hours    Hematalogic:  aspirin  chewable 81 milliGRAM(s) Oral daily  enoxaparin Injectable 40 milliGRAM(s) SubCutaneous every 24 hours    Other:  acetaminophen    Suspension .. 650 milliGRAM(s) Oral every 6 hours PRN  aluminum hydroxide/magnesium hydroxide/simethicone Suspension 30 milliLiter(s) Oral every 4 hours PRN  amantadine Syrup 100 milliGRAM(s) Oral two times a day  finasteride 5 milliGRAM(s) Oral daily  insulin glargine Injectable (LANTUS) 12 Unit(s) SubCutaneous every morning  insulin glargine Injectable (LANTUS) 12 Unit(s) SubCutaneous at bedtime  insulin lispro (ADMELOG) corrective regimen sliding scale   SubCutaneous every 6 hours  insulin lispro Injectable (ADMELOG) 6 Unit(s) SubCutaneous every 6 hours  melatonin 3 milliGRAM(s) Oral at bedtime PRN  nicotine -   7 mG/24Hr(s) Patch 1 Patch Transdermal daily  ondansetron Injectable 4 milliGRAM(s) IV Push every 8 hours PRN  polyethylene glycol 3350 17 Gram(s) Oral daily  senna Syrup 10 milliLiter(s) Oral daily  silver sulfADIAZINE 1% Cream 1 Application(s) Topical two times a day  tamsulosin 0.4 milliGRAM(s) Oral at bedtime    acetaminophen    Suspension .. 650 milliGRAM(s) Oral every 6 hours PRN  albuterol/ipratropium for Nebulization 3 milliLiter(s) Nebulizer every 6 hours  aluminum hydroxide/magnesium hydroxide/simethicone Suspension 30 milliLiter(s) Oral every 4 hours PRN  amantadine Syrup 100 milliGRAM(s) Oral two times a day  aspirin  chewable 81 milliGRAM(s) Oral daily  enoxaparin Injectable 40 milliGRAM(s) SubCutaneous every 24 hours  finasteride 5 milliGRAM(s) Oral daily  insulin glargine Injectable (LANTUS) 12 Unit(s) SubCutaneous every morning  insulin glargine Injectable (LANTUS) 12 Unit(s) SubCutaneous at bedtime  insulin lispro (ADMELOG) corrective regimen sliding scale   SubCutaneous every 6 hours  insulin lispro Injectable (ADMELOG) 6 Unit(s) SubCutaneous every 6 hours  melatonin 3 milliGRAM(s) Oral at bedtime PRN  meropenem  IVPB      meropenem  IVPB 1000 milliGRAM(s) IV Intermittent every 8 hours  nicotine -   7 mG/24Hr(s) Patch 1 Patch Transdermal daily  ondansetron Injectable 4 milliGRAM(s) IV Push every 8 hours PRN  polyethylene glycol 3350 17 Gram(s) Oral daily  senna Syrup 10 milliLiter(s) Oral daily  silver sulfADIAZINE 1% Cream 1 Application(s) Topical two times a day  tamsulosin 0.4 milliGRAM(s) Oral at bedtime    Drug Dosing Weight  Height (cm): 177.8 (05 Mar 2023 03:36)  Weight (kg): 86.2 (05 Mar 2023 03:36)  BMI (kg/m2): 27.3 (05 Mar 2023 03:36)  BSA (m2): 2.04 (05 Mar 2023 03:36)    CENTRAL LINE: [ ] YES [x ] NO  LOCATION:   DATE INSERTED:  REMOVE: [ ] YES [ ] NO  EXPLAIN:    MELCHOR: [x ] YES [ ] NO    DATE INSERTED:  REMOVE:  [ ] YES [x ] NO  EXPLAIN:  Chronic. Failed multiple TOV    PAST MEDICAL & SURGICAL HISTORY:  History of subdural hemorrhage      PEG (percutaneous endoscopic gastrostomy) status      DM (diabetes mellitus)      S/P craniotomy                  03-27 @ 07:01  -  03-28 @ 07:00  --------------------------------------------------------  IN: 0 mL / OUT: 600 mL / NET: -600 mL            PHYSICAL EXAM:    GENERAL: NAD, Afebrile  HEAD:  Right craniofacial deformity  EYES: PERRLA, conjunctiva and sclera clear  ENMT: No tonsillar erythema, exudates  NECK: Supple, No JVD, tracheostomy intact  NERVOUS SYSTEM: Awake and alert. Nonverbal at baseline. Intermittent tracking with eyes. Does not follow commands. No spontaneous movement of extremities.  BL foot drop  CHEST/LUNG: Diminished breath sounds bilateral bases  HEART: Regular rate and rhythm; No murmurs, rubs, or gallops  ABDOMEN: +Peg  Soft, Nontender, Nondistended; Bowel sounds present  EXTREMITIES: No spontaneous movement of extremities.  2+ Peripheral Pulses, No clubbing, cyanosis, or edema  LYMPH: No lymphadenopathy noted  SKIN: Stage 1 bilateral heels and coccyx.               LABS:  CBC Full  -  ( 28 Mar 2023 06:26 )  WBC Count : 9.53 K/uL  RBC Count : 3.13 M/uL  Hemoglobin : 8.7 g/dL  Hematocrit : 28.9 %  Platelet Count - Automated : 260 K/uL  Mean Cell Volume : 92.3 fl  Mean Cell Hemoglobin : 27.8 pg  Mean Cell Hemoglobin Concentration : 30.1 gm/dL  Auto Neutrophil # : x  Auto Lymphocyte # : x  Auto Monocyte # : x  Auto Eosinophil # : x  Auto Basophil # : x  Auto Neutrophil % : x  Auto Lymphocyte % : x  Auto Monocyte % : x  Auto Eosinophil % : x  Auto Basophil % : x    03-28    143  |  115<H>  |  29<H>  ----------------------------<  127<H>  3.5   |  28  |  0.72    Ca    9.0      28 Mar 2023 06:26  Phos  2.7     03-28  Mg     2.5     03-28    TPro  7.7  /  Alb  2.3<L>  /  TBili  0.6  /  DBili  x   /  AST  40  /  ALT  63<H>  /  AlkPhos  239<H>  03-28              [  ]  DVT Prophylaxis  [  ]  Nutrition, Brand, Rate         Goal Rate        Abnormal Nutritional Status -  Malnutrition   Cachexia         RADIOLOGY & ADDITIONAL STUDIES:  ***  < from: CT Chest w/ IV Cont (03.24.23 @ 23:13) >  CONTRAST/COMPLICATIONS:  IV Contrast: Omnipaque 350 (accession [id number]), IV contrast documented   in unlinked concurrent exam (accession [id number])  90 cc administered   10   cc discarded  Oral Contrast: NONE  Complications: None reported at time of study completion    PROCEDURE:  CT of the Chest, Abdomen and Pelvis was performed.  Sagittal and coronal reformats were performed.    FINDINGS:  CHEST:  LUNGS AND LARGE AIRWAYS:Patent central airways. Bibasilar consolidations   greater on the right. Bilateral lower lobe mucous plugging. Tracheostomy.  PLEURA: No pleural effusion.  VESSELS: Within normal limits.  HEART: Heart size is normal.  No pericardial effusion.  MEDIASTINUM AND GLORY: No lymphadenopathy.  CHEST WALL AND LOWER NECK: Within normal limits.    ABDOMEN AND PELVIS:  LIVER: Within normal limits.  BILE DUCTS: Normal caliber.  GALLBLADDER: Within normal limits.  SPLEEN: Within normal limits.  PANCREAS: Within normal limits.  ADRENALS: Within normal limits.  KIDNEYS/URETERS: Cysts and other lesions too small to characterize.    BLADDER: Small focus of air in the bladder.  REPRODUCTIVE ORGANS: Within normal limits.    BOWEL: No bowel obstruction. A small duodenal diverticulum. Normal   appendix. Gastrostomy tube is in place.  PERITONEUM: No ascites.  VESSELS:  Within normal limits.  RETROPERITONEUM/LYMPH NODES: No lymphadenopathy.  ABDOMINAL WALL: There is enlargement of the right piriformis with   ossification.  BONES: Within normal limits.    IMPRESSION: Bibasilar consolidations and mucous plugging possibly   infectious.    Enlargement and ossification of the right piriformis which could be   traumatic inflammatory or postinfectious in etiology.        --- End of Report ---    < end of copied text >  < from: Xray Chest 1 View- PORTABLE-Urgent (Xray Chest 1 View- PORTABLE-Urgent .) (03.24.23 @ 13:17) >  FINDINGS:  Heart/Vascular: The heart size, mediastinum, hilum and aorta are within   normal limits for projection.  Pulmonary: Midline trachea. There is no focal infiltrate, congestion or   effusion.    Bones: There is no fracture.  Lines and catheter: Tracheostomy in place.    Impression:    No acute pulmonary disease.    --- End of Report ---    < end of copied text >    Goals of Care Discussion with Family/Proxy/Other   - see note from 3/05/23

## 2023-03-28 NOTE — PROGRESS NOTE ADULT - PROBLEM SELECTOR PLAN 8
Started on Tamsulosin since 3/14/23  Bran catheter removed 3/24/23  Multiple straight caths since then  Bran cath reinserted late yesterday. 600ml on bladder scan.  Unable to void on his own  Continue Flomax and Proscar.

## 2023-03-28 NOTE — PROGRESS NOTE ADULT - PROBLEM SELECTOR PLAN 3
Secondary to SDH and craniotomy  Craniotomy at [facility] 1/23  Maintain safety precautions  Strict aspiration precautions.  Seizure precautions. lip

## 2023-03-29 LAB
ALBUMIN SERPL ELPH-MCNC: 2.4 G/DL — LOW (ref 3.5–5)
ALP SERPL-CCNC: 229 U/L — HIGH (ref 40–120)
ALT FLD-CCNC: 58 U/L DA — SIGNIFICANT CHANGE UP (ref 10–60)
ANION GAP SERPL CALC-SCNC: 3 MMOL/L — LOW (ref 5–17)
AST SERPL-CCNC: 38 U/L — SIGNIFICANT CHANGE UP (ref 10–40)
BILIRUB SERPL-MCNC: 0.5 MG/DL — SIGNIFICANT CHANGE UP (ref 0.2–1.2)
BUN SERPL-MCNC: 28 MG/DL — HIGH (ref 7–18)
CALCIUM SERPL-MCNC: 9 MG/DL — SIGNIFICANT CHANGE UP (ref 8.4–10.5)
CHLORIDE SERPL-SCNC: 119 MMOL/L — HIGH (ref 96–108)
CO2 SERPL-SCNC: 27 MMOL/L — SIGNIFICANT CHANGE UP (ref 22–31)
CREAT SERPL-MCNC: 0.72 MG/DL — SIGNIFICANT CHANGE UP (ref 0.5–1.3)
CULTURE RESULTS: SIGNIFICANT CHANGE UP
CULTURE RESULTS: SIGNIFICANT CHANGE UP
EGFR: 105 ML/MIN/1.73M2 — SIGNIFICANT CHANGE UP
GLUCOSE SERPL-MCNC: 139 MG/DL — HIGH (ref 70–99)
HCT VFR BLD CALC: 28.1 % — LOW (ref 39–50)
HGB BLD-MCNC: 8.4 G/DL — LOW (ref 13–17)
MAGNESIUM SERPL-MCNC: 2.6 MG/DL — SIGNIFICANT CHANGE UP (ref 1.6–2.6)
MCHC RBC-ENTMCNC: 27.5 PG — SIGNIFICANT CHANGE UP (ref 27–34)
MCHC RBC-ENTMCNC: 29.9 GM/DL — LOW (ref 32–36)
MCV RBC AUTO: 91.8 FL — SIGNIFICANT CHANGE UP (ref 80–100)
NRBC # BLD: 0 /100 WBCS — SIGNIFICANT CHANGE UP (ref 0–0)
PHOSPHATE SERPL-MCNC: 2.6 MG/DL — SIGNIFICANT CHANGE UP (ref 2.5–4.5)
PLATELET # BLD AUTO: 252 K/UL — SIGNIFICANT CHANGE UP (ref 150–400)
POTASSIUM SERPL-MCNC: 3.8 MMOL/L — SIGNIFICANT CHANGE UP (ref 3.5–5.3)
POTASSIUM SERPL-SCNC: 3.8 MMOL/L — SIGNIFICANT CHANGE UP (ref 3.5–5.3)
PROT SERPL-MCNC: 7.3 G/DL — SIGNIFICANT CHANGE UP (ref 6–8.3)
RBC # BLD: 3.06 M/UL — LOW (ref 4.2–5.8)
RBC # FLD: 16 % — HIGH (ref 10.3–14.5)
SODIUM SERPL-SCNC: 149 MMOL/L — HIGH (ref 135–145)
SPECIMEN SOURCE: SIGNIFICANT CHANGE UP
SPECIMEN SOURCE: SIGNIFICANT CHANGE UP
WBC # BLD: 8.5 K/UL — SIGNIFICANT CHANGE UP (ref 3.8–10.5)
WBC # FLD AUTO: 8.5 K/UL — SIGNIFICANT CHANGE UP (ref 3.8–10.5)

## 2023-03-29 PROCEDURE — 99233 SBSQ HOSP IP/OBS HIGH 50: CPT

## 2023-03-29 PROCEDURE — 78802 RP LOCLZJ TUM WHBDY 1 D IMG: CPT | Mod: 26

## 2023-03-29 RX ADMIN — POLYETHYLENE GLYCOL 3350 17 GRAM(S): 17 POWDER, FOR SOLUTION ORAL at 11:18

## 2023-03-29 RX ADMIN — SENNA PLUS 10 MILLILITER(S): 8.6 TABLET ORAL at 11:18

## 2023-03-29 RX ADMIN — INSULIN GLARGINE 12 UNIT(S): 100 INJECTION, SOLUTION SUBCUTANEOUS at 22:38

## 2023-03-29 RX ADMIN — Medication 3 MILLILITER(S): at 02:58

## 2023-03-29 RX ADMIN — FINASTERIDE 5 MILLIGRAM(S): 5 TABLET, FILM COATED ORAL at 11:17

## 2023-03-29 RX ADMIN — Medication 6 UNIT(S): at 23:49

## 2023-03-29 RX ADMIN — Medication 3 MILLILITER(S): at 20:07

## 2023-03-29 RX ADMIN — Medication 1: at 17:07

## 2023-03-29 RX ADMIN — MEROPENEM 100 MILLIGRAM(S): 1 INJECTION INTRAVENOUS at 13:14

## 2023-03-29 RX ADMIN — Medication 1 APPLICATION(S): at 17:28

## 2023-03-29 RX ADMIN — TAMSULOSIN HYDROCHLORIDE 0.4 MILLIGRAM(S): 0.4 CAPSULE ORAL at 21:22

## 2023-03-29 RX ADMIN — ENOXAPARIN SODIUM 40 MILLIGRAM(S): 100 INJECTION SUBCUTANEOUS at 11:17

## 2023-03-29 RX ADMIN — Medication 6 UNIT(S): at 06:05

## 2023-03-29 RX ADMIN — Medication 1 APPLICATION(S): at 06:03

## 2023-03-29 RX ADMIN — Medication 1 PATCH: at 07:54

## 2023-03-29 RX ADMIN — Medication 100 MILLIGRAM(S): at 17:28

## 2023-03-29 RX ADMIN — MEROPENEM 100 MILLIGRAM(S): 1 INJECTION INTRAVENOUS at 21:22

## 2023-03-29 RX ADMIN — Medication 6 UNIT(S): at 17:07

## 2023-03-29 RX ADMIN — Medication 81 MILLIGRAM(S): at 11:17

## 2023-03-29 RX ADMIN — Medication 100 MILLIGRAM(S): at 06:03

## 2023-03-29 RX ADMIN — Medication 1 PATCH: at 11:18

## 2023-03-29 RX ADMIN — MEROPENEM 100 MILLIGRAM(S): 1 INJECTION INTRAVENOUS at 06:03

## 2023-03-29 RX ADMIN — INSULIN GLARGINE 12 UNIT(S): 100 INJECTION, SOLUTION SUBCUTANEOUS at 10:15

## 2023-03-29 RX ADMIN — Medication 3 MILLILITER(S): at 15:47

## 2023-03-29 NOTE — PROGRESS NOTE ADULT - ASSESSMENT
62M from [facility], h/o traumatic subdural hemorrhage following a fall down the stairs while drunk, s/p r craniotomy at [facility] in 01/2023, s/p trach/PEG BIBEMS for tachycardia 170s, RR 27, increased secretions admitted to  for sepsis secondary to RUL pneumonia with tracheostomy to 3L NC.  3/6 Febrile 101.7  03/07: No events overnight. On [place]-trach at 3 L and tolerating well. Hemodynamically stable. Cultures in progress. C/w Vanco and Cefepime. F/u Vanco trough. Hypokalemic this AM and replacement ordered.   03/08: Afebrile. Vanco discontinued since MRSA negative. + yeast in sputum; + Candida Galbrata in urine (Bran in place with clear urine and afebrile).   Dr. Peres following. Blood cx NGTD. C/w Cefepime.   03/09: Febrile, tachycardic and tachypneic this AM. Code sepsis called. Sepsis w/u in progress.  Started on Vanco. ID following, . Discussed with Dr. Peres. F/u CXR. Worsening Leukocytosis. IVF bolus with LR (2,300 ml)  per protocol. Lactate 1.9.   3/11: afebrile over past 24hrs, hemodynamically stable. Vanco trough 11.7  03/12: Vanco trough this PM. Blood cx remains NGTD. C/w Vanco and Cefepime. ID following. Persistent Transaminitis.  Hep C non-reactive. F/u remaining Hepatitis panel. Patient on high dose statin which could be contributing to transaminitis. Hold for now and re-eval. Given that patient is unable to verbalize/demonstrate if symptomatic will order Liver US.   03/13: Spiked fevers overnight. Tachycardic and hypotensive. IVF bolus given. Blood cx in progress.  F/u Procal and Lactate. Vanco increased to 1500 mg BID and Meropenem added and Cefepime discontinued Discussed wih Dr. Peres and reccs appreciated  Worsening leukocytosis Pending Abdominal US 2/2 transaminitis.   03/14: Febrile. Urinary retention. On Flomax. Bladder scan and Straight cath Q 4-6 hours. On Vanco and Jose Elias. F/u cx. Abd US unremarkable. CXR 3/13 negative for acute findings.   3/15 Bladder scan 550. Bran cath reinserted.  3/23/23 Patient febrile overnight 100.5 likely central fever, no leukocytosis  3/24/23 Patient febrile 102.3 this AM. Patient blood ans sputum culture and UA sent, chest x-ray, RVP ordered. Will start Meropenem, ID aware.  3/25/23 TOV attempted yesterday, bladder scan 600ml, straight cath, today 550ml, straight cath, continue bladder scan q6h.   3/27  Continues to retain large amount of urine. Last bladder scan 600ml. Bran cath reinserted.

## 2023-03-29 NOTE — PROGRESS NOTE ADULT - PROBLEM SELECTOR PLAN 11
DVT and GI prophylaxis.  HydroTrach on RA and PEG  F/U final cultures.  First part of Indium scan completed. Scan to be completed today. F/U results.  Afebrile overnight  Will continue meropenem until results of scan are back.

## 2023-03-29 NOTE — PROGRESS NOTE ADULT - PROBLEM SELECTOR PLAN 3
Secondary to SDH and craniotomy  Craniotomy at [facility] 1/23  Maintain safety precautions  Strict aspiration precautions.  Seizure precautions.

## 2023-03-29 NOTE — CHART NOTE - NSCHARTNOTEFT_GEN_A_CORE
Son Raulito updated several time on test results and treatment plan.  Discussed that his brother James needed to sign paperwork before his father could   be discharged.  All questions answered.

## 2023-03-29 NOTE — PROGRESS NOTE ADULT - PROBLEM SELECTOR PLAN 2
Secondary to aspiration pneumonia.  Will continue meropenem until results of Indium scan are available.  All cultures negative. Procalcitonin slightly elevated.  CXR and CT chest no pna visualized.  First part of Indium scan completed yesterday. Scan scheduled for this morning. F/U results.

## 2023-03-29 NOTE — PROGRESS NOTE ADULT - SUBJECTIVE AND OBJECTIVE BOX
Time of Visit:  Patient seen and examined.     MEDICATIONS  (STANDING):  albuterol/ipratropium for Nebulization 3 milliLiter(s) Nebulizer every 6 hours  amantadine Syrup 100 milliGRAM(s) Oral two times a day  aspirin  chewable 81 milliGRAM(s) Oral daily  enoxaparin Injectable 40 milliGRAM(s) SubCutaneous every 24 hours  finasteride 5 milliGRAM(s) Oral daily  insulin glargine Injectable (LANTUS) 12 Unit(s) SubCutaneous every morning  insulin glargine Injectable (LANTUS) 12 Unit(s) SubCutaneous at bedtime  insulin lispro (ADMELOG) corrective regimen sliding scale   SubCutaneous every 6 hours  insulin lispro Injectable (ADMELOG) 6 Unit(s) SubCutaneous every 6 hours  meropenem  IVPB      meropenem  IVPB 1000 milliGRAM(s) IV Intermittent every 8 hours  polyethylene glycol 3350 17 Gram(s) Oral daily  senna Syrup 10 milliLiter(s) Oral daily  silver sulfADIAZINE 1% Cream 1 Application(s) Topical two times a day  tamsulosin 0.4 milliGRAM(s) Oral at bedtime      MEDICATIONS  (PRN):  acetaminophen    Suspension .. 650 milliGRAM(s) Oral every 6 hours PRN Temp greater or equal to 38C (100.4F), Mild Pain (1 - 3)  aluminum hydroxide/magnesium hydroxide/simethicone Suspension 30 milliLiter(s) Oral every 4 hours PRN Dyspepsia  melatonin 3 milliGRAM(s) Oral at bedtime PRN Insomnia  ondansetron Injectable 4 milliGRAM(s) IV Push every 8 hours PRN Nausea and/or Vomiting       Medications up to date at time of exam.      PHYSICAL EXAMINATION:  Patient has no new complaints.  GENERAL: The patient is a well-developed, well-nourished, in no apparent distress.     Vital Signs Last 24 Hrs  T(C): 37.1 (29 Mar 2023 13:00), Max: 37.2 (28 Mar 2023 20:55)  T(F): 98.8 (29 Mar 2023 13:00), Max: 99 (28 Mar 2023 20:55)  HR: 96 (29 Mar 2023 13:00) (68 - 100)  BP: 135/93 (29 Mar 2023 13:00) (110/73 - 137/86)  BP(mean): --  RR: 18 (29 Mar 2023 13:00) (18 - 19)  SpO2: 95% (29 Mar 2023 13:00) (94% - 98%)    Parameters below as of 29 Mar 2023 13:00  Patient On (Oxygen Delivery Method): tracheostomy collar       (if applicable)    Chest Tube (if applicable)    HEENT: Head is normocephalic and atraumatic. Extraocular muscles are intact. Mucous membranes are moist.     NECK: Supple, no palpable adenopathy.    LUNGS: Clear to auscultation, no wheezing, rales, or rhonchi.    HEART: Regular rate and rhythm without murmur.    ABDOMEN: Soft, nontender, and nondistended.  No hepatosplenomegaly is noted.    : No painful voiding, no pelvic pain    EXTREMITIES: Without any cyanosis, clubbing, rash, lesions or edema.    NEUROLOGIC: Awake, alert, oriented, grossly intact    SKIN: Warm, dry, good turgor.      LABS:                        8.4    8.50  )-----------( 252      ( 29 Mar 2023 07:01 )             28.1     03-29    149<H>  |  119<H>  |  28<H>  ----------------------------<  139<H>  3.8   |  27  |  0.72    Ca    9.0      29 Mar 2023 07:01  Phos  2.6     03-29  Mg     2.6     03-29    TPro  7.3  /  Alb  2.4<L>  /  TBili  0.5  /  DBili  x   /  AST  38  /  ALT  58  /  AlkPhos  229<H>  03-29                        MICROBIOLOGY: (if applicable)    RADIOLOGY & ADDITIONAL STUDIES:  EKG:   CXR:  ECHO:    IMPRESSION: 62y Male PAST MEDICAL & SURGICAL HISTORY:  History of subdural hemorrhage      PEG (percutaneous endoscopic gastrostomy) status      DM (diabetes mellitus)      S/P craniotomy       p/w           RECOMMENDATIONS:   Time of Visit:  Patient seen and examined. on room air     MEDICATIONS  (STANDING):  albuterol/ipratropium for Nebulization 3 milliLiter(s) Nebulizer every 6 hours  amantadine Syrup 100 milliGRAM(s) Oral two times a day  aspirin  chewable 81 milliGRAM(s) Oral daily  enoxaparin Injectable 40 milliGRAM(s) SubCutaneous every 24 hours  finasteride 5 milliGRAM(s) Oral daily  insulin glargine Injectable (LANTUS) 12 Unit(s) SubCutaneous every morning  insulin glargine Injectable (LANTUS) 12 Unit(s) SubCutaneous at bedtime  insulin lispro (ADMELOG) corrective regimen sliding scale   SubCutaneous every 6 hours  insulin lispro Injectable (ADMELOG) 6 Unit(s) SubCutaneous every 6 hours  meropenem  IVPB      meropenem  IVPB 1000 milliGRAM(s) IV Intermittent every 8 hours  polyethylene glycol 3350 17 Gram(s) Oral daily  senna Syrup 10 milliLiter(s) Oral daily  silver sulfADIAZINE 1% Cream 1 Application(s) Topical two times a day  tamsulosin 0.4 milliGRAM(s) Oral at bedtime      MEDICATIONS  (PRN):  acetaminophen    Suspension .. 650 milliGRAM(s) Oral every 6 hours PRN Temp greater or equal to 38C (100.4F), Mild Pain (1 - 3)  aluminum hydroxide/magnesium hydroxide/simethicone Suspension 30 milliLiter(s) Oral every 4 hours PRN Dyspepsia  melatonin 3 milliGRAM(s) Oral at bedtime PRN Insomnia  ondansetron Injectable 4 milliGRAM(s) IV Push every 8 hours PRN Nausea and/or Vomiting       Medications up to date at time of exam.      PHYSICAL EXAMINATION:  Patient has no new complaints.  GENERAL: The patient is a well-developed, well-nourished, in no apparent distress.     Vital Signs Last 24 Hrs  T(C): 37.1 (29 Mar 2023 13:00), Max: 37.2 (28 Mar 2023 20:55)  T(F): 98.8 (29 Mar 2023 13:00), Max: 99 (28 Mar 2023 20:55)  HR: 96 (29 Mar 2023 13:00) (68 - 100)  BP: 135/93 (29 Mar 2023 13:00) (110/73 - 137/86)  BP(mean): --  RR: 18 (29 Mar 2023 13:00) (18 - 19)  SpO2: 95% (29 Mar 2023 13:00) (94% - 98%)    Parameters below as of 29 Mar 2023 13:00  Patient On (Oxygen Delivery Method): tracheostomy collar       (if applicable)    Chest Tube (if applicable)    HEENT: Head is large indentation due to surgery     NECK: Supple, no palpable adenopathy.    LUNGS: Clear to auscultation, no wheezing, rales, or rhonchi.    HEART: Regular rate and rhythm without murmur.    ABDOMEN: Soft, nontender, and nondistended.  No hepatosplenomegaly is noted.    EXTREMITIES: Without any cyanosis, clubbing, rash, lesions or edema.    NEUROLOGIC: eyes open     SKIN: Warm, dry, good turgor.      LABS:                        8.4    8.50  )-----------( 252      ( 29 Mar 2023 07:01 )             28.1     03-29    149<H>  |  119<H>  |  28<H>  ----------------------------<  139<H>  3.8   |  27  |  0.72    Ca    9.0      29 Mar 2023 07:01  Phos  2.6     03-29  Mg     2.6     03-29    TPro  7.3  /  Alb  2.4<L>  /  TBili  0.5  /  DBili  x   /  AST  38  /  ALT  58  /  AlkPhos  229<H>  03-29                        MICROBIOLOGY: (if applicable)    RADIOLOGY & ADDITIONAL STUDIES:  EKG:   CXR:< from: NM Inflammatory Loc Wholebody WBC, Single Day (03.29.23 @ 09:42) >    ACC: [id number] EXAM:  NM INFLAMM LOC WBC WB SD   ORDERED BY: ANTHONY RESENDIZ     PROCEDURE DATE:  03/29/2023          INTERPRETATION:  RADIOPHARMACEUTICAL: Indium-labeled autologous   leukocytes  DOSE: 534 uCi IV injected on 3/28/2023    CLINICAL INFORMATION: 62 year-old male with fever of unknown origin,   referred to evaluate site of infection    TECHNIQUE: Whole-body planar images in the anterior and posterior   projections and additional static views of the pelvis were obtained   approximately 24 hours post tracer injection.    COMPARISON: CT chest, abdomen, and pelvis performed 3/24/2023    FINDINGS: These images demonstrate faint Indium labeled leukocyte   accumulation in the soft tissue of the left upper thigh lateral to the   left hip. There is physiologic radiotracer distribution in the remainder   of the visualized structures.    IMPRESSION: Indium-labeled leukocyte scan demonstrates:    Faint soft tissues accumulation in the left upper thigh lateral to the   left hip may represent inflammation/infection. Please correlate   clinically.    --- End of Report ---          WILMER HERNANDEZ MD; Resident Radiologist  This document has been electronically signed.  JEROME MAY MD; Attending Nuclear Medicine  This document has beenelectronically signed. Mar 29 2023 11:10AM    < end of copied text >    ECHO:    IMPRESSION: 62y Male PAST MEDICAL & SURGICAL HISTORY:  History of subdural hemorrhage      PEG (percutaneous endoscopic gastrostomy) status      DM (diabetes mellitus)      S/P craniotomy       p/w         IMP: This is a 62 yr old man  from [facility], h/o traumatic subdural hemorrhage following a fall down the stairs while drunk, s/p r craniotomy at [facility] [place] in 01/2023, s/p trach/PEG BIBEMS for tachycardia 170s, RR 27, increased secretions admitted to  for sepsis secondary to RUL pneumonia with tracheostomy to 3L NC.  3/6 Febrile 101.7  03/07: No events overnight. On [place]-trach at 3 L and tolerating well. Hemodynamically stable. Cultures in progress. C/w Vanco and Cefepime. F/u Vanco trough. Hypokalemic this AM and replacement ordered.   03/08: Afebrile. Vanco discontinued since MRSA negative. + yeast in sputum; + Candida Galbrata in urine (Bran in place with clear urine and afebrile).   Dr. Peres following. Blood cx NGTD. C/w Cefepime.   03/09: Febrile, tachycardic and tachypneic this AM. Code sepsis called. Sepsis w/u in progress.  Started on Vanco. ID following, . Discussed with Dr. Peres. F/u CXR. Worsening Leukocytosis. IVF bolus with LR (2,300 ml)  per protocol. Lactate 1.9.   3/11: afebrile over past 24hrs, hemodynamically stable. Vanco trough 11.7  03/12: Vanco trough this PM. Blood cx remains NGTD. C/w Vanco and Cefepime. ID following. Persistent Transaminitis.  Hep C non-reactive. F/u remaining Hepatitis panel. Patient on high dose statin which could be contributing to transaminitis. Hold for now and re-eval. Given that patient is unable to verbalize/demonstrate if symptomatic will order Liver US.   03/13: Spiked fevers overnight. Tachycardic and hypotensive. IVF bolus given. Blood cx in progress.  F/u Procal and Lactate. Vanco increased to 1500 mg BID and Meropenem added and Cefepime discontinued Discussed wih Dr. Peres and reccs appreciated  Worsening leukocytosis Pending Abdominal US 2/2 transaminitis.   03/14: Febrile. Urinary retention. On Flomax. Bladder scan and Straight cath Q 4-6 hours. On Vanco and Jose Elias. F/u cx. Abd US unremarkable. CXR 3/13 negative for acute findings.   3/15 Bladder scan 550. Bran cath reinserted.  3/24 Spiked fever       Plan   - Continue O2 supp via TC ( hydrotrach )   - On meropenem started 3/24, neg calcitonin   - Asp precaution   - Pul hygiene   - Indium scan / left lateral thigh accumulation   - US of left lateral thigh   - Duoneb  - Trach care   - DVT GI prophy     d/c with NP covering

## 2023-03-29 NOTE — PROGRESS NOTE ADULT - PROBLEM SELECTOR PLAN 8
Started on Tamsulosin since 3/14/23  Bran catheter removed 3/24/23  Multiple straight caths since then  Bran cath reinserted late 3/27. 600ml on bladder scan.  Unable to void on his own  Continue Flomax and Proscar.

## 2023-03-29 NOTE — PROGRESS NOTE ADULT - SUBJECTIVE AND OBJECTIVE BOX
WILMA NICOEL    SCU progress note    INTERVAL HPI/OVERNIGHT EVENTS: ***Afebrile    DNR [ ]   DNI  [  ]  Full Code    Covid - 19 PCR: Neg 3/25    The 4Ms    What Matters Most: see GO  Age appropriate Medications/Screen for High Risk Medication: Yes  Mentation: see CAM below  Mobility: defer to physical exam    The Confusion Assessment Method (CAM) Diagnostic Algorithm     1: Acute Onset or Fluctuating Course  - Is there evidence of an acute change in mental status from the patient’s baseline? Did the (abnormal) behavior  fluctuate during the day, that is, tend to come and go, or increase and decrease in severity?  [ ] YES [x ] NO     2: Inattention  - Did the patient have difficulty focusing attention, being easily distractible, or having difficulty keeping track of what was being said?  [ ] YES [ ] NO  Unable to access     3: Disorganized thinking  -Was the patient’s thinking disorganized or incoherent, such as rambling or irrelevant conversation, unclear or illogical flow of ideas, or unpredictable switching from subject to subject?  [ ] YES [ ] NO   Unable to access    4: Altered Level of consciousness?  [ ] YES [ ] NO    The diagnosis of delirium by CAM requires the presence of features 1 and 2 and either 3 or 4.    PRESSORS: [ ] YES [x ] NO  meropenem  IVPB 1000 milliGRAM(s) IV Intermittent every 8 hours  meropenem  IVPB        Cardiovascular:  Heart Failure  Acute   Acute on Chronic  Chronic         Pulmonary:  albuterol/ipratropium for Nebulization 3 milliLiter(s) Nebulizer every 6 hours    Hematalogic:  aspirin  chewable 81 milliGRAM(s) Oral daily  enoxaparin Injectable 40 milliGRAM(s) SubCutaneous every 24 hours    Other:  acetaminophen    Suspension .. 650 milliGRAM(s) Oral every 6 hours PRN  aluminum hydroxide/magnesium hydroxide/simethicone Suspension 30 milliLiter(s) Oral every 4 hours PRN  amantadine Syrup 100 milliGRAM(s) Oral two times a day  finasteride 5 milliGRAM(s) Oral daily  insulin glargine Injectable (LANTUS) 12 Unit(s) SubCutaneous every morning  insulin glargine Injectable (LANTUS) 12 Unit(s) SubCutaneous at bedtime  insulin lispro (ADMELOG) corrective regimen sliding scale   SubCutaneous every 6 hours  insulin lispro Injectable (ADMELOG) 6 Unit(s) SubCutaneous every 6 hours  melatonin 3 milliGRAM(s) Oral at bedtime PRN  ondansetron Injectable 4 milliGRAM(s) IV Push every 8 hours PRN  polyethylene glycol 3350 17 Gram(s) Oral daily  senna Syrup 10 milliLiter(s) Oral daily  silver sulfADIAZINE 1% Cream 1 Application(s) Topical two times a day  tamsulosin 0.4 milliGRAM(s) Oral at bedtime    acetaminophen    Suspension .. 650 milliGRAM(s) Oral every 6 hours PRN  albuterol/ipratropium for Nebulization 3 milliLiter(s) Nebulizer every 6 hours  aluminum hydroxide/magnesium hydroxide/simethicone Suspension 30 milliLiter(s) Oral every 4 hours PRN  amantadine Syrup 100 milliGRAM(s) Oral two times a day  aspirin  chewable 81 milliGRAM(s) Oral daily  enoxaparin Injectable 40 milliGRAM(s) SubCutaneous every 24 hours  finasteride 5 milliGRAM(s) Oral daily  insulin glargine Injectable (LANTUS) 12 Unit(s) SubCutaneous every morning  insulin glargine Injectable (LANTUS) 12 Unit(s) SubCutaneous at bedtime  insulin lispro (ADMELOG) corrective regimen sliding scale   SubCutaneous every 6 hours  insulin lispro Injectable (ADMELOG) 6 Unit(s) SubCutaneous every 6 hours  melatonin 3 milliGRAM(s) Oral at bedtime PRN  meropenem  IVPB      meropenem  IVPB 1000 milliGRAM(s) IV Intermittent every 8 hours  ondansetron Injectable 4 milliGRAM(s) IV Push every 8 hours PRN  polyethylene glycol 3350 17 Gram(s) Oral daily  senna Syrup 10 milliLiter(s) Oral daily  silver sulfADIAZINE 1% Cream 1 Application(s) Topical two times a day  tamsulosin 0.4 milliGRAM(s) Oral at bedtime    Drug Dosing Weight  Height (cm): 177.8 (05 Mar 2023 03:36)  Weight (kg): 86.2 (05 Mar 2023 03:36)  BMI (kg/m2): 27.3 (05 Mar 2023 03:36)  BSA (m2): 2.04 (05 Mar 2023 03:36)    CENTRAL LINE: [ ] YES [x ] NO  LOCATION:   DATE INSERTED:  REMOVE: [ ] YES [ ] NO  EXPLAIN:    MELCHOR: [x ] YES [ ] NO    DATE INSERTED:  REMOVE:  [ ] YES [x ] NO  EXPLAIN:  Chronic. Failed multiple TOV    PAST MEDICAL & SURGICAL HISTORY:  History of subdural hemorrhage      PEG (percutaneous endoscopic gastrostomy) status      DM (diabetes mellitus)      S/P craniotomy                  03-28 @ 07:01  -  03-29 @ 07:00  --------------------------------------------------------  IN: 0 mL / OUT: 700 mL / NET: -700 mL            PHYSICAL EXAM:    GENERAL: NAD, afebrile  HEAD:  Right craniofacial deformity  EYES: PERRLA, conjunctiva and sclera clear  ENMT: No tonsillar erythema, exudates  NECK: Supple, No JVD, tracheostomy intact  NERVOUS SYSTEM:  Awake. Nonverbal at baseline. Does not follow commands. Intermittent tracking with eyes. No spontaneous movement of extremities. BL foot drop  CHEST/LUNG: Diminished breath sounds bilateral bases  HEART: Regular rate and rhythm; No murmurs, rubs, or gallops  ABDOMEN: +Peg intact Soft, Nontender, Nondistended; Bowel sounds present  EXTREMITIES:  No spontaneous movement of extremities. 2+ Peripheral Pulses, No clubbing, cyanosis, or edema  LYMPH: No lymphadenopathy noted  SKIN: Stage 1 coccyx and bilateral heels.      LABS:  CBC Full  -  ( 29 Mar 2023 07:01 )  WBC Count : 8.50 K/uL  RBC Count : 3.06 M/uL  Hemoglobin : 8.4 g/dL  Hematocrit : 28.1 %  Platelet Count - Automated : 252 K/uL  Mean Cell Volume : 91.8 fl  Mean Cell Hemoglobin : 27.5 pg  Mean Cell Hemoglobin Concentration : 29.9 gm/dL  Auto Neutrophil # : x  Auto Lymphocyte # : x  Auto Monocyte # : x  Auto Eosinophil # : x  Auto Basophil # : x  Auto Neutrophil % : x  Auto Lymphocyte % : x  Auto Monocyte % : x  Auto Eosinophil % : x  Auto Basophil % : x    03-28    143  |  115<H>  |  29<H>  ----------------------------<  127<H>  3.5   |  28  |  0.72    Ca    9.0      28 Mar 2023 06:26  Phos  2.7     03-28  Mg     2.5     03-28    TPro  7.7  /  Alb  2.3<L>  /  TBili  0.6  /  DBili  x   /  AST  40  /  ALT  63<H>  /  AlkPhos  239<H>  03-28              [  ]  DVT Prophylaxis  [  ]  Nutrition, Brand, Rate         Goal Rate        Abnormal Nutritional Status -  Malnutrition   Cachexia          RADIOLOGY & ADDITIONAL STUDIES:  ***  < from: CT Chest w/ IV Cont (03.24.23 @ 23:13) >  CONTRAST/COMPLICATIONS:  IV Contrast: Omnipaque 350 (accession [id number]), IV contrast documented   in unlinked concurrent exam (accession [id number])  90 cc administered   10   cc discarded  Oral Contrast: NONE  Complications: None reported at time of study completion    PROCEDURE:  CT of the Chest, Abdomen and Pelvis was performed.  Sagittal and coronal reformats were performed.    FINDINGS:  CHEST:  LUNGS AND LARGE AIRWAYS:Patent central airways. Bibasilar consolidations   greater on the right. Bilateral lower lobe mucous plugging. Tracheostomy.  PLEURA: No pleural effusion.  VESSELS: Within normal limits.  HEART: Heart size is normal.  No pericardial effusion.  MEDIASTINUM AND GLORY: No lymphadenopathy.  CHEST WALL AND LOWER NECK: Within normal limits.    ABDOMEN AND PELVIS:  LIVER: Within normal limits.  BILE DUCTS: Normal caliber.  GALLBLADDER: Within normal limits.  SPLEEN: Within normal limits.  PANCREAS: Within normal limits.  ADRENALS: Within normal limits.  KIDNEYS/URETERS: Cysts and other lesions too small to characterize.    BLADDER: Small focus of air in the bladder.  REPRODUCTIVE ORGANS: Within normal limits.    BOWEL: No bowel obstruction. A small duodenal diverticulum. Normal   appendix. Gastrostomy tube is in place.  PERITONEUM: No ascites.  VESSELS:  Within normal limits.  RETROPERITONEUM/LYMPH NODES: No lymphadenopathy.  ABDOMINAL WALL: There is enlargement of the right piriformis with   ossification.  BONES: Within normal limits.    IMPRESSION: Bibasilar consolidations and mucous plugging possibly   infectious.    Enlargement and ossification of the right piriformis which could be   traumatic inflammatory or postinfectious in etiology.    < end of copied text >    Goals of Care Discussion with Family/Proxy/Other   - see note from 3/05/23

## 2023-03-30 LAB
ALBUMIN SERPL ELPH-MCNC: 2.2 G/DL — LOW (ref 3.5–5)
ALP SERPL-CCNC: 242 U/L — HIGH (ref 40–120)
ALT FLD-CCNC: 59 U/L DA — SIGNIFICANT CHANGE UP (ref 10–60)
ANION GAP SERPL CALC-SCNC: 5 MMOL/L — SIGNIFICANT CHANGE UP (ref 5–17)
AST SERPL-CCNC: 42 U/L — HIGH (ref 10–40)
BILIRUB SERPL-MCNC: 0.5 MG/DL — SIGNIFICANT CHANGE UP (ref 0.2–1.2)
BUN SERPL-MCNC: 32 MG/DL — HIGH (ref 7–18)
CALCIUM SERPL-MCNC: 9.1 MG/DL — SIGNIFICANT CHANGE UP (ref 8.4–10.5)
CHLORIDE SERPL-SCNC: 114 MMOL/L — HIGH (ref 96–108)
CO2 SERPL-SCNC: 26 MMOL/L — SIGNIFICANT CHANGE UP (ref 22–31)
CREAT SERPL-MCNC: 0.7 MG/DL — SIGNIFICANT CHANGE UP (ref 0.5–1.3)
CRP SERPL-MCNC: 36 MG/L — HIGH
EGFR: 105 ML/MIN/1.73M2 — SIGNIFICANT CHANGE UP
ERYTHROCYTE [SEDIMENTATION RATE] IN BLOOD: 119 MM/HR — HIGH (ref 0–20)
GLUCOSE SERPL-MCNC: 138 MG/DL — HIGH (ref 70–99)
HCT VFR BLD CALC: 29.4 % — LOW (ref 39–50)
HGB BLD-MCNC: 8.8 G/DL — LOW (ref 13–17)
MAGNESIUM SERPL-MCNC: 2.6 MG/DL — SIGNIFICANT CHANGE UP (ref 1.6–2.6)
MCHC RBC-ENTMCNC: 27.8 PG — SIGNIFICANT CHANGE UP (ref 27–34)
MCHC RBC-ENTMCNC: 29.9 GM/DL — LOW (ref 32–36)
MCV RBC AUTO: 93 FL — SIGNIFICANT CHANGE UP (ref 80–100)
NRBC # BLD: 0 /100 WBCS — SIGNIFICANT CHANGE UP (ref 0–0)
PHOSPHATE SERPL-MCNC: 3.2 MG/DL — SIGNIFICANT CHANGE UP (ref 2.5–4.5)
PLATELET # BLD AUTO: 250 K/UL — SIGNIFICANT CHANGE UP (ref 150–400)
POTASSIUM SERPL-MCNC: 3.9 MMOL/L — SIGNIFICANT CHANGE UP (ref 3.5–5.3)
POTASSIUM SERPL-SCNC: 3.9 MMOL/L — SIGNIFICANT CHANGE UP (ref 3.5–5.3)
PROT SERPL-MCNC: 7.3 G/DL — SIGNIFICANT CHANGE UP (ref 6–8.3)
RBC # BLD: 3.16 M/UL — LOW (ref 4.2–5.8)
RBC # FLD: 16 % — HIGH (ref 10.3–14.5)
SODIUM SERPL-SCNC: 145 MMOL/L — SIGNIFICANT CHANGE UP (ref 135–145)
WBC # BLD: 8.29 K/UL — SIGNIFICANT CHANGE UP (ref 3.8–10.5)
WBC # FLD AUTO: 8.29 K/UL — SIGNIFICANT CHANGE UP (ref 3.8–10.5)

## 2023-03-30 PROCEDURE — 99233 SBSQ HOSP IP/OBS HIGH 50: CPT

## 2023-03-30 RX ADMIN — Medication 3 MILLILITER(S): at 20:02

## 2023-03-30 RX ADMIN — Medication 3 MILLILITER(S): at 14:01

## 2023-03-30 RX ADMIN — MEROPENEM 100 MILLIGRAM(S): 1 INJECTION INTRAVENOUS at 05:39

## 2023-03-30 RX ADMIN — Medication 1 APPLICATION(S): at 17:11

## 2023-03-30 RX ADMIN — INSULIN GLARGINE 12 UNIT(S): 100 INJECTION, SOLUTION SUBCUTANEOUS at 08:28

## 2023-03-30 RX ADMIN — FINASTERIDE 5 MILLIGRAM(S): 5 TABLET, FILM COATED ORAL at 11:41

## 2023-03-30 RX ADMIN — Medication 1 APPLICATION(S): at 05:39

## 2023-03-30 RX ADMIN — Medication 100 MILLIGRAM(S): at 17:11

## 2023-03-30 RX ADMIN — Medication 100 MILLIGRAM(S): at 05:39

## 2023-03-30 RX ADMIN — MEROPENEM 100 MILLIGRAM(S): 1 INJECTION INTRAVENOUS at 21:26

## 2023-03-30 RX ADMIN — INSULIN GLARGINE 12 UNIT(S): 100 INJECTION, SOLUTION SUBCUTANEOUS at 21:56

## 2023-03-30 RX ADMIN — Medication 3 MILLILITER(S): at 08:44

## 2023-03-30 RX ADMIN — Medication 81 MILLIGRAM(S): at 11:41

## 2023-03-30 RX ADMIN — Medication 1: at 11:42

## 2023-03-30 RX ADMIN — Medication 3 MILLILITER(S): at 03:07

## 2023-03-30 RX ADMIN — ENOXAPARIN SODIUM 40 MILLIGRAM(S): 100 INJECTION SUBCUTANEOUS at 10:28

## 2023-03-30 RX ADMIN — MEROPENEM 100 MILLIGRAM(S): 1 INJECTION INTRAVENOUS at 13:15

## 2023-03-30 RX ADMIN — Medication 6 UNIT(S): at 11:42

## 2023-03-30 RX ADMIN — Medication 6 UNIT(S): at 05:59

## 2023-03-30 RX ADMIN — TAMSULOSIN HYDROCHLORIDE 0.4 MILLIGRAM(S): 0.4 CAPSULE ORAL at 21:26

## 2023-03-30 RX ADMIN — POLYETHYLENE GLYCOL 3350 17 GRAM(S): 17 POWDER, FOR SOLUTION ORAL at 11:42

## 2023-03-30 RX ADMIN — SENNA PLUS 10 MILLILITER(S): 8.6 TABLET ORAL at 11:42

## 2023-03-30 NOTE — PROGRESS NOTE ADULT - PROBLEM SELECTOR PLAN 2
Secondary to aspiration pneumonia.  Will continue meropenem until results of Indium scan are available.  All cultures negative. Procalcitonin slightly elevated.  CXR and CT chest no pna visualized.  First part of Indium scan completed yesterday. Scan scheduled for this morning. F/U results. Secondary to aspiration pneumonia.  Will continue meropenem until results of Indium scan are available.  All cultures negative. Procalcitonin slightly elevated.  CXR and CT chest no pna visualized.  First part of Indium scan completed yesterday. Scan scheduled for this morning.   Indium scan report noted.

## 2023-03-30 NOTE — PROGRESS NOTE ADULT - SUBJECTIVE AND OBJECTIVE BOX
Time of Visit:  Patient seen and examined.     MEDICATIONS  (STANDING):  albuterol/ipratropium for Nebulization 3 milliLiter(s) Nebulizer every 6 hours  amantadine Syrup 100 milliGRAM(s) Oral two times a day  aspirin  chewable 81 milliGRAM(s) Oral daily  enoxaparin Injectable 40 milliGRAM(s) SubCutaneous every 24 hours  finasteride 5 milliGRAM(s) Oral daily  insulin glargine Injectable (LANTUS) 12 Unit(s) SubCutaneous every morning  insulin glargine Injectable (LANTUS) 12 Unit(s) SubCutaneous at bedtime  insulin lispro (ADMELOG) corrective regimen sliding scale   SubCutaneous every 6 hours  insulin lispro Injectable (ADMELOG) 6 Unit(s) SubCutaneous every 6 hours  meropenem  IVPB      meropenem  IVPB 1000 milliGRAM(s) IV Intermittent every 8 hours  polyethylene glycol 3350 17 Gram(s) Oral daily  senna Syrup 10 milliLiter(s) Oral daily  silver sulfADIAZINE 1% Cream 1 Application(s) Topical two times a day  tamsulosin 0.4 milliGRAM(s) Oral at bedtime      MEDICATIONS  (PRN):  acetaminophen    Suspension .. 650 milliGRAM(s) Oral every 6 hours PRN Temp greater or equal to 38C (100.4F), Mild Pain (1 - 3)  aluminum hydroxide/magnesium hydroxide/simethicone Suspension 30 milliLiter(s) Oral every 4 hours PRN Dyspepsia  melatonin 3 milliGRAM(s) Oral at bedtime PRN Insomnia  ondansetron Injectable 4 milliGRAM(s) IV Push every 8 hours PRN Nausea and/or Vomiting       Medications up to date at time of exam.      PHYSICAL EXAMINATION:    Vital Signs Last 24 Hrs  T(C): 37.5 (30 Mar 2023 12:03), Max: 37.5 (29 Mar 2023 20:45)  T(F): 99.5 (30 Mar 2023 12:03), Max: 99.5 (29 Mar 2023 20:45)  HR: 98 (30 Mar 2023 12:03) (98 - 100)  BP: 114/83 (30 Mar 2023 12:03) (114/83 - 145/91)  BP(mean): --  RR: 16 (30 Mar 2023 12:03) (16 - 17)  SpO2: 97% (30 Mar 2023 12:03) (95% - 99%)    Parameters below as of 30 Mar 2023 12:03  Patient On (Oxygen Delivery Method): tracheostomy collar    General ; Non verbal. No acute distress.     HEENT: +Right craniofacial deformity . No nasal tenderness.  Mucous membranes are moist.     NECK: Has Tracheostomy cuffed #7.5, non infected.      LUNGS: Non labored. No wheezing . No use of accessory muscle.     HEART: S1 S2 Regular rate and no murmur.    ABDOMEN: Soft, nontender, and nondistended. Active bowel sounds. +ve Peg.     EXTREMITIES: Total care. Non ambulatory.      NEUROLOGIC: Cognitive impaired .      LABS:                        8.8    8.29  )-----------( 250      ( 30 Mar 2023 06:44 )             29.4     03-30    145  |  114<H>  |  32<H>  ----------------------------<  138<H>  3.9   |  26  |  0.70    Ca    9.1      30 Mar 2023 06:44  Phos  3.2     03-30  Mg     2.6     03-30    TPro  7.3  /  Alb  2.2<L>  /  TBili  0.5  /  DBili  x   /  AST  42<H>  /  ALT  59  /  AlkPhos  242<H>  03-30                        MICROBIOLOGY: (if applicable)    RADIOLOGY & ADDITIONAL STUDIES:  EKG:   CXR:  ECHO:    IMPRESSION: 62y Male PAST MEDICAL & SURGICAL HISTORY:  History of subdural hemorrhage      PEG (percutaneous endoscopic gastrostomy) status      DM (diabetes mellitus)      S/P craniotomy       Impression: This is a 61 Y/O Male from [facility] . Hx of Traumatic Subdural Hemorrhage following a fall down the stairs while drunk, had s/p Craniotomy at [facility] on  with s/p Trach / Peg . Admitted to  with Acute on chronic hypoxic respiratory failure secondary to Aspiration Pneumonia. 03-20-23 Negative PCR for Covid 19.     Suggestion:   O2 saturation 95% with Hydro trach collar 3L . No need for Vent support at this time.   Oral, trach care, suction.  Not a candidate for speaking valve, trach capping at present time.   Not a candidate for decannulation at this time.   Continue Duoneb via nebulization Q 6 Hours.  DVT / GI prophylactic. On Lovenox 40mg SQ daily.   Aspiration precautions with HOB elevation especially during Peg feeding.          On Meropenem 1Gm IVPB Q 8 Hours .

## 2023-03-30 NOTE — CHART NOTE - NSCHARTNOTEFT_GEN_A_CORE
Case discussed with pt's son Mr. Nathan Erickson at bedside and updated him regarding pt's current medical condition.  Plan of care discussed and all questions answered.

## 2023-03-30 NOTE — PROGRESS NOTE ADULT - SUBJECTIVE AND OBJECTIVE BOX
WILMA NICOLE    SCU progress note    INTERVAL HPI/OVERNIGHT EVENTS: ***    DNR [ ]   DNI  [  ]    Covid - 19 PCR:     The 4Ms    What Matters Most: see GOC  Age appropriate Medications/Screen for High Risk Medication: Yes  Mentation: see CAM below  Mobility: defer to physical exam    The Confusion Assessment Method (CAM) Diagnostic Algorithm     1: Acute Onset or Fluctuating Course  - Is there evidence of an acute change in mental status from the patient’s baseline? Did the (abnormal) behavior  fluctuate during the day, that is, tend to come and go, or increase and decrease in severity?  [ ] YES [ ] NO     2: Inattention  - Did the patient have difficulty focusing attention, being easily distractible, or having difficulty keeping track of what was being said?  [ ] YES [ ] NO     3: Disorganized thinking  -Was the patient’s thinking disorganized or incoherent, such as rambling or irrelevant conversation, unclear or illogical flow of ideas, or unpredictable switching from subject to subject?  [ ] YES [ ] NO    4: Altered Level of consciousness?  [ ] YES [ ] NO    The diagnosis of delirium by CAM requires the presence of features 1 and 2 and either 3 or 4.    PRESSORS: [ ] YES [ ] NO  meropenem  IVPB      meropenem  IVPB 1000 milliGRAM(s) IV Intermittent every 8 hours    Cardiovascular:  Heart Failure  Acute   Acute on Chronic  Chronic         Pulmonary:  albuterol/ipratropium for Nebulization 3 milliLiter(s) Nebulizer every 6 hours    Hematalogic:  aspirin  chewable 81 milliGRAM(s) Oral daily  enoxaparin Injectable 40 milliGRAM(s) SubCutaneous every 24 hours    Other:  acetaminophen    Suspension .. 650 milliGRAM(s) Oral every 6 hours PRN  aluminum hydroxide/magnesium hydroxide/simethicone Suspension 30 milliLiter(s) Oral every 4 hours PRN  amantadine Syrup 100 milliGRAM(s) Oral two times a day  finasteride 5 milliGRAM(s) Oral daily  insulin glargine Injectable (LANTUS) 12 Unit(s) SubCutaneous every morning  insulin glargine Injectable (LANTUS) 12 Unit(s) SubCutaneous at bedtime  insulin lispro (ADMELOG) corrective regimen sliding scale   SubCutaneous every 6 hours  insulin lispro Injectable (ADMELOG) 6 Unit(s) SubCutaneous every 6 hours  melatonin 3 milliGRAM(s) Oral at bedtime PRN  ondansetron Injectable 4 milliGRAM(s) IV Push every 8 hours PRN  polyethylene glycol 3350 17 Gram(s) Oral daily  senna Syrup 10 milliLiter(s) Oral daily  silver sulfADIAZINE 1% Cream 1 Application(s) Topical two times a day  tamsulosin 0.4 milliGRAM(s) Oral at bedtime    acetaminophen    Suspension .. 650 milliGRAM(s) Oral every 6 hours PRN  albuterol/ipratropium for Nebulization 3 milliLiter(s) Nebulizer every 6 hours  aluminum hydroxide/magnesium hydroxide/simethicone Suspension 30 milliLiter(s) Oral every 4 hours PRN  amantadine Syrup 100 milliGRAM(s) Oral two times a day  aspirin  chewable 81 milliGRAM(s) Oral daily  enoxaparin Injectable 40 milliGRAM(s) SubCutaneous every 24 hours  finasteride 5 milliGRAM(s) Oral daily  insulin glargine Injectable (LANTUS) 12 Unit(s) SubCutaneous every morning  insulin glargine Injectable (LANTUS) 12 Unit(s) SubCutaneous at bedtime  insulin lispro (ADMELOG) corrective regimen sliding scale   SubCutaneous every 6 hours  insulin lispro Injectable (ADMELOG) 6 Unit(s) SubCutaneous every 6 hours  melatonin 3 milliGRAM(s) Oral at bedtime PRN  meropenem  IVPB      meropenem  IVPB 1000 milliGRAM(s) IV Intermittent every 8 hours  ondansetron Injectable 4 milliGRAM(s) IV Push every 8 hours PRN  polyethylene glycol 3350 17 Gram(s) Oral daily  senna Syrup 10 milliLiter(s) Oral daily  silver sulfADIAZINE 1% Cream 1 Application(s) Topical two times a day  tamsulosin 0.4 milliGRAM(s) Oral at bedtime    Drug Dosing Weight  Height (cm): 177.8 (05 Mar 2023 03:36)  Weight (kg): 86.2 (05 Mar 2023 03:36)  BMI (kg/m2): 27.3 (05 Mar 2023 03:36)  BSA (m2): 2.04 (05 Mar 2023 03:36)    CENTRAL LINE: [ ] YES [ ] NO  LOCATION:   DATE INSERTED:  REMOVE: [ ] YES [ ] NO  EXPLAIN:    MELCHOR: [ ] YES [ ] NO    DATE INSERTED:  REMOVE:  [ ] YES [ ] NO  EXPLAIN:    PAST MEDICAL & SURGICAL HISTORY:  History of subdural hemorrhage      PEG (percutaneous endoscopic gastrostomy) status      DM (diabetes mellitus)      S/P craniotomy                  03-29 @ 07:01  -  03-30 @ 07:00  --------------------------------------------------------  IN: 0 mL / OUT: 900 mL / NET: -900 mL            PHYSICAL EXAM:    GENERAL: NAD, well-groomed, well-developed  HEAD:  Atraumatic, Normocephalic  EYES: EOMI, PERRLA, conjunctiva and sclera clear  ENMT: No tonsillar erythema, exudates, or enlargement; Moist mucous membranes, Good dentition, No lesions  NECK: Supple, No JVD, Normal thyroid  NERVOUS SYSTEM:  Alert & Oriented X3, Good concentration; Motor Strength 5/5 B/L upper and lower extremities; DTRs 2+ intact and symmetric  CHEST/LUNG: Clear to percussion bilaterally; No rales, rhonchi, wheezing, or rubs  HEART: Regular rate and rhythm; No murmurs, rubs, or gallops  ABDOMEN: Soft, Nontender, Nondistended; Bowel sounds present  EXTREMITIES:  2+ Peripheral Pulses, No clubbing, cyanosis, or edema  LYMPH: No lymphadenopathy noted  SKIN: No rashes or lesions      LABS:  CBC Full  -  ( 30 Mar 2023 06:44 )  WBC Count : 8.29 K/uL  RBC Count : 3.16 M/uL  Hemoglobin : 8.8 g/dL  Hematocrit : 29.4 %  Platelet Count - Automated : 250 K/uL  Mean Cell Volume : 93.0 fl  Mean Cell Hemoglobin : 27.8 pg  Mean Cell Hemoglobin Concentration : 29.9 gm/dL  Auto Neutrophil # : x  Auto Lymphocyte # : x  Auto Monocyte # : x  Auto Eosinophil # : x  Auto Basophil # : x  Auto Neutrophil % : x  Auto Lymphocyte % : x  Auto Monocyte % : x  Auto Eosinophil % : x  Auto Basophil % : x    03-30    145  |  114<H>  |  32<H>  ----------------------------<  138<H>  3.9   |  26  |  0.70    Ca    9.1      30 Mar 2023 06:44  Phos  3.2     03-30  Mg     2.6     03-30    TPro  7.3  /  Alb  2.2<L>  /  TBili  0.5  /  DBili  x   /  AST  42<H>  /  ALT  59  /  AlkPhos  242<H>  03-30              [  ]  DVT Prophylaxis  [  ]  Nutrition, Brand, Rate         Goal Rate        Abnormal Nutritional Status -  Malnutrition   Cachexia      Morbid Obesity BMI >/=40    RADIOLOGY & ADDITIONAL STUDIES:  ***    Goals of Care Discussion with Family/Proxy/Other   - see note from/family meeting set up for...     WILMA NICOLE    SCU progress note    INTERVAL HPI/OVERNIGHT EVENTS: No acute events overnight.     DNR [ ]   DNI  [  ]- FULL CODE    Covid - 19 PCR: SARS-CoV-2: NotDetec (25 Mar 2023 06:00)      The 4Ms    What Matters Most: see GOC  Age appropriate Medications/Screen for High Risk Medication: Yes  Mentation: see CAM below  Mobility: defer to physical exam    The Confusion Assessment Method (CAM) Diagnostic Algorithm     1: Acute Onset or Fluctuating Course  - Is there evidence of an acute change in mental status from the patient’s baseline? Did the (abnormal) behavior  fluctuate during the day, that is, tend to come and go, or increase and decrease in severity?  [ ] YES [x ] NO     2: Inattention  - Did the patient have difficulty focusing attention, being easily distractible, or having difficulty keeping track of what was being said?  [ ] YES [ ] NO- Unable to assess     3: Disorganized thinking  -Was the patient’s thinking disorganized or incoherent, such as rambling or irrelevant conversation, unclear or illogical flow of ideas, or unpredictable switching from subject to subject?  [ ] YES [ ] NO- Unable to assess    4: Altered Level of consciousness?  [ ] YES [ ] NO- unable to assess    The diagnosis of delirium by CAM requires the presence of features 1 and 2 and either 3 or 4.    PRESSORS: [ ] YES [x ] NO  meropenem  IVPB      meropenem  IVPB 1000 milliGRAM(s) IV Intermittent every 8 hours    Cardiovascular:  Heart Failure  Acute   Acute on Chronic  Chronic         Pulmonary:  albuterol/ipratropium for Nebulization 3 milliLiter(s) Nebulizer every 6 hours    Hematalogic:  aspirin  chewable 81 milliGRAM(s) Oral daily  enoxaparin Injectable 40 milliGRAM(s) SubCutaneous every 24 hours    Other:  acetaminophen    Suspension .. 650 milliGRAM(s) Oral every 6 hours PRN  aluminum hydroxide/magnesium hydroxide/simethicone Suspension 30 milliLiter(s) Oral every 4 hours PRN  amantadine Syrup 100 milliGRAM(s) Oral two times a day  finasteride 5 milliGRAM(s) Oral daily  insulin glargine Injectable (LANTUS) 12 Unit(s) SubCutaneous every morning  insulin glargine Injectable (LANTUS) 12 Unit(s) SubCutaneous at bedtime  insulin lispro (ADMELOG) corrective regimen sliding scale   SubCutaneous every 6 hours  insulin lispro Injectable (ADMELOG) 6 Unit(s) SubCutaneous every 6 hours  melatonin 3 milliGRAM(s) Oral at bedtime PRN  ondansetron Injectable 4 milliGRAM(s) IV Push every 8 hours PRN  polyethylene glycol 3350 17 Gram(s) Oral daily  senna Syrup 10 milliLiter(s) Oral daily  silver sulfADIAZINE 1% Cream 1 Application(s) Topical two times a day  tamsulosin 0.4 milliGRAM(s) Oral at bedtime    acetaminophen    Suspension .. 650 milliGRAM(s) Oral every 6 hours PRN  albuterol/ipratropium for Nebulization 3 milliLiter(s) Nebulizer every 6 hours  aluminum hydroxide/magnesium hydroxide/simethicone Suspension 30 milliLiter(s) Oral every 4 hours PRN  amantadine Syrup 100 milliGRAM(s) Oral two times a day  aspirin  chewable 81 milliGRAM(s) Oral daily  enoxaparin Injectable 40 milliGRAM(s) SubCutaneous every 24 hours  finasteride 5 milliGRAM(s) Oral daily  insulin glargine Injectable (LANTUS) 12 Unit(s) SubCutaneous every morning  insulin glargine Injectable (LANTUS) 12 Unit(s) SubCutaneous at bedtime  insulin lispro (ADMELOG) corrective regimen sliding scale   SubCutaneous every 6 hours  insulin lispro Injectable (ADMELOG) 6 Unit(s) SubCutaneous every 6 hours  melatonin 3 milliGRAM(s) Oral at bedtime PRN  meropenem  IVPB      meropenem  IVPB 1000 milliGRAM(s) IV Intermittent every 8 hours  ondansetron Injectable 4 milliGRAM(s) IV Push every 8 hours PRN  polyethylene glycol 3350 17 Gram(s) Oral daily  senna Syrup 10 milliLiter(s) Oral daily  silver sulfADIAZINE 1% Cream 1 Application(s) Topical two times a day  tamsulosin 0.4 milliGRAM(s) Oral at bedtime    Drug Dosing Weight  Height (cm): 177.8 (05 Mar 2023 03:36)  Weight (kg): 86.2 (05 Mar 2023 03:36)  BMI (kg/m2): 27.3 (05 Mar 2023 03:36)  BSA (m2): 2.04 (05 Mar 2023 03:36)    CENTRAL LINE: [ ] YES [x ] NO  LOCATION:   DATE INSERTED:  REMOVE: [ ] YES [ ] NO  EXPLAIN:    MELCHOR: [x ] YES [ ] NO    DATE INSERTED:  REMOVE:  [ ] YES [ ] NO  EXPLAIN: Urinary retention    PAST MEDICAL & SURGICAL HISTORY:  History of subdural hemorrhage      PEG (percutaneous endoscopic gastrostomy) status      DM (diabetes mellitus)      S/P craniotomy                  03-29 @ 07:01  -  03-30 @ 07:00  --------------------------------------------------------  IN: 0 mL / OUT: 900 mL / NET: -900 mL            PHYSICAL EXAM:    GENERAL: NAD, well-groomed, well-developed  HEAD:  + rt parietal deformity from craniotomy   EYES: PERRLA, conjunctiva and sclera clear  ENMT: No tonsillar erythema, exudates, or enlargement; Moist mucous membranes, Good dentition, No lesions  NECK: + trach, Supple, No JVD, Normal thyroid  NERVOUS SYSTEM:  Awake, does not follow commands, no voluntary movements   CHEST/LUNG: b/l fields with decreased bs w/o rales, rhonchi, wheezing, or rubs  HEART: Regular rate and rhythm; No murmurs, rubs, or gallops  ABDOMEN: Soft, Nontender, Nondistended; Bowel sounds present  EXTREMITIES:  2+ Peripheral Pulses, No clubbing, cyanosis, or edema  LYMPH: No lymphadenopathy noted  SKIN: stage I decub b/l heels and sacral     LABS:  CBC Full  -  ( 30 Mar 2023 06:44 )  WBC Count : 8.29 K/uL  RBC Count : 3.16 M/uL  Hemoglobin : 8.8 g/dL  Hematocrit : 29.4 %  Platelet Count - Automated : 250 K/uL  Mean Cell Volume : 93.0 fl  Mean Cell Hemoglobin : 27.8 pg  Mean Cell Hemoglobin Concentration : 29.9 gm/dL  Auto Neutrophil # : x  Auto Lymphocyte # : x  Auto Monocyte # : x  Auto Eosinophil # : x  Auto Basophil # : x  Auto Neutrophil % : x  Auto Lymphocyte % : x  Auto Monocyte % : x  Auto Eosinophil % : x  Auto Basophil % : x    03-30    145  |  114<H>  |  32<H>  ----------------------------<  138<H>  3.9   |  26  |  0.70    Ca    9.1      30 Mar 2023 06:44  Phos  3.2     03-30  Mg     2.6     03-30    TPro  7.3  /  Alb  2.2<L>  /  TBili  0.5  /  DBili  x   /  AST  42<H>  /  ALT  59  /  AlkPhos  242<H>  03-30              [  ]  DVT Prophylaxis  [  ]  Nutrition, Brand, Rate         Goal Rate        Abnormal Nutritional Status -  Malnutrition   Cachexia      Morbid Obesity BMI >/=40    RADIOLOGY & ADDITIONAL STUDIES:  ***    Goals of Care Discussion with Family/Proxy/Other   - see note from/family meeting set up for...

## 2023-03-30 NOTE — PROGRESS NOTE ADULT - PROBLEM SELECTOR PLAN 3
Secondary to SDH and craniotomy  Craniotomy at [facility] 1/23  Maintain safety precautions  Strict aspiration precautions.  Seizure precautions.

## 2023-03-30 NOTE — PROGRESS NOTE ADULT - PROBLEM SELECTOR PLAN 11
DVT and GI prophylaxis.  HydroTrach on RA and PEG  F/U final cultures.  First part of [place] scan completed. Scan to be completed today. F/U results.  Afebrile overnight  Will continue meropenem until results of scan are back. DVT and GI prophylaxis.  HydroTrach on RA and PEG  F/U final cultures.  First part of [place] scan completed. Scan to be completed today.   [place]-labeled leukocyte scan demonstrates:   Faint soft tissues accumulation in the left upper thigh lateral to the   left hip may represent inflammation/infection. Please correlate   clinically.  Afebrile overnight  Will continue meropenem until 3/31.

## 2023-03-30 NOTE — CHART NOTE - NSCHARTNOTESELECT_GEN_ALL_CORE
Family Update Note/Event Note
Family Update Note/Event Note
Family Update/Event Note
Family Update/Event Note
Chart Note/Event Note
Elevated temp/Event Note
Event Note
Family Update Note/Event Note
Family Update/Event Note
Hematuria/Event Note
Nutrition Services
Nutrition Services

## 2023-03-30 NOTE — PROGRESS NOTE ADULT - ASSESSMENT
62M from [facility], h/o traumatic subdural hemorrhage following a fall down the stairs while drunk, s/p r craniotomy at [facility] in 01/2023, s/p trach/PEG BIBEMS for tachycardia 170s, RR 27, increased secretions admitted to  for sepsis secondary to RUL pneumonia with tracheostomy to 3L NC.  3/6 Febrile 101.7  03/07: No events overnight. On [place]-trach at 3 L and tolerating well. Hemodynamically stable. Cultures in progress. C/w Vanco and Cefepime. F/u Vanco trough. Hypokalemic this AM and replacement ordered.   03/08: Afebrile. Vanco discontinued since MRSA negative. + yeast in sputum; + Candida Galbrata in urine (Bran in place with clear urine and afebrile).   Dr. Peres following. Blood cx NGTD. C/w Cefepime.   03/09: Febrile, tachycardic and tachypneic this AM. Code sepsis called. Sepsis w/u in progress.  Started on Vanco. ID following, . Discussed with Dr. Peres. F/u CXR. Worsening Leukocytosis. IVF bolus with LR (2,300 ml)  per protocol. Lactate 1.9.   3/11: afebrile over past 24hrs, hemodynamically stable. Vanco trough 11.7  03/12: Vanco trough this PM. Blood cx remains NGTD. C/w Vanco and Cefepime. ID following. Persistent Transaminitis.  Hep C non-reactive. F/u remaining Hepatitis panel. Patient on high dose statin which could be contributing to transaminitis. Hold for now and re-eval. Given that patient is unable to verbalize/demonstrate if symptomatic will order Liver US.   03/13: Spiked fevers overnight. Tachycardic and hypotensive. IVF bolus given. Blood cx in progress.  F/u Procal and Lactate. Vanco increased to 1500 mg BID and Meropenem added and Cefepime discontinued Discussed wih Dr. Peres and reccs appreciated  Worsening leukocytosis Pending Abdominal US 2/2 transaminitis.   03/14: Febrile. Urinary retention. On Flomax. Bladder scan and Straight cath Q 4-6 hours. On Vanco and Jose Elias. F/u cx. Abd US unremarkable. CXR 3/13 negative for acute findings.   3/15 Bladder scan 550. Bran cath reinserted.  3/23/23 Patient febrile overnight 100.5 likely central fever, no leukocytosis  3/24/23 Patient febrile 102.3 this AM. Patient blood ans sputum culture and UA sent, chest x-ray, RVP ordered. Will start Meropenem, ID aware.  3/25/23 TOV attempted yesterday, bladder scan 600ml, straight cath, today 550ml, straight cath, continue bladder scan q6h.   3/27  Continues to retain large amount of urine. Last bladder scan 600ml. Bran cath reinserted.   62M from [facility], h/o traumatic subdural hemorrhage following a fall down the stairs while drunk, s/p r craniotomy at [facility] in 01/2023, s/p trach/PEG BIBEMS for tachycardia 170s, RR 27, increased secretions admitted to  for sepsis secondary to RUL pneumonia with tracheostomy to 3L NC.  3/6 Febrile 101.7  03/07: No events overnight. On [place]-trach at 3 L and tolerating well. Hemodynamically stable. Cultures in progress. C/w Vanco and Cefepime. F/u Vanco trough. Hypokalemic this AM and replacement ordered.   03/08: Afebrile. Vanco discontinued since MRSA negative. + yeast in sputum; + Candida Galbrata in urine (Bran in place with clear urine and afebrile).   Dr. Peres following. Blood cx NGTD. C/w Cefepime.   03/09: Febrile, tachycardic and tachypneic this AM. Code sepsis called. Sepsis w/u in progress.  Started on Vanco. ID following, . Discussed with Dr. Peres. F/u CXR. Worsening Leukocytosis. IVF bolus with LR (2,300 ml)  per protocol. Lactate 1.9.   3/11: afebrile over past 24hrs, hemodynamically stable. Vanco trough 11.7  03/12: Vanco trough this PM. Blood cx remains NGTD. C/w Vanco and Cefepime. ID following. Persistent Transaminitis.  Hep C non-reactive. F/u remaining Hepatitis panel. Patient on high dose statin which could be contributing to transaminitis. Hold for now and re-eval. Given that patient is unable to verbalize/demonstrate if symptomatic will order Liver US.   03/13: Spiked fevers overnight. Tachycardic and hypotensive. IVF bolus given. Blood cx in progress.  F/u Procal and Lactate. Vanco increased to 1500 mg BID and Meropenem added and Cefepime discontinued Discussed wih Dr. Peres and reccs appreciated  Worsening leukocytosis Pending Abdominal US 2/2 transaminitis.   03/14: Febrile. Urinary retention. On Flomax. Bladder scan and Straight cath Q 4-6 hours. On Vanco and Jose Elias. F/u cx. Abd US unremarkable. CXR 3/13 negative for acute findings.   3/15 Bladder scan 550. Bran cath reinserted.  3/23/23 Patient febrile overnight 100.5 likely central fever, no leukocytosis  3/24/23 Patient febrile 102.3 this AM. Patient blood ans sputum culture and UA sent, chest x-ray, RVP ordered. Will start Meropenem, ID aware.  3/25/23 TOV attempted yesterday, bladder scan 600ml, straight cath, today 550ml, straight cath, continue bladder scan q6h.   3/27  Continues to retain large amount of urine. Last bladder scan 600ml. Bran cath reinserted.  3/30- No acute events overnight, hold body scan report noted for Faint soft tissues accumulation in the left upper thigh lateral to the   left hip may represent inflammation/infection. Please correlate clinically. Left lateral thigh exam is not consistent with obvious infectious process at this time, area if w/o tenderness or erythema.  D/c planning ongoing.

## 2023-03-31 VITALS
DIASTOLIC BLOOD PRESSURE: 95 MMHG | TEMPERATURE: 98 F | SYSTOLIC BLOOD PRESSURE: 144 MMHG | HEART RATE: 95 BPM | RESPIRATION RATE: 18 BRPM | OXYGEN SATURATION: 99 %

## 2023-03-31 LAB
ALBUMIN SERPL ELPH-MCNC: 2.2 G/DL — LOW (ref 3.5–5)
ALP SERPL-CCNC: 231 U/L — HIGH (ref 40–120)
ALT FLD-CCNC: 54 U/L DA — SIGNIFICANT CHANGE UP (ref 10–60)
ANION GAP SERPL CALC-SCNC: 2 MMOL/L — LOW (ref 5–17)
AST SERPL-CCNC: 36 U/L — SIGNIFICANT CHANGE UP (ref 10–40)
BILIRUB SERPL-MCNC: 0.5 MG/DL — SIGNIFICANT CHANGE UP (ref 0.2–1.2)
BUN SERPL-MCNC: 27 MG/DL — HIGH (ref 7–18)
CALCIUM SERPL-MCNC: 9 MG/DL — SIGNIFICANT CHANGE UP (ref 8.4–10.5)
CHLORIDE SERPL-SCNC: 114 MMOL/L — HIGH (ref 96–108)
CO2 SERPL-SCNC: 27 MMOL/L — SIGNIFICANT CHANGE UP (ref 22–31)
CREAT SERPL-MCNC: 0.65 MG/DL — SIGNIFICANT CHANGE UP (ref 0.5–1.3)
EGFR: 108 ML/MIN/1.73M2 — SIGNIFICANT CHANGE UP
GLUCOSE SERPL-MCNC: 140 MG/DL — HIGH (ref 70–99)
HCT VFR BLD CALC: 28.4 % — LOW (ref 39–50)
HGB BLD-MCNC: 8.5 G/DL — LOW (ref 13–17)
MAGNESIUM SERPL-MCNC: 2.5 MG/DL — SIGNIFICANT CHANGE UP (ref 1.6–2.6)
MCHC RBC-ENTMCNC: 27.9 PG — SIGNIFICANT CHANGE UP (ref 27–34)
MCHC RBC-ENTMCNC: 29.9 GM/DL — LOW (ref 32–36)
MCV RBC AUTO: 93.1 FL — SIGNIFICANT CHANGE UP (ref 80–100)
NRBC # BLD: 0 /100 WBCS — SIGNIFICANT CHANGE UP (ref 0–0)
PHOSPHATE SERPL-MCNC: 3.1 MG/DL — SIGNIFICANT CHANGE UP (ref 2.5–4.5)
PLATELET # BLD AUTO: 247 K/UL — SIGNIFICANT CHANGE UP (ref 150–400)
POTASSIUM SERPL-MCNC: 3.7 MMOL/L — SIGNIFICANT CHANGE UP (ref 3.5–5.3)
POTASSIUM SERPL-SCNC: 3.7 MMOL/L — SIGNIFICANT CHANGE UP (ref 3.5–5.3)
PROT SERPL-MCNC: 7.2 G/DL — SIGNIFICANT CHANGE UP (ref 6–8.3)
RBC # BLD: 3.05 M/UL — LOW (ref 4.2–5.8)
RBC # FLD: 16.1 % — HIGH (ref 10.3–14.5)
SARS-COV-2 RNA SPEC QL NAA+PROBE: SIGNIFICANT CHANGE UP
SODIUM SERPL-SCNC: 143 MMOL/L — SIGNIFICANT CHANGE UP (ref 135–145)
WBC # BLD: 6.96 K/UL — SIGNIFICANT CHANGE UP (ref 3.8–10.5)
WBC # FLD AUTO: 6.96 K/UL — SIGNIFICANT CHANGE UP (ref 3.8–10.5)

## 2023-03-31 PROCEDURE — 85027 COMPLETE CBC AUTOMATED: CPT

## 2023-03-31 PROCEDURE — 71045 X-RAY EXAM CHEST 1 VIEW: CPT

## 2023-03-31 PROCEDURE — 86803 HEPATITIS C AB TEST: CPT

## 2023-03-31 PROCEDURE — 87899 AGENT NOS ASSAY W/OPTIC: CPT

## 2023-03-31 PROCEDURE — 86850 RBC ANTIBODY SCREEN: CPT

## 2023-03-31 PROCEDURE — 87340 HEPATITIS B SURFACE AG IA: CPT

## 2023-03-31 PROCEDURE — 93005 ELECTROCARDIOGRAM TRACING: CPT

## 2023-03-31 PROCEDURE — 87640 STAPH A DNA AMP PROBE: CPT

## 2023-03-31 PROCEDURE — 84484 ASSAY OF TROPONIN QUANT: CPT

## 2023-03-31 PROCEDURE — 86140 C-REACTIVE PROTEIN: CPT

## 2023-03-31 PROCEDURE — 85025 COMPLETE CBC W/AUTO DIFF WBC: CPT

## 2023-03-31 PROCEDURE — 80202 ASSAY OF VANCOMYCIN: CPT

## 2023-03-31 PROCEDURE — 87641 MR-STAPH DNA AMP PROBE: CPT

## 2023-03-31 PROCEDURE — 36415 COLL VENOUS BLD VENIPUNCTURE: CPT

## 2023-03-31 PROCEDURE — 99285 EMERGENCY DEPT VISIT HI MDM: CPT

## 2023-03-31 PROCEDURE — 84100 ASSAY OF PHOSPHORUS: CPT

## 2023-03-31 PROCEDURE — 82962 GLUCOSE BLOOD TEST: CPT

## 2023-03-31 PROCEDURE — 81001 URINALYSIS AUTO W/SCOPE: CPT

## 2023-03-31 PROCEDURE — 85730 THROMBOPLASTIN TIME PARTIAL: CPT

## 2023-03-31 PROCEDURE — 85610 PROTHROMBIN TIME: CPT

## 2023-03-31 PROCEDURE — 82728 ASSAY OF FERRITIN: CPT

## 2023-03-31 PROCEDURE — 85652 RBC SED RATE AUTOMATED: CPT

## 2023-03-31 PROCEDURE — 86901 BLOOD TYPING SEROLOGIC RH(D): CPT

## 2023-03-31 PROCEDURE — 86705 HEP B CORE ANTIBODY IGM: CPT

## 2023-03-31 PROCEDURE — 86900 BLOOD TYPING SEROLOGIC ABO: CPT

## 2023-03-31 PROCEDURE — 84145 PROCALCITONIN (PCT): CPT

## 2023-03-31 PROCEDURE — 94760 N-INVAS EAR/PLS OXIMETRY 1: CPT

## 2023-03-31 PROCEDURE — 87086 URINE CULTURE/COLONY COUNT: CPT

## 2023-03-31 PROCEDURE — 76705 ECHO EXAM OF ABDOMEN: CPT

## 2023-03-31 PROCEDURE — 83550 IRON BINDING TEST: CPT

## 2023-03-31 PROCEDURE — 83540 ASSAY OF IRON: CPT

## 2023-03-31 PROCEDURE — 87637 SARSCOV2&INF A&B&RSV AMP PRB: CPT

## 2023-03-31 PROCEDURE — 82803 BLOOD GASES ANY COMBINATION: CPT

## 2023-03-31 PROCEDURE — 86738 MYCOPLASMA ANTIBODY: CPT

## 2023-03-31 PROCEDURE — 80053 COMPREHEN METABOLIC PANEL: CPT

## 2023-03-31 PROCEDURE — 0225U NFCT DS DNA&RNA 21 SARSCOV2: CPT

## 2023-03-31 PROCEDURE — 74177 CT ABD & PELVIS W/CONTRAST: CPT

## 2023-03-31 PROCEDURE — 83605 ASSAY OF LACTIC ACID: CPT

## 2023-03-31 PROCEDURE — 94640 AIRWAY INHALATION TREATMENT: CPT

## 2023-03-31 PROCEDURE — 99233 SBSQ HOSP IP/OBS HIGH 50: CPT

## 2023-03-31 PROCEDURE — 71260 CT THORAX DX C+: CPT

## 2023-03-31 PROCEDURE — 78802 RP LOCLZJ TUM WHBDY 1 D IMG: CPT

## 2023-03-31 PROCEDURE — 87070 CULTURE OTHR SPECIMN AEROBIC: CPT

## 2023-03-31 PROCEDURE — 87040 BLOOD CULTURE FOR BACTERIA: CPT

## 2023-03-31 PROCEDURE — 87449 NOS EACH ORGANISM AG IA: CPT

## 2023-03-31 PROCEDURE — 97163 PT EVAL HIGH COMPLEX 45 MIN: CPT

## 2023-03-31 PROCEDURE — 87635 SARS-COV-2 COVID-19 AMP PRB: CPT

## 2023-03-31 PROCEDURE — 86709 HEPATITIS A IGM ANTIBODY: CPT

## 2023-03-31 PROCEDURE — 87077 CULTURE AEROBIC IDENTIFY: CPT

## 2023-03-31 PROCEDURE — 86708 HEPATITIS A ANTIBODY: CPT

## 2023-03-31 PROCEDURE — 83735 ASSAY OF MAGNESIUM: CPT

## 2023-03-31 RX ORDER — DOXAZOSIN MESYLATE 4 MG
1 TABLET ORAL
Qty: 0 | Refills: 0 | DISCHARGE

## 2023-03-31 RX ORDER — FINASTERIDE 5 MG/1
1 TABLET, FILM COATED ORAL
Qty: 0 | Refills: 0 | DISCHARGE
Start: 2023-03-31

## 2023-03-31 RX ORDER — NICOTINE POLACRILEX 2 MG
1 GUM BUCCAL
Qty: 0 | Refills: 0 | DISCHARGE

## 2023-03-31 RX ORDER — INSULIN GLARGINE 100 [IU]/ML
12 INJECTION, SOLUTION SUBCUTANEOUS
Qty: 0 | Refills: 0 | DISCHARGE
Start: 2023-03-31

## 2023-03-31 RX ORDER — INSULIN GLARGINE 100 [IU]/ML
12 INJECTION, SOLUTION SUBCUTANEOUS
Qty: 1 | Refills: 0
Start: 2023-03-31 | End: 2023-04-29

## 2023-03-31 RX ORDER — TAMSULOSIN HYDROCHLORIDE 0.4 MG/1
1 CAPSULE ORAL
Qty: 0 | Refills: 0 | DISCHARGE
Start: 2023-03-31

## 2023-03-31 RX ORDER — INSULIN LISPRO 100/ML
0 VIAL (ML) SUBCUTANEOUS
Qty: 0 | Refills: 0 | DISCHARGE

## 2023-03-31 RX ORDER — SODIUM CHLORIDE 9 MG/ML
3 INJECTION INTRAMUSCULAR; INTRAVENOUS; SUBCUTANEOUS
Qty: 0 | Refills: 0 | DISCHARGE

## 2023-03-31 RX ORDER — INSULIN LISPRO 100/ML
1 VIAL (ML) SUBCUTANEOUS
Qty: 1 | Refills: 0
Start: 2023-03-31 | End: 2023-04-29

## 2023-03-31 RX ORDER — INSULIN GLARGINE 100 [IU]/ML
18 INJECTION, SOLUTION SUBCUTANEOUS
Qty: 0 | Refills: 0 | DISCHARGE

## 2023-03-31 RX ORDER — INSULIN LISPRO 100/ML
10 VIAL (ML) SUBCUTANEOUS
Qty: 0 | Refills: 0 | DISCHARGE

## 2023-03-31 RX ORDER — ATORVASTATIN CALCIUM 80 MG/1
1 TABLET, FILM COATED ORAL
Qty: 0 | Refills: 0 | DISCHARGE

## 2023-03-31 RX ORDER — IPRATROPIUM/ALBUTEROL SULFATE 18-103MCG
3 AEROSOL WITH ADAPTER (GRAM) INHALATION
Qty: 0 | Refills: 0 | DISCHARGE
Start: 2023-03-31

## 2023-03-31 RX ORDER — INSULIN LISPRO 100/ML
6 VIAL (ML) SUBCUTANEOUS
Qty: 0 | Refills: 0 | DISCHARGE
Start: 2023-03-31

## 2023-03-31 RX ADMIN — Medication 3 MILLILITER(S): at 02:27

## 2023-03-31 RX ADMIN — Medication 1 APPLICATION(S): at 05:49

## 2023-03-31 RX ADMIN — POLYETHYLENE GLYCOL 3350 17 GRAM(S): 17 POWDER, FOR SOLUTION ORAL at 11:55

## 2023-03-31 RX ADMIN — ENOXAPARIN SODIUM 40 MILLIGRAM(S): 100 INJECTION SUBCUTANEOUS at 11:54

## 2023-03-31 RX ADMIN — Medication 6 UNIT(S): at 12:15

## 2023-03-31 RX ADMIN — Medication 3 MILLILITER(S): at 14:26

## 2023-03-31 RX ADMIN — Medication 100 MILLIGRAM(S): at 05:49

## 2023-03-31 RX ADMIN — Medication 81 MILLIGRAM(S): at 11:55

## 2023-03-31 RX ADMIN — SENNA PLUS 10 MILLILITER(S): 8.6 TABLET ORAL at 11:55

## 2023-03-31 RX ADMIN — MEROPENEM 100 MILLIGRAM(S): 1 INJECTION INTRAVENOUS at 05:48

## 2023-03-31 RX ADMIN — Medication 1 APPLICATION(S): at 17:12

## 2023-03-31 RX ADMIN — Medication 3 MILLILITER(S): at 08:04

## 2023-03-31 RX ADMIN — MEROPENEM 100 MILLIGRAM(S): 1 INJECTION INTRAVENOUS at 13:31

## 2023-03-31 RX ADMIN — FINASTERIDE 5 MILLIGRAM(S): 5 TABLET, FILM COATED ORAL at 11:55

## 2023-03-31 RX ADMIN — INSULIN GLARGINE 12 UNIT(S): 100 INJECTION, SOLUTION SUBCUTANEOUS at 08:26

## 2023-03-31 RX ADMIN — Medication 6 UNIT(S): at 00:10

## 2023-03-31 RX ADMIN — Medication 100 MILLIGRAM(S): at 17:11

## 2023-03-31 RX ADMIN — Medication 6 UNIT(S): at 05:59

## 2023-03-31 NOTE — DISCHARGE NOTE NURSING/CASE MANAGEMENT/SOCIAL WORK - PATIENT PORTAL LINK FT
You can access the FollowMyHealth Patient Portal offered by [facility] by registering at the following website: [email or web address]. By joining [employer]’s FollowMyHealth portal, you will also be able to view your health information using other applications (apps) compatible with our system.

## 2023-03-31 NOTE — PROGRESS NOTE ADULT - PROBLEM SELECTOR PROBLEM 1
Sepsis
Acute on chronic respiratory failure with hypoxia
Sepsis
Sepsis
Acute on chronic respiratory failure with hypoxia
Sepsis
Sepsis
Acute on chronic respiratory failure with hypoxia
Sepsis
Acute on chronic respiratory failure with hypoxia
Acute on chronic respiratory failure with hypoxia
Sepsis
Acute on chronic respiratory failure with hypoxia
Sepsis
Acute on chronic respiratory failure with hypoxia
Sepsis
Acute on chronic respiratory failure with hypoxia
Sepsis
Acute on chronic respiratory failure with hypoxia

## 2023-03-31 NOTE — PROGRESS NOTE ADULT - PROBLEM/PLAN-9
DISPLAY PLAN FREE TEXT
no

## 2023-03-31 NOTE — PROGRESS NOTE ADULT - PROVIDER SPECIALTY LIST ADULT
Critical Care
Infectious Disease
Pulmonology
Critical Care
Infectious Disease
Pulmonology
Infectious Disease
Pulmonology
Internal Medicine
Critical Care
Infectious Disease
Internal Medicine
Critical Care
Internal Medicine
Critical Care
Critical Care
Internal Medicine
Critical Care
Internal Medicine
Critical Care
Internal Medicine
Critical Care
Internal Medicine
Critical Care

## 2023-03-31 NOTE — PROGRESS NOTE ADULT - PROBLEM SELECTOR PROBLEM 10
Advance care planning
Severe protein-calorie malnutrition
Advance care planning
Severe protein-calorie malnutrition
Severe protein-calorie malnutrition
Advance care planning
Severe protein-calorie malnutrition
Advance care planning
Diabetes
Severe protein-calorie malnutrition
Severe protein-calorie malnutrition
Urinary retention
Diabetes
Severe protein-calorie malnutrition
Advance care planning
Urinary retention
Advance care planning
Severe protein-calorie malnutrition

## 2023-03-31 NOTE — PROGRESS NOTE ADULT - SUBJECTIVE AND OBJECTIVE BOX
WILMA NICOLE    SCU progress note    INTERVAL HPI/OVERNIGHT EVENTS: no overnight event    DNR [ ]   DNI  [  ] Full code    Covid - 19 PCR: negative 3/25    The 4Ms    What Matters Most: see GOC  Age appropriate Medications/Screen for High Risk Medication: Yes  Mentation: see CAM below  Mobility: defer to physical exam    The Confusion Assessment Method (CAM) Diagnostic Algorithm     1: Acute Onset or Fluctuating Course  - Is there evidence of an acute change in mental status from the patient’s baseline? Did the (abnormal) behavior  fluctuate during the day, that is, tend to come and go, or increase and decrease in severity?  [ ] YES [x ] NO      2: Inattention  - Did the patient have difficulty focusing attention, being easily distractible, or having difficulty keeping track of what was being said?  [ ] YES [ ] NO unable to assess     3: Disorganized thinking  -Was the patient’s thinking disorganized or incoherent, such as rambling or irrelevant conversation, unclear or illogical flow of ideas, or unpredictable switching from subject to subject?  [ ] YES [ ] NO unable to assess    4: Altered Level of consciousness?  [ ] YES [ ] NO unable to assess      The diagnosis of delirium by CAM requires the presence of features 1 and 2 and either 3 or 4.    PRESSORS: [ ] YES [x ] NO  meropenem  IVPB      meropenem  IVPB 1000 milliGRAM(s) IV Intermittent every 8 hours    Cardiovascular:  Heart Failure  Acute   Acute on Chronic  Chronic         Pulmonary:  albuterol/ipratropium for Nebulization 3 milliLiter(s) Nebulizer every 6 hours    Hematalogic:  aspirin  chewable 81 milliGRAM(s) Oral daily  enoxaparin Injectable 40 milliGRAM(s) SubCutaneous every 24 hours    Other:  acetaminophen    Suspension .. 650 milliGRAM(s) Oral every 6 hours PRN  aluminum hydroxide/magnesium hydroxide/simethicone Suspension 30 milliLiter(s) Oral every 4 hours PRN  amantadine Syrup 100 milliGRAM(s) Oral two times a day  finasteride 5 milliGRAM(s) Oral daily  insulin glargine Injectable (LANTUS) 12 Unit(s) SubCutaneous every morning  insulin glargine Injectable (LANTUS) 12 Unit(s) SubCutaneous at bedtime  insulin lispro (ADMELOG) corrective regimen sliding scale   SubCutaneous every 6 hours  insulin lispro Injectable (ADMELOG) 6 Unit(s) SubCutaneous every 6 hours  melatonin 3 milliGRAM(s) Oral at bedtime PRN  ondansetron Injectable 4 milliGRAM(s) IV Push every 8 hours PRN  polyethylene glycol 3350 17 Gram(s) Oral daily  senna Syrup 10 milliLiter(s) Oral daily  silver sulfADIAZINE 1% Cream 1 Application(s) Topical two times a day  tamsulosin 0.4 milliGRAM(s) Oral at bedtime    acetaminophen    Suspension .. 650 milliGRAM(s) Oral every 6 hours PRN  albuterol/ipratropium for Nebulization 3 milliLiter(s) Nebulizer every 6 hours  aluminum hydroxide/magnesium hydroxide/simethicone Suspension 30 milliLiter(s) Oral every 4 hours PRN  amantadine Syrup 100 milliGRAM(s) Oral two times a day  aspirin  chewable 81 milliGRAM(s) Oral daily  enoxaparin Injectable 40 milliGRAM(s) SubCutaneous every 24 hours  finasteride 5 milliGRAM(s) Oral daily  insulin glargine Injectable (LANTUS) 12 Unit(s) SubCutaneous every morning  insulin glargine Injectable (LANTUS) 12 Unit(s) SubCutaneous at bedtime  insulin lispro (ADMELOG) corrective regimen sliding scale   SubCutaneous every 6 hours  insulin lispro Injectable (ADMELOG) 6 Unit(s) SubCutaneous every 6 hours  melatonin 3 milliGRAM(s) Oral at bedtime PRN  meropenem  IVPB      meropenem  IVPB 1000 milliGRAM(s) IV Intermittent every 8 hours  ondansetron Injectable 4 milliGRAM(s) IV Push every 8 hours PRN  polyethylene glycol 3350 17 Gram(s) Oral daily  senna Syrup 10 milliLiter(s) Oral daily  silver sulfADIAZINE 1% Cream 1 Application(s) Topical two times a day  tamsulosin 0.4 milliGRAM(s) Oral at bedtime    Drug Dosing Weight  Height (cm): 177.8 (05 Mar 2023 03:36)  Weight (kg): 86.2 (05 Mar 2023 03:36)  BMI (kg/m2): 27.3 (05 Mar 2023 03:36)  BSA (m2): 2.04 (05 Mar 2023 03:36)    CENTRAL LINE: [ ] YES [x ] NO  LOCATION:   DATE INSERTED:  REMOVE: [ ] YES [ ] NO  EXPLAIN:    MELCHOR: [x ] YES [ ] NO    DATE INSERTED: reinserted 3/27  REMOVE:  [ ] YES [x ] NO  EXPLAIN: Failed TOV    PAST MEDICAL & SURGICAL HISTORY:  History of subdural hemorrhage      PEG (percutaneous endoscopic gastrostomy) status      DM (diabetes mellitus)      S/P craniotomy      03-30 @ 07:01  -  03-31 @ 07:00  --------------------------------------------------------  IN: 0 mL / OUT: 1175 mL / NET: -1175 mL      PHYSICAL EXAM:  GENERAL: NAD, well-groomed, well-developed  HEAD:  + R parietal deformity from craniotomy   EYES: PERRLA, conjunctiva and sclera clear  ENMT: No tonsillar erythema, exudates, or enlargement; Moist mucous membranes, Good dentition, No lesions  NECK: + trach, Supple, No JVD, Normal thyroid  NERVOUS SYSTEM:  Awake, open eyes spontaneously,  does not follow commands, no voluntary movements   CHEST/LUNG: b/l fields with decreased bs w/o rales, rhonchi, wheezing, or rubs  HEART: Regular rate and rhythm; No murmurs, rubs, or gallops  ABDOMEN: Soft, Nontender, Nondistended; Bowel sounds present, + Peg  EXTREMITIES:  2+ Peripheral Pulses, No clubbing, cyanosis, or edema  LYMPH: No lymphadenopathy noted  SKIN: stage I decub b/l heels and sacral         LABS:  CBC Full  -  ( 31 Mar 2023 06:50 )  WBC Count : 6.96 K/uL  RBC Count : 3.05 M/uL  Hemoglobin : 8.5 g/dL  Hematocrit : 28.4 %  Platelet Count - Automated : 247 K/uL  Mean Cell Volume : 93.1 fl  Mean Cell Hemoglobin : 27.9 pg  Mean Cell Hemoglobin Concentration : 29.9 gm/dL    03-31    143  |  114<H>  |  27<H>  ----------------------------<  140<H>  3.7   |  27  |  0.65    Ca    9.0      31 Mar 2023 06:50  Phos  3.1     03-31  Mg     2.5     03-31    TPro  7.2  /  Alb  2.2<L>  /  TBili  0.5  /  DBili  x   /  AST  36  /  ALT  54  /  AlkPhos  231<H>  03-31              [  ]  DVT Prophylaxis  [  ]  Nutrition, Brand, Rate         Goal Rate        Abnormal Nutritional Status -  Malnutrition   Cachexia      Morbid Obesity BMI >/=40    RADIOLOGY & ADDITIONAL STUDIES:    no new imaging    Goals of Care Discussion with Family/Proxy/Other   - see note from/family meeting set up for...

## 2023-03-31 NOTE — PROGRESS NOTE ADULT - NS ATTEND OPT1A GEN_ALL_CORE
Medical decision making

## 2023-03-31 NOTE — PROGRESS NOTE ADULT - REASON FOR ADMISSION
Sepsis secondary to pneumonia

## 2023-03-31 NOTE — PROGRESS NOTE ADULT - PROBLEM SELECTOR PLAN 2
Secondary to aspiration pneumonia.  Will continue meropenem until results of Indium scan are available.  All cultures negative. Procalcitonin slightly elevated.  CXR and CT chest no pna visualized.  First part of Indium scan completed yesterday. Scan scheduled for this morning.   Indium scan report noted.

## 2023-03-31 NOTE — PROGRESS NOTE ADULT - PROBLEM SELECTOR PROBLEM 6
Hypertension
Diabetes
Transaminitis
Diabetes
Diabetes
Hypertension
Transaminitis
Hypertension
Diabetes
Diabetes
Hypertension
Diabetes
Hypertension
Diabetes
Diabetes
Hypertension
Diabetes

## 2023-03-31 NOTE — PROGRESS NOTE ADULT - NS_MD_PANP_GEN_ALL_CORE

## 2023-03-31 NOTE — PROGRESS NOTE ADULT - PROBLEM SELECTOR PROBLEM 5
Hypertension
Hypertension
Acute encephalopathy
Hypertension
Anemia of chronic disease
Hypertension
Anemia of chronic disease
Hypertension
Anemia of chronic disease
Hypertension
Hypertension
Anemia of chronic disease
Hypertension
Anemia of chronic disease
Hypertension
Acute encephalopathy
Hypertension
Anemia of chronic disease

## 2023-03-31 NOTE — PROGRESS NOTE ADULT - PROBLEM SELECTOR PROBLEM 7
Anemia of chronic disease
Hypokalemia
Anemia of chronic disease
Anemia of chronic disease
Hypokalemia
Anemia of chronic disease
Diabetes
Anemia of chronic disease
Hypokalemia
Anemia of chronic disease
Hypokalemia
Anemia of chronic disease
Hypokalemia
Anemia of chronic disease

## 2023-03-31 NOTE — PROGRESS NOTE ADULT - PROBLEM SELECTOR PROBLEM 11
Advance care planning
Functional quadriplegia
Advance care planning
Functional quadriplegia

## 2023-03-31 NOTE — PROGRESS NOTE ADULT - PROBLEM SELECTOR PLAN 11
DVT and GI prophylaxis.  HydroTrach on RA and PEG  F/U final cultures.  First part of Indium scan completed. Scan to be completed today.   Indium-labeled leukocyte scan demonstrates:   Faint soft tissues accumulation in the left upper thigh lateral to the   left hip may represent inflammation/infection. Please correlate   clinically.  Afebrile overnight  Will continue meropenem until 3/31.

## 2023-03-31 NOTE — PROGRESS NOTE ADULT - ASSESSMENT
62M from [facility], h/o traumatic subdural hemorrhage following a fall down the stairs while drunk, s/p r craniotomy at [facility] in 01/2023, s/p trach/PEG BIBEMS for tachycardia 170s, RR 27, increased secretions admitted to  for sepsis secondary to RUL pneumonia with tracheostomy to 3L NC.  3/6 Febrile 101.7  03/07: No events overnight. On Howell-trach at 3 L and tolerating well. Hemodynamically stable. Cultures in progress. C/w Vanco and Cefepime. F/u Vanco trough. Hypokalemic this AM and replacement ordered.   03/08: Afebrile. Vanco discontinued since MRSA negative. + yeast in sputum; + Candida Galbrata in urine (Bran in place with clear urine and afebrile).   Dr. Peres following. Blood cx NGTD. C/w Cefepime.   03/09: Febrile, tachycardic and tachypneic this AM. Code sepsis called. Sepsis w/u in progress.  Started on Vanco. ID following, . Discussed with Dr. Peres. F/u CXR. Worsening Leukocytosis. IVF bolus with LR (2,300 ml)  per protocol. Lactate 1.9.   3/11: afebrile over past 24hrs, hemodynamically stable. Vanco trough 11.7  03/12: Vanco trough this PM. Blood cx remains NGTD. C/w Vanco and Cefepime. ID following. Persistent Transaminitis.  Hep C non-reactive. F/u remaining Hepatitis panel. Patient on high dose statin which could be contributing to transaminitis. Hold for now and re-eval. Given that patient is unable to verbalize/demonstrate if symptomatic will order Liver US.   03/13: Spiked fevers overnight. Tachycardic and hypotensive. IVF bolus given. Blood cx in progress.  F/u Procal and Lactate. Vanco increased to 1500 mg BID and Meropenem added and Cefepime discontinued Discussed wih Dr. Peres and reccs appreciated  Worsening leukocytosis Pending Abdominal US 2/2 transaminitis.   03/14: Febrile. Urinary retention. On Flomax. Bladder scan and Straight cath Q 4-6 hours. On Vanco and Jose Elias. F/u cx. Abd US unremarkable. CXR 3/13 negative for acute findings.   3/15 Bladder scan 550. Bran cath reinserted.  3/23/23 Patient febrile overnight 100.5 likely central fever, no leukocytosis  3/24/23 Patient febrile 102.3 this AM. Patient blood ans sputum culture and UA sent, chest x-ray, RVP ordered. Will start Meropenem, ID aware.  3/25/23 TOV attempted yesterday, bladder scan 600ml, straight cath, today 550ml, straight cath, continue bladder scan q6h.   3/27  Continues to retain large amount of urine. Last bladder scan 600ml. Bran cath reinserted.  3/30- No acute events overnight, hold body scan report noted for Faint soft tissues accumulation in the left upper thigh lateral to the   left hip may represent inflammation/infection. Please correlate clinically. Left lateral thigh exam is not consistent with obvious infectious process at this time, area if w/o tenderness or erythema.  D/c planning ongoing.

## 2023-03-31 NOTE — PROGRESS NOTE ADULT - NS ATTEND AMEND GEN_ALL_CORE FT
Impression: This is a 63 Y/O Male from [facility] . Hx of Traumatic Subdural Hemorrhage following a fall down the stairs while drunk, had s/p Craniotomy at [facility] on  with s/p Trach / Peg . Admitted to  with Acute on chronic hypoxic respiratory failure secondary to RUL Pneumonia. 03-20-23 Negative PCR for Covid 19.     Suggestion:   O2 saturation 98% with HME O2 supplementation at 3L via trach collar.  Oral, trach care, suction.  Not a candidate for speaking valve, trach capping at present time.   Not a candidate for decannulation at this time.   Continue Duoneb via nebulization Q 6 Hours.  DVT / GI prophylactic. On Lovenox 40mg SQ daily.   Aspiration precautions with HOB elevation especially during Peg feeding.
Impression: This is a 61 Y/O Male from [facility] . Hx of Traumatic Subdural Hemorrhage following a fall down the stairs while drunk, had s/p Craniotomy at [facility] on  with s/p Trach / Peg . Admitted to  with Acute on chronic hypoxic respiratory failure secondary to RUL Pneumonia. 03-20-23 Negative PCR for Covid 19.     Suggestion:   O2 saturation 96% with Hydro trach collar 3L . No need for Vent support at this time.   Oral, trach care, suction.  Not a candidate for speaking valve, trach capping at present time.   Not a candidate for decannulation at this time.   Continue Duoneb via nebulization Q 6 Hours.  DVT / GI prophylactic. On Lovenox 40mg SQ daily.   Aspiration precautions with HOB elevation especially during Peg feeding.
Impression: This is a 63 Y/O Male from [facility] . Hx of Traumatic Subdural Hemorrhage following a fall down the stairs while drunk, had s/p Craniotomy at [facility] on  with s/p Trach / Peg . Admitted to  with Acute on chronic hypoxic respiratory failure secondary to Sepsis due to Pneumonia. Leukocytosis WBC 11.28. Has also for +ve Yeast  in Sputum, Candida Galbrata in Urine.     Suggestion:   O2 saturation 100% with HME O2 supplementation at 3L via trach collar.  Oral, trach care, suction.  Not a candidate for speaking valve, trach capping at present time.   Not a candidate for decannulation at this time.   Continue Duoneb via nebulization Q 6 Hours.  Code Sepsis was called earlier due to Febrile with Tachycardia , Tachypnea. Ongoing Sepsis work up.       On Cefepime 2 mg IVPB Q 8 hours.   Vancomycin 1250 mg IVPB Q 12 Hours with Vancomycin trough monitoring.   DVT / GI prophylactic. On Lovenox 40mg SQ daily.   Aspiration precautions with HOB elevation especially during Peg feeding.
Has intermittent low grade fevers despite antibiotics  ID follow up  Check Procalcitonin level  Antibiotics per ID recommendation  Cont. trach collar oxygen support  Hemodynamic monitoring  Cont. Nebs  Discharge planning back to facility if remains afebrile
Problem/Plan - 1:  ·  Problem: Sepsis.   ·  Plan: likely secondary to pneumonia.  F/U repeated blood cultures on 3/13  Continue meropenum and vanco.  ID DR Peres. ID f/u  Repeat CXR in am.     Problem/Plan - 2:  ·  Problem: Acute on chronic respiratory failure with hypoxia.   ·  Plan: Continue hydro-trach collar.  Currently tolerating 2LPM via hydro-trach collar.  Monitor oxygen saturation.  Continue bronchodilators  Antibiotics as per ID.     Problem/Plan - 3:  ·  Problem: Acute encephalopathy.   ·  Plan: Secondary to SDH and craniotomy  Craniotomy at [facility] 1/23  Maintain safety precautions  Strict aspiration precautions.  Seizure precautions.     Problem/Plan - 4:  ·  Problem: Transaminitis.   ·  Plan: Possibly secondary to sepsis vs amantadine  Controlled.   Continue to monitor daily.     Problem/Plan - 5:  ·  Problem: Hypertension.   ·  Plan: Antihypertensives currently on hold secondary to sepsis.  Adjust medications as needed.     Problem/Plan - 6:  ·  Problem: Diabetes.   ·  Plan: Lantus 12U in am and HS  Continue 6U lispro every 6 hours and continue sliding scale coverage every 6 hours.     Problem/Plan - 7:  ·  Problem: Anemia of chronic disease.   ·  Plan: H&H low but stable.   Continue to monitor daily.  No signs of bleeding.     Problem/Plan - 8:  ·  Problem: Functional quadriplegia.   ·  Plan: Passive ROM exercises daily.  Turn and position every 2 hours  Strict aspiration precautions.  Keep head of bed elevated at all times.
Problem/Plan - 1:  ·  Problem: Sepsis.   ·  Plan: likely secondary to pneumonia.  Repeated blood cultures on 3/13 Negative  Continue meropenum and vanco.  vanco trough 22.4 yesterday, will repeat today  ID DR Peres. ID f/u.     Problem/Plan - 2:  ·  Problem: Acute on chronic respiratory failure with hypoxia.   ·  Plan: Continue hydro-trach collar, saturating well  Currently tolerating 2LPM via hydro-trach collar.  Monitor oxygen saturation.  Continue bronchodilators  Antibiotics as per ID.  Keep head of bed elevated at all times.     Problem/Plan - 3:  ·  Problem: Acute encephalopathy.   ·  Plan: Secondary to SDH and craniotomy  Craniotomy at [facility] 1/23  Maintain safety precautions  Strict aspiration precautions.  Seizure precautions.
- Producing copious thick pink secretions requiring frequent suctioning   - Continue antibx   - Monitor vanco level   - Asp precaution   - O2 supp   - Bronchodilator
Has intermittent low grade fevers despite antibiotics  ID follow up  Procalcitonin downtrending   Completed antibiotivcs  Cont. trach collar oxygen support  Hemodynamic monitoring  Cont. Nebs  Bran placed for urinary retention, patient failed multiple void trials  Discharge planning back to facility if remains afebrile
Problem/Plan - 1:  ·  Problem: Acute on chronic respiratory failure with hypoxia.   ·  Plan: Continue trach collar.  Monitor oxygen saturation.  Continue sodium chloride 0.9% via nebulizer every 4 hours  F/U blood, urine and sputum culture  Continue antibiotics for RUL pna.     Problem/Plan - 2:  ·  Problem: Sepsis.   ·  Plan: Secondary to RUL pna.  Tachycardic, febrile and hypoxic on admission.  Meets sepsis criteria.  F/U cultures.  Continue cefepime and vanco  Monitor trough.     Problem/Plan - 3:  ·  Problem: Acute encephalopathy.   ·  Plan: Secondary to SDH and craniotomy  Craniotomy at [facility] 1/23  Maintain safety precautions  Strict aspiration precautions.     Problem/Plan - 4:  ·  Problem: Transaminitis.   ·  Plan: Possibly secondary to sepsis vs amantadine  Continue to monitor daily.     Problem/Plan - 5:  ·  Problem: Anemia of chronic disease.   ·  Plan: H&H low  Will order iron studies.  Continue to monitor daily.     Problem/Plan - 6:  ·  Problem: Hypertension.   ·  Plan: Antihypertensives currently on hold secondary to sepsis.  Adjust medications as needed.     Problem/Plan - 7:  ·  Problem: Diabetes.   ·  Plan: Continue lantus 12U every 12 yovana  Lispro 6U every 6 hours  And continue sliding scale coverage.  Monitor glucose.     Problem/Plan - 8:  ·  Problem: Functional quadriplegia.   ·  Plan: Passive ROM exercises daily  Turn and position every 2 hours.  Strict aspiration precautions.  PT consult.
Problem/Plan - 1:  ·  Problem: Sepsis.   ·  Plan: Secondary to pneumonia  S/P Code Sepsis (3/9)  F/U Blood and sputum cultures (3/9) - thus far negative to date  Continue cefepime and vanco  Repeat CXR 3/10 without focal infiltrate  ID following  Febrile today 102.3 rectal  Worsening leukocytosis. Tachy and hypotensive.   IVF bolus  Lactate and Procal   Blood cultures repeated.   Sputum cx   Meropenem started  Vanco dosage increased from 1250 to 1500 mg BID.     Problem/Plan - 2:  ·  Problem: Acute on chronic respiratory failure with hypoxia.   ·  Plan: Continue trach collar. Titrate oxygen as tolerated.  Maintain sat at or above 92%  Monitor oxygen saturation  Continue bronchodilators  Empirically being covered with Vanco and Meropenem antibiotics for pna/Septicemia.   F/U cultures from 3/9 - thus far negative to date.     Problem/Plan - 3:  ·  Problem: Acute encephalopathy.   ·  Plan: Secondary to SDH and craniotomy  Craniotomy at [facility] 1/23  Maintain safety precautions  Strict aspiration precautions.  Seizure precautions.  Off note: Request that family bring helmet to protect Crani site).
Completed antibiotics  Cont. trach collar oxygen support  Hemodynamic monitoring  Cont. Nebs  Bran placed for urinary retention, patient failed multiple void trials  Discharge planning back to facility
Intermittent low grade fevers  Procalcitonin downtrending   Completed antibiotics will monitor off antibiotics for now  Cont. trach collar oxygen support  Hemodynamic monitoring  Cont. Nebs  Bran placed for urinary retention, patient failed multiple void trials  Discharge planning back to facility
CT scan with out any obvious infection source  F/u repeat cultures and trend procalcitonin  Afebrile   ID follow up   d/c meropenem  Cont. flomax, add finasteride   Cont. trach collar oxygen support  Hemodynamic monitoring  stable for transfer back to facility
Patient likely with pneumonia  Awaiting culture results  Cont. broad spectrum antibiotics  pulmonary toilet   Oxygen support   Start tube feedings  Overall prognosis is guarded  Discussed with son on the phone
Problem/Plan - 1:  ·  Problem: Sepsis.   ·  Plan: likely secondary to pneumonia.  F/U repeated blood cultures on 3/13  Continue meropenum and vanco.  ID DR Peres. ID f/u  Repeat CXR in am.     Problem/Plan - 2:  ·  Problem: Acute on chronic respiratory failure with hypoxia.   ·  Plan: Continue hydro-trach collar.  Currently tolerating 2LPM via hydro-trach collar.  Monitor oxygen saturation.  Continue bronchodilators  Antibiotics as per ID.     Problem/Plan - 3:  ·  Problem: Acute encephalopathy.   ·  Plan: Secondary to SDH and craniotomy  Craniotomy at [facility] 1/23  Maintain safety precautions  Strict aspiration precautions.  Seizure precautions.     Problem/Plan - 4:  ·  Problem: Transaminitis.   ·  Plan: Possibly secondary to sepsis vs amantadine  Controlled.   Continue to monitor daily.  May consider US RUQ if worsens.
- [place] fever with up-trending WBC   - CXR   - Antibx   - Check UA
CT scan with out any obvious infection source  F/u repeat cultures and trend procalcitonin  Afebrile x24h  ID follow up   Indium scan pending  Cont. meropenem  de la torre reinserted, failed TOV  Cont. flomax, add finasteride   Cont. trach collar oxygen support  Hemodynamic monitoring
CT scan with out any obvious infection source  F/u repeat cultures and trend procalcitonin  ID follow up   Cont. meropenem  Void trial, de la torre removed yesterday  Cont. flomax, add finasteride   Cont. trach collar oxygen support  Hemodynamic monitoring  Cont. Nebs
No further episodes of fevers reported  F/u Repeat cultures  Cont. Antibiotics  Check Vancomycin trough  ID follow up   pulmonary toilet   Oxygen support   Cont. tube feedings  Failed void trial  Overall prognosis is guarded
Problem/Plan - 1:  ·  Problem: Sepsis.   ·  Plan: likely secondary to pneumonia.  Repeated blood cultures on 3/13 Negative  Continue Meropenum and Vanco.  Vanco trough 13.2- wnl, continue current dose   ID DR Peres.  following.     Problem/Plan - 2:  ·  Problem: Acute on chronic respiratory failure with hypoxia.   ·  Plan: Continue hydro-trach collar, saturating well  Currently tolerating 2LPM via hydro-trach collar.  Monitor oxygen saturation.  Continue bronchodilators  Antibiotics as per ID.  Keep head of bed elevated at all times.     Problem/Plan - 3:  ·  Problem: Acute encephalopathy.   ·  Plan: Secondary to SDH and craniotomy  Craniotomy at [facility] 1/23  Maintain safety precautions  Strict aspiration precautions.  Seizure precautions.     Problem/Plan - 4:  ·  Problem: Transaminitis.   ·  Plan: Possibly secondary to sepsis vs amantadine  Improving.  Continue to monitor daily.     Problem/Plan - 5:  ·  Problem: Hypertension.   ·  Plan: Currently normotensive.  Antihypertensives currently on hold secondary to sepsis  Adjust medications as need.
CT scan with out any obvious infection source  F/u repeat cultures and trend procalcitonin  ID follow up   consider wbc scan  Cont. meropenem  de la torre reinserted, failed TOV  Cont. flomax, add finasteride   Cont. trach collar oxygen support  Hemodynamic monitoring  Cont. Nebs
Febrile to 102 this morning   Repeat cultures and procalcitonin  ID follow up requested  Restart meropenem  D/c de la torre and void trial  Cont. flomax, add finasteride   Cont. trach collar oxygen support  Hemodynamic monitoring  Cont. Nebs  Discharge held due to sepsis  Discussed with son at bedside
CT scan with out any obvious infection source  F/u repeat cultures and trend procalcitonin  ID follow up   Indium scan pending  Cont. meropenem  de la torre reinserted, failed TOV  Cont. flomax, add finasteride   Cont. trach collar oxygen support  Hemodynamic monitoring  Cont. Nebs
Febrile this morning, with code sepsis called  Cultures repeated   Antibiotics broadened   ID follow up   pulmonary toilet   Oxygen support   Cont. tube feedings  D/c wil  Overall prognosis is guarded
CT scan with out any obvious infection source  F/u repeat cultures and trend procalcitonin  Afebrile   ID follow up   d/c meropenem  Cont. flomax, add finasteride   Cont. trach collar oxygen support  Hemodynamic monitoring  stable for transfer back to facility
CT scan with out any obvious infection source  F/u repeat cultures and trend procalcitonin  ID follow up   Cont. meropenem  Void trial, de la torre removed yesterday  Cont. flomax, add finasteride   Cont. trach collar oxygen support  Hemodynamic monitoring  Cont. Nebs
62M from [facility], h/o traumatic subdural hemorrhage following a fall down the stairs while drunk, s/p r craniotomy at [facility] in 01/2023, s/p trach/PEG BIBEMS for tachycardia 170s, RR 27, increased secretions admitted to  for sepsis secondary to RUL pneumonia with tracheostomy to 3L NC.  3/6 Febrile 101.7  03/07: No events overnight. On [place]-trach at 3 L and tolerating well. Hemodynamically stable. Cultures in progress. C/w Vanco and Cefepime. F/u Vanco trough. Hypokalemic this AM and replacement ordered.   03/08: Afebrile. Vanco discontinued since MRSA negative. + yeast in sputum; + Candida Galbrata in urine (Bran in place with clear urine and afebrile).   Dr. Peres following. Blood cx NGTD. C/w Cefepime.        COVID-19 Negative Lab Result:  · COVID-19 Negative Lab Result	COVID-19 ruled out     Problem/Plan - 1:  ·  Problem: Acute on chronic respiratory failure with hypoxia.   ·  Plan: Continue trach collar @ 3L.  Maintain O2 Sat > 94%   Monitor oxygen saturation.  Continue bronchodilators via nebulizer  blood cultures NGTD.   Continue cefepime for PNA  Vanco discontinued on 3/7.     Problem/Plan - 2:  ·  Problem: Pneumonia.   ·  Plan: Complicated by yeast infection in sputum  Dr. Peres consulted   C/w Cefepime.  O2 via hydro trach.
Patient likely with pneumonia  Cultures with + candidate in the urine and + yeast in sputum  Cont. broad spectrum antibiotics  D/c Vancomycin as no evidence of MRSA  ID consult ?treatment for candidate   pulmonary toilet   Oxygen support   Cont. tube feedings  Overall prognosis is guarded  Discharge planning back to facility once completes antibiotics
Cont. Antibiotics  ID follow up   Check Vancomycin trough  pulmonary toilet   Oxygen support   Cont. tube feedings  Failed void trial  Overall prognosis is guarded
No further episodes of fevers reported  Cont. Antibiotics  ID follow up   pulmonary toilet   Oxygen support   Cont. tube feedings  Failed void trial  Overall prognosis is guarded

## 2023-03-31 NOTE — PROGRESS NOTE ADULT - SUBJECTIVE AND OBJECTIVE BOX
Time of Visit:  Patient seen and examined. pat is trached on room air     MEDICATIONS  (STANDING):  albuterol/ipratropium for Nebulization 3 milliLiter(s) Nebulizer every 6 hours  amantadine Syrup 100 milliGRAM(s) Oral two times a day  aspirin  chewable 81 milliGRAM(s) Oral daily  enoxaparin Injectable 40 milliGRAM(s) SubCutaneous every 24 hours  finasteride 5 milliGRAM(s) Oral daily  insulin glargine Injectable (LANTUS) 12 Unit(s) SubCutaneous every morning  insulin glargine Injectable (LANTUS) 12 Unit(s) SubCutaneous at bedtime  insulin lispro (ADMELOG) corrective regimen sliding scale   SubCutaneous every 6 hours  insulin lispro Injectable (ADMELOG) 6 Unit(s) SubCutaneous every 6 hours  polyethylene glycol 3350 17 Gram(s) Oral daily  senna Syrup 10 milliLiter(s) Oral daily  silver sulfADIAZINE 1% Cream 1 Application(s) Topical two times a day  tamsulosin 0.4 milliGRAM(s) Oral at bedtime      MEDICATIONS  (PRN):  acetaminophen    Suspension .. 650 milliGRAM(s) Oral every 6 hours PRN Temp greater or equal to 38C (100.4F), Mild Pain (1 - 3)  aluminum hydroxide/magnesium hydroxide/simethicone Suspension 30 milliLiter(s) Oral every 4 hours PRN Dyspepsia  melatonin 3 milliGRAM(s) Oral at bedtime PRN Insomnia  ondansetron Injectable 4 milliGRAM(s) IV Push every 8 hours PRN Nausea and/or Vomiting       Medications up to date at time of exam.      PHYSICAL EXAMINATION:  Patient has no new complaints.  GENERAL: The patient is a well-developed, well-nourished, in no apparent distress.     Vital Signs Last 24 Hrs  T(C): 37.3 (31 Mar 2023 12:19), Max: 37.3 (31 Mar 2023 12:19)  T(F): 99.1 (31 Mar 2023 12:19), Max: 99.1 (31 Mar 2023 12:19)  HR: 93 (31 Mar 2023 12:19) (92 - 93)  BP: 150/86 (31 Mar 2023 12:19) (127/83 - 150/86)  BP(mean): --  RR: 18 (31 Mar 2023 12:19) (18 - 18)  SpO2: 97% (31 Mar 2023 12:19) (97% - 100%)    Parameters below as of 31 Mar 2023 12:19  Patient On (Oxygen Delivery Method): Hydrotrach       (if applicable)    Chest Tube (if applicable)    HEENT: Right hemisphere indentation due to prior surgery  Mucous membranes are moist.     NECK: Supple, no palpable adenopathy. + trach , clean stoma     LUNGS: Clear to auscultation, no wheezing, rales, or rhonchi.    HEART: Regular rate and rhythm without murmur.    ABDOMEN: Soft, nontender, and nondistended.  No hepatosplenomegaly is noted. + PEG     EXTREMITIES: Without any cyanosis, clubbing, rash, lesions or edema.    NEUROLOGIC: eyes open . do not follow commands     SKIN: Warm, dry, good turgor.      LABS:                        8.5    6.96  )-----------( 247      ( 31 Mar 2023 06:50 )             28.4     03-31    143  |  114<H>  |  27<H>  ----------------------------<  140<H>  3.7   |  27  |  0.65    Ca    9.0      31 Mar 2023 06:50  Phos  3.1     03-31  Mg     2.5     03-31    TPro  7.2  /  Alb  2.2<L>  /  TBili  0.5  /  DBili  x   /  AST  36  /  ALT  54  /  AlkPhos  231<H>  03-31                        MICROBIOLOGY: (if applicable)    RADIOLOGY & ADDITIONAL STUDIES:  EKG:   CXR:  ECHO:    IMPRESSION: 62y Male PAST MEDICAL & SURGICAL HISTORY:  History of subdural hemorrhage      PEG (percutaneous endoscopic gastrostomy) status      DM (diabetes mellitus)      S/P craniotomy       p/w       IMP: This is a 62 yr old man  from [facility], h/o traumatic subdural hemorrhage following a fall down the stairs while drunk, s/p r craniotomy at [facility] in 01/2023, s/p trach/PEG BIBEMS for tachycardia 170s, RR 27, increased secretions admitted to  for sepsis secondary to RUL pneumonia with tracheostomy to 3L NC.  3/6 Febrile 101.7  03/07: No events overnight. On [place]-trach at 3 L and tolerating well. Hemodynamically stable. Cultures in progress. C/w Vanco and Cefepime. F/u Vanco trough. Hypokalemic this AM and replacement ordered.   03/08: Afebrile. Vanco discontinued since MRSA negative. + yeast in sputum; + Candida Galbrata in urine (Bran in place with clear urine and afebrile).   Dr. Peres following. Blood cx NGTD. C/w Cefepime.   03/09: Febrile, tachycardic and tachypneic this AM. Code sepsis called. Sepsis w/u in progress.  Started on Vanco. ID following, . Discussed with Dr. Peres. F/u CXR. Worsening Leukocytosis. IVF bolus with LR (2,300 ml)  per protocol. Lactate 1.9.   3/11: afebrile over past 24hrs, hemodynamically stable. Vanco trough 11.7  03/12: Vanco trough this PM. Blood cx remains NGTD. C/w Vanco and Cefepime. ID following. Persistent Transaminitis.  Hep C non-reactive. F/u remaining Hepatitis panel. Patient on high dose statin which could be contributing to transaminitis. Hold for now and re-eval. Given that patient is unable to verbalize/demonstrate if symptomatic will order Liver US.   03/13: Spiked fevers overnight. Tachycardic and hypotensive. IVF bolus given. Blood cx in progress.  F/u Procal and Lactate. Vanco increased to 1500 mg BID and Meropenem added and Cefepime discontinued Discussed wih Dr. Peres and reccs appreciated  Worsening leukocytosis Pending Abdominal US 2/2 transaminitis.   03/14: Febrile. Urinary retention. On Flomax. Bladder scan and Straight cath Q 4-6 hours. On Vanco and Jose Elias. F/u cx. Abd US unremarkable. CXR 3/13 negative for acute findings.   3/15 Bladder scan 550. Bran cath reinserted.  3/24 Spiked fever       Plan   - Continue O2 supp via TC ( hydrotrach )   - On meropenem started 3/24, neg calcitonin , completed   - Asp precaution   - Pul hygiene   - Indium scan / left lateral thigh accumulation   - US of left lateral thigh   - Duoneb  - Trach care   - DVT GI prophy     d/c with NP covering

## 2023-03-31 NOTE — PROGRESS NOTE ADULT - PROBLEM SELECTOR PROBLEM 8
Diabetes
Urinary retention
Functional quadriplegia
Functional quadriplegia
Diabetes
Diabetes
Functional quadriplegia
Urinary retention
Functional quadriplegia
Urinary retention
Functional quadriplegia
Functional quadriplegia
Diabetes
Functional quadriplegia
Urinary retention
Diabetes
Functional quadriplegia
Functional quadriplegia
Urinary retention
Functional quadriplegia
Hypertension
Functional quadriplegia
Urinary retention
Hypertension

## 2023-03-31 NOTE — PROGRESS NOTE ADULT - PROBLEM SELECTOR PLAN 3
Secondary to SDH and craniotomy  Craniotomy at [facility] 1/23  Maintain safety precautions  Strict aspiration precautions.  Seizure precautions.

## 2023-03-31 NOTE — PROGRESS NOTE ADULT - PROBLEM SELECTOR PROBLEM 2
Acute on chronic respiratory failure with hypoxia
Sepsis
Pneumonia
Acute on chronic respiratory failure with hypoxia
Sepsis
Acute on chronic respiratory failure with hypoxia
Acute on chronic respiratory failure with hypoxia
Pneumonia
Acute on chronic respiratory failure with hypoxia
Acute on chronic respiratory failure with hypoxia
Sepsis
Acute on chronic respiratory failure with hypoxia
Acute on chronic respiratory failure with hypoxia
Sepsis
Sepsis
Acute on chronic respiratory failure with hypoxia
Acute on chronic respiratory failure with hypoxia
Sepsis
Sepsis
Acute on chronic respiratory failure with hypoxia
Sepsis
Sepsis
Acute on chronic respiratory failure with hypoxia
Sepsis
Sepsis

## 2023-03-31 NOTE — PROGRESS NOTE ADULT - PROBLEM SELECTOR PROBLEM 4
Transaminitis
Sepsis
Transaminitis
Sepsis
Transaminitis

## 2023-03-31 NOTE — DISCHARGE NOTE NURSING/CASE MANAGEMENT/SOCIAL WORK - NSDCPEFALRISK_GEN_ALL_CORE
For information on Fall & Injury Prevention, visit: [email or web address] OR  [email or web address] OR  https://www.cdc.gov/steadi/patient.html

## 2023-03-31 NOTE — PROGRESS NOTE ADULT - PROBLEM/PLAN-2
DISPLAY PLAN FREE TEXT
none

## 2023-03-31 NOTE — PROGRESS NOTE ADULT - PROBLEM SELECTOR PROBLEM 9
Severe protein-calorie malnutrition
Functional quadriplegia
Severe protein-calorie malnutrition
Functional quadriplegia
Functional quadriplegia
Severe protein-calorie malnutrition
Hypokalemia
Severe protein-calorie malnutrition
Severe protein-calorie malnutrition
Functional quadriplegia
Severe protein-calorie malnutrition
Functional quadriplegia
Severe protein-calorie malnutrition
Functional quadriplegia
Functional quadriplegia
Severe protein-calorie malnutrition
Hypokalemia
Severe protein-calorie malnutrition
Functional quadriplegia
Functional quadriplegia
Severe protein-calorie malnutrition
Functional quadriplegia
Functional quadriplegia

## 2023-03-31 NOTE — PROGRESS NOTE ADULT - NS ATTEND OPT1 GEN_ALL_CORE

## 2023-03-31 NOTE — PROGRESS NOTE ADULT - PROBLEM SELECTOR PROBLEM 3
Acute encephalopathy
Yeast infection
Acute encephalopathy
Yeast infection

## 2023-03-31 NOTE — PROGRESS NOTE ADULT - COVID-19 NEGATIVE LAB RESULT
COVID-19 ruled out

## 2023-03-31 NOTE — PROGRESS NOTE ADULT - NS ATTEND BILL GEN_ALL_CORE
Attending to bill

## 2023-04-11 NOTE — PROGRESS NOTE ADULT - ASSESSMENT
Return if symptoms worsen or new symptoms develop.  Follow-up with primary care physician next available.  Bring in a stool sample as soon as possible to be assessed.  Continue pushing fluids.  Take Zofran for nausea if decreased urine output any abdominal pain blood in stool or other symptoms present please return for recheck.   62M from [facility], h/o traumatic subdural hemorrhage following a fall down the stairs while drunk, s/p r craniotomy at [facility] in 01/2023, s/p trach/PEG BIBEMS for tachycardia 170s, RR 27, increased secretions admitted to  for sepsis secondary to RUL pneumonia with tracheostomy to 3L NC.  3/6 Febrile 101.7

## 2023-04-24 ENCOUNTER — EMERGENCY (EMERGENCY)
Facility: HOSPITAL | Age: 63
LOS: 1 days | Discharge: ANOTHER HOSPITAL | End: 2023-04-24
Attending: EMERGENCY MEDICINE
Payer: MEDICAID

## 2023-04-24 VITALS
OXYGEN SATURATION: 97 % | SYSTOLIC BLOOD PRESSURE: 121 MMHG | TEMPERATURE: 99 F | RESPIRATION RATE: 17 BRPM | HEIGHT: 70 IN | HEART RATE: 101 BPM | DIASTOLIC BLOOD PRESSURE: 79 MMHG | WEIGHT: 141.98 LBS

## 2023-04-24 DIAGNOSIS — Z98.890 OTHER SPECIFIED POSTPROCEDURAL STATES: Chronic | ICD-10-CM

## 2023-04-24 PROBLEM — E11.9 TYPE 2 DIABETES MELLITUS WITHOUT COMPLICATIONS: Chronic | Status: ACTIVE | Noted: 2023-03-05

## 2023-04-24 PROBLEM — Z93.1 GASTROSTOMY STATUS: Chronic | Status: ACTIVE | Noted: 2023-03-05

## 2023-04-24 PROBLEM — Z86.79 PERSONAL HISTORY OF OTHER DISEASES OF THE CIRCULATORY SYSTEM: Chronic | Status: ACTIVE | Noted: 2023-03-05

## 2023-04-24 LAB
ACETONE SERPL-MCNC: NEGATIVE — SIGNIFICANT CHANGE UP
ALBUMIN SERPL ELPH-MCNC: 2.7 G/DL — LOW (ref 3.5–5)
ALP SERPL-CCNC: 237 U/L — HIGH (ref 40–120)
ALT FLD-CCNC: 35 U/L DA — SIGNIFICANT CHANGE UP (ref 10–60)
ANION GAP SERPL CALC-SCNC: 4 MMOL/L — LOW (ref 5–17)
APTT BLD: 37.1 SEC — HIGH (ref 27.5–35.5)
AST SERPL-CCNC: 30 U/L — SIGNIFICANT CHANGE UP (ref 10–40)
BASOPHILS # BLD AUTO: 0.09 K/UL — SIGNIFICANT CHANGE UP (ref 0–0.2)
BASOPHILS NFR BLD AUTO: 0.4 % — SIGNIFICANT CHANGE UP (ref 0–2)
BILIRUB SERPL-MCNC: 1 MG/DL — SIGNIFICANT CHANGE UP (ref 0.2–1.2)
BUN SERPL-MCNC: 35 MG/DL — HIGH (ref 7–18)
CALCIUM SERPL-MCNC: 9.4 MG/DL — SIGNIFICANT CHANGE UP (ref 8.4–10.5)
CHLORIDE SERPL-SCNC: 114 MMOL/L — HIGH (ref 96–108)
CO2 SERPL-SCNC: 27 MMOL/L — SIGNIFICANT CHANGE UP (ref 22–31)
CREAT SERPL-MCNC: 0.84 MG/DL — SIGNIFICANT CHANGE UP (ref 0.5–1.3)
EGFR: 100 ML/MIN/1.73M2 — SIGNIFICANT CHANGE UP
EOSINOPHIL # BLD AUTO: 0 K/UL — SIGNIFICANT CHANGE UP (ref 0–0.5)
EOSINOPHIL NFR BLD AUTO: 0 % — SIGNIFICANT CHANGE UP (ref 0–6)
GLUCOSE SERPL-MCNC: 223 MG/DL — HIGH (ref 70–99)
HCT VFR BLD CALC: 29.5 % — LOW (ref 39–50)
HGB BLD-MCNC: 9.2 G/DL — LOW (ref 13–17)
IMM GRANULOCYTES NFR BLD AUTO: 0.8 % — SIGNIFICANT CHANGE UP (ref 0–0.9)
INR BLD: 1.37 RATIO — HIGH (ref 0.88–1.16)
LACTATE SERPL-SCNC: 1 MMOL/L — SIGNIFICANT CHANGE UP (ref 0.7–2)
LACTATE SERPL-SCNC: 2.4 MMOL/L — HIGH (ref 0.7–2)
LYMPHOCYTES # BLD AUTO: 1.68 K/UL — SIGNIFICANT CHANGE UP (ref 1–3.3)
LYMPHOCYTES # BLD AUTO: 7.7 % — LOW (ref 13–44)
MAGNESIUM SERPL-MCNC: 2.6 MG/DL — SIGNIFICANT CHANGE UP (ref 1.6–2.6)
MCHC RBC-ENTMCNC: 27.6 PG — SIGNIFICANT CHANGE UP (ref 27–34)
MCHC RBC-ENTMCNC: 31.2 GM/DL — LOW (ref 32–36)
MCV RBC AUTO: 88.6 FL — SIGNIFICANT CHANGE UP (ref 80–100)
MONOCYTES # BLD AUTO: 1.09 K/UL — HIGH (ref 0–0.9)
MONOCYTES NFR BLD AUTO: 5 % — SIGNIFICANT CHANGE UP (ref 2–14)
NEUTROPHILS # BLD AUTO: 18.71 K/UL — HIGH (ref 1.8–7.4)
NEUTROPHILS NFR BLD AUTO: 86.1 % — HIGH (ref 43–77)
NRBC # BLD: 0 /100 WBCS — SIGNIFICANT CHANGE UP (ref 0–0)
PLATELET # BLD AUTO: 169 K/UL — SIGNIFICANT CHANGE UP (ref 150–400)
POTASSIUM SERPL-MCNC: 4.2 MMOL/L — SIGNIFICANT CHANGE UP (ref 3.5–5.3)
POTASSIUM SERPL-SCNC: 4.2 MMOL/L — SIGNIFICANT CHANGE UP (ref 3.5–5.3)
PROT SERPL-MCNC: 8 G/DL — SIGNIFICANT CHANGE UP (ref 6–8.3)
PROTHROM AB SERPL-ACNC: 16.4 SEC — HIGH (ref 10.5–13.4)
RBC # BLD: 3.33 M/UL — LOW (ref 4.2–5.8)
RBC # FLD: 17.2 % — HIGH (ref 10.3–14.5)
SARS-COV-2 RNA SPEC QL NAA+PROBE: SIGNIFICANT CHANGE UP
SODIUM SERPL-SCNC: 145 MMOL/L — SIGNIFICANT CHANGE UP (ref 135–145)
WBC # BLD: 21.55 K/UL — HIGH (ref 3.8–10.5)
WBC # FLD AUTO: 21.55 K/UL — HIGH (ref 3.8–10.5)

## 2023-04-24 PROCEDURE — 99291 CRITICAL CARE FIRST HOUR: CPT

## 2023-04-24 PROCEDURE — 93010 ELECTROCARDIOGRAM REPORT: CPT

## 2023-04-24 PROCEDURE — 70496 CT ANGIOGRAPHY HEAD: CPT | Mod: 26,MA

## 2023-04-24 PROCEDURE — 71045 X-RAY EXAM CHEST 1 VIEW: CPT | Mod: 26

## 2023-04-24 RX ORDER — SODIUM CHLORIDE 9 MG/ML
2000 INJECTION INTRAMUSCULAR; INTRAVENOUS; SUBCUTANEOUS ONCE
Refills: 0 | Status: COMPLETED | OUTPATIENT
Start: 2023-04-24 | End: 2023-04-24

## 2023-04-24 RX ORDER — MEROPENEM 1 G/30ML
1000 INJECTION INTRAVENOUS ONCE
Refills: 0 | Status: COMPLETED | OUTPATIENT
Start: 2023-04-24 | End: 2023-04-24

## 2023-04-24 RX ADMIN — SODIUM CHLORIDE 2000 MILLILITER(S): 9 INJECTION INTRAMUSCULAR; INTRAVENOUS; SUBCUTANEOUS at 17:05

## 2023-04-24 RX ADMIN — MEROPENEM 100 MILLIGRAM(S): 1 INJECTION INTRAVENOUS at 17:05

## 2023-04-24 NOTE — ED PROVIDER NOTE - OBJECTIVE STATEMENT
62 y.o. male with h/o TBI, BIBA reportedly with bleeding from his ears.  Pt unable to provide any informations

## 2023-04-24 NOTE — ED PROVIDER NOTE - NSICDXPASTMEDICALHX_GEN_ALL_CORE_FT
PAST MEDICAL HISTORY:  Chronic respiratory failure with hypoxia     DM (diabetes mellitus)     History of subdural hemorrhage     PEG (percutaneous endoscopic gastrostomy) status     TBI (traumatic brain injury)

## 2023-04-24 NOTE — ED PROVIDER NOTE - PHYSICAL EXAMINATION
Head-Rt waldo-craniectomy  Lt ear- bleeding from canal, Lt TM-dullness, no blood seen  Rt ear-bleeding noted, TM unable to visualized, cerumen with maceration noted

## 2023-04-24 NOTE — ED PROVIDER NOTE - PROGRESS NOTE DETAILS
Labs/CT result explained to pt's son  Pt with sepsis, also Lt occipital lobe petechial parenchymal bleed Case d/w Dr. Ryan, needs ENT to see pt tonight.    Called ENT Dr. Perez, message left Case d/w neurosurgery ERWIN Ramos, no concern for intracranial bleeding.  Advised to repeat CT in 6 hrs. Case d/w ENT fellow rory Abebe

## 2023-04-24 NOTE — ED ADULT NURSE NOTE - NSIMPLEMENTINTERV_GEN_ALL_ED
Implemented All Fall Risk Interventions:  [place] to call system. Call bell, personal items and telephone within reach. Instruct patient to call for assistance. Room bathroom lighting operational. Non-slip footwear when patient is off stretcher. Physically safe environment: no spills, clutter or unnecessary equipment. Stretcher in lowest position, wheels locked, appropriate side rails in place. Provide visual cue, wrist band, yellow gown, etc. Monitor gait and stability. Monitor for mental status changes and reorient to person, place, and time. Review medications for side effects contributing to fall risk. Reinforce activity limits and safety measures with patient and family.

## 2023-04-24 NOTE — ED PROVIDER NOTE - ENMT, MLM
Airway patent, Nasal mucosa clear. Mouth with normal mucosa. Throat has no vesicles, no oropharyngeal exudates and uvula is midline.  Trach-noted

## 2023-04-24 NOTE — ED PROVIDER NOTE - CLINICAL SUMMARY MEDICAL DECISION MAKING FREE TEXT BOX
pt with palmira ear bleeding, mostly likely from ears, given h/o brain injury, will get labs., CT to r/o intracranial pathology.  will get labs, CT head, give Abx, also pt with corneal abrasion

## 2023-04-24 NOTE — ED ADULT NURSE NOTE - OBJECTIVE STATEMENT
Patient brought to the ED with c/o bleeding to B/L ears. Trach, 7.5 Shiley and PEG noted. Stage 2 PUs to Rt & Lt buttocks and unstageable to sacrum. Patient brought to the ED with c/o bleeding to B/L ears. Trach, 7.5 Shiley and PEG noted. Stage 2 PUs to Rt & Lt buttocks and unstageable to sacrum. pt Bedbound.

## 2023-04-25 ENCOUNTER — INPATIENT (INPATIENT)
Facility: HOSPITAL | Age: 63
LOS: 12 days | Discharge: NURSING, REHAB OR LONG-TERM CARE | End: 2023-05-08
Attending: INTERNAL MEDICINE | Admitting: INTERNAL MEDICINE
Payer: MEDICAID

## 2023-04-25 VITALS
OXYGEN SATURATION: 96 % | HEART RATE: 92 BPM | HEIGHT: 70 IN | TEMPERATURE: 97 F | SYSTOLIC BLOOD PRESSURE: 127 MMHG | DIASTOLIC BLOOD PRESSURE: 78 MMHG | RESPIRATION RATE: 20 BRPM

## 2023-04-25 VITALS
SYSTOLIC BLOOD PRESSURE: 133 MMHG | TEMPERATURE: 97 F | DIASTOLIC BLOOD PRESSURE: 85 MMHG | HEART RATE: 88 BPM | OXYGEN SATURATION: 97 % | RESPIRATION RATE: 18 BRPM

## 2023-04-25 DIAGNOSIS — I10 ESSENTIAL (PRIMARY) HYPERTENSION: ICD-10-CM

## 2023-04-25 DIAGNOSIS — J18.9 PNEUMONIA, UNSPECIFIED ORGANISM: ICD-10-CM

## 2023-04-25 DIAGNOSIS — Z29.9 ENCOUNTER FOR PROPHYLACTIC MEASURES, UNSPECIFIED: ICD-10-CM

## 2023-04-25 DIAGNOSIS — R93.89 ABNORMAL FINDINGS ON DIAGNOSTIC IMAGING OF OTHER SPECIFIED BODY STRUCTURES: ICD-10-CM

## 2023-04-25 DIAGNOSIS — E11.9 TYPE 2 DIABETES MELLITUS WITHOUT COMPLICATIONS: ICD-10-CM

## 2023-04-25 DIAGNOSIS — Z98.890 OTHER SPECIFIED POSTPROCEDURAL STATES: Chronic | ICD-10-CM

## 2023-04-25 DIAGNOSIS — S31.000A UNSPECIFIED OPEN WOUND OF LOWER BACK AND PELVIS WITHOUT PENETRATION INTO RETROPERITONEUM, INITIAL ENCOUNTER: ICD-10-CM

## 2023-04-25 DIAGNOSIS — H93.8X9 OTHER SPECIFIED DISORDERS OF EAR, UNSPECIFIED EAR: ICD-10-CM

## 2023-04-25 DIAGNOSIS — N40.0 BENIGN PROSTATIC HYPERPLASIA WITHOUT LOWER URINARY TRACT SYMPTOMS: ICD-10-CM

## 2023-04-25 DIAGNOSIS — H92.20 OTORRHAGIA, UNSPECIFIED EAR: ICD-10-CM

## 2023-04-25 LAB
APPEARANCE UR: CLEAR — SIGNIFICANT CHANGE UP
BACTERIA # UR AUTO: ABNORMAL
BILIRUB UR-MCNC: NEGATIVE — SIGNIFICANT CHANGE UP
COLOR SPEC: YELLOW — SIGNIFICANT CHANGE UP
DIFF PNL FLD: ABNORMAL
EPI CELLS # UR: 2 /HPF — SIGNIFICANT CHANGE UP (ref 0–5)
GLUCOSE BLDC GLUCOMTR-MCNC: 128 MG/DL — HIGH (ref 70–99)
GLUCOSE BLDC GLUCOMTR-MCNC: 134 MG/DL — HIGH (ref 70–99)
GLUCOSE BLDC GLUCOMTR-MCNC: 142 MG/DL — HIGH (ref 70–99)
GLUCOSE BLDC GLUCOMTR-MCNC: 150 MG/DL — HIGH (ref 70–99)
GLUCOSE UR QL: NEGATIVE — SIGNIFICANT CHANGE UP
HYALINE CASTS # UR AUTO: 4 /LPF — SIGNIFICANT CHANGE UP (ref 0–7)
KETONES UR-MCNC: NEGATIVE — SIGNIFICANT CHANGE UP
LEUKOCYTE ESTERASE UR-ACNC: ABNORMAL
NITRITE UR-MCNC: NEGATIVE — SIGNIFICANT CHANGE UP
PH UR: 6.5 — SIGNIFICANT CHANGE UP (ref 5–8)
PROT UR-MCNC: ABNORMAL
RBC CASTS # UR COMP ASSIST: 4 /HPF — SIGNIFICANT CHANGE UP (ref 0–4)
SP GR SPEC: 1.02 — SIGNIFICANT CHANGE UP (ref 1.01–1.05)
UROBILINOGEN FLD QL: SIGNIFICANT CHANGE UP
WBC UR QL: >50 /HPF — HIGH (ref 0–5)

## 2023-04-25 PROCEDURE — 71250 CT THORAX DX C-: CPT | Mod: 26,MA

## 2023-04-25 PROCEDURE — 83605 ASSAY OF LACTIC ACID: CPT

## 2023-04-25 PROCEDURE — 99223 1ST HOSP IP/OBS HIGH 75: CPT

## 2023-04-25 PROCEDURE — 70496 CT ANGIOGRAPHY HEAD: CPT | Mod: MA

## 2023-04-25 PROCEDURE — 93005 ELECTROCARDIOGRAM TRACING: CPT

## 2023-04-25 PROCEDURE — 99285 EMERGENCY DEPT VISIT HI MDM: CPT

## 2023-04-25 PROCEDURE — 70450 CT HEAD/BRAIN W/O DYE: CPT | Mod: 26,MA

## 2023-04-25 PROCEDURE — 87040 BLOOD CULTURE FOR BACTERIA: CPT

## 2023-04-25 PROCEDURE — 83735 ASSAY OF MAGNESIUM: CPT

## 2023-04-25 PROCEDURE — 99232 SBSQ HOSP IP/OBS MODERATE 35: CPT

## 2023-04-25 PROCEDURE — 82962 GLUCOSE BLOOD TEST: CPT

## 2023-04-25 PROCEDURE — 99291 CRITICAL CARE FIRST HOUR: CPT | Mod: 25

## 2023-04-25 PROCEDURE — 36415 COLL VENOUS BLD VENIPUNCTURE: CPT

## 2023-04-25 PROCEDURE — 82009 KETONE BODYS QUAL: CPT

## 2023-04-25 PROCEDURE — 85025 COMPLETE CBC W/AUTO DIFF WBC: CPT

## 2023-04-25 PROCEDURE — 96374 THER/PROPH/DIAG INJ IV PUSH: CPT | Mod: XU

## 2023-04-25 PROCEDURE — 85730 THROMBOPLASTIN TIME PARTIAL: CPT

## 2023-04-25 PROCEDURE — 71045 X-RAY EXAM CHEST 1 VIEW: CPT

## 2023-04-25 PROCEDURE — 85610 PROTHROMBIN TIME: CPT

## 2023-04-25 PROCEDURE — 74176 CT ABD & PELVIS W/O CONTRAST: CPT | Mod: 26,MA

## 2023-04-25 PROCEDURE — 99222 1ST HOSP IP/OBS MODERATE 55: CPT

## 2023-04-25 PROCEDURE — 80053 COMPREHEN METABOLIC PANEL: CPT

## 2023-04-25 PROCEDURE — 87635 SARS-COV-2 COVID-19 AMP PRB: CPT

## 2023-04-25 RX ORDER — AMLODIPINE BESYLATE 2.5 MG/1
10 TABLET ORAL DAILY
Refills: 0 | Status: DISCONTINUED | OUTPATIENT
Start: 2023-04-25 | End: 2023-05-08

## 2023-04-25 RX ORDER — PIPERACILLIN AND TAZOBACTAM 4; .5 G/20ML; G/20ML
3.38 INJECTION, POWDER, LYOPHILIZED, FOR SOLUTION INTRAVENOUS EVERY 8 HOURS
Refills: 0 | Status: DISCONTINUED | OUTPATIENT
Start: 2023-04-25 | End: 2023-05-01

## 2023-04-25 RX ORDER — GLUCAGON INJECTION, SOLUTION 0.5 MG/.1ML
1 INJECTION, SOLUTION SUBCUTANEOUS ONCE
Refills: 0 | Status: DISCONTINUED | OUTPATIENT
Start: 2023-04-25 | End: 2023-05-08

## 2023-04-25 RX ORDER — SODIUM CHLORIDE 9 MG/ML
1000 INJECTION, SOLUTION INTRAVENOUS
Refills: 0 | Status: DISCONTINUED | OUTPATIENT
Start: 2023-04-25 | End: 2023-05-08

## 2023-04-25 RX ORDER — ONDANSETRON 8 MG/1
4 TABLET, FILM COATED ORAL EVERY 8 HOURS
Refills: 0 | Status: DISCONTINUED | OUTPATIENT
Start: 2023-04-25 | End: 2023-05-08

## 2023-04-25 RX ORDER — LANOLIN ALCOHOL/MO/W.PET/CERES
3 CREAM (GRAM) TOPICAL AT BEDTIME
Refills: 0 | Status: DISCONTINUED | OUTPATIENT
Start: 2023-04-25 | End: 2023-05-08

## 2023-04-25 RX ORDER — ACETAMINOPHEN 500 MG
650 TABLET ORAL EVERY 6 HOURS
Refills: 0 | Status: DISCONTINUED | OUTPATIENT
Start: 2023-04-25 | End: 2023-05-08

## 2023-04-25 RX ORDER — INSULIN GLARGINE 100 [IU]/ML
12 INJECTION, SOLUTION SUBCUTANEOUS AT BEDTIME
Refills: 0 | Status: DISCONTINUED | OUTPATIENT
Start: 2023-04-25 | End: 2023-05-08

## 2023-04-25 RX ORDER — DEXTROSE 50 % IN WATER 50 %
12.5 SYRINGE (ML) INTRAVENOUS ONCE
Refills: 0 | Status: DISCONTINUED | OUTPATIENT
Start: 2023-04-25 | End: 2023-05-08

## 2023-04-25 RX ORDER — SENNA PLUS 8.6 MG/1
10 TABLET ORAL
Qty: 0 | Refills: 0 | DISCHARGE

## 2023-04-25 RX ORDER — IPRATROPIUM/ALBUTEROL SULFATE 18-103MCG
3 AEROSOL WITH ADAPTER (GRAM) INHALATION EVERY 6 HOURS
Refills: 0 | Status: DISCONTINUED | OUTPATIENT
Start: 2023-04-25 | End: 2023-05-08

## 2023-04-25 RX ORDER — POLYETHYLENE GLYCOL 3350 17 G/17G
17 POWDER, FOR SOLUTION ORAL DAILY
Refills: 0 | Status: DISCONTINUED | OUTPATIENT
Start: 2023-04-25 | End: 2023-05-08

## 2023-04-25 RX ORDER — DEXTROSE 50 % IN WATER 50 %
25 SYRINGE (ML) INTRAVENOUS ONCE
Refills: 0 | Status: DISCONTINUED | OUTPATIENT
Start: 2023-04-25 | End: 2023-05-08

## 2023-04-25 RX ORDER — MEROPENEM 1 G/30ML
1000 INJECTION INTRAVENOUS ONCE
Refills: 0 | Status: COMPLETED | OUTPATIENT
Start: 2023-04-25 | End: 2023-04-25

## 2023-04-25 RX ORDER — TAMSULOSIN HYDROCHLORIDE 0.4 MG/1
0.4 CAPSULE ORAL AT BEDTIME
Refills: 0 | Status: DISCONTINUED | OUTPATIENT
Start: 2023-04-25 | End: 2023-05-08

## 2023-04-25 RX ORDER — GLUCAGON INJECTION, SOLUTION 0.5 MG/.1ML
0 INJECTION, SOLUTION SUBCUTANEOUS
Qty: 0 | Refills: 0 | DISCHARGE

## 2023-04-25 RX ORDER — DEXTROSE 50 % IN WATER 50 %
15 SYRINGE (ML) INTRAVENOUS ONCE
Refills: 0 | Status: DISCONTINUED | OUTPATIENT
Start: 2023-04-25 | End: 2023-05-08

## 2023-04-25 RX ORDER — INSULIN LISPRO 100/ML
VIAL (ML) SUBCUTANEOUS EVERY 6 HOURS
Refills: 0 | Status: DISCONTINUED | OUTPATIENT
Start: 2023-04-25 | End: 2023-05-08

## 2023-04-25 RX ORDER — AMANTADINE HCL 100 MG
10 CAPSULE ORAL
Qty: 0 | Refills: 0 | DISCHARGE

## 2023-04-25 RX ADMIN — PIPERACILLIN AND TAZOBACTAM 25 GRAM(S): 4; .5 INJECTION, POWDER, LYOPHILIZED, FOR SOLUTION INTRAVENOUS at 23:02

## 2023-04-25 RX ADMIN — MEROPENEM 100 MILLIGRAM(S): 1 INJECTION INTRAVENOUS at 03:59

## 2023-04-25 RX ADMIN — INSULIN GLARGINE 12 UNIT(S): 100 INJECTION, SOLUTION SUBCUTANEOUS at 23:17

## 2023-04-25 RX ADMIN — TAMSULOSIN HYDROCHLORIDE 0.4 MILLIGRAM(S): 0.4 CAPSULE ORAL at 23:02

## 2023-04-25 RX ADMIN — PIPERACILLIN AND TAZOBACTAM 25 GRAM(S): 4; .5 INJECTION, POWDER, LYOPHILIZED, FOR SOLUTION INTRAVENOUS at 14:00

## 2023-04-25 NOTE — ED PROVIDER NOTE - PHYSICAL EXAMINATION
PHYSICAL EXAM:  GENERAL: Sitting in stretcher, helmet on, diaphoretic, opens eyes to voice, non-verbal  HENMT: Atraumatic, moist mucous membranes, dried blood and clot present in EAC b/l, no active bleeding noted, trach patent to room air  EYES: Clear bilaterally, PERRL, EOMs intact b/l  HEART: RRR, nl S1/S2, no murmurs  RESPIRATORY: Coarse bs b/l, no wheezes/rales  ABDOMEN: +BS, soft, nondistended, PEG tube in place  EXTREMITIES: No lower extremity edema, +2 radial pulses b/l  NEURO:  Non-verbal, does not follow commands, unable to obtain reliable neuro exam  Heme/LYMPH: No ecchymosis or bruising, no anterior/posterior cervical or supraclavicular LAD  SKIN:  Stage 2 sacral ulcer with purulent drainage and surrounding erythema. No evidence of rash.

## 2023-04-25 NOTE — H&P ADULT - NSHPPHYSICALEXAM_GEN_ALL_CORE
Vital Signs Last 24 Hrs  T(C): 36.6 (25 Apr 2023 12:56), Max: 37.3 (25 Apr 2023 03:00)  T(F): 97.8 (25 Apr 2023 12:56), Max: 99.2 (25 Apr 2023 03:00)  HR: 89 (25 Apr 2023 12:56) (88 - 101)  BP: 127/89 (25 Apr 2023 12:56) (121/79 - 137/84)  BP(mean): --  RR: 18 (25 Apr 2023 12:56) (17 - 20)  SpO2: 100% (25 Apr 2023 12:56) (96% - 100%)    Parameters below as of 25 Apr 2023 12:56  Patient On (Oxygen Delivery Method): tracheostomy collar        PHYSICAL EXAM:  GENERAL: NAD, well-developed  HEAD:  +RT cranitomy + Trach in place   EYES: EOMI,, conjunctiva and sclera clear  NECK: Supple, No JVD  CHEST/LUNG: coarse BS; No wheeze  HEART: Regular rate and rhythm; No murmurs, rubs, or gallops  ABDOMEN: Soft, Nontender, Nondistended; Bowel sounds present +PEG + condomn cath   EXTREMITIES:  2+ Peripheral Pulses, No clubbing, cyanosis, or edema  PSYCH: non verbal at baseline   NEUROLOGY: unable to assess

## 2023-04-25 NOTE — ED PROVIDER NOTE - ATTENDING CONTRIBUTION TO CARE
62-year-old male with history of traumatic SDH s/p right craniotomy (bedbound and nonverbal at baseline), trach to air, insulin dependent DM, recent hospitalization for aspiration pneumonia transferred from [place] ED for ENT evaluation of bilateral ear bleeding.  Patient is unable to provide any history.  Family at bedside reports that nursing home told them he started bleeding from both his ears yesterday.  No reported trauma or foreign body insertion.  Of note, pt had CTA head showing ?intraparenchymal hemorrhage of left occipital lobe.  He was seen by neurosurgery, who did not think it was a bleed and recommended 6hr repeat CT scan.  Pt rec'd meropenem PTA.    Chronically ill appearing, lying in stretcher, nontoxic.  Afebrile, VSS.  NCAT, dried blood in bilateral external canals.  Lungs cta bl.  Cards nl S1/S2, RRR, no MRG.  Abd soft ntnd.  Stage 2 sacral ulcer with purulent drainage.      ENT eval for ear bleeding - appears to have traumatic abrasions/lacerations to bilateral external canals, TMs normal.  Labs and imaging from OSH reviewed - pt with significant leukocytosis.  Concern for underlying infection - pna vs uti; sacral ulcer does appear infected.  Will cont meropenem.  CT head to eval for poss intraparenchymal hemorrhage, will extend to chest/abd/pel to eval for other source of infection.  Plan for admission.

## 2023-04-25 NOTE — PATIENT PROFILE ADULT - FUNCTIONAL SCREEN CURRENT LEVEL: COMMUNICATION, MLM
unable to assess, patient is nonverbal/3 = unable to understand or speak (not related to language barrier)

## 2023-04-25 NOTE — ED ADULT NURSE NOTE - NS ED NURSE REPORT GIVEN DT
CC:   Chief Complaint   Patient presents with   • EPIGASTRIC PAIN      x3days            SUBJECTIVE:    Park Gomez is a 34 year old  year old female who presents to [facility]   with abdominal symptoms which have been present for one week.  Location:epigastric  Quality:Severity:mild  Symptoms include: belching and flatulus  Denies:  decreased urine output,   recent travel,   DENIES::denies recent antibx,   DENIES:suspicous food intake,   DENIES: blood in the stool, black or tarry appearing stool, and     DENIES:denies exposure to sick contacts.    Aggravating factors: None  Alleviating factors: None      Review of Systems    Review of Systems   Constitutional: Negative for activity change, appetite change, chills, diaphoresis, fatigue, fever and unexpected weight change.   HENT: Negative for congestion, dental problem, drooling, ear discharge, ear pain, facial swelling, hearing loss, mouth sores, nosebleeds, postnasal drip, rhinorrhea, sinus pressure, sinus pain, sneezing, sore throat, tinnitus, trouble swallowing and voice change.    Eyes: Negative for photophobia, pain, discharge, redness, itching and visual disturbance.   Respiratory: Negative for apnea, cough, choking, chest tightness, shortness of breath, wheezing and stridor.    Infectious: No fever or recent infections reported.  Cardiovascular: Negative for chest pain, palpitations , murmurs or congenital heart disease or leg swelling.   Gastrointestinal: Negative for abdominal distention, abdominal pain, anal bleeding, blood in stool, constipation, diarrhea, nausea, rectal pain and vomiting.   Endocrine: Negative for cold intolerance, heat intolerance, polydipsia, growth concerns, polyphagia and polyuria.   Genitourinary: Negative for decreased urine volume, difficulty urinating, dyspareunia, dysuria, enuresis, flank pain, frequency, genital sores, hematuria, menstrual problem, pelvic pain, urgency, vaginal bleeding, vaginal discharge and vaginal  pain.   Musculoskeletal: Negative for gait, muscle, or skeletal concerns, arthralgias, back pain, joint swelling, myalgias, neck pain and neck stiffness.   Skin: No history of rashes ,eczema., color change, pallor, or wound.     Allergic/Immunologic: Negative for environmental allergies, food allergies and immunocompromised state.   Neurological: Negative for dizziness, tremors, seizures, syncope, facial asymmetry, speech difficulty, weakness, light-headedness, numbness,                         Headaches,hypotonia, or hypertonia  Hematological: Negative for adenopathy. Does not bruise/bleed easily.   Psychiatric/Behavioral: Negative for agitation, behavioral problems, confusion, decreased concentration, dysphoric mood, hallucinations, self-injury, sleep disturbance and suicidal ideas. The patient is not nervous/anxious and is not hyperactive.    All other systems reviewed and are negative.       PMHX=SAME AS ACTIVE  PSX=NONE DISCLOSED THIS VISIT  FAMHX=NO   RELATIVE SYMPTOMS  SOCHX=NO TOB      SH: ALLERGIES: no known allergies.    OBJECTIVE:  General appearance:   Alert, well hydrated, in no apparent distress  Ear Exam:TM's intact without erythema, bulging, retraction, or fluid-level. External auditory canal clear  Conjunctiva: clear without injection or discharge  Nose: nasal mucosa moist, pink, without rhinorrhea or sinus tenderness  Throat: pink and moist without erythema, edema, or exudates  Neck: supple without cervical adenopathy. No meningismus.  Heart: regular rate and rhythm\",\"no murmurs, clicks or gallops  Lungs: clear to auscultation without wheezes, rhonchi or rales  Abdomen:   ABDOMEN EXAM: soft, non-tender, non-distended. Bowel sounds present and active without rigidity. No organomegally  Skin: smooth without rash or edema and capillary refill is normal    ASSESSMENT/PLAN:  GERD  OMEPROZOLE    PUSH FLUID  Abd Pain: If recurs, persists, or blood is seen in the stool or other worrisome symptoms develop  follow-up with  to recheck.       FOLLOW UP WITH PRIMARY DOC IN TWO DAYS  GO TO ED FOR CHEST PAIN SHORTNESS OF BREATH OR DIZZINESS.      Best Foods for Acid Reflux  “A diet balanced with vegetables, protein and fruits is best,” Dr. Edwards says. Examples of the best foods for acid reflux include:    Chicken breast - Be sure to remove the fatty skin. Skip fried and instead choose baked, broiled or grilled.  Lettuce, celery and sweet peppers - These mild green veggies are easy on the stomach - and won’t cause painful gas.  Brown rice - This complex carbohydrate is mild and filling - just don’t serve it fried.  Melons - Watermelon, cantaloupe and honeydew are all low-acid fruits that are among the best foods for acid reflux.  Oatmeal - Filling, hearty and healthy, this comforting breakfast standard also works for lunch.  Fennel - This low-acid crunchy vegetable has a mild licorice flavor and a natural soothing effect.  Ashwini - Steep caffeine-free ashwini tea or chew on low-sugar dried ashwini for a natural tummy tamer.  Worst Foods for Reflux  In general, anything that is fatty, acidic or highly caffeinated should be avoided. The worst foods for acid reflux list includes:    Coffee and tea - Caffeinated beverages aggravate acid reflux. Opt for teas without caffeine.  Carbonated beverages - The bubbles expand in your stomach, creating more pressure and pain. Choose plain water or decaf iced tea.  Chocolate - This treat has a trifecta of acid reflux problems: caffeine, fat and cocoa.  Peppermint -Don’t be fooled by its reputation for soothing the tummy; peppermint is an acid reflux trigger.  Grapefruit and orange - The high acidity of citrus fruits relaxes the esophagus sphincter and worsens symptoms.  Tomatoes - Also avoid marinara sauce, ketchup and tomato soup - they’re all naturally high in acid.  Alcohol -This has a double whammy effect. Alcohol relaxes the sphincter valve but it also stimulates acid production in  the stomach.  Fried foods - These are some of the worst foods for reflux. Skip the french fries, onion rings and fried chicken -- cook on the grill or in the oven at home.  Late-night snacks - Avoid eating anything in the two hours before you go to bed. Also, you can try eating four to five smaller meals throughout the day instead of two to three large meals.      .         25-Apr-2023 14:14

## 2023-04-25 NOTE — ED PROVIDER NOTE - CLINICAL SUMMARY MEDICAL DECISION MAKING FREE TEXT BOX
63 y/o M h/o traumatic subdural hemorrhage following a fall down the stairs while drunk (bedbound, nonverbal, lives in NH), s/p r craniotomy at [facility] in 01/2023, s/p trach/PEG txfer from [place] ED for ENT evaluation for b/l ear bleeding. Afebrile, vital signs stable. Trach to room air. Coarse breath sounds b/l and stage 2 sacral ulcer. Dried blood and small clots present EAC b/l. Chart review from [place] showing significant leukocytosis, CTH w/ ?petechial hemorrhage seen by neurosurgery recommending rpt 6h scan. Pt diaphoretic on exam and more lethargic per wife. Possible sepsis 2/2 pna v intraabdominal infection v sacral ulcer. Cultures sent at . Will straight cath for UA/culture, obtain CTH f/u possibly intracranial hemorrhage, CT C/A/P eval infectious source, ENT c/s, redose meropenem (1st dose received at  >8 hours ago), likely admit.

## 2023-04-25 NOTE — CONSULT NOTE ADULT - SUBJECTIVE AND OBJECTIVE BOX
HPI:  61 yo M w/ PMHx traumatic subdural hemorrhage following a fall down the stairs while drunk, s/p RIGHT cranecotmy at [facility] in 2023 complicated by infarct,bone flap discarded , non verbal and functional quadriplegic, s/p trach/PEG (while socialized at [facility] ), recently hospitalized (3/5-3/31/23) at sepsis 2/2 pneumonia, resides at [facility] transferred to [facility] from  for ENT eval due to bleeding from b/l ears. On labs was found to have elevated WBC and Ct chest with b/l PNA     [place]  [id number]       (2023 13:58)    PAST MEDICAL & SURGICAL HISTORY:  History of subdural hemorrhage      PEG (percutaneous endoscopic gastrostomy) status      DM (diabetes mellitus)      TBI (traumatic brain injury)      Chronic respiratory failure with hypoxia      S/P craniotomy        FAMILY HISTORY:  No pertinent family history in first degree relatives      Home Medications:  acetaminophen 48 mg/mL oral liquid: 20 milliliter(s) orally every 6 hours, As Needed fever (2023 14:57)  amantadine 50 mg/5 mL oral syrup: 15 by gastrostomy tube 2 times a day (2023 14:55)  amLODIPine 10 mg oral tablet: 1 tab(s) orally once a day (2023 14:57)  aspirin 81 mg oral tablet, chewable: 1 tab(s) orally once a day (2023 14:57)  enalapril 10 mg oral tablet: 1 tab(s) orally once a day (2023 14:57)  enoxaparin 40 mg/0.4 mL injectable solution: 0.4 milliliter(s) subcutaneous once a day (2023 14:57)  insulin glargine 100 units/mL subcutaneous solution: 12 unit(s) subcutaneous once a day (at bedtime) (2023 14:57)  MiraLax oral powder for reconstitution: 17 gram(s) by PEG tube once a day (2023 14:57)  Silvadene 1% topical cream: Apply topically to affected area 2 times a day (2023 14:57)  tamsulosin 0.4 mg oral capsule: 1 cap(s) orally once a day (at bedtime) (2023 14:57)      MEDICATIONS  (STANDING):  amLODIPine   Tablet 10 milliGRAM(s) Oral daily  dextrose 5%. 1000 milliLiter(s) (50 mL/Hr) IV Continuous <Continuous>  dextrose 5%. 1000 milliLiter(s) (100 mL/Hr) IV Continuous <Continuous>  dextrose 50% Injectable 25 Gram(s) IV Push once  dextrose 50% Injectable 25 Gram(s) IV Push once  dextrose 50% Injectable 12.5 Gram(s) IV Push once  enalapril 10 milliGRAM(s) Oral daily  glucagon  Injectable 1 milliGRAM(s) IntraMuscular once  insulin glargine Injectable (LANTUS) 12 Unit(s) SubCutaneous at bedtime  insulin lispro (ADMELOG) corrective regimen sliding scale   SubCutaneous every 6 hours  piperacillin/tazobactam IVPB.. 3.375 Gram(s) IV Intermittent every 8 hours  polyethylene glycol 3350 17 Gram(s) Oral daily  tamsulosin 0.4 milliGRAM(s) Oral at bedtime    MEDICATIONS  (PRN):  acetaminophen   Oral Liquid .. 650 milliGRAM(s) Oral every 6 hours PRN Temp greater or equal to 38C (100.4F), Mild Pain (1 - 3)  albuterol/ipratropium for Nebulization 3 milliLiter(s) Nebulizer every 6 hours PRN Shortness of Breath and/or Wheezing  aluminum hydroxide/magnesium hydroxide/simethicone Suspension 30 milliLiter(s) Oral every 4 hours PRN Dyspepsia  dextrose Oral Gel 15 Gram(s) Oral once PRN Blood Glucose LESS THAN 70 milliGRAM(s)/deciliter  melatonin 3 milliGRAM(s) Oral at bedtime PRN Insomnia  ondansetron Injectable 4 milliGRAM(s) IV Push every 8 hours PRN Nausea and/or Vomiting    Vital Signs Last 24 Hrs  T(C): 36.6 (2023 12:56), Max: 37.3 (2023 03:00)  T(F): 97.8 (2023 12:56), Max: 99.2 (2023 03:00)  HR: 89 (2023 12:56) (88 - 99)  BP: 127/89 (2023 12:56) (127/78 - 137/84)  BP(mean): --  RR: 18 (2023 12:56) (18 - 20)  SpO2: 100% (2023 12:56) (96% - 100%)    Parameters below as of 2023 12:56  Patient On (Oxygen Delivery Method): tracheostomy collar        PHYSICAL EXAM:  OE spontaneously  Pupils 2mm b/l reacitve, blinks to threat  Does not track  Not following commands  moving R side to noxious stimuli > L sdied  Right cranial defect sunken,    LABS:  Urinalysis Basic - ( 2023 03:00 )    Color: Yellow / Appearance: Clear / S.018 / pH: x  Gluc: x / Ketone: Negative  / Bili: Negative / Urobili: <2 mg/dL   Blood: x / Protein: 30 mg/dL / Nitrite: Negative   Leuk Esterase: Large / RBC: 4 /HPF / WBC >50 /HPF   Sq Epi: x / Non Sq Epi: x / Bacteria: Few      PT/INR - ( 2023 17:00 )   PT: 16.4 sec;   INR: 1.37 ratio         PTT - ( 2023 17:00 )  PTT:37.1 sec                        9.2    21.55 )-----------( 169      ( 2023 17:00 )             29.5     04-24    145  |  114<H>  |  35<H>  ----------------------------<  223<H>  4.2   |  27  |  0.84    Ca    9.4      2023 17:00  Mg     2.6     -    TPro  8.0  /  Alb  2.7<L>  /  TBili  1.0  /  DBili  x   /  AST  30  /  ALT  35  /  AlkPhos  237<H>  -      RADIOLOGY:  < from: CT Head No Cont (23 @ 06:55) >    Status post right parietotemporal craniectomy with concave deformity.   Extensive encephalomalacia and gliosis of the supraorbital right frontal   lobe appears unchanged. Extensive infarction within the left cerebral   hemisphere is unchanged. This includes a small focalhyperdensities   within the infarcted brain parenchyma within the anterior left occipital   lobe. There is broad asymmetric dilatation of the left lateral ventricle   on an ex vacuo basis. The right lateral ventricle is also dilated. The   third ventricle is dilated. The cerebral aqueduct appears patent. The   fourth ventricle is not dilated. No interval lesion is recognized. No new   mass effect is developed in the brain.    The visualized paranasal sinuses are clear. The mastoid air cells and   middle ear cavities are clear. No destructive bone lesion is found in   either temporal bone. The external auditory canals demonstrate small   volume soft tissue without destructive lesion.    The soft tissues of the scalp are unremarkable.      IMPRESSION:    No substantial change 2023, including small hypodensities within the   infarcted brain parenchyma the left occipital lobe, small blood clots   versus dystrophic calcification    < end of copied text >

## 2023-04-25 NOTE — ED ADULT NURSE NOTE - OBJECTIVE STATEMENT
BREAK RN: pt arrives as transfer from [facility] for ENT consult. pt family at bedside, pt nonverbal nonambulatory at and at baseline as per family. pt presents to ED for bilateral ear bleeding. bleeding controlled at this time. as per family no known falls or trauma. pt respirations mildly labored with abdominal muscle use, trach to air sat 100%. pt normal sinus on monitor. pt with pressure injury to sacrum with purulent drainage. skin otherwise in tact. pt arrives with 20g to the LAC with no redness or swelling. ENT at bedside at this time. report endorsed to primary RN Najma. no verbal or nonverbal indicators of pain. pt stable upon exiting room.

## 2023-04-25 NOTE — ED ADULT NURSE REASSESSMENT NOTE - NSFALLRSKASSESSDT_ED_ALL_ED
25-Apr-2023 06:51
25-Apr-2023 07:20
25-Apr-2023 14:54
25-Apr-2023 09:07
25-Apr-2023 12:18
25-Apr-2023 15:00

## 2023-04-25 NOTE — CONSULT NOTE ADULT - SUBJECTIVE AND OBJECTIVE BOX
"HPI: 62M pmhx  h/o traumatic subdural hemorrhage following a fall down the stairs while drunk, s/p r craniotomy at [facility] in 2023, s/p trach/PEG (while socialized at [facility] ), recently hospitalized (3/5-3/31/23) at [facility] with sepsis 2/2 pneumonia,  transferred from [facility] to [facility] for b/l ear bleeding. ENT consulted. Pt family at bedside. Explained pt lives at nursing home ([facility])  and facility noticed bleeding from ears today and sent pt to hospital. Pt family unable to provide further details. Unsure if still on Lovenox or ASA at nursing home"    Above reviewed. 63 yo M with history of traumatic CDH, sp craniotomy, trach/PEG, recently at OSH, with pneumonia, transferred to [facility] in setting of ear bleeding  Patient reportedly with onset of bleeding from ears  On treatment for possible pneumonia  Patient is nonverbal and not able to provide further history or ROS  Family member says that patient has had shortness of breath but no other new complaints  Here for ENT eval  ID called for further eval    PAST MEDICAL & SURGICAL HISTORY:  History of subdural hemorrhage      PEG (percutaneous endoscopic gastrostomy) status      DM (diabetes mellitus)      TBI (traumatic brain injury)      Chronic respiratory failure with hypoxia      S/P craniotomy    Allergies    No Known Allergies    Intolerances    ANTIMICROBIALS:      OTHER MEDS:  dextrose 5%. 1000 milliLiter(s) IV Continuous <Continuous>  dextrose 5%. 1000 milliLiter(s) IV Continuous <Continuous>  dextrose 50% Injectable 25 Gram(s) IV Push once  dextrose 50% Injectable 25 Gram(s) IV Push once  dextrose 50% Injectable 12.5 Gram(s) IV Push once  dextrose Oral Gel 15 Gram(s) Oral once PRN  glucagon  Injectable 1 milliGRAM(s) IntraMuscular once  insulin lispro (ADMELOG) corrective regimen sliding scale   SubCutaneous every 6 hours    SOCIAL HISTORY: Not able to provide further history, poor mental status    FAMILY HISTORY: Not able to provide further history, poor mental status    Drug Dosing Weight  Height (cm): 177.8 (2023 01:50)  Weight (kg): 64.4 (2023 14:14)  BMI (kg/m2): 20.4 (2023 01:50)  BSA (m2): 1.8 (2023 01:50)    PE:    Vital Signs Last 24 Hrs  T(C): 36.6 (2023 12:56), Max: 37.3 (2023 03:00)  T(F): 97.8 (2023 12:56), Max: 99.2 (2023 03:00)  HR: 89 (2023 12:56) (88 - 101)  BP: 127/89 (2023 12:56) (121/79 - 137/84)  RR: 18 (2023 12:56) (17 - 20)  SpO2: 100% (2023 12:56) (96% - 100%)    Gen: AOx3, NAD, non-toxic, pleasant  CV: S1+S2 normal, nontachycardic  Resp: Clear bilat, no resp distress, no crackles/wheezes  Abd: Soft, nontender, +BS  Ext: No LE edema, no wounds  : No Bran  IV/Skin: No thrombophlebitis  Msk: No low back pain, no arthralgias, no joint swelling  Neuro: No sensory deficits, no motor deficits    LABS:                        9.2    21.55 )-----------( 169      ( 2023 17:00 )             29.5     04-24    145  |  114<H>  |  35<H>  ----------------------------<  223<H>  4.2   |  27  |  0.84    Ca    9.4      2023 17:00  Mg     2.6     04-24    TPro  8.0  /  Alb  2.7<L>  /  TBili  1.0  /  DBili  x   /  AST  30  /  ALT  35  /  AlkPhos  237<H>  04-24    Urinalysis Basic - ( 2023 03:00 )    Color: Yellow / Appearance: Clear / S.018 / pH: x  Gluc: x / Ketone: Negative  / Bili: Negative / Urobili: <2 mg/dL   Blood: x / Protein: 30 mg/dL / Nitrite: Negative   Leuk Esterase: Large / RBC: 4 /HPF / WBC >50 /HPF   Sq Epi: x / Non Sq Epi: x / Bacteria: Few    MICROBIOLOGY:    COVID neg    RADIOLOGY:     CT:    IMPRESSION:    Limited noncontrast study.    Patchy consolidative and tree-in-bud opacities in the bilateral lower   lobes consistent with multifocal pneumonia. Aspiration is a consideration.    Circumferential bladder wall thickening, correlate with urinalysis for   possible infection. High density material within the bladder may be old   renally excreted contrast.

## 2023-04-25 NOTE — ED ADULT NURSE REASSESSMENT NOTE - NS ED NURSE REASSESS COMMENT FT1
Assumed care of patient, AAOx0, no signs of distress, turned and repositioned, pending admission, fall precautions maintained
Patient is A&OX0, awake and alert, breathing spontaneous and unlabored. Pt taken off humidifier sating at 98%, no sign of discomfort or distress, will continue.
patient received from day RN at baseline mental status, appears to be resting in bed comfortably, no complaints noted at this time. Breathing even and unlabored, pallor/diaphoresis not noted. Denies chest pain, shortness of breath, nausea or vomiting. IV site clean dry and intact. All safety maintained, will continue to monitor.
Mobile Critical Care RN: performed trache care, cleaned inner cannula, cleaned insertion site, changed trache sponge
Patient is A&OX0, awake and alert. ACP Ethlyn made aware that patient is going to the floor, ACP states its okay to go up without the humidifier. Will continue to monitor.
Pt received back from CT. resting comfortbly at this time, inner cannula and tracheostomy cleaned and suction. NSR on cardiac monitor, awaiting CT read.

## 2023-04-25 NOTE — CONSULT NOTE ADULT - SUBJECTIVE AND OBJECTIVE BOX
CC: bleeding b/l ears     HPI: 62M pmhx  h/o traumatic subdural hemorrhage following a fall down the stairs while drunk, s/p r craniotomy at [facility] in 01/2023, s/p trach/PEG (unsure when), transferred from [facility] to [facility] for b/l ear bleeding. ENT consulted. Pt family at bedside. Explained pt lives at nursing home and facility noticed bleeding from ears today and sent pt to hospital. Pt family unable to provide further details.       PAST MEDICAL & SURGICAL HISTORY:  History of subdural hemorrhage      PEG (percutaneous endoscopic gastrostomy) status      DM (diabetes mellitus)      TBI (traumatic brain injury)      Chronic respiratory failure with hypoxia      S/P craniotomy        Allergies    No Known Allergies    Intolerances      MEDICATIONS  (STANDING):  meropenem  IVPB 1000 milliGRAM(s) IV Intermittent Once    MEDICATIONS  (PRN):           Family history: No pertinent family history in first degree relatives    ROS:    unable to provide due to medical conditions    Vital Signs Last 24 Hrs  T(C): 37.3 (25 Apr 2023 03:00), Max: 37.3 (25 Apr 2023 03:00)  T(F): 99.2 (25 Apr 2023 03:00), Max: 99.2 (25 Apr 2023 03:00)  HR: 99 (25 Apr 2023 03:00) (88 - 101)  BP: 137/84 (25 Apr 2023 03:00) (121/79 - 137/84)  RR: 20 (25 Apr 2023 03:00) (17 - 20)  SpO2: 100% (25 Apr 2023 03:00) (96% - 100%)    Parameters below as of 25 Apr 2023 03:00  Patient On (Oxygen Delivery Method): room air,trach                              9.2    21.55 )-----------( 169      ( 24 Apr 2023 17:00 )             29.5    04-24    145  |  114<H>  |  35<H>  ----------------------------<  223<H>  4.2   |  27  |  0.84    Ca    9.4      24 Apr 2023 17:00  Mg     2.6     04-24    TPro  8.0  /  Alb  2.7<L>  /  TBili  1.0  /  DBili  x   /  AST  30  /  ALT  35  /  AlkPhos  237<H>  04-24   PT/INR - ( 24 Apr 2023 17:00 )   PT: 16.4 sec;   INR: 1.37 ratio         PTT - ( 24 Apr 2023 17:00 )  PTT:37.1 sec    PHYSICAL EXAM:  Gen: NAD, laying on stretcher   Skin: No rashes, bruises, or lesions  Head: Normocephalic, Atraumatic  Face: no edema, erythema, or fluctuance. Parotid glands soft without mass  Eyes: no scleral injection  Ears: Right - ear canal dried blood noted, skin along the EAC lacerated (ear wic placed)  NO active bleeding  TM intact without effusion or erythema. No evidence of any fluid drainage. No mastoid tenderness, erythema, or ear bulging            Left - ear canal dried blood (one clot noted and removed) NO active bleeding , skin along EAC lacerated, TM intact without effusion or erythema. No evidence of any fluid drainage. No mastoid tenderness, erythema, or ear bulging  Nose: Nares bilaterally patent  Neck:+ tracheostomy tube in place with posey ties   Lymphatic: No lymphadenopathy  Resp: breathing easily, no stridor              CC: bleeding b/l ears     HPI: 62M pmhx  h/o traumatic subdural hemorrhage following a fall down the stairs while drunk, s/p r craniotomy at [facility] in 01/2023, s/p trach/PEG (while socialized at [facility] ), transferred from [facility] to [facility] for b/l ear bleeding. ENT consulted. Pt family at bedside. Explained pt lives at nursing home ([facility])  and facility noticed bleeding from ears today and sent pt to hospital. Pt family unable to provide further details. Unsure if still on Lovenox or ASA at nursing home       PAST MEDICAL & SURGICAL HISTORY:  History of subdural hemorrhage      PEG (percutaneous endoscopic gastrostomy) status      DM (diabetes mellitus)      TBI (traumatic brain injury)      Chronic respiratory failure with hypoxia      S/P craniotomy        Allergies    No Known Allergies    Intolerances      MEDICATIONS  (STANDING):  meropenem  IVPB 1000 milliGRAM(s) IV Intermittent Once    MEDICATIONS  (PRN):           Family history: No pertinent family history in first degree relatives    ROS:    unable to provide due to medical conditions    Vital Signs Last 24 Hrs  T(C): 37.3 (25 Apr 2023 03:00), Max: 37.3 (25 Apr 2023 03:00)  T(F): 99.2 (25 Apr 2023 03:00), Max: 99.2 (25 Apr 2023 03:00)  HR: 99 (25 Apr 2023 03:00) (88 - 101)  BP: 137/84 (25 Apr 2023 03:00) (121/79 - 137/84)  RR: 20 (25 Apr 2023 03:00) (17 - 20)  SpO2: 100% (25 Apr 2023 03:00) (96% - 100%)    Parameters below as of 25 Apr 2023 03:00  Patient On (Oxygen Delivery Method): room air,trach                              9.2    21.55 )-----------( 169      ( 24 Apr 2023 17:00 )             29.5    04-24    145  |  114<H>  |  35<H>  ----------------------------<  223<H>  4.2   |  27  |  0.84    Ca    9.4      24 Apr 2023 17:00  Mg     2.6     04-24    TPro  8.0  /  Alb  2.7<L>  /  TBili  1.0  /  DBili  x   /  AST  30  /  ALT  35  /  AlkPhos  237<H>  04-24   PT/INR - ( 24 Apr 2023 17:00 )   PT: 16.4 sec;   INR: 1.37 ratio         PTT - ( 24 Apr 2023 17:00 )  PTT:37.1 sec    PHYSICAL EXAM:  Gen: NAD, laying on stretcher   Skin: No rashes, bruises, or lesions  Head: Normocephalic, Atraumatic  Face: no edema, erythema, or fluctuance. Parotid glands soft without mass  Eyes: no scleral injection  Ears: Right - ear canal dried blood noted, skin along the EAC lacerated (ear wic placed)  NO active bleeding  TM intact without effusion or erythema. No evidence of any fluid drainage. No mastoid tenderness, erythema, or ear bulging            Left - ear canal dried blood (one clot noted and removed) NO active bleeding , skin along EAC lacerated, TM intact without effusion or erythema. No evidence of any fluid drainage. No mastoid tenderness, erythema, or ear bulging  Nose: Nares bilaterally patent  Neck:+ tracheostomy tube in place with posey ties   Lymphatic: No lymphadenopathy  Resp: breathing easily, no stridor              CC: bleeding b/l ears     HPI: 62M pmhx  h/o traumatic subdural hemorrhage following a fall down the stairs while drunk, s/p r craniotomy at [facility] in 01/2023, s/p trach/PEG (while socialized at [facility] ), recently hospitalized (3/5-3/31/23) at [facility] with sepsis 2/2 pneumonia,  transferred from [facility] to [facility] for b/l ear bleeding. ENT consulted. Pt family at bedside. Explained pt lives at nursing home ([facility])  and facility noticed bleeding from ears today and sent pt to hospital. Pt family unable to provide further details. Unsure if still on Lovenox or ASA at nursing home       PAST MEDICAL & SURGICAL HISTORY:  History of subdural hemorrhage      PEG (percutaneous endoscopic gastrostomy) status      DM (diabetes mellitus)      TBI (traumatic brain injury)      Chronic respiratory failure with hypoxia      S/P craniotomy        Allergies    No Known Allergies    Intolerances      MEDICATIONS  (STANDING):  meropenem  IVPB 1000 milliGRAM(s) IV Intermittent Once    MEDICATIONS  (PRN):           Family history: No pertinent family history in first degree relatives    ROS:    unable to provide due to medical conditions    Vital Signs Last 24 Hrs  T(C): 37.3 (25 Apr 2023 03:00), Max: 37.3 (25 Apr 2023 03:00)  T(F): 99.2 (25 Apr 2023 03:00), Max: 99.2 (25 Apr 2023 03:00)  HR: 99 (25 Apr 2023 03:00) (88 - 101)  BP: 137/84 (25 Apr 2023 03:00) (121/79 - 137/84)  RR: 20 (25 Apr 2023 03:00) (17 - 20)  SpO2: 100% (25 Apr 2023 03:00) (96% - 100%)    Parameters below as of 25 Apr 2023 03:00  Patient On (Oxygen Delivery Method): room air,trach                              9.2    21.55 )-----------( 169      ( 24 Apr 2023 17:00 )             29.5    04-24    145  |  114<H>  |  35<H>  ----------------------------<  223<H>  4.2   |  27  |  0.84    Ca    9.4      24 Apr 2023 17:00  Mg     2.6     04-24    TPro  8.0  /  Alb  2.7<L>  /  TBili  1.0  /  DBili  x   /  AST  30  /  ALT  35  /  AlkPhos  237<H>  04-24   PT/INR - ( 24 Apr 2023 17:00 )   PT: 16.4 sec;   INR: 1.37 ratio         PTT - ( 24 Apr 2023 17:00 )  PTT:37.1 sec    PHYSICAL EXAM:  Gen: NAD, laying on stretcher   Skin: No rashes, bruises, or lesions  Head: Normocephalic, Atraumatic  Face: no edema, erythema, or fluctuance. Parotid glands soft without mass  Eyes: no scleral injection  Ears: Right - ear canal dried blood noted, skin along the EAC lacerated (ear wic placed)  NO active bleeding  TM intact without effusion or erythema. No evidence of any fluid drainage. No mastoid tenderness, erythema, or ear bulging            Left - ear canal dried blood (one clot noted and removed) NO active bleeding , skin along EAC lacerated, TM intact without effusion or erythema. No evidence of any fluid drainage. No mastoid tenderness, erythema, or ear bulging  Nose: Nares bilaterally patent  Neck:+ tracheostomy tube in place with posey ties   Lymphatic: No lymphadenopathy  Resp: breathing easily, no stridor

## 2023-04-25 NOTE — ED ADULT TRIAGE NOTE - CHIEF COMPLAINT QUOTE
Pt arrives as transfer from [place] for ENT consult. Presented to ED for bleeding from Bilateral ears, irrigated and packed. Mild bleeding noted. No known falls or trauma per EMS Trach to collar on room air.  20G L AV.  fs 110 by EMS. Pmhx: TBI, DM, Pt arrives as transfer from [place] for ENT consult. Presented to ED for bleeding from Bilateral ears, irrigated and packed. Mild bleeding noted. No known falls or trauma per EMS .Trach on room air.  20G L AV.  fs 110 by EMS. Pmhx: TBI, DM,

## 2023-04-25 NOTE — H&P ADULT - NSHPPOASURGSITEINCISION_GEN_ALL_CORE
Medication(s) Requested: Zolpidem 10 mg   Last office visit: 1/27/2020  Next office visit: n/a  Medication contract: Yes  Last urine drug screen:  6/13/17  Last refill: 3/26/2020  Is the patient due for refill of this medication(s): Yes  PDMP review: Criteria met. Forwarded to Physician/GAGE for signature.        
no

## 2023-04-25 NOTE — CONSULT NOTE ADULT - PROBLEM SELECTOR RECOMMENDATION 9
- admit to medicine - ent to follow as consulting service  - Ear wic in place right ear   - Ensure no trauma to EAC (no otic temps, no qtips)
- CT head reviewed- exvaco dilation of venticles, no comparison imaging. Per wife at bedside, patient has been at this neuro baseline since end of Janurary  - Per wife, rehab is arranging replacement of bone flap with primary surgeon at [facility]-   - No additional workup needed from neurosurgical standpoint. Tiny petechial hemorrhage less conspicuous on repeat CT head. No intervention needed

## 2023-04-25 NOTE — ED ADULT TRIAGE NOTE - NS ED NURSE BANDS TYPE
" NOTE    Patient name: Farrah Sweet  MRN: [id number]  Mother:  Albina Sweet    Gestational Age: 39w6d female now 40w 2d on DOL# 3 days    Delivery Clinician:  COSME HARLEY     Peds/FP:  Lexus Moreira MD    PRENATAL / BIRTH HISTORY / DELIVERY   ROM on 2022 at 8:10 AM; Clear  x 6h 16m  (prior to delivery).  Infant delivered on 2022 at 2:26 PM    Gestational Age: 39w6d term female born by Vaginal, Spontaneous to a 31 y.o.   . Cord Information: 3 vessels; Complications: None. MBT: A- prenatal labs negative, GBS negative, and prenatal ultrasounds Normal anatomy per OB note. Pregnancy complicated by  cervical polyp and PIH. Mother received   labetalol, procardia, Fe, PNV and magnesium sulfate during pregnancy and/or labor. Resuscitation at delivery: Suctioning;Tactile Stimulation. Apgars: 9  and 9 .    Maternal COVID-19 results on admission: Negative  22    Infant remained inpatient d/t maternal health status.    VITAL SIGNS & PHYSICAL EXAM:   Birth Wt: 5 lb 15 oz (2692 g) T: 98.4 °F (36.9 °C) (Axillary)  HR: 130   RR: 42        Current Weight:    Weight: 2502 g (5 lb 8.3 oz)    Birth Length: 19       Change in weight since birth: -7% Birth Head circumference: Head Circumference: 33.7 cm (13.29\")                  NORMAL  EXAMINATION    UNLESS OTHERWISE NOTED EXCEPTIONS    (AS NOTED)   General/Neuro   In no apparent distress, appears c/w EGA  Exam/reflexes appropriate for age and gestation SGA   Skin   Clear w/o abnormal rash, jaundice or lesions  Normal perfusion and peripheral pulses jaundice and erythema toxicum   HEENT   Normocephalic w/ nl sutures, eyes open.  RR:red reflex present bilaterally, conjunctiva without erythema, no drainage, sclera white, and no edema  ENT patent w/o obvious defects molding   Chest   In no apparent respiratory distress  CTA / RRR. No Murmur None   Abdomen/Genitalia   Soft, nondistended w/o organomegaly  Normal appearance for gender and gestation "  normal female   Trunk  Spine  Extremities Straight w/o obvious defects  Active, mobile without deformity none       INTAKE AND OUTPUT     Feeding: breastfeeding fair- well BRF x10/24hrs, discussed importance of continuing to breastfeed every 2 to 3 hrs around the clock, discussed si/sy of dehydration and appropriate number of wet diapers each day. MOB states milk is beginning to come in, infant reweighed this AM d/t 8% WL. Infant with weight gain of 20g overnight.    Intake & Output (last day)        07 07 0701  04/10 07    P.O. 3.5     Total Intake(mL/kg) 3.5 (1.4)     Net +3.5           Urine Unmeasured Occurrence 3 x     Stool Unmeasured Occurrence 6 x           LABS     Infant Blood Type: A+  KATINA: Negative   Passive AB:N/A    No results found for this or any previous visit (from the past 24 hour(s)).    TCI: Risk assessment of Hyperbilirubinemia  TcB Point of Care testin.4  Calculation Age in Hours: 61  Risk Assessment of Patient is: Low intermediate risk zone   LL 16.7, according to AAP guidelines for discharge < 72 hrs, follow up according to age and other clinical concerns.     TESTING      BP:   66/43 Location: Right Leg          77/53   Location: Right Arm    CCHD Critical Congen Heart Defect Test Result: pass (22 1506)   Car Seat Challenge Test  n/a   Hearing Screen Hearing Screen Date: 22 (22 1100)  Hearing Screen, Left Ear: passed (22 1100)  Hearing Screen, Right Ear: passed (22 1100)     Screen  Collected 22       Immunization History   Administered Date(s) Administered   • Hep B, Adolescent or Pediatric 2022     As indicated in active problem list and/or as listed as below. The plan of care has been / will be discussed with the family/primary caregiver(s).    RECOGNIZED PROBLEMS & IMMEDIATE PLAN(S) OF CARE:     Patient Active Problem List    Diagnosis Date Noted   • Single liveborn, born in hospital, delivered by  vaginal delivery 2022     Note Last Updated: 2022     Borderline SGA, BW 2692g, 10.63%(ile) WHO Growth Chart  Blood Glucose WNL x5  ------------------------------------------------------------------------------           FOLLOW UP:     Check/ follow up: none    Other Issues: GBS Plan: GBS negative, ROM 6.3hrs, Maternal Tmax 98.6F, recieved no antibiotics. Per EOS routine care for well appearing and equivocal infant.     Discharge to: to home    PCP follow-up: F/U with PCP as above in Monday days after DC, to be scheduled by family.    PENDING LABS/STUDIES:  The following labs and/ or studies are still pending at discharge:   metabolic screen      DISCHARGE CAREGIVER EDUCATION   In preparation for discharge, nursing staff and/ or medical provider (MD, NP or PA) have discussed the following:  -Diet   -Temperature  -Any Medications  -Circumcision Care (if applicable), no tub bath until healed  -Discharge Follow-Up appointment in 1-2 days  -Safe sleep recommendations (including ABCs of sleep and Tobacco Exposure Avoidance)  -[place] infection, including environmental exposure, immunization schedule and general infection prevention precautions)  -Cord Care, no tub bath until completely detached  -Car Seat Use/safety  -Questions were addressed    Less than 30 minutes was spent with the patient's family/current caregivers in preparing this discharge.        OUSMANE Granados  [facility] -  Nursery  [facility]  Documentation reviewed and electronically signed on 2022 at 10:40 EDT       DISCLAIMER:      “As of 2021, as required by the Federal 21st Century Cures Act, medical records (including provider notes and laboratory/imaging results) are to be made available to patients and/or their designees as soon as the documents are signed/resulted. While the intention is to ensure transparency and to engage patients in their healthcare, this immediate  access may create unintended consequences because this document uses language intended for communication between medical providers for interpretation with the entirety of the patient’s clinical picture in mind. It is recommended that patients and/or their designees review all available information with their primary or specialist providers for explanation and to avoid misinterpretation of this information.”    Attending Physician Addendum:    I have reviewed this patient's active problem list and corresponding treatment plan, while providing supervision of the management of this patient by the Advanced Practice Provider. This patient's pertinent monitoring, laboratory and/or radiological data were reviewed. To the best of my knowledge, the documentation represents an accurate description of this patient's current status, with any exceptions noted below.  Baby discharged home with close follow up.    Curtis Bocanegra MD  Attending Neonatologist  [facility] - Neonatology  Documentation reviewed and electronically signed on 2022 at 12:28 EDT   Name band;

## 2023-04-25 NOTE — H&P ADULT - PROBLEM SELECTOR PLAN 2
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 petechial parenchymal bleed; repeat CT head 4/25 No substantial change 4/24/2023, including small hypodensities within the infarcted brain parenchyma the left occipital lobe, small blood clots versus dystrophic calcification  - hold ASA and AC  - NSx c/s

## 2023-04-25 NOTE — ED ADULT NURSE NOTE - CHIEF COMPLAINT QUOTE
Pt arrives as transfer from [place] for ENT consult. Presented to ED for bleeding from Bilateral ears, irrigated and packed. Mild bleeding noted. No known falls or trauma per EMS .Trach on room air.  20G L AV.  fs 110 by EMS. Pmhx: TBI, DM,

## 2023-04-25 NOTE — ED PROVIDER NOTE - PROGRESS NOTE DETAILS
[facility] EM/IM PGY5: ENT evaluated patient, noting bleeding likely from trauma to b/l EAC, TMs appear normal and intact.

## 2023-04-25 NOTE — H&P ADULT - PROBLEM SELECTOR PLAN 1
- Ct chest with consolidation  - UA+ unable to obtain symptoms   - f/u BCx/Ucx   - Zosyn   - Rneata PRN  - ID  appreciated

## 2023-04-25 NOTE — ED PROVIDER NOTE - OBJECTIVE STATEMENT
63 y/o M h/o traumatic subdural hemorrhage following a fall down the stairs while drunk (bedbound, nonverbal, lives in NH), s/p r craniotomy at [facility] in 01/2023, s/p trach/PEG sharon from [place] ED for ENT evaluation for b/l ear bleeding. Pt is nonverbal and unable to provide any history. Family at bedside report staff at nursing home noticed bleeding from both of his ears. Wife also notes pt appears more lethargic than usual. Otherwise denies other recent noticed symptoms.

## 2023-04-25 NOTE — ED ADULT NURSE NOTE - NSIMPLEMENTINTERV_GEN_ALL_ED
Implemented All Fall with Harm Risk Interventions:  [place] to call system. Call bell, personal items and telephone within reach. Instruct patient to call for assistance. Room bathroom lighting operational. Non-slip footwear when patient is off stretcher. Physically safe environment: no spills, clutter or unnecessary equipment. Stretcher in lowest position, wheels locked, appropriate side rails in place. Provide visual cue, wrist band, yellow gown, etc. Monitor gait and stability. Monitor for mental status changes and reorient to person, place, and time. Review medications for side effects contributing to fall risk. Reinforce activity limits and safety measures with patient and family. Provide visual clues: red socks.

## 2023-04-25 NOTE — H&P ADULT - HISTORY OF PRESENT ILLNESS
61 yo M w/ PMHx traumatic subdural hemorrhage following a fall down the stairs while drunk, s/p r craniotomy at [facility] in 01/2023, non verbal and functional quadriplegic, s/p trach/PEG (while socialized at [facility] ), recently hospitalized (3/5-3/31/23) at sepsis 2/2 pneumonia, resides at [facility] transferred to [facility] from  for ENT eval due to bleeding from b/l ears. On labs was found to have elevated WBC and Ct chest with b/l PNA

## 2023-04-25 NOTE — CHART NOTE - NSCHARTNOTEFT_GEN_A_CORE
ENT Aware patient has arrived   Will need room with suction set up   please call [phone]// page [phone] when room ready and ENT team will see

## 2023-04-25 NOTE — PATIENT PROFILE ADULT - FUNCTIONAL ASSESSMENT - BASIC MOBILITY 5.
Message was sent to pt.'s portal to book an appt.  
Spoke w/ pt's mother, declined each appt that was available. Offered an appt with another provider declined as well. States she will call Dr Bhandari personally.        Hi Dr. Trujillo, I hope this finds you well. Octavio  has been  scheduled for surgery with Dr. Wheatley from [facility] orthopedics for a mass behind his right knee for September 22 of this year. [facility] has requested a clearance from you, but has stated they will except the clearance you gave back in May for upper scope of his G.I. If this is the case I do have all the paperwork from [facility] he will also need labs which they sent to [facility]. If this is not the case he will need an appointment with you then before September 22 that is when the surgery is scheduled at [facility].    
1 = Total assistance

## 2023-04-25 NOTE — H&P ADULT - ASSESSMENT
63 y/o M h/o traumatic subdural hemorrhage following a fall down the stairs while drunk (bedbound, nonverbal, lives in [facility]), s/p r craniotomy at [facility] in 01/2023, s/p trach/PEG sharon from [place] ED for ENT evaluation for b/l ear bleeding. Ct head with petechial hemm LT. + elevated WBC CT chest with b/l PNA. UA +

## 2023-04-25 NOTE — H&P ADULT - NSHPLABSRESULTS_GEN_ALL_CORE
9.2    21.55 )-----------( 169      ( 2023 17:00 )             29.5           145  |  114<H>  |  35<H>  ----------------------------<  223<H>  4.2   |  27  |  0.84    Ca    9.4      2023 17:00  Mg     2.6         TPro  8.0  /  Alb  2.7<L>  /  TBili  1.0  /  DBili  x   /  AST  30  /  ALT  35  /  AlkPhos  237<H>        CAPILLARY BLOOD GLUCOSE      POCT Blood Glucose.: 128 mg/dL (2023 12:32)  POCT Blood Glucose.: 123 mg/dL (2023 08:42)  POCT Blood Glucose.: 110 mg/dL (2023 01:04)      Urinalysis Basic - ( 2023 03:00 )    Color: Yellow / Appearance: Clear / S.018 / pH: x  Gluc: x / Ketone: Negative  / Bili: Negative / Urobili: <2 mg/dL   Blood: x / Protein: 30 mg/dL / Nitrite: Negative   Leuk Esterase: Large / RBC: 4 /HPF / WBC >50 /HPF   Sq Epi: x / Non Sq Epi: x / Bacteria: Few        PT/INR - ( 2023 17:00 )   PT: 16.4 sec;   INR: 1.37 ratio         PTT - ( 2023 17:00 )  PTT:37.1 sec    Radiology    < from: CT Abdomen and Pelvis No Cont (23 @ 06:56) >    IMPRESSION:    Limited noncontrast study.    Patchy consolidative and tree-in-bud opacities in the bilateral lower   lobes consistent with multifocal pneumonia. Aspiration is a consideration.    Circumferential bladder wall thickening, correlate with urinalysis for   possible infection. High density material within the bladder may be old   renally excreted contrast.    < end of copied text >    < from: CT Head No Cont (23 @ 06:55) >    IMPRESSION:    No substantial change 2023, including small hypodensities within the   infarcted brain parenchyma the left occipital lobe, small blood clots   versus dystrophic calcification    --- End of Report ---      < end of copied text >    < from: CT Angio Head w/ IV Cont (23 @ 18:21) >    IMPRESSION:    CT HEAD: Tiny punctate hyperattenuating foci in the left occipital lobe   and left periatrial white matter associated with regions of   encephalomalaciamay reflect regions of petechial parenchymal bleed,   versus regions of spared parenchyma, less likely).    CTA BRAIN: Expected paucity of of left MCA branch vessels associated with   the left temporoparietal encephalomalacia. Otherwise, no evidence of   acute flow-limiting stenosis or occlusion.    < end of copied text >

## 2023-04-25 NOTE — PATIENT PROFILE ADULT - FALL HARM RISK - HARM RISK INTERVENTIONS
Assistance with ambulation/Assistance OOB with selected safe patient handling equipment/Communicate Risk of Fall with Harm to all staff/Monitor for mental status changes/Monitor gait and stability/Reinforce activity limits and safety measures with patient and family/Tailored Fall Risk Interventions/Toileting schedule using arm’s reach rule for commode and bathroom/Use of alarms - bed, chair and/or voice tab/Visual Cue: Yellow wristband and red socks/Bed in lowest position, wheels locked, appropriate side rails in place/Call bell, personal items and telephone in reach/Instruct patient to call for assistance before getting out of bed or chair/Non-slip footwear when patient is out of bed/[place] to call system/Physically safe environment - no spills, clutter or unnecessary equipment/Purposeful Proactive Rounding/Room/bathroom lighting operational, light cord in reach/Unable to comprehend

## 2023-04-26 LAB
A1C WITH ESTIMATED AVERAGE GLUCOSE RESULT: 5.7 % — HIGH (ref 4–5.6)
ALBUMIN SERPL ELPH-MCNC: 3.2 G/DL — LOW (ref 3.3–5)
ALP SERPL-CCNC: 201 U/L — HIGH (ref 40–120)
ALT FLD-CCNC: 21 U/L — SIGNIFICANT CHANGE UP (ref 4–41)
ANION GAP SERPL CALC-SCNC: 10 MMOL/L — SIGNIFICANT CHANGE UP (ref 7–14)
ANION GAP SERPL CALC-SCNC: 11 MMOL/L — SIGNIFICANT CHANGE UP (ref 7–14)
AST SERPL-CCNC: 24 U/L — SIGNIFICANT CHANGE UP (ref 4–40)
BASOPHILS # BLD AUTO: 0.05 K/UL — SIGNIFICANT CHANGE UP (ref 0–0.2)
BASOPHILS NFR BLD AUTO: 0.5 % — SIGNIFICANT CHANGE UP (ref 0–2)
BILIRUB SERPL-MCNC: 0.9 MG/DL — SIGNIFICANT CHANGE UP (ref 0.2–1.2)
BUN SERPL-MCNC: 22 MG/DL — SIGNIFICANT CHANGE UP (ref 7–23)
BUN SERPL-MCNC: 25 MG/DL — HIGH (ref 7–23)
CALCIUM SERPL-MCNC: 9.4 MG/DL — SIGNIFICANT CHANGE UP (ref 8.4–10.5)
CALCIUM SERPL-MCNC: 9.6 MG/DL — SIGNIFICANT CHANGE UP (ref 8.4–10.5)
CHLORIDE SERPL-SCNC: 118 MMOL/L — HIGH (ref 98–107)
CHLORIDE SERPL-SCNC: 119 MMOL/L — HIGH (ref 98–107)
CO2 SERPL-SCNC: 24 MMOL/L — SIGNIFICANT CHANGE UP (ref 22–31)
CO2 SERPL-SCNC: 25 MMOL/L — SIGNIFICANT CHANGE UP (ref 22–31)
CREAT SERPL-MCNC: 0.56 MG/DL — SIGNIFICANT CHANGE UP (ref 0.5–1.3)
CREAT SERPL-MCNC: 0.57 MG/DL — SIGNIFICANT CHANGE UP (ref 0.5–1.3)
CULTURE RESULTS: SIGNIFICANT CHANGE UP
EGFR: 112 ML/MIN/1.73M2 — SIGNIFICANT CHANGE UP
EGFR: 113 ML/MIN/1.73M2 — SIGNIFICANT CHANGE UP
EOSINOPHIL # BLD AUTO: 0.04 K/UL — SIGNIFICANT CHANGE UP (ref 0–0.5)
EOSINOPHIL NFR BLD AUTO: 0.4 % — SIGNIFICANT CHANGE UP (ref 0–6)
ESTIMATED AVERAGE GLUCOSE: 117 — SIGNIFICANT CHANGE UP
GLUCOSE BLDC GLUCOMTR-MCNC: 127 MG/DL — HIGH (ref 70–99)
GLUCOSE BLDC GLUCOMTR-MCNC: 148 MG/DL — HIGH (ref 70–99)
GLUCOSE BLDC GLUCOMTR-MCNC: 166 MG/DL — HIGH (ref 70–99)
GLUCOSE BLDC GLUCOMTR-MCNC: 231 MG/DL — HIGH (ref 70–99)
GLUCOSE SERPL-MCNC: 164 MG/DL — HIGH (ref 70–99)
GLUCOSE SERPL-MCNC: 182 MG/DL — HIGH (ref 70–99)
HCT VFR BLD CALC: 26.5 % — LOW (ref 39–50)
HGB BLD-MCNC: 8 G/DL — LOW (ref 13–17)
IANC: 7.05 K/UL — SIGNIFICANT CHANGE UP (ref 1.8–7.4)
IMM GRANULOCYTES NFR BLD AUTO: 0.6 % — SIGNIFICANT CHANGE UP (ref 0–0.9)
LYMPHOCYTES # BLD AUTO: 1.39 K/UL — SIGNIFICANT CHANGE UP (ref 1–3.3)
LYMPHOCYTES # BLD AUTO: 15 % — SIGNIFICANT CHANGE UP (ref 13–44)
MAGNESIUM SERPL-MCNC: 2.3 MG/DL — SIGNIFICANT CHANGE UP (ref 1.6–2.6)
MAGNESIUM SERPL-MCNC: 2.4 MG/DL — SIGNIFICANT CHANGE UP (ref 1.6–2.6)
MCHC RBC-ENTMCNC: 27.3 PG — SIGNIFICANT CHANGE UP (ref 27–34)
MCHC RBC-ENTMCNC: 30.2 GM/DL — LOW (ref 32–36)
MCV RBC AUTO: 90.4 FL — SIGNIFICANT CHANGE UP (ref 80–100)
MONOCYTES # BLD AUTO: 0.66 K/UL — SIGNIFICANT CHANGE UP (ref 0–0.9)
MONOCYTES NFR BLD AUTO: 7.1 % — SIGNIFICANT CHANGE UP (ref 2–14)
NEUTROPHILS # BLD AUTO: 7.05 K/UL — SIGNIFICANT CHANGE UP (ref 1.8–7.4)
NEUTROPHILS NFR BLD AUTO: 76.4 % — SIGNIFICANT CHANGE UP (ref 43–77)
NRBC # BLD: 0 /100 WBCS — SIGNIFICANT CHANGE UP (ref 0–0)
NRBC # FLD: 0 K/UL — SIGNIFICANT CHANGE UP (ref 0–0)
PHOSPHATE SERPL-MCNC: 3.4 MG/DL — SIGNIFICANT CHANGE UP (ref 2.5–4.5)
PHOSPHATE SERPL-MCNC: 3.4 MG/DL — SIGNIFICANT CHANGE UP (ref 2.5–4.5)
PLATELET # BLD AUTO: 152 K/UL — SIGNIFICANT CHANGE UP (ref 150–400)
POTASSIUM SERPL-MCNC: 3.1 MMOL/L — LOW (ref 3.5–5.3)
POTASSIUM SERPL-MCNC: 3.3 MMOL/L — LOW (ref 3.5–5.3)
POTASSIUM SERPL-SCNC: 3.1 MMOL/L — LOW (ref 3.5–5.3)
POTASSIUM SERPL-SCNC: 3.3 MMOL/L — LOW (ref 3.5–5.3)
PROT SERPL-MCNC: 7.1 G/DL — SIGNIFICANT CHANGE UP (ref 6–8.3)
RBC # BLD: 2.93 M/UL — LOW (ref 4.2–5.8)
RBC # FLD: 17.6 % — HIGH (ref 10.3–14.5)
SODIUM SERPL-SCNC: 153 MMOL/L — HIGH (ref 135–145)
SODIUM SERPL-SCNC: 154 MMOL/L — HIGH (ref 135–145)
SPECIMEN SOURCE: SIGNIFICANT CHANGE UP
WBC # BLD: 9.25 K/UL — SIGNIFICANT CHANGE UP (ref 3.8–10.5)
WBC # FLD AUTO: 9.25 K/UL — SIGNIFICANT CHANGE UP (ref 3.8–10.5)

## 2023-04-26 PROCEDURE — 99232 SBSQ HOSP IP/OBS MODERATE 35: CPT

## 2023-04-26 RX ORDER — POTASSIUM CHLORIDE 20 MEQ
40 PACKET (EA) ORAL ONCE
Refills: 0 | Status: COMPLETED | OUTPATIENT
Start: 2023-04-26 | End: 2023-04-26

## 2023-04-26 RX ORDER — SODIUM CHLORIDE 9 MG/ML
1000 INJECTION, SOLUTION INTRAVENOUS
Refills: 0 | Status: DISCONTINUED | OUTPATIENT
Start: 2023-04-26 | End: 2023-04-27

## 2023-04-26 RX ORDER — SODIUM CHLORIDE 9 MG/ML
1000 INJECTION, SOLUTION INTRAVENOUS
Refills: 0 | Status: DISCONTINUED | OUTPATIENT
Start: 2023-04-26 | End: 2023-04-26

## 2023-04-26 RX ADMIN — PIPERACILLIN AND TAZOBACTAM 25 GRAM(S): 4; .5 INJECTION, POWDER, LYOPHILIZED, FOR SOLUTION INTRAVENOUS at 23:11

## 2023-04-26 RX ADMIN — Medication 2: at 19:06

## 2023-04-26 RX ADMIN — Medication 10 MILLIGRAM(S): at 06:17

## 2023-04-26 RX ADMIN — AMLODIPINE BESYLATE 10 MILLIGRAM(S): 2.5 TABLET ORAL at 06:17

## 2023-04-26 RX ADMIN — PIPERACILLIN AND TAZOBACTAM 25 GRAM(S): 4; .5 INJECTION, POWDER, LYOPHILIZED, FOR SOLUTION INTRAVENOUS at 06:17

## 2023-04-26 RX ADMIN — INSULIN GLARGINE 12 UNIT(S): 100 INJECTION, SOLUTION SUBCUTANEOUS at 23:11

## 2023-04-26 RX ADMIN — PIPERACILLIN AND TAZOBACTAM 25 GRAM(S): 4; .5 INJECTION, POWDER, LYOPHILIZED, FOR SOLUTION INTRAVENOUS at 14:47

## 2023-04-26 RX ADMIN — Medication 1: at 06:17

## 2023-04-26 RX ADMIN — Medication 40 MILLIEQUIVALENT(S): at 10:13

## 2023-04-26 RX ADMIN — POLYETHYLENE GLYCOL 3350 17 GRAM(S): 17 POWDER, FOR SOLUTION ORAL at 14:30

## 2023-04-26 RX ADMIN — SODIUM CHLORIDE 50 MILLILITER(S): 9 INJECTION, SOLUTION INTRAVENOUS at 14:30

## 2023-04-26 RX ADMIN — SODIUM CHLORIDE 80 MILLILITER(S): 9 INJECTION, SOLUTION INTRAVENOUS at 23:50

## 2023-04-26 NOTE — ADVANCED PRACTICE NURSE CONSULT - RECOMMEDATIONS
Recommending further imaging to rule out OM, gas collection or abscess to sacrum.    Topical Recommendations    Tracheostomy: Cleanse around trach site with NS. Pat dry. Apply Silicone foam dressing without border beneath trach collar, cut mid dressing to "Y" shape. Change foam dressing every shift and prn. Change trach ties every 3 days.     PEG tube site: Cleanse q shift with NS, apply liquid barrier film beneath priscilla disc.  If redness noted under priscilla disc bumper apply thin foam  dressing without border (Mepilex Lite)- cut to mid dressing with a 'Y' shape.   Secondary securement to abdomen taping in 'H' fashion with Steri-strips.     Sacrum to BL buttocks: Cleanse with NS, pat dry. Apply Liquid barrier film to periwound skin (allow to dry). Apply TRIAD paste to base of wound, secure with large silicone foam with borders. With episodes of incontinence only remove soiled layer of Triad, then reinforce with thin layer.     Continue usage of male urinary pouch (order #9811) to help reduce moisture from incontinence, thank you!    Continue low air loss bed therapy, continue heel elevation, continue to turn & reposition per protocol, soft pillow between bony prominences, continue moisture management with barrier creams & single breathable pad, continue measures to decrease friction/shear/pressure. Continue with nutritional support as per dietary/orders.     Plan of care discussed with primary RN and primary ACP Richard #[id number].    Please contact Wound Care Service Line if we can be of further assistance (ext [phone]).

## 2023-04-26 NOTE — DIETITIAN INITIAL EVALUATION ADULT - PROBLEM SELECTOR PLAN 1
- Ct chest with consolidation  - UA+ unable to obtain symptoms   - f/u BCx/Ucx   - Zosyn   - Renata PRN  - ID  appreciated

## 2023-04-26 NOTE — DIETITIAN INITIAL EVALUATION ADULT - PERTINENT LABORATORY DATA
04-26    153<H>  |  118<H>  |  25<H>  ----------------------------<  164<H>  3.1<L>   |  25  |  0.57    Ca    9.4      26 Apr 2023 06:00  Phos  3.4     04-26  Mg     2.40     04-26    TPro  7.1  /  Alb  3.2<L>  /  TBili  0.9  /  DBili  x   /  AST  24  /  ALT  21  /  AlkPhos  201<H>  04-26    A1C with Estimated Average Glucose Result: 5.7 % (04-26-23 @ 06:00)  CAPILLARY BLOOD GLUCOSE  141 (25 Apr 2023 19:32)      POCT Blood Glucose.: 166 mg/dL (26 Apr 2023 05:55)  POCT Blood Glucose.: 134 mg/dL (25 Apr 2023 23:09)  POCT Blood Glucose.: 142 mg/dL (25 Apr 2023 22:00)  POCT Blood Glucose.: 150 mg/dL (25 Apr 2023 19:32)  POCT Blood Glucose.: 128 mg/dL (25 Apr 2023 12:32)

## 2023-04-26 NOTE — DIETITIAN INITIAL EVALUATION ADULT - NS FNS DIET ORDER
Diet, NPO:   Tube Feeding Modality: Gastrostomy  Glucerna 1.5 Guero (GLUCERNA1.5RTH)  Total Volume for 24 Hours (mL): 908  Bolus  Tube Feed Frequency: Every 6 hours   Tube Feed Start Time: 17:00  Bolus Feed Rate (mL per Hour): 999   Bolus Feed Duration (in Hours): 1   Total Volume of Bolus (mL):  227 (04-25-23 @ 15:05)

## 2023-04-26 NOTE — CHART NOTE - NSCHARTNOTEFT_GEN_A_CORE
Spoke with pts son at bedside who reports pt has been making "a lot of movements". As per pt son, pt had seizures which were characterized by upper extremity continuous movement previously while at [facility] s/p craniotomy in 1/2023. Pt noticed to have right lower leg myoclonic jerk approx three times while writer at bedside.    VSS.  GENERAL: Right cranial defect sunken. Asleep, in no acute distress.   EYES: Pupils 2mm b/l reactive  RESP: Trachestomy in place. No respiratory distress, CTA b/l, no WRR  CV: RRR, +S1S2, no MRG;   NEURO: RLE myoclonic jerk noted three times.  Extremities: Distal pulses and sensation in-tact    Plan:   - Seizure pre-cautions ordered  - EEG ordered  - Neuro consulted    Medical attending Dr. Ryan made aware and agrees to plan. Per Dr. Ryan, she will call for neuro consult tonight. Spoke with pts son at bedside who reports pt has been making "a lot of movements". As per pt son, pt had seizures which were characterized by upper extremity continuous movement previously while at [facility] s/p craniotomy in 1/2023. Pt noticed to have right lower leg myoclonic jerk approx three times within 2 minutes while writer at bedside.    VSS.  GENERAL: Right cranial defect sunken. Asleep, in no acute distress.   EYES: Pupils 2mm b/l reactive  RESP: Tracheostomy tube in place. No respiratory distress, CTA b/l, no WRR  CV: RRR, +S1S2.  NEURO: RLE myoclonic jerk noted three times.  Extremities: Distal pulses and sensation in-tact    Plan:   - Seizure pre-cautions ordered  - EEG ordered  - Neuro consulted    Medical attending Dr. Ryan made aware and agrees to plan. Per Dr. Ryan, she will call for neuro consult tonight.

## 2023-04-26 NOTE — DIETITIAN INITIAL EVALUATION ADULT - OTHER INFO
RD visited with patient for nutrition consult as above.    63 y/o M h/o traumatic subdural hemorrhage following a fall down the stairs while drunk (bedbound, nonverbal, lives in [facility]), s/p r craniotomy at [facility] in 01/2023, s/p trach/PEG txfer from [place] [facility] for ENT evaluation for b/l ear bleeding. Ct head with petechial hemm LT. + elevated WBC CT chest with b/l PNA. UA + (H&P 4/25/23).    Spoke with RN for collateral as patient sleeping at time of visit; RN reported that patient tolerating bolus feeds (227ml bolus every 6 hours, four times daily).  EN providing 1362kcal / 75gm protein / 689ml water as currently ordered.  Patient noted with sacral pressure injury, pending wound care consult. No GI distress reported by RN i.e. nausea, vomiting, diarrhea.  No allergies listed on chart.        Patient nonverbal, h/o TBI.

## 2023-04-26 NOTE — ADVANCED PRACTICE NURSE CONSULT - ASSESSMENT
General: A&Ox0, nonverbal, tracheostomy secured per protocol with no evidence of skin breakdown or ulceration noted. Patient with R craniotomy, one staple noted inferior to craniotomy and superior to R ear over healed surgical scar. Bilateral inner ears noted with dry sanguinous drainage; team and primary RN aware. Patient is bedbound, able to be turned with 2x assistance, incontinent of urine and stool. Upon assessment, incontinence care of urine provided. LUQ peritubular skin clean, dry and intact. Skin warm, dry with increased moisture in intertriginous folds, scattered areas of hyperpigmentation and hypopigmentation, scattered areas of ecchymosis on bilateral upper extremities with no hematomas. Blanchable erythema on bilateral dry, flaky heels.     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 Scant serosanguinous drainage noted with cleansing. Periwound with maceration and hyperpigmentation circumferentially. Induration noted circumferentially extending 2cm from wound edges. No o edema, no fluctuance, no crepitus, no temperature changes, no overt signs of infection noted.

## 2023-04-26 NOTE — DIETITIAN INITIAL EVALUATION ADULT - PERTINENT MEDS FT
MEDICATIONS  (STANDING):  amLODIPine   Tablet 10 milliGRAM(s) Oral daily  dextrose 5%. 1000 milliLiter(s) (50 mL/Hr) IV Continuous <Continuous>  dextrose 5%. 1000 milliLiter(s) (100 mL/Hr) IV Continuous <Continuous>  dextrose 50% Injectable 12.5 Gram(s) IV Push once  dextrose 50% Injectable 25 Gram(s) IV Push once  dextrose 50% Injectable 25 Gram(s) IV Push once  enalapril 10 milliGRAM(s) Oral daily  glucagon  Injectable 1 milliGRAM(s) IntraMuscular once  insulin glargine Injectable (LANTUS) 12 Unit(s) SubCutaneous at bedtime  insulin lispro (ADMELOG) corrective regimen sliding scale   SubCutaneous every 6 hours  piperacillin/tazobactam IVPB.. 3.375 Gram(s) IV Intermittent every 8 hours  polyethylene glycol 3350 17 Gram(s) Oral daily  potassium chloride   Powder 40 milliEquivalent(s) Enteral Tube once  tamsulosin 0.4 milliGRAM(s) Oral at bedtime    MEDICATIONS  (PRN):  acetaminophen   Oral Liquid .. 650 milliGRAM(s) Oral every 6 hours PRN Temp greater or equal to 38C (100.4F), Mild Pain (1 - 3)  albuterol/ipratropium for Nebulization 3 milliLiter(s) Nebulizer every 6 hours PRN Shortness of Breath and/or Wheezing  aluminum hydroxide/magnesium hydroxide/simethicone Suspension 30 milliLiter(s) Oral every 4 hours PRN Dyspepsia  dextrose Oral Gel 15 Gram(s) Oral once PRN Blood Glucose LESS THAN 70 milliGRAM(s)/deciliter  melatonin 3 milliGRAM(s) Oral at bedtime PRN Insomnia  ondansetron Injectable 4 milliGRAM(s) IV Push every 8 hours PRN Nausea and/or Vomiting

## 2023-04-26 NOTE — DIETITIAN INITIAL EVALUATION ADULT - ENTERAL
Suggest Glucerna 1.5 ---> 290ml bolus four times daily (every 6 hours) to provide 1160ml formula, 1740kcal (27kcal/kg), 96gm protein (1.4gm/pro/kg), 880ml water via formula.  Defer additional free water flush provision to physician / ACP.

## 2023-04-26 NOTE — DIETITIAN INITIAL EVALUATION ADULT - NSFNSPHYEXAMSKINFT_GEN_A_CORE
Sacral pressure injury noted in flowsheets (pending assessment by wound care, no stage indicated in flowsheets at this time)

## 2023-04-26 NOTE — ADVANCED PRACTICE NURSE CONSULT - REASON FOR CONSULT
Patient seen on skin care rounds after wound care referral received for assessment of skin impairment and recommendations of topical management of a sacral pressure injury. As per H&P, patient is a 63 yo M w/ PMHx traumatic subdural hemorrhage following a fall down the stairs while drunk, s/p r craniotomy at [facility] in 01/2023, non verbal and functional quadriplegic, s/p trach/PEG (while socialized at [facility] ), recently hospitalized (3/5-3/31/23) at sepsis 2/2 pneumonia, resides at [facility] transferred to [facility] from  for ENT eval due to bleeding from b/l ears. On labs was found to have elevated WBC and Ct chest with b/l PNA.    CT of Abdomen and Pelvis with no contrast from 4/25/23: Limited noncontrast study. Patchy consolidative and tree-in-bud opacities in the bilateral lower lobes consistent with multifocal pneumonia. Aspiration is a consideration. Circumferential bladder wall thickening, correlate with urinalysis for possible infection. High density material within the bladder may be old renally excreted contrast.    Chart reviewed: WBC: 9.25, H&H: 8.0/26.5, Platelets: 152, INR: 1.37, A1C: 5.7, serum albumin: 3.2, BMI: 20.4, Mustapha 10.

## 2023-04-26 NOTE — DIETITIAN INITIAL EVALUATION ADULT - ADD RECOMMEND
1) Monitor weights, EN tolerance, skin integrity, pertinent labs and further adjust enteral feeding provision PRN.

## 2023-04-27 LAB
ANION GAP SERPL CALC-SCNC: 10 MMOL/L — SIGNIFICANT CHANGE UP (ref 7–14)
ANION GAP SERPL CALC-SCNC: 16 MMOL/L — HIGH (ref 7–14)
BASOPHILS # BLD AUTO: 0.05 K/UL — SIGNIFICANT CHANGE UP (ref 0–0.2)
BASOPHILS NFR BLD AUTO: 0.5 % — SIGNIFICANT CHANGE UP (ref 0–2)
BUN SERPL-MCNC: 16 MG/DL — SIGNIFICANT CHANGE UP (ref 7–23)
BUN SERPL-MCNC: 19 MG/DL — SIGNIFICANT CHANGE UP (ref 7–23)
CALCIUM SERPL-MCNC: 9.3 MG/DL — SIGNIFICANT CHANGE UP (ref 8.4–10.5)
CALCIUM SERPL-MCNC: 9.6 MG/DL — SIGNIFICANT CHANGE UP (ref 8.4–10.5)
CHLORIDE SERPL-SCNC: 114 MMOL/L — HIGH (ref 98–107)
CHLORIDE SERPL-SCNC: 117 MMOL/L — HIGH (ref 98–107)
CO2 SERPL-SCNC: 21 MMOL/L — LOW (ref 22–31)
CO2 SERPL-SCNC: 24 MMOL/L — SIGNIFICANT CHANGE UP (ref 22–31)
CREAT SERPL-MCNC: 0.5 MG/DL — SIGNIFICANT CHANGE UP (ref 0.5–1.3)
CREAT SERPL-MCNC: 0.53 MG/DL — SIGNIFICANT CHANGE UP (ref 0.5–1.3)
EGFR: 115 ML/MIN/1.73M2 — SIGNIFICANT CHANGE UP
EGFR: 117 ML/MIN/1.73M2 — SIGNIFICANT CHANGE UP
EOSINOPHIL # BLD AUTO: 0.12 K/UL — SIGNIFICANT CHANGE UP (ref 0–0.5)
EOSINOPHIL NFR BLD AUTO: 1.1 % — SIGNIFICANT CHANGE UP (ref 0–6)
GLUCOSE BLDC GLUCOMTR-MCNC: 148 MG/DL — HIGH (ref 70–99)
GLUCOSE BLDC GLUCOMTR-MCNC: 153 MG/DL — HIGH (ref 70–99)
GLUCOSE BLDC GLUCOMTR-MCNC: 155 MG/DL — HIGH (ref 70–99)
GLUCOSE BLDC GLUCOMTR-MCNC: 155 MG/DL — HIGH (ref 70–99)
GLUCOSE BLDC GLUCOMTR-MCNC: 165 MG/DL — HIGH (ref 70–99)
GLUCOSE BLDC GLUCOMTR-MCNC: 214 MG/DL — HIGH (ref 70–99)
GLUCOSE SERPL-MCNC: 166 MG/DL — HIGH (ref 70–99)
GLUCOSE SERPL-MCNC: 212 MG/DL — HIGH (ref 70–99)
HCT VFR BLD CALC: 26.8 % — LOW (ref 39–50)
HGB BLD-MCNC: 8.1 G/DL — LOW (ref 13–17)
IANC: 7.94 K/UL — HIGH (ref 1.8–7.4)
IMM GRANULOCYTES NFR BLD AUTO: 1 % — HIGH (ref 0–0.9)
LYMPHOCYTES # BLD AUTO: 1.52 K/UL — SIGNIFICANT CHANGE UP (ref 1–3.3)
LYMPHOCYTES # BLD AUTO: 14.5 % — SIGNIFICANT CHANGE UP (ref 13–44)
MAGNESIUM SERPL-MCNC: 2.2 MG/DL — SIGNIFICANT CHANGE UP (ref 1.6–2.6)
MAGNESIUM SERPL-MCNC: 2.3 MG/DL — SIGNIFICANT CHANGE UP (ref 1.6–2.6)
MCHC RBC-ENTMCNC: 27.7 PG — SIGNIFICANT CHANGE UP (ref 27–34)
MCHC RBC-ENTMCNC: 30.2 GM/DL — LOW (ref 32–36)
MCV RBC AUTO: 91.8 FL — SIGNIFICANT CHANGE UP (ref 80–100)
MONOCYTES # BLD AUTO: 0.75 K/UL — SIGNIFICANT CHANGE UP (ref 0–0.9)
MONOCYTES NFR BLD AUTO: 7.1 % — SIGNIFICANT CHANGE UP (ref 2–14)
NEUTROPHILS # BLD AUTO: 7.94 K/UL — HIGH (ref 1.8–7.4)
NEUTROPHILS NFR BLD AUTO: 75.8 % — SIGNIFICANT CHANGE UP (ref 43–77)
NRBC # BLD: 0 /100 WBCS — SIGNIFICANT CHANGE UP (ref 0–0)
NRBC # FLD: 0 K/UL — SIGNIFICANT CHANGE UP (ref 0–0)
PHOSPHATE SERPL-MCNC: 3.1 MG/DL — SIGNIFICANT CHANGE UP (ref 2.5–4.5)
PHOSPHATE SERPL-MCNC: 3.6 MG/DL — SIGNIFICANT CHANGE UP (ref 2.5–4.5)
PLATELET # BLD AUTO: 158 K/UL — SIGNIFICANT CHANGE UP (ref 150–400)
POTASSIUM SERPL-MCNC: 3.5 MMOL/L — SIGNIFICANT CHANGE UP (ref 3.5–5.3)
POTASSIUM SERPL-MCNC: 3.6 MMOL/L — SIGNIFICANT CHANGE UP (ref 3.5–5.3)
POTASSIUM SERPL-SCNC: 3.5 MMOL/L — SIGNIFICANT CHANGE UP (ref 3.5–5.3)
POTASSIUM SERPL-SCNC: 3.6 MMOL/L — SIGNIFICANT CHANGE UP (ref 3.5–5.3)
RBC # BLD: 2.92 M/UL — LOW (ref 4.2–5.8)
RBC # FLD: 17.4 % — HIGH (ref 10.3–14.5)
SODIUM SERPL-SCNC: 151 MMOL/L — HIGH (ref 135–145)
SODIUM SERPL-SCNC: 151 MMOL/L — HIGH (ref 135–145)
WBC # BLD: 10.49 K/UL — SIGNIFICANT CHANGE UP (ref 3.8–10.5)
WBC # FLD AUTO: 10.49 K/UL — SIGNIFICANT CHANGE UP (ref 3.8–10.5)

## 2023-04-27 PROCEDURE — 95720 EEG PHY/QHP EA INCR W/VEEG: CPT

## 2023-04-27 PROCEDURE — 99232 SBSQ HOSP IP/OBS MODERATE 35: CPT

## 2023-04-27 RX ORDER — SODIUM CHLORIDE 9 MG/ML
1000 INJECTION, SOLUTION INTRAVENOUS
Refills: 0 | Status: DISCONTINUED | OUTPATIENT
Start: 2023-04-27 | End: 2023-04-28

## 2023-04-27 RX ADMIN — Medication 40 MILLIEQUIVALENT(S): at 00:08

## 2023-04-27 RX ADMIN — Medication 10 MILLIGRAM(S): at 06:41

## 2023-04-27 RX ADMIN — INSULIN GLARGINE 12 UNIT(S): 100 INJECTION, SOLUTION SUBCUTANEOUS at 23:49

## 2023-04-27 RX ADMIN — Medication 1: at 09:22

## 2023-04-27 RX ADMIN — PIPERACILLIN AND TAZOBACTAM 25 GRAM(S): 4; .5 INJECTION, POWDER, LYOPHILIZED, FOR SOLUTION INTRAVENOUS at 06:43

## 2023-04-27 RX ADMIN — AMLODIPINE BESYLATE 10 MILLIGRAM(S): 2.5 TABLET ORAL at 06:42

## 2023-04-27 RX ADMIN — Medication 1: at 23:50

## 2023-04-27 RX ADMIN — POLYETHYLENE GLYCOL 3350 17 GRAM(S): 17 POWDER, FOR SOLUTION ORAL at 13:58

## 2023-04-27 RX ADMIN — PIPERACILLIN AND TAZOBACTAM 25 GRAM(S): 4; .5 INJECTION, POWDER, LYOPHILIZED, FOR SOLUTION INTRAVENOUS at 23:52

## 2023-04-27 RX ADMIN — PIPERACILLIN AND TAZOBACTAM 25 GRAM(S): 4; .5 INJECTION, POWDER, LYOPHILIZED, FOR SOLUTION INTRAVENOUS at 14:09

## 2023-04-27 RX ADMIN — SODIUM CHLORIDE 80 MILLILITER(S): 9 INJECTION, SOLUTION INTRAVENOUS at 09:22

## 2023-04-27 RX ADMIN — Medication 2: at 18:55

## 2023-04-27 NOTE — CHART NOTE - NSCHARTNOTEFT_GEN_A_CORE
EEG preliminary read (not final) on the initial recording hour(s) = x 0.5    No seizures recorded.  Right sided slowing noted  Final report to follow tomorrow morning after completion of study.    [facility] EEG [place] Room Ph#: [phone]  Epilepsy Answering Service after 5PM and before 8:30AM: Ph#: [phone]

## 2023-04-27 NOTE — CONSULT NOTE ADULT - SUBJECTIVE AND OBJECTIVE BOX
[facility] NEPHROLOGY- CONSULTATION NOTE    62y Male with history of below presents with bleeding from ear. Nephrology consulted for hypernatremia.     Patient with h/o s/p trach and PEG and currently on free water 250 Q6 hours. Patient with hypernatremia and started on D5W @ 50 ml/hour yesterday morning. Rate increased to 80 ml/hour yesterday evening with improving serum Na.      REVIEW OF SYSTEMS:  Gen: no fevers  HEENT: no rhinorrhea  Neck: no sore throat  Cards: no chest pain  Resp: no dyspnea  GI: no nausea or vomiting or diarrhea  : no dysuria or hematuria  Vascular: no LE edema  Derm: no rashes  Neuro: no numbness/tingling    No Known Allergies      Home Medications Reviewed  Hospital Medications:   MEDICATIONS  (STANDING):  amLODIPine   Tablet 10 milliGRAM(s) Oral daily  dextrose 5%. 1000 milliLiter(s) (80 mL/Hr) IV Continuous <Continuous>  dextrose 5%. 1000 milliLiter(s) (100 mL/Hr) IV Continuous <Continuous>  dextrose 5%. 1000 milliLiter(s) (50 mL/Hr) IV Continuous <Continuous>  dextrose 50% Injectable 25 Gram(s) IV Push once  dextrose 50% Injectable 25 Gram(s) IV Push once  dextrose 50% Injectable 12.5 Gram(s) IV Push once  enalapril 10 milliGRAM(s) Oral daily  glucagon  Injectable 1 milliGRAM(s) IntraMuscular once  insulin glargine Injectable (LANTUS) 12 Unit(s) SubCutaneous at bedtime  insulin lispro (ADMELOG) corrective regimen sliding scale   SubCutaneous every 6 hours  piperacillin/tazobactam IVPB.. 3.375 Gram(s) IV Intermittent every 8 hours  polyethylene glycol 3350 17 Gram(s) Oral daily  tamsulosin 0.4 milliGRAM(s) Oral at bedtime      PAST MEDICAL & SURGICAL HISTORY:  History of subdural hemorrhage      PEG (percutaneous endoscopic gastrostomy) status      DM (diabetes mellitus)      TBI (traumatic brain injury)      Chronic respiratory failure with hypoxia      S/P craniotomy          FAMILY HISTORY:  No pertinent family history in first degree relatives        SOCIAL HISTORY:  Denies toxic substance use     VITALS:  T(F): 98.1 (23 @ 06:35), Max: 98.8 (23 @ 10:00)  HR: 82 (23 @ 06:35)  BP: 145/91 (23 @ 06:35)  RR: 17 (23 @ 06:35)  SpO2: 100% (23 @ 06:35)  Wt(kg): --     @ 07:01  -   @ 07:00  --------------------------------------------------------  IN: 1900 mL / OUT: 500 mL / NET: 1400 mL     @ 07:01  -   @ 09:27  --------------------------------------------------------  IN: 80 mL / OUT: 0 mL / NET: 80 mL      PHYSICAL EXAM:  Gen: NAD, calm  HEENT: MMM, + trach  Neck: no JVD  Cards: RRR, +S1/S2, no M/G/R  Resp: course BS B/L  GI: soft, NT/ND, NABS, + PEG  : no CVA tenderness, + condom catheter  Vascular: no LE edema B/L  Derm: no rashes  Neuro: non-focal      LABS:      151<H>  |  117<H>  |  19  ----------------------------<  166<H>  3.5   |  24  |  0.53    Ca    9.3      2023 05:59  Phos  3.1       Mg     2.30         TPro  7.1  /  Alb  3.2<L>  /  TBili  0.9  /  DBili      /  AST  24  /  ALT  21  /  AlkPhos  201<H>      Creatinine Trend: 0.53 <--, 0.56 <--, 0.57 <--, 0.84 <--                        8.1    10.49 )-----------( 158      ( 2023 05:59 )             26.8     Urine Studies:  Urinalysis Basic - ( 2023 03:00 )    Color: Yellow / Appearance: Clear / S.018 / pH:   Gluc:  / Ketone: Negative  / Bili: Negative / Urobili: <2 mg/dL   Blood:  / Protein: 30 mg/dL / Nitrite: Negative   Leuk Esterase: Large / RBC: 4 /HPF / WBC >50 /HPF   Sq Epi:  / Non Sq Epi:  / Bacteria: Few          RADIOLOGY & ADDITIONAL STUDIES:    < from: CT Abdomen and Pelvis No Cont (23 @ 06:56) >  IMPRESSION:    Limited noncontrast study.    Patchy consolidative and tree-in-bud opacities in the bilateral lower   lobes consistent with multifocal pneumonia. Aspiration is a consideration.    Circumferential bladder wall thickening, correlate with urinalysis for   possible infection. High density material within the bladder may be old   renally excreted contrast.    --- End of Report ---    < end of copied text >      < from: CT Head No Cont (23 @ 06:55) >  IMPRESSION:    No substantial change 2023, including small hypodensities within the   infarcted brain parenchyma the left occipital lobe, small blood clots   versus dystrophic calcification    --- End of Report ---    < end of copied text >      < from: Xray Chest 1 View-PORTABLE IMMEDIATE (Xray Chest 1 View-PORTABLE IMMEDIATE .) (23 @ 21:43) >    IMPRESSION:   No radiographic evidence of active chest disease.    --- End of Report ---    < end of copied text >      < from: CT Angio Head w/ IV Cont (23 @ 18:21) >  IMPRESSION:    CT HEAD: Tiny punctate hyperattenuating foci in the left occipital lobe   and left periatrial white matter associated with regions of   encephalomalaciamay reflect regions of petechial parenchymal bleed,   versus regions of spared parenchyma, less likely).    CTA BRAIN: Expected paucity of of left MCA branch vessels associated with   the left temporoparietal encephalomalacia. Otherwise, no evidence of   acute flow-limiting stenosis or occlusion.    --- End of Report ---    < end of copied text >

## 2023-04-28 LAB
ANION GAP SERPL CALC-SCNC: 10 MMOL/L — SIGNIFICANT CHANGE UP (ref 7–14)
ANION GAP SERPL CALC-SCNC: 12 MMOL/L — SIGNIFICANT CHANGE UP (ref 7–14)
BASOPHILS # BLD AUTO: 0.07 K/UL — SIGNIFICANT CHANGE UP (ref 0–0.2)
BASOPHILS NFR BLD AUTO: 0.6 % — SIGNIFICANT CHANGE UP (ref 0–2)
BUN SERPL-MCNC: 14 MG/DL — SIGNIFICANT CHANGE UP (ref 7–23)
BUN SERPL-MCNC: 15 MG/DL — SIGNIFICANT CHANGE UP (ref 7–23)
CALCIUM SERPL-MCNC: 9.2 MG/DL — SIGNIFICANT CHANGE UP (ref 8.4–10.5)
CALCIUM SERPL-MCNC: 9.3 MG/DL — SIGNIFICANT CHANGE UP (ref 8.4–10.5)
CHLORIDE SERPL-SCNC: 105 MMOL/L — SIGNIFICANT CHANGE UP (ref 98–107)
CHLORIDE SERPL-SCNC: 105 MMOL/L — SIGNIFICANT CHANGE UP (ref 98–107)
CO2 SERPL-SCNC: 25 MMOL/L — SIGNIFICANT CHANGE UP (ref 22–31)
CO2 SERPL-SCNC: 25 MMOL/L — SIGNIFICANT CHANGE UP (ref 22–31)
CREAT SERPL-MCNC: 0.52 MG/DL — SIGNIFICANT CHANGE UP (ref 0.5–1.3)
CREAT SERPL-MCNC: 0.54 MG/DL — SIGNIFICANT CHANGE UP (ref 0.5–1.3)
EGFR: 114 ML/MIN/1.73M2 — SIGNIFICANT CHANGE UP
EGFR: 115 ML/MIN/1.73M2 — SIGNIFICANT CHANGE UP
EOSINOPHIL # BLD AUTO: 0.23 K/UL — SIGNIFICANT CHANGE UP (ref 0–0.5)
EOSINOPHIL NFR BLD AUTO: 2.1 % — SIGNIFICANT CHANGE UP (ref 0–6)
GLUCOSE BLDC GLUCOMTR-MCNC: 173 MG/DL — HIGH (ref 70–99)
GLUCOSE BLDC GLUCOMTR-MCNC: 180 MG/DL — HIGH (ref 70–99)
GLUCOSE BLDC GLUCOMTR-MCNC: 189 MG/DL — HIGH (ref 70–99)
GLUCOSE BLDC GLUCOMTR-MCNC: 190 MG/DL — HIGH (ref 70–99)
GLUCOSE BLDC GLUCOMTR-MCNC: 195 MG/DL — HIGH (ref 70–99)
GLUCOSE SERPL-MCNC: 154 MG/DL — HIGH (ref 70–99)
GLUCOSE SERPL-MCNC: 188 MG/DL — HIGH (ref 70–99)
HCT VFR BLD CALC: 26.4 % — LOW (ref 39–50)
HGB BLD-MCNC: 8.2 G/DL — LOW (ref 13–17)
IANC: 7.42 K/UL — HIGH (ref 1.8–7.4)
IMM GRANULOCYTES NFR BLD AUTO: 2 % — HIGH (ref 0–0.9)
LYMPHOCYTES # BLD AUTO: 2.22 K/UL — SIGNIFICANT CHANGE UP (ref 1–3.3)
LYMPHOCYTES # BLD AUTO: 20.4 % — SIGNIFICANT CHANGE UP (ref 13–44)
MAGNESIUM SERPL-MCNC: 2.1 MG/DL — SIGNIFICANT CHANGE UP (ref 1.6–2.6)
MAGNESIUM SERPL-MCNC: 2.2 MG/DL — SIGNIFICANT CHANGE UP (ref 1.6–2.6)
MCHC RBC-ENTMCNC: 27.2 PG — SIGNIFICANT CHANGE UP (ref 27–34)
MCHC RBC-ENTMCNC: 31.1 GM/DL — LOW (ref 32–36)
MCV RBC AUTO: 87.7 FL — SIGNIFICANT CHANGE UP (ref 80–100)
MONOCYTES # BLD AUTO: 0.74 K/UL — SIGNIFICANT CHANGE UP (ref 0–0.9)
MONOCYTES NFR BLD AUTO: 6.8 % — SIGNIFICANT CHANGE UP (ref 2–14)
NEUTROPHILS # BLD AUTO: 7.42 K/UL — HIGH (ref 1.8–7.4)
NEUTROPHILS NFR BLD AUTO: 68.1 % — SIGNIFICANT CHANGE UP (ref 43–77)
NRBC # BLD: 0 /100 WBCS — SIGNIFICANT CHANGE UP (ref 0–0)
NRBC # FLD: 0.03 K/UL — HIGH (ref 0–0)
PHOSPHATE SERPL-MCNC: 3.6 MG/DL — SIGNIFICANT CHANGE UP (ref 2.5–4.5)
PHOSPHATE SERPL-MCNC: 3.6 MG/DL — SIGNIFICANT CHANGE UP (ref 2.5–4.5)
PLATELET # BLD AUTO: 152 K/UL — SIGNIFICANT CHANGE UP (ref 150–400)
POTASSIUM SERPL-MCNC: 3.6 MMOL/L — SIGNIFICANT CHANGE UP (ref 3.5–5.3)
POTASSIUM SERPL-MCNC: 3.8 MMOL/L — SIGNIFICANT CHANGE UP (ref 3.5–5.3)
POTASSIUM SERPL-SCNC: 3.6 MMOL/L — SIGNIFICANT CHANGE UP (ref 3.5–5.3)
POTASSIUM SERPL-SCNC: 3.8 MMOL/L — SIGNIFICANT CHANGE UP (ref 3.5–5.3)
RBC # BLD: 3.01 M/UL — LOW (ref 4.2–5.8)
RBC # FLD: 17.2 % — HIGH (ref 10.3–14.5)
SODIUM SERPL-SCNC: 140 MMOL/L — SIGNIFICANT CHANGE UP (ref 135–145)
SODIUM SERPL-SCNC: 142 MMOL/L — SIGNIFICANT CHANGE UP (ref 135–145)
WBC # BLD: 10.9 K/UL — HIGH (ref 3.8–10.5)
WBC # FLD AUTO: 10.9 K/UL — HIGH (ref 3.8–10.5)

## 2023-04-28 PROCEDURE — 95720 EEG PHY/QHP EA INCR W/VEEG: CPT

## 2023-04-28 RX ADMIN — AMLODIPINE BESYLATE 10 MILLIGRAM(S): 2.5 TABLET ORAL at 07:07

## 2023-04-28 RX ADMIN — INSULIN GLARGINE 12 UNIT(S): 100 INJECTION, SOLUTION SUBCUTANEOUS at 23:24

## 2023-04-28 RX ADMIN — TAMSULOSIN HYDROCHLORIDE 0.4 MILLIGRAM(S): 0.4 CAPSULE ORAL at 21:23

## 2023-04-28 RX ADMIN — POLYETHYLENE GLYCOL 3350 17 GRAM(S): 17 POWDER, FOR SOLUTION ORAL at 13:11

## 2023-04-28 RX ADMIN — PIPERACILLIN AND TAZOBACTAM 25 GRAM(S): 4; .5 INJECTION, POWDER, LYOPHILIZED, FOR SOLUTION INTRAVENOUS at 21:23

## 2023-04-28 RX ADMIN — Medication 10 MILLIGRAM(S): at 07:07

## 2023-04-28 RX ADMIN — Medication 1: at 23:25

## 2023-04-28 RX ADMIN — PIPERACILLIN AND TAZOBACTAM 25 GRAM(S): 4; .5 INJECTION, POWDER, LYOPHILIZED, FOR SOLUTION INTRAVENOUS at 07:13

## 2023-04-28 RX ADMIN — Medication 1: at 09:34

## 2023-04-28 RX ADMIN — Medication 1: at 13:11

## 2023-04-28 RX ADMIN — Medication 1: at 18:12

## 2023-04-28 RX ADMIN — SODIUM CHLORIDE 100 MILLILITER(S): 9 INJECTION, SOLUTION INTRAVENOUS at 00:10

## 2023-04-28 RX ADMIN — PIPERACILLIN AND TAZOBACTAM 25 GRAM(S): 4; .5 INJECTION, POWDER, LYOPHILIZED, FOR SOLUTION INTRAVENOUS at 14:59

## 2023-04-28 NOTE — EEG REPORT - NS EEG TEXT BOX
WILMA NICOLE [id number]     Study Date: 		04-27 10:42-08:00 4-28-23  Duration in hours:  x20:52hrs    --------------------------------------------------------------------------------------------------  History:  CC/ HPI Patient is a 62y old  Male who presents with a chief complaint of bleeding from ears (27 Apr 2023 11:51)    MEDICATIONS  (STANDING):  amLODIPine   Tablet 10 milliGRAM(s) Oral daily  enalapril 10 milliGRAM(s) Oral daily  glucagon  Injectable 1 milliGRAM(s) IntraMuscular once  insulin glargine Injectable (LANTUS) 12 Unit(s) SubCutaneous at bedtime  insulin lispro (ADMELOG) corrective regimen sliding scale   SubCutaneous every 6 hours  piperacillin/tazobactam IVPB.. 3.375 Gram(s) IV Intermittent every 8 hours  polyethylene glycol 3350 17 Gram(s) Oral daily  tamsulosin 0.4 milliGRAM(s) Oral at bedtime    --------------------------------------------------------------------------------------------------  Study Interpretation:    [Abbreviation Key:  PDR=alpha rhythm/posterior dominant rhythm. A-P=anterior posterior.  Amplitude: ‘very low’:<20; ‘low’:20-49; ‘medium’:; ‘high’:>150uV.  Persistence for periodic/rhythmic patterns (% of epoch) ‘rare’:<1%; ‘occasional’:1-10%; ‘frequent’:10-50%; ‘abundant’:50-90%; ‘continuous’:>90%.  Persistence for sporadic discharges: ‘rare’:<1/hr; ‘occasional’:1/min-1/hr; ‘frequent’:>1/min; ‘abundant’:>1/10 sec.  RPP=rhythmic and periodic patterns; GRDA=generalized rhythmic delta activity; FIRDA=frontal intermittent GRDA; LRDA=lateralized rhythmic delta activity; TIRDA=temporal intermittent rhythmic delta activity;  LPD=PLED=lateralized periodic discharges; GPD=generalized periodic discharges; BIPDs =bilateral independent periodic discharges; Mf=multifocal; SIRPDs=stimulus induced rhythmic, periodic, or ictal appearing discharges; BIRDs=brief potentially ictal rhythmic discharges >4 Hz, lasting .5-10s; PFA (paroxysmal bursts >13 Hz or =8 Hz <10s).  Modifiers: +F=with fast component; +S=with spike component; +R=with rhythmic component.  S-B=burst suppression pattern.  Max=maximal. N1-drowsy; N2-stage II sleep; N3-slow wave sleep. SSS/BETS=small sharp spikes/benign epileptiform transients of sleep. HV=hyperventilation; PS=photic stimulation]    FINDINGS:      Background:  Continuity: continuous  Symmetry: symmetric  PDR: none  Reactivity: present  Voltage: normal (between 20-150uV), with relative attenuation over the right temporal region  Anterior Posterior Gradient: none  Other background findings: none  Breach: Left frontal and right centrotemporal breach effect    Background Slowing:  Generalized slowing: diffuse irregular delta and theta activity.  Irregular theta/delta maximal over the right in amplitude  Focal slowing: none was present.    State Changes:   -Drowsiness noted with increased slowing, attenuation of fast activity, vertex transients.  -Present with N2 sleep transients with symmetric spindles and K-complexes.    Sporadic Epileptiform Discharges:    None    Rhythmic and Periodic Patterns (RPPs):  None     Electrographic and Electroclinical seizures:  None    Other Clinical Events:  None    Activation Procedures:   -Hyperventilation was not performed.    -Photic stimulation was not performed.     Artifacts:  Intermittent myogenic and movement artifacts were noted.    ECG:  The heart rate on single channel ECG was predominantly between 60-80 BPM.    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      -------------------------------------------------------------------------------------------------------  [facility] EEG Reading Room Ph#: [phone]  Epilepsy Answering Service after 5PM and before 8:30AM: Ph#: [phone]    Jose Miguel Arias M.D.   of Neurology, [facility] [place]	   WILMA NICOLE [id number]     Study Date: 		04-27 10:42-08:00 4-28-23  Duration in hours:  x20:52hrs    --------------------------------------------------------------------------------------------------  History:  CC/ HPI Patient is a 62y old  Male who presents with a chief complaint of bleeding from ears (27 Apr 2023 11:51)    MEDICATIONS  (STANDING):  amLODIPine   Tablet 10 milliGRAM(s) Oral daily  enalapril 10 milliGRAM(s) Oral daily  glucagon  Injectable 1 milliGRAM(s) IntraMuscular once  insulin glargine Injectable (LANTUS) 12 Unit(s) SubCutaneous at bedtime  insulin lispro (ADMELOG) corrective regimen sliding scale   SubCutaneous every 6 hours  piperacillin/tazobactam IVPB.. 3.375 Gram(s) IV Intermittent every 8 hours  polyethylene glycol 3350 17 Gram(s) Oral daily  tamsulosin 0.4 milliGRAM(s) Oral at bedtime    --------------------------------------------------------------------------------------------------  Study Interpretation:    [Abbreviation Key:  PDR=alpha rhythm/posterior dominant rhythm. A-P=anterior posterior.  Amplitude: ‘very low’:<20; ‘low’:20-49; ‘medium’:; ‘high’:>150uV.  Persistence for periodic/rhythmic patterns (% of epoch) ‘rare’:<1%; ‘occasional’:1-10%; ‘frequent’:10-50%; ‘abundant’:50-90%; ‘continuous’:>90%.  Persistence for sporadic discharges: ‘rare’:<1/hr; ‘occasional’:1/min-1/hr; ‘frequent’:>1/min; ‘abundant’:>1/10 sec.  RPP=rhythmic and periodic patterns; GRDA=generalized rhythmic delta activity; FIRDA=frontal intermittent GRDA; LRDA=lateralized rhythmic delta activity; TIRDA=temporal intermittent rhythmic delta activity;  LPD=PLED=lateralized periodic discharges; GPD=generalized periodic discharges; BIPDs =bilateral independent periodic discharges; Mf=multifocal; SIRPDs=stimulus induced rhythmic, periodic, or ictal appearing discharges; BIRDs=brief potentially ictal rhythmic discharges >4 Hz, lasting .5-10s; PFA (paroxysmal bursts >13 Hz or =8 Hz <10s).  Modifiers: +F=with fast component; +S=with spike component; +R=with rhythmic component.  S-B=burst suppression pattern.  Max=maximal. N1-drowsy; N2-stage II sleep; N3-slow wave sleep. SSS/BETS=small sharp spikes/benign epileptiform transients of sleep. HV=hyperventilation; PS=photic stimulation]    FINDINGS:      Background:  Continuity: continuous  Symmetry: symmetric  PDR: none  Reactivity: present  Voltage: normal (between 20-150uV), with relative attenuation over the right temporal region  Anterior Posterior Gradient: none  Other background findings: none  Breach: Left frontal and right centrotemporal breach effects    Background Slowing:  Generalized slowing: diffuse irregular delta and theta activity.  Irregular theta/delta maximal over the right in amplitude, sharply contoured in morphology  Focal slowing: none was present.    State Changes:   -Drowsiness noted with increased slowing, attenuation of fast activity, vertex transients.  -Present with N2 sleep transients with symmetric spindles and K-complexes.    Sporadic Epileptiform Discharges:    None    Rhythmic and Periodic Patterns (RPPs):  None     Electrographic and Electroclinical seizures:  None    Other Clinical Events:  None    Activation Procedures:   -Hyperventilation was not performed.    -Photic stimulation was not performed.     Artifacts:  Intermittent myogenic and movement artifacts were noted.    ECG:  The heart rate on single channel ECG was predominantly between 60-80 BPM.    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      -------------------------------------------------------------------------------------------------------  [facility] EEG Reading Room Ph#: [phone]  Epilepsy Answering Service after 5PM and before 8:30AM: Ph#: [phone]    Jose Miguel Arias M.D.   of Neurology, [facility] Epilepsy [place]

## 2023-04-29 LAB
ANION GAP SERPL CALC-SCNC: 13 MMOL/L — SIGNIFICANT CHANGE UP (ref 7–14)
BLD GP AB SCN SERPL QL: NEGATIVE — SIGNIFICANT CHANGE UP
BUN SERPL-MCNC: 17 MG/DL — SIGNIFICANT CHANGE UP (ref 7–23)
CALCIUM SERPL-MCNC: 9.6 MG/DL — SIGNIFICANT CHANGE UP (ref 8.4–10.5)
CHLORIDE SERPL-SCNC: 106 MMOL/L — SIGNIFICANT CHANGE UP (ref 98–107)
CO2 SERPL-SCNC: 23 MMOL/L — SIGNIFICANT CHANGE UP (ref 22–31)
CREAT SERPL-MCNC: 0.58 MG/DL — SIGNIFICANT CHANGE UP (ref 0.5–1.3)
CULTURE RESULTS: SIGNIFICANT CHANGE UP
CULTURE RESULTS: SIGNIFICANT CHANGE UP
EGFR: 112 ML/MIN/1.73M2 — SIGNIFICANT CHANGE UP
GLUCOSE BLDC GLUCOMTR-MCNC: 169 MG/DL — HIGH (ref 70–99)
GLUCOSE BLDC GLUCOMTR-MCNC: 181 MG/DL — HIGH (ref 70–99)
GLUCOSE BLDC GLUCOMTR-MCNC: 183 MG/DL — HIGH (ref 70–99)
GLUCOSE BLDC GLUCOMTR-MCNC: 217 MG/DL — HIGH (ref 70–99)
GLUCOSE SERPL-MCNC: 135 MG/DL — HIGH (ref 70–99)
HCT VFR BLD CALC: 17.3 % — CRITICAL LOW (ref 39–50)
HCT VFR BLD CALC: 21.8 % — LOW (ref 39–50)
HCT VFR BLD CALC: 29.7 % — LOW (ref 39–50)
HGB BLD-MCNC: 5.3 G/DL — CRITICAL LOW (ref 13–17)
HGB BLD-MCNC: 6.6 G/DL — CRITICAL LOW (ref 13–17)
HGB BLD-MCNC: 9.6 G/DL — LOW (ref 13–17)
MAGNESIUM SERPL-MCNC: 2.3 MG/DL — SIGNIFICANT CHANGE UP (ref 1.6–2.6)
MCHC RBC-ENTMCNC: 26.4 PG — LOW (ref 27–34)
MCHC RBC-ENTMCNC: 27.7 PG — SIGNIFICANT CHANGE UP (ref 27–34)
MCHC RBC-ENTMCNC: 28.3 PG — SIGNIFICANT CHANGE UP (ref 27–34)
MCHC RBC-ENTMCNC: 30.3 GM/DL — LOW (ref 32–36)
MCHC RBC-ENTMCNC: 30.6 GM/DL — LOW (ref 32–36)
MCHC RBC-ENTMCNC: 32.3 GM/DL — SIGNIFICANT CHANGE UP (ref 32–36)
MCV RBC AUTO: 87.2 FL — SIGNIFICANT CHANGE UP (ref 80–100)
MCV RBC AUTO: 87.6 FL — SIGNIFICANT CHANGE UP (ref 80–100)
MCV RBC AUTO: 90.6 FL — SIGNIFICANT CHANGE UP (ref 80–100)
NRBC # BLD: 0 /100 WBCS — SIGNIFICANT CHANGE UP (ref 0–0)
NRBC # FLD: 0.02 K/UL — HIGH (ref 0–0)
OB PNL STL: NEGATIVE — SIGNIFICANT CHANGE UP
PHOSPHATE SERPL-MCNC: 3.9 MG/DL — SIGNIFICANT CHANGE UP (ref 2.5–4.5)
PLATELET # BLD AUTO: 150 K/UL — SIGNIFICANT CHANGE UP (ref 150–400)
PLATELET # BLD AUTO: 166 K/UL — SIGNIFICANT CHANGE UP (ref 150–400)
PLATELET # BLD AUTO: 193 K/UL — SIGNIFICANT CHANGE UP (ref 150–400)
POTASSIUM SERPL-MCNC: 4 MMOL/L — SIGNIFICANT CHANGE UP (ref 3.5–5.3)
POTASSIUM SERPL-SCNC: 4 MMOL/L — SIGNIFICANT CHANGE UP (ref 3.5–5.3)
RBC # BLD: 1.91 M/UL — LOW (ref 4.2–5.8)
RBC # BLD: 2.5 M/UL — LOW (ref 4.2–5.8)
RBC # BLD: 3.39 M/UL — LOW (ref 4.2–5.8)
RBC # FLD: 16.7 % — HIGH (ref 10.3–14.5)
RBC # FLD: 17.3 % — HIGH (ref 10.3–14.5)
RBC # FLD: 17.5 % — HIGH (ref 10.3–14.5)
RH IG SCN BLD-IMP: POSITIVE — SIGNIFICANT CHANGE UP
RH IG SCN BLD-IMP: POSITIVE — SIGNIFICANT CHANGE UP
SODIUM SERPL-SCNC: 142 MMOL/L — SIGNIFICANT CHANGE UP (ref 135–145)
SPECIMEN SOURCE: SIGNIFICANT CHANGE UP
SPECIMEN SOURCE: SIGNIFICANT CHANGE UP
WBC # BLD: 12.24 K/UL — HIGH (ref 3.8–10.5)
WBC # BLD: 15.27 K/UL — HIGH (ref 3.8–10.5)
WBC # BLD: 17.05 K/UL — HIGH (ref 3.8–10.5)
WBC # FLD AUTO: 12.24 K/UL — HIGH (ref 3.8–10.5)
WBC # FLD AUTO: 15.27 K/UL — HIGH (ref 3.8–10.5)
WBC # FLD AUTO: 17.05 K/UL — HIGH (ref 3.8–10.5)

## 2023-04-29 RX ADMIN — POLYETHYLENE GLYCOL 3350 17 GRAM(S): 17 POWDER, FOR SOLUTION ORAL at 13:18

## 2023-04-29 RX ADMIN — Medication 1: at 12:47

## 2023-04-29 RX ADMIN — Medication 2: at 08:05

## 2023-04-29 RX ADMIN — Medication 1: at 19:05

## 2023-04-29 RX ADMIN — TAMSULOSIN HYDROCHLORIDE 0.4 MILLIGRAM(S): 0.4 CAPSULE ORAL at 21:10

## 2023-04-29 RX ADMIN — Medication 10 MILLIGRAM(S): at 05:41

## 2023-04-29 RX ADMIN — AMLODIPINE BESYLATE 10 MILLIGRAM(S): 2.5 TABLET ORAL at 05:41

## 2023-04-29 RX ADMIN — INSULIN GLARGINE 12 UNIT(S): 100 INJECTION, SOLUTION SUBCUTANEOUS at 23:09

## 2023-04-29 RX ADMIN — PIPERACILLIN AND TAZOBACTAM 25 GRAM(S): 4; .5 INJECTION, POWDER, LYOPHILIZED, FOR SOLUTION INTRAVENOUS at 18:00

## 2023-04-29 RX ADMIN — PIPERACILLIN AND TAZOBACTAM 25 GRAM(S): 4; .5 INJECTION, POWDER, LYOPHILIZED, FOR SOLUTION INTRAVENOUS at 05:41

## 2023-04-29 RX ADMIN — Medication 1: at 23:09

## 2023-04-29 NOTE — PROVIDER CONTACT NOTE (CRITICAL VALUE NOTIFICATION) - ASSESSMENT
patient A&OXo, nonverbal. No signs of distress  noted.
Patient A&OX0, nonverbal. No signs of distress noted.

## 2023-04-29 NOTE — CHART NOTE - NSCHARTNOTEFT_GEN_A_CORE
Called for H/H 5.3/17.3 labs result. Patient VSS. No overt sign of bleeding. Appears to be lab error. Will order stat repeat of cbc and T&S in case of transfusion. Called for H/H 5.3/17.3 labs result. Patient VSS. No overt sign of bleeding. Appears to be lab error. Will order stat repeat of cbc and T&S in case of transfusion.  Addendum- Repeat cbc with H/H 6.6/21.8.   Obtained consent from wife and son for blood transfusion.   Will f/u post transfusion cbc, stool for occult blood.  Attending aware.

## 2023-04-29 NOTE — EEG REPORT - NS EEG TEXT BOX
WILMA NICOLE [id number]     Study Date: 	04-28 08:00-08:00 4-29-23  Duration in hours:  24 hr     --------------------------------------------------------------------------------------------------  History:  CC/ HPI Patient is a 62y old  Male who presents with a chief complaint of bleeding from ears (27 Apr 2023 11:51)    MEDICATIONS  (STANDING):  No neuro related    --------------------------------------------------------------------------------------------------  Study Interpretation:    [Abbreviation Key:  PDR=alpha rhythm/posterior dominant rhythm. A-P=anterior posterior.  Amplitude: ‘very low’:<20; ‘low’:20-49; ‘medium’:; ‘high’:>150uV.  Persistence for periodic/rhythmic patterns (% of epoch) ‘rare’:<1%; ‘occasional’:1-10%; ‘frequent’:10-50%; ‘abundant’:50-90%; ‘continuous’:>90%.  Persistence for sporadic discharges: ‘rare’:<1/hr; ‘occasional’:1/min-1/hr; ‘frequent’:>1/min; ‘abundant’:>1/10 sec.  RPP=rhythmic and periodic patterns; GRDA=generalized rhythmic delta activity; FIRDA=frontal intermittent GRDA; LRDA=lateralized rhythmic delta activity; TIRDA=temporal intermittent rhythmic delta activity;  LPD=PLED=lateralized periodic discharges; GPD=generalized periodic discharges; BIPDs =bilateral independent periodic discharges; Mf=multifocal; SIRPDs=stimulus induced rhythmic, periodic, or ictal appearing discharges; BIRDs=brief potentially ictal rhythmic discharges >4 Hz, lasting .5-10s; PFA (paroxysmal bursts >13 Hz or =8 Hz <10s).  Modifiers: +F=with fast component; +S=with spike component; +R=with rhythmic component.  S-B=burst suppression pattern.  Max=maximal. N1-drowsy; N2-stage II sleep; N3-slow wave sleep. SSS/BETS=small sharp spikes/benign epileptiform transients of sleep. HV=hyperventilation; PS=photic stimulation]    FINDINGS:      Background:  Continuity: continuous  Symmetry: asymmetric  PDR: none  Reactivity: present  Voltage: normal (between 20-150uV), with relative attenuation over the right temporal region  Anterior Posterior Gradient: none  Other background findings: none  Breach: Left frontal and right centrotemporal breach effects    Background Slowing:  Generalized slowing: diffuse irregular delta and theta activity.  Irregular theta/delta maximal over the right in amplitude, sharply contoured in morphology  Focal slowing: none was present.    State Changes:   -Drowsiness noted with increased slowing, attenuation of fast activity, vertex transients.  -Present with N2 sleep transients with symmetric spindles and K-complexes.    Sporadic Epileptiform Discharges:    None    Rhythmic and Periodic Patterns (RPPs):  A single sharp transient (100uV) was seen over the right mid-temporal region     Electrographic and Electroclinical seizures:  None    Other Clinical Events:  None    Activation Procedures:   -Hyperventilation was not performed.    -Photic stimulation was not performed.     Artifacts:  Intermittent myogenic and movement artifacts were noted.    ECG:  The heart rate on single channel ECG was predominantly between 60-80 BPM.    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  A single sharp transient (100uV) was seen over the right mid-temporal region, a finding of uncertain significance may indicate epileptogenicity   No seizures     -------------------------------------------------------------------------------------------------------  [facility] EEG Reading Room Ph#: [phone]  Epilepsy Answering Service after 5PM and before 8:30AM: Ph#: [phone]      This is a preliminary read    Shania Weldon MD   Epilepsy Fellow, [facility]	       WILMA NICOLE [id number]     Study Date: 	04-28 08:00-08:00 4-29-23  Duration in hours:  24 hr     --------------------------------------------------------------------------------------------------  History:  CC/ HPI Patient is a 62y old  Male who presents with a chief complaint of bleeding from ears (27 Apr 2023 11:51)    MEDICATIONS  (STANDING):  No neuro related    --------------------------------------------------------------------------------------------------  Study Interpretation:    [Abbreviation Key:  PDR=alpha rhythm/posterior dominant rhythm. A-P=anterior posterior.  Amplitude: ‘very low’:<20; ‘low’:20-49; ‘medium’:; ‘high’:>150uV.  Persistence for periodic/rhythmic patterns (% of epoch) ‘rare’:<1%; ‘occasional’:1-10%; ‘frequent’:10-50%; ‘abundant’:50-90%; ‘continuous’:>90%.  Persistence for sporadic discharges: ‘rare’:<1/hr; ‘occasional’:1/min-1/hr; ‘frequent’:>1/min; ‘abundant’:>1/10 sec.  RPP=rhythmic and periodic patterns; GRDA=generalized rhythmic delta activity; FIRDA=frontal intermittent GRDA; LRDA=lateralized rhythmic delta activity; TIRDA=temporal intermittent rhythmic delta activity;  LPD=PLED=lateralized periodic discharges; GPD=generalized periodic discharges; BIPDs =bilateral independent periodic discharges; Mf=multifocal; SIRPDs=stimulus induced rhythmic, periodic, or ictal appearing discharges; BIRDs=brief potentially ictal rhythmic discharges >4 Hz, lasting .5-10s; PFA (paroxysmal bursts >13 Hz or =8 Hz <10s).  Modifiers: +F=with fast component; +S=with spike component; +R=with rhythmic component.  S-B=burst suppression pattern.  Max=maximal. N1-drowsy; N2-stage II sleep; N3-slow wave sleep. SSS/BETS=small sharp spikes/benign epileptiform transients of sleep. HV=hyperventilation; PS=photic stimulation]    FINDINGS:      Background:  Continuity: continuous  Symmetry: asymmetric  PDR: none  Reactivity: present  Voltage: normal (between 20-150uV), with relative attenuation over the right temporal region  Anterior Posterior Gradient: none  Other background findings: none  Breach: Left frontal and right centrotemporal breach effects    Background Slowing:  Generalized slowing: diffuse irregular delta and theta activity.  Irregular theta/delta maximal over the right in amplitude, sharply contoured in morphology  Focal slowing: none was present.    State Changes:   -Drowsiness noted with increased slowing, attenuation of fast activity, vertex transients.  -Present with N2 sleep transients with symmetric spindles and K-complexes.    Sporadic Epileptiform Discharges:    None    Rhythmic and Periodic Patterns (RPPs):  A single sharp transient (100uV) was seen over the right mid-temporal region     Electrographic and Electroclinical seizures:  None    Other Clinical Events:  None    Activation Procedures:   -Hyperventilation was not performed.    -Photic stimulation was not performed.     Artifacts:  Intermittent myogenic and movement artifacts were noted.    ECG:  The heart rate on single channel ECG was predominantly between 60-80 BPM.    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  A single sharp transient (100uV) was seen over the right mid-temporal region, a finding of uncertain significance may indicate epileptogenicity   No seizures     -------------------------------------------------------------------------------------------------------  [facility] EEG Reading Room Ph#: [phone]  Epilepsy Answering Service after 5PM and before 8:30AM: Ph#: [phone]    Shania Weldon MD   Epilepsy Fellow, [facility]	    Ruddy Bello MD PhD  Director, Epilepsy Division, [facility]      WILMA NICOLE [id number]     Study Date: 	04-28 08:00-13:11 4-29-23  Duration in hours:  25 hr  11 min    --------------------------------------------------------------------------------------------------  History:  CC/ HPI Patient is a 62y old  Male who presents with a chief complaint of bleeding from ears (27 Apr 2023 11:51)    MEDICATIONS  (STANDING):  No neuro related    --------------------------------------------------------------------------------------------------  Study Interpretation:    [Abbreviation Key:  PDR=alpha rhythm/posterior dominant rhythm. A-P=anterior posterior.  Amplitude: ‘very low’:<20; ‘low’:20-49; ‘medium’:; ‘high’:>150uV.  Persistence for periodic/rhythmic patterns (% of epoch) ‘rare’:<1%; ‘occasional’:1-10%; ‘frequent’:10-50%; ‘abundant’:50-90%; ‘continuous’:>90%.  Persistence for sporadic discharges: ‘rare’:<1/hr; ‘occasional’:1/min-1/hr; ‘frequent’:>1/min; ‘abundant’:>1/10 sec.  RPP=rhythmic and periodic patterns; GRDA=generalized rhythmic delta activity; FIRDA=frontal intermittent GRDA; LRDA=lateralized rhythmic delta activity; TIRDA=temporal intermittent rhythmic delta activity;  LPD=PLED=lateralized periodic discharges; GPD=generalized periodic discharges; BIPDs =bilateral independent periodic discharges; Mf=multifocal; SIRPDs=stimulus induced rhythmic, periodic, or ictal appearing discharges; BIRDs=brief potentially ictal rhythmic discharges >4 Hz, lasting .5-10s; PFA (paroxysmal bursts >13 Hz or =8 Hz <10s).  Modifiers: +F=with fast component; +S=with spike component; +R=with rhythmic component.  S-B=burst suppression pattern.  Max=maximal. N1-drowsy; N2-stage II sleep; N3-slow wave sleep. SSS/BETS=small sharp spikes/benign epileptiform transients of sleep. HV=hyperventilation; PS=photic stimulation]    FINDINGS:      Background:  Continuity: continuous  Symmetry: asymmetric  PDR: none  Reactivity: present  Voltage: normal (between 20-150uV), with relative attenuation over the right temporal region  Anterior Posterior Gradient: none  Other background findings: none  Breach: Left frontal and right centrotemporal breach effects    Background Slowing:  Generalized slowing: diffuse irregular delta and theta activity.  Irregular theta/delta maximal over the right in amplitude, sharply contoured in morphology  Focal slowing: none was present.    State Changes:   -Drowsiness noted with increased slowing, attenuation of fast activity, vertex transients.  -Present with N2 sleep transients with symmetric spindles and K-complexes.    Sporadic Epileptiform Discharges:    None    Rhythmic and Periodic Patterns (RPPs):  A single sharp transient (100uV) was seen over the right mid-temporal region     Electrographic and Electroclinical seizures:  None    Other Clinical Events:  None    Activation Procedures:   -Hyperventilation was not performed.    -Photic stimulation was not performed.     Artifacts:  Intermittent myogenic and movement artifacts were noted.    ECG:  The heart rate on single channel ECG was predominantly between 60-80 BPM.    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

## 2023-04-29 NOTE — PROVIDER CONTACT NOTE (CRITICAL VALUE NOTIFICATION) - BACKGROUND
patient admitted for pneumonia. hx of traumatic subdural hematoma. Craniotomy, NPO with bolus tube feeds and trach in place.
patient admitted for pneumonia. Hx of traumatic subdural hemorrhage, s/p craniotomy.

## 2023-04-30 DIAGNOSIS — D64.9 ANEMIA, UNSPECIFIED: ICD-10-CM

## 2023-04-30 LAB
ANION GAP SERPL CALC-SCNC: 11 MMOL/L — SIGNIFICANT CHANGE UP (ref 7–14)
BUN SERPL-MCNC: 18 MG/DL — SIGNIFICANT CHANGE UP (ref 7–23)
CALCIUM SERPL-MCNC: 9.3 MG/DL — SIGNIFICANT CHANGE UP (ref 8.4–10.5)
CHLORIDE SERPL-SCNC: 107 MMOL/L — SIGNIFICANT CHANGE UP (ref 98–107)
CO2 SERPL-SCNC: 25 MMOL/L — SIGNIFICANT CHANGE UP (ref 22–31)
CREAT SERPL-MCNC: 0.57 MG/DL — SIGNIFICANT CHANGE UP (ref 0.5–1.3)
CULTURE RESULTS: SIGNIFICANT CHANGE UP
CULTURE RESULTS: SIGNIFICANT CHANGE UP
EGFR: 112 ML/MIN/1.73M2 — SIGNIFICANT CHANGE UP
GLUCOSE BLDC GLUCOMTR-MCNC: 124 MG/DL — HIGH (ref 70–99)
GLUCOSE BLDC GLUCOMTR-MCNC: 169 MG/DL — HIGH (ref 70–99)
GLUCOSE BLDC GLUCOMTR-MCNC: 173 MG/DL — HIGH (ref 70–99)
GLUCOSE BLDC GLUCOMTR-MCNC: 192 MG/DL — HIGH (ref 70–99)
GLUCOSE SERPL-MCNC: 163 MG/DL — HIGH (ref 70–99)
HCT VFR BLD CALC: 28.8 % — LOW (ref 39–50)
HGB BLD-MCNC: 9.2 G/DL — LOW (ref 13–17)
MAGNESIUM SERPL-MCNC: 2.3 MG/DL — SIGNIFICANT CHANGE UP (ref 1.6–2.6)
MCHC RBC-ENTMCNC: 28.5 PG — SIGNIFICANT CHANGE UP (ref 27–34)
MCHC RBC-ENTMCNC: 31.9 GM/DL — LOW (ref 32–36)
MCV RBC AUTO: 89.2 FL — SIGNIFICANT CHANGE UP (ref 80–100)
NRBC # BLD: 0 /100 WBCS — SIGNIFICANT CHANGE UP (ref 0–0)
NRBC # FLD: 0.02 K/UL — HIGH (ref 0–0)
PHOSPHATE SERPL-MCNC: 3.2 MG/DL — SIGNIFICANT CHANGE UP (ref 2.5–4.5)
PLATELET # BLD AUTO: 143 K/UL — LOW (ref 150–400)
POTASSIUM SERPL-MCNC: 3.9 MMOL/L — SIGNIFICANT CHANGE UP (ref 3.5–5.3)
POTASSIUM SERPL-SCNC: 3.9 MMOL/L — SIGNIFICANT CHANGE UP (ref 3.5–5.3)
RBC # BLD: 3.23 M/UL — LOW (ref 4.2–5.8)
RBC # FLD: 17.2 % — HIGH (ref 10.3–14.5)
SODIUM SERPL-SCNC: 143 MMOL/L — SIGNIFICANT CHANGE UP (ref 135–145)
SPECIMEN SOURCE: SIGNIFICANT CHANGE UP
SPECIMEN SOURCE: SIGNIFICANT CHANGE UP
WBC # BLD: 10.76 K/UL — HIGH (ref 3.8–10.5)
WBC # FLD AUTO: 10.76 K/UL — HIGH (ref 3.8–10.5)

## 2023-04-30 RX ADMIN — Medication 10 MILLIGRAM(S): at 06:02

## 2023-04-30 RX ADMIN — AMLODIPINE BESYLATE 10 MILLIGRAM(S): 2.5 TABLET ORAL at 06:02

## 2023-04-30 RX ADMIN — PIPERACILLIN AND TAZOBACTAM 25 GRAM(S): 4; .5 INJECTION, POWDER, LYOPHILIZED, FOR SOLUTION INTRAVENOUS at 03:20

## 2023-04-30 RX ADMIN — INSULIN GLARGINE 12 UNIT(S): 100 INJECTION, SOLUTION SUBCUTANEOUS at 23:39

## 2023-04-30 RX ADMIN — Medication 1: at 18:56

## 2023-04-30 RX ADMIN — PIPERACILLIN AND TAZOBACTAM 25 GRAM(S): 4; .5 INJECTION, POWDER, LYOPHILIZED, FOR SOLUTION INTRAVENOUS at 17:59

## 2023-04-30 RX ADMIN — Medication 1: at 06:12

## 2023-04-30 RX ADMIN — PIPERACILLIN AND TAZOBACTAM 25 GRAM(S): 4; .5 INJECTION, POWDER, LYOPHILIZED, FOR SOLUTION INTRAVENOUS at 10:46

## 2023-04-30 RX ADMIN — POLYETHYLENE GLYCOL 3350 17 GRAM(S): 17 POWDER, FOR SOLUTION ORAL at 10:46

## 2023-04-30 RX ADMIN — Medication 1: at 13:40

## 2023-05-01 LAB
ANION GAP SERPL CALC-SCNC: 11 MMOL/L — SIGNIFICANT CHANGE UP (ref 7–14)
BUN SERPL-MCNC: 18 MG/DL — SIGNIFICANT CHANGE UP (ref 7–23)
CALCIUM SERPL-MCNC: 9.6 MG/DL — SIGNIFICANT CHANGE UP (ref 8.4–10.5)
CHLORIDE SERPL-SCNC: 103 MMOL/L — SIGNIFICANT CHANGE UP (ref 98–107)
CO2 SERPL-SCNC: 25 MMOL/L — SIGNIFICANT CHANGE UP (ref 22–31)
CREAT SERPL-MCNC: 0.59 MG/DL — SIGNIFICANT CHANGE UP (ref 0.5–1.3)
EGFR: 111 ML/MIN/1.73M2 — SIGNIFICANT CHANGE UP
GLUCOSE BLDC GLUCOMTR-MCNC: 185 MG/DL — HIGH (ref 70–99)
GLUCOSE BLDC GLUCOMTR-MCNC: 187 MG/DL — HIGH (ref 70–99)
GLUCOSE BLDC GLUCOMTR-MCNC: 198 MG/DL — HIGH (ref 70–99)
GLUCOSE SERPL-MCNC: 197 MG/DL — HIGH (ref 70–99)
HCT VFR BLD CALC: 28.9 % — LOW (ref 39–50)
HGB BLD-MCNC: 9.1 G/DL — LOW (ref 13–17)
MAGNESIUM SERPL-MCNC: 2.2 MG/DL — SIGNIFICANT CHANGE UP (ref 1.6–2.6)
MCHC RBC-ENTMCNC: 27.4 PG — SIGNIFICANT CHANGE UP (ref 27–34)
MCHC RBC-ENTMCNC: 31.5 GM/DL — LOW (ref 32–36)
MCV RBC AUTO: 87 FL — SIGNIFICANT CHANGE UP (ref 80–100)
NRBC # BLD: 0 /100 WBCS — SIGNIFICANT CHANGE UP (ref 0–0)
NRBC # FLD: 0 K/UL — SIGNIFICANT CHANGE UP (ref 0–0)
PHOSPHATE SERPL-MCNC: 3.1 MG/DL — SIGNIFICANT CHANGE UP (ref 2.5–4.5)
PLATELET # BLD AUTO: 162 K/UL — SIGNIFICANT CHANGE UP (ref 150–400)
POTASSIUM SERPL-MCNC: 3.9 MMOL/L — SIGNIFICANT CHANGE UP (ref 3.5–5.3)
POTASSIUM SERPL-SCNC: 3.9 MMOL/L — SIGNIFICANT CHANGE UP (ref 3.5–5.3)
RBC # BLD: 3.32 M/UL — LOW (ref 4.2–5.8)
RBC # FLD: 17.1 % — HIGH (ref 10.3–14.5)
SODIUM SERPL-SCNC: 139 MMOL/L — SIGNIFICANT CHANGE UP (ref 135–145)
WBC # BLD: 10.62 K/UL — HIGH (ref 3.8–10.5)
WBC # FLD AUTO: 10.62 K/UL — HIGH (ref 3.8–10.5)

## 2023-05-01 RX ADMIN — Medication 1: at 13:20

## 2023-05-01 RX ADMIN — PIPERACILLIN AND TAZOBACTAM 25 GRAM(S): 4; .5 INJECTION, POWDER, LYOPHILIZED, FOR SOLUTION INTRAVENOUS at 01:29

## 2023-05-01 RX ADMIN — Medication 10 MILLIGRAM(S): at 06:21

## 2023-05-01 RX ADMIN — PIPERACILLIN AND TAZOBACTAM 25 GRAM(S): 4; .5 INJECTION, POWDER, LYOPHILIZED, FOR SOLUTION INTRAVENOUS at 07:17

## 2023-05-01 RX ADMIN — PIPERACILLIN AND TAZOBACTAM 25 GRAM(S): 4; .5 INJECTION, POWDER, LYOPHILIZED, FOR SOLUTION INTRAVENOUS at 15:05

## 2023-05-01 RX ADMIN — POLYETHYLENE GLYCOL 3350 17 GRAM(S): 17 POWDER, FOR SOLUTION ORAL at 10:12

## 2023-05-01 RX ADMIN — Medication 1: at 18:41

## 2023-05-01 RX ADMIN — AMLODIPINE BESYLATE 10 MILLIGRAM(S): 2.5 TABLET ORAL at 06:21

## 2023-05-01 RX ADMIN — Medication 1: at 07:15

## 2023-05-01 NOTE — CHART NOTE - NSCHARTNOTEFT_GEN_A_CORE
Case discussed w/ Dr. Ryan, requesting Neurology consult with Dr. Cortés.    Neurology consult discussed with Dr. Cortés, will await further recommendations.      Leila White NP-BC  Department of Medicine  In House Pager #[phone]

## 2023-05-02 LAB
ANION GAP SERPL CALC-SCNC: 12 MMOL/L — SIGNIFICANT CHANGE UP (ref 7–14)
BUN SERPL-MCNC: 20 MG/DL — SIGNIFICANT CHANGE UP (ref 7–23)
CALCIUM SERPL-MCNC: 9.9 MG/DL — SIGNIFICANT CHANGE UP (ref 8.4–10.5)
CHLORIDE SERPL-SCNC: 105 MMOL/L — SIGNIFICANT CHANGE UP (ref 98–107)
CO2 SERPL-SCNC: 24 MMOL/L — SIGNIFICANT CHANGE UP (ref 22–31)
CREAT SERPL-MCNC: 0.56 MG/DL — SIGNIFICANT CHANGE UP (ref 0.5–1.3)
EGFR: 113 ML/MIN/1.73M2 — SIGNIFICANT CHANGE UP
GLUCOSE BLDC GLUCOMTR-MCNC: 139 MG/DL — HIGH (ref 70–99)
GLUCOSE BLDC GLUCOMTR-MCNC: 155 MG/DL — HIGH (ref 70–99)
GLUCOSE BLDC GLUCOMTR-MCNC: 174 MG/DL — HIGH (ref 70–99)
GLUCOSE BLDC GLUCOMTR-MCNC: 176 MG/DL — HIGH (ref 70–99)
GLUCOSE BLDC GLUCOMTR-MCNC: 185 MG/DL — HIGH (ref 70–99)
GLUCOSE SERPL-MCNC: 159 MG/DL — HIGH (ref 70–99)
HCT VFR BLD CALC: 30.3 % — LOW (ref 39–50)
HGB BLD-MCNC: 9.4 G/DL — LOW (ref 13–17)
MAGNESIUM SERPL-MCNC: 2.3 MG/DL — SIGNIFICANT CHANGE UP (ref 1.6–2.6)
MCHC RBC-ENTMCNC: 27.9 PG — SIGNIFICANT CHANGE UP (ref 27–34)
MCHC RBC-ENTMCNC: 31 GM/DL — LOW (ref 32–36)
MCV RBC AUTO: 89.9 FL — SIGNIFICANT CHANGE UP (ref 80–100)
NRBC # BLD: 0 /100 WBCS — SIGNIFICANT CHANGE UP (ref 0–0)
NRBC # FLD: 0 K/UL — SIGNIFICANT CHANGE UP (ref 0–0)
PHOSPHATE SERPL-MCNC: 3.1 MG/DL — SIGNIFICANT CHANGE UP (ref 2.5–4.5)
PLATELET # BLD AUTO: 186 K/UL — SIGNIFICANT CHANGE UP (ref 150–400)
POTASSIUM SERPL-MCNC: 4.2 MMOL/L — SIGNIFICANT CHANGE UP (ref 3.5–5.3)
POTASSIUM SERPL-SCNC: 4.2 MMOL/L — SIGNIFICANT CHANGE UP (ref 3.5–5.3)
RBC # BLD: 3.37 M/UL — LOW (ref 4.2–5.8)
RBC # FLD: 17.4 % — HIGH (ref 10.3–14.5)
SODIUM SERPL-SCNC: 141 MMOL/L — SIGNIFICANT CHANGE UP (ref 135–145)
WBC # BLD: 9.79 K/UL — SIGNIFICANT CHANGE UP (ref 3.8–10.5)
WBC # FLD AUTO: 9.79 K/UL — SIGNIFICANT CHANGE UP (ref 3.8–10.5)

## 2023-05-02 RX ORDER — LEVETIRACETAM 250 MG/1
500 TABLET, FILM COATED ORAL
Refills: 0 | Status: DISCONTINUED | OUTPATIENT
Start: 2023-05-02 | End: 2023-05-08

## 2023-05-02 RX ADMIN — Medication 1: at 23:48

## 2023-05-02 RX ADMIN — Medication 1: at 07:04

## 2023-05-02 RX ADMIN — Medication 10 MILLIGRAM(S): at 07:05

## 2023-05-02 RX ADMIN — LEVETIRACETAM 500 MILLIGRAM(S): 250 TABLET, FILM COATED ORAL at 18:26

## 2023-05-02 RX ADMIN — Medication 1: at 01:00

## 2023-05-02 RX ADMIN — Medication 1: at 18:26

## 2023-05-02 RX ADMIN — INSULIN GLARGINE 12 UNIT(S): 100 INJECTION, SOLUTION SUBCUTANEOUS at 23:48

## 2023-05-02 RX ADMIN — INSULIN GLARGINE 12 UNIT(S): 100 INJECTION, SOLUTION SUBCUTANEOUS at 00:59

## 2023-05-02 RX ADMIN — POLYETHYLENE GLYCOL 3350 17 GRAM(S): 17 POWDER, FOR SOLUTION ORAL at 13:54

## 2023-05-02 RX ADMIN — AMLODIPINE BESYLATE 10 MILLIGRAM(S): 2.5 TABLET ORAL at 07:06

## 2023-05-02 NOTE — CONSULT NOTE ADULT - ASSESSMENT
62M pmhx  h/o traumatic subdural hemorrhage following a fall down the stairs while drunk, s/p r craniotomy at [facility] in 01/2023, s/p trach/PEG (unsure when), transferred from [facility] to [facility] for b/l ear bleeding. Physical exam revealed dried b/l EAC with + traumatic lacerations on EAC afrin used. B/l TM in tact likely 2/2 trauma 
63 yo M w/ PMHx traumatic subdural hemorrhage following a fall down the stairs while drunk, s/p r craniotomy at [facility] in 01/2023, non verbal and functional quadriplegic, s/p trach/PEG (while socialized at [facility] ), recently hospitalized (3/5-3/31/23) at sepsis 2/2 pneumonia, resides at [facility] transferred to [facility] from  for ENT eval due to bleeding from b/l ears. On labs was found to have elevated WBC and Ct chest with b/l PNA 
63 yo M with history of traumatic CDH, sp craniotomy, trach/PEG, recently at OSH, with pneumonia, transferred to [facility] in setting of ear bleeding  Fever, leukocytosis  Seen at OSH ED, now transferred to [facility] for further care, patient having bleeding from ears  CT with consolidative/tree in bud opacities, multifocal PNA; aspiration? bladder thickening (I reviewed imaging personally)  UA+  Patient with poor mental status not able to provide further input  Pneumonia as cause of fever/leukocytosis? Uncertain etiology for bleeding from ears  Overall, Pneumonia with leukocytosis, leukocytosis, bleeding  - Zosyn 3.375g q 8  - Check BCXs x 2  - Check sputum culture  - Monitor for shortness of breath  - F/U UCX  - Care for bleeding from ears per team    Zelalem Juan MD  Contact on TEAMS messaging from 9am - 5pm  From 5pm-9am, on weekends, or if no response call [phone]
62y Male with history of SDH s/p trach/PEG s/p craniotomy presents with bleeding from ear. Nephrology consulted for hypernatremia.     1) Hypernatremia: Secondary to free water deficit (3L). Patient currently on free water 250 ml Q6 hours and D5W @ 80 ml/hour. Please repeat BMP this afternoon to monitor for overcorrection (do not correct more than 10 meQ/day). Monitor serum Na.    2) HTN: BP borderline. Will titrate enalapril as needed. Monitor BP.    3) Acute cystitis without hematuria: As per ID.    4) Pneumonia: As per ID.        [facility] NEPHROLOGY  Caleb King M.D.  Angelo Han D.O.  Cecilia Rowley M.D.  Arlet Beltrán, MSN, ANP-C    Telephone: [phone]  Facsimile: [phone]    [address] [place] [address]  
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

## 2023-05-02 NOTE — CONSULT NOTE ADULT - SUBJECTIVE AND OBJECTIVE BOX
{\rtf1\zatdkn81513\ansi\opyzxcn6590\ftnbj\uc1\deff0  {\fonttbl{\f0 \fnil Segoe UI;}{\f1 \fnil \fcharset0 Segoe UI;}{\f2 \fnil Symbol;}{\f3 \fnil Wingdings;}{\f4 \fnil Times New Jerome;}}  {\colortbl ;\uxv650\neaas832\uslu283 ;\red0\green0\blue0 ;\red0\green0\rhef570 ;\red0\green0\blue0 ;}  {\stylesheet{\f0\fs20 Normal;}{\cs1 Default Paragraph Font;}{\cs2\f0\fs16 Line Number;}{\cs3\f4\fs24\ul\cf3 Hyperlink;}}  {\*\revtbl{Unknown;}}  {\*\listtable  {\list\listtemplateid-1  {\listlevel\levelnfc0\levelfollow0\levelstartat1\levelnorestart{\leveltext \'02\'00.}{\levelnumbers \'01}}  {\listlevel\levelnfc0\levelfollow0\levelstartat1\levelnorestart{\leveltext \'02\'01.}{\levelnumbers \'01}}  {\listlevel\levelnfc0\levelfollow0\levelstartat1\levelnorestart{\leveltext \'02\'02.}{\levelnumbers \'01}}  {\listlevel\levelnfc0\levelfollow0\levelstartat1\levelnorestart{\leveltext \'02\'03.}{\levelnumbers \'01}}  {\listlevel\levelnfc0\levelfollow0\levelstartat1\levelnorestart{\leveltext \'02\'04.}{\levelnumbers \'01}}  {\listlevel\levelnfc0\levelfollow0\levelstartat1\levelnorestart{\leveltext \'02\'05.}{\levelnumbers \'01}}  {\listlevel\levelnfc0\levelfollow0\levelstartat1\levelnorestart{\leveltext \'02\'06.}{\levelnumbers \'01}}  {\listlevel\levelnfc0\levelfollow0\levelstartat1\levelnorestart{\leveltext \'02\'07.}{\levelnumbers \'01}}  {\listlevel\levelnfc0\levelfollow0\levelstartat1\levelnorestart{\leveltext \'02\'08.}{\levelnumbers \'01}}  {\listname ;}\[id number]  }  }  {\*\listoverridetable  {\listoverride\cxkyhh69131\listoverridecount0\ls1}  }  \irpbfi02581\vplrqw21486\wynao9733\nvjcj8071\edenz5237\hkdyf5345\ehpcjzi277\ohxumnu920\nogrowautofit\mwttao713\formshade\nofeaturethrottle1\dntblnsbdb\fet4\aendnotes\aftnnrlc\pgbrdrhead\pgbrdrfoot  \sectd\nxzavw56390\cnwjmx40699\guttersxn0\dyspdicu2452\enhobbmv1410\rwyypgup7097\yhtzlfza8924\iwnfhbm492\hmmxefq775\sbkpage\pgncont\pgndec  \plain\plain\f0\fs24\pard\plain\f0\fs24\plain\f0\fs20\hdsb6708\hich\f0\dbch\f0\loch\f0\fs20 Neurology consult\par  \par  AVIDAN BQURE55iZuav\par   \par  Patient is a 62y old  Male who presents with a chief complaint of bleeding from ears (01 May 2023 10:04)\par  \par  \par  HPI:\par  61 yo M w/ PMHx traumatic subdural hemorrhage following a fall down the stairs while drunk, s/p r craniotomy at [facility] in 01/2023, non verbal and functional quadriplegic, s/p trach/PEG (while socialized at [facility] ), recently hospitalized (3/5-3/31/23)   at sepsis 2/2 pneumonia, resides at [facility] transferred to [facility] from  for ENT eval due to bleeding from b/l ears. On labs was found to have elevated WBC and Ct chest with b/l PNA \par  \par  \par   (25 Apr 2023 13:58)\par  \par  \par  \par  MEDICATIONS\par  \par  acetaminophen   Oral Liquid .. 650 milliGRAM(s) Oral every 6 hours PRN\par  albuterol/ipratropium for Nebulization 3 milliLiter(s) Nebulizer every 6 hours PRN\par  aluminum hydroxide/magnesium hydroxide/simethicone Suspension 30 milliLiter(s) Oral every 4 hours PRN\par  amLODIPine   Tablet 10 milliGRAM(s) Oral daily\par  dextrose 5%. 1000 milliLiter(s) IV Continuous <Continuous>\par  dextrose 5%. 1000 milliLiter(s) IV Continuous <Continuous>\par  dextrose 50% Injectable 25 Gram(s) IV Push once\par  dextrose 50% Injectable 12.5 Gram(s) IV Push once\par  dextrose 50% Injectable 25 Gram(s) IV Push once\par  dextrose Oral Gel 15 Gram(s) Oral once PRN\par  enalapril 10 milliGRAM(s) Oral daily\par  glucagon  Injectable 1 milliGRAM(s) IntraMuscular once\par  insulin glargine Injectable (LANTUS) 12 Unit(s) SubCutaneous at bedtime\par  insulin lispro (ADMELOG) corrective regimen sliding scale   SubCutaneous every 6 hours\par  melatonin 3 milliGRAM(s) Oral at bedtime PRN\par  ondansetron Injectable 4 milliGRAM(s) IV Push every 8 hours PRN\par  polyethylene glycol 3350 17 Gram(s) Oral daily\par  tamsulosin 0.4 milliGRAM(s) Oral at bedtime\par  \par  \par  PMH: History of subdural hemorrhage\par  \par  PEG (percutaneous endoscopic gastrostomy) status\par  \par  DM (diabetes mellitus)\par  \par  TBI (traumatic brain injury)\par  \par  Chronic respiratory failure with hypoxia\par  \par  \par   \par  PSH: S/P craniotomy\par  \par  \par  FAMILY HISTORY:\par  No pertinent family history in first degree relatives\par  \par  \par  \par  SOCIAL HISTORY:  No history of tobacco or alcohol use \par  \par  Allergies\par  \par  No Known Allergies\par  \par  Intolerances\par  \par  \par  \par  \par  \par  Vital Signs Last 24 Hrs\par  T(C): 36.5 (02 May 2023 07:00), Max: 36.7 (01 May 2023 11:02)\par  T(F): 97.7 (02 May 2023 07:00), Max: 98 (01 May 2023 11:02)\par  HR: 86 (02 May 2023 07:00) (79 - 90)\par  BP: 129/86 (02 May 2023 07:00) (122/83 - 135/83)\par  BP(mean): --\par  RR: 18 (02 May 2023 07:00) (17 - 18)\par  SpO2: 97% (02 May 2023 07:00) (97% - 100%)\par  \par  Parameters below as of 02 May 2023 07:00\par  Patient On (Oxygen Delivery Method): tracheostomy collar\par  O2 Flow (L/min): 6\par  \par  \par  \par  On Neurological Examination:\par  \par  Mental Status - does not oppe eyes does not follow commands\par  Cranial Nerves - PERRL, , , no gross facial asymmetry, tongue/uvula midline\par  \par  Motor Exam - \par  functional quadraplegia moves LUE and LLe at timese\par  \par  rest deferref\par                                 \par  \par  \par  \par   \par  \par   \par  \par  LABS:\par  CBC Full  -  ( 02 May 2023 07:50 )\par  WBC Count : 9.79 K/uL\par  RBC Count : 3.37 M/uL\par  Hemoglobin : 9.4 g/dL\par  Hematocrit : 30.3 %\par  Platelet Count - Automated : 186 K/uL\par  Mean Cell Volume : 89.9 fL\par  Mean Cell Hemoglobin : 27.9 pg\par  Mean Cell Hemoglobin Concentration : 31.0 gm/dL\par  Auto Neutrophil # : x\par  Auto Lymphocyte # : x\par  Auto Monocyte # : x\par  Auto Eosinophil # : x\par  Auto Basophil # : x\par  Auto Neutrophil % : x\par  Auto Lymphocyte % : x\par  Auto Monocyte % : x\par  Auto Eosinophil % : x\par  Auto Basophil % : x\par  \par  \par  05-02\par  \par  141  |  105  |  20\par  ----------------------------<  159<H>\par  4.2   |  24  |  0.56\par  \par  Ca    9.9      02 May 2023 07:50\par  Phos  3.1     05-02\par  Mg     2.30     05-02\par  \par  \par  \par  Hemoglobin A1C: \par  \par  \par  \par  \par  \par  RADIOLOGY\par  CTH \par  < from: CT Head No Cont (04.25.23 @ 06:55) >\par  \ql\plain\f0\fs24\plain\f1\fs16\ylek6820\hich\f1\dbch\f1\loch\f1\cf2\fs16\par  No substantial change 4/24/2023, including small hypodensities within the \par  infarcted brain parenchyma the left occipital lobe, small blood clots \par  versus dystrophic calcification\par  \par  --- End of Report ---\par  \pard\plain\f0\fs24\plain\f0\fs20\kpqf3093\hich\f0\dbch\f0\loch\f0\fs20\par  \par  < end of copied text >\par  \par  \ql\plain\f0\fs24\plain\f1\fs16\mqqj4161\hich\f1\dbch\f1\loch\f1\cf2\fs16\par  \plain\f1\fs16\buad0740\hich\f1\dbch\f1\loch\f1\cf2\fs16\b Clinical Impression:\plain\f1\fs16\nbsj8698\hich\f1\dbch\f1\loch\f1\cf2\fs16\par  {\listtext\pard\plain\f1\fs16 1.\tab}  \pard\ssparaaux0\s0\ls1\ilvl0\fi-360\li360\ql\plain\f0\fs24\plain\f1\fs16\miez6917\hich\f1\dbch\f1\loch\f1\cf2\fs16 Asymmetry Right hemispheric increased amplitude slowing may be due to known skull defect and regional structural lesion\par  {\listtext\pard\plain\f1\fs16 2.\tab}  Left sided attenuation likely due to known h/o focal cortical encephalomalacia\par  {\listtext\pard\plain\f1\fs16 3.\tab}  Moderate diffuse/multi-focal cerebral dysfunction, not specific as to etiology.\par  {\listtext\pard\plain\f1\fs16 4.\tab}  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\par  {\listtext\pard\plain\f1\fs16 5.\tab}  A single sharp transient (100uV) was seen over the right mid-temporal region, a finding of uncertain significance may indicate epileptogenicity \par  {\listtext\pard\plain\f1\fs16 6.\tab}  No seizures \par  \pard\ssparaaux0\s0\ql\plain\f0\fs24\plain\f1\fs16\qmmz2543\hich\f1\dbch\f1\loch\f1\cf2\fs16\par  -------------------------------------------------------------------------------------------------------\par  [facility] EEG Reading Room Ph#: [phone]\par  Epilepsy Answering Service after 5PM and before 8:30AM: Ph#: [phone]\par  \par  Shania Weldon MD \par  Epilepsy Fellow, [facility]\tab\par  \par  Ruddy Bello MD PhD\par  Director, Epilepsy Division, [facility] \par  \par  \par  \par  \plain\f1\fs16\tqnk3524\hich\f1\dbch\f1\loch\f1\cf2\fs16\strike\plain\f0\fs20\wszq8075\hich\f0\dbch\f0\loch\f0\fs20\par  \pard\plain\f0\fs24\plain\f0\fs20\szqz0332\hich\f0\dbch\f0\loch\f0\fs20\par  \par  \par  \par  \par  \ql\plain\f0\fs24\plain\f0\fs20\arur6607\hich\f0\dbch\f0\loch\f0\fs20\par  }

## 2023-05-03 LAB
ANION GAP SERPL CALC-SCNC: 13 MMOL/L — SIGNIFICANT CHANGE UP (ref 7–14)
BUN SERPL-MCNC: 20 MG/DL — SIGNIFICANT CHANGE UP (ref 7–23)
CALCIUM SERPL-MCNC: 10.1 MG/DL — SIGNIFICANT CHANGE UP (ref 8.4–10.5)
CHLORIDE SERPL-SCNC: 102 MMOL/L — SIGNIFICANT CHANGE UP (ref 98–107)
CO2 SERPL-SCNC: 23 MMOL/L — SIGNIFICANT CHANGE UP (ref 22–31)
CREAT SERPL-MCNC: 0.52 MG/DL — SIGNIFICANT CHANGE UP (ref 0.5–1.3)
EGFR: 115 ML/MIN/1.73M2 — SIGNIFICANT CHANGE UP
GLUCOSE BLDC GLUCOMTR-MCNC: 157 MG/DL — HIGH (ref 70–99)
GLUCOSE BLDC GLUCOMTR-MCNC: 182 MG/DL — HIGH (ref 70–99)
GLUCOSE BLDC GLUCOMTR-MCNC: 185 MG/DL — HIGH (ref 70–99)
GLUCOSE BLDC GLUCOMTR-MCNC: 193 MG/DL — HIGH (ref 70–99)
GLUCOSE SERPL-MCNC: 195 MG/DL — HIGH (ref 70–99)
HCT VFR BLD CALC: 33 % — LOW (ref 39–50)
HGB BLD-MCNC: 10.2 G/DL — LOW (ref 13–17)
MAGNESIUM SERPL-MCNC: 2.4 MG/DL — SIGNIFICANT CHANGE UP (ref 1.6–2.6)
MCHC RBC-ENTMCNC: 27.3 PG — SIGNIFICANT CHANGE UP (ref 27–34)
MCHC RBC-ENTMCNC: 30.9 GM/DL — LOW (ref 32–36)
MCV RBC AUTO: 88.5 FL — SIGNIFICANT CHANGE UP (ref 80–100)
NRBC # BLD: 0 /100 WBCS — SIGNIFICANT CHANGE UP (ref 0–0)
NRBC # FLD: 0 K/UL — SIGNIFICANT CHANGE UP (ref 0–0)
PHOSPHATE SERPL-MCNC: 3.8 MG/DL — SIGNIFICANT CHANGE UP (ref 2.5–4.5)
PLATELET # BLD AUTO: 201 K/UL — SIGNIFICANT CHANGE UP (ref 150–400)
POTASSIUM SERPL-MCNC: 4.5 MMOL/L — SIGNIFICANT CHANGE UP (ref 3.5–5.3)
POTASSIUM SERPL-SCNC: 4.5 MMOL/L — SIGNIFICANT CHANGE UP (ref 3.5–5.3)
RBC # BLD: 3.73 M/UL — LOW (ref 4.2–5.8)
RBC # FLD: 17.2 % — HIGH (ref 10.3–14.5)
SODIUM SERPL-SCNC: 138 MMOL/L — SIGNIFICANT CHANGE UP (ref 135–145)
WBC # BLD: 9.65 K/UL — SIGNIFICANT CHANGE UP (ref 3.8–10.5)
WBC # FLD AUTO: 9.65 K/UL — SIGNIFICANT CHANGE UP (ref 3.8–10.5)

## 2023-05-03 PROCEDURE — 72197 MRI PELVIS W/O & W/DYE: CPT | Mod: 26

## 2023-05-03 RX ADMIN — AMLODIPINE BESYLATE 10 MILLIGRAM(S): 2.5 TABLET ORAL at 05:35

## 2023-05-03 RX ADMIN — Medication 1: at 12:38

## 2023-05-03 RX ADMIN — POLYETHYLENE GLYCOL 3350 17 GRAM(S): 17 POWDER, FOR SOLUTION ORAL at 12:14

## 2023-05-03 RX ADMIN — INSULIN GLARGINE 12 UNIT(S): 100 INJECTION, SOLUTION SUBCUTANEOUS at 23:37

## 2023-05-03 RX ADMIN — Medication 1: at 18:37

## 2023-05-03 RX ADMIN — Medication 1: at 23:37

## 2023-05-03 RX ADMIN — LEVETIRACETAM 500 MILLIGRAM(S): 250 TABLET, FILM COATED ORAL at 05:35

## 2023-05-03 RX ADMIN — Medication 1: at 06:55

## 2023-05-03 RX ADMIN — LEVETIRACETAM 500 MILLIGRAM(S): 250 TABLET, FILM COATED ORAL at 18:38

## 2023-05-03 RX ADMIN — Medication 10 MILLIGRAM(S): at 05:35

## 2023-05-03 NOTE — CHART NOTE - NSCHARTNOTEFT_GEN_A_CORE
Reason for Follow-Up Assessment: Follow-Up / Enteral Nutrition      Internal Medicine Attending (5/2/23) 63 y/o M h/o traumatic subdural hemorrhage following a fall down the stairs while drunk (bedbound, nonverbal, lives in [facility]), s/p r craniotomy at [facility] in 01/2023, s/p trach/PEG txfer from [facility] for ENT evaluation for b/l ear bleeding. Ct head with petechial hemm LT. + elevated WBC CT chest with b/l PNA. UA +     Hyponatremia noted to be resolved.      Source: [ ] Patient [ ] Family [ ] RN [X] Chart      Diet, NPO:   Tube Feeding Modality: Gastrostomy  Glucerna 1.5 Guero (GLUCERNA1.5RTH)  Total Volume for 24 Hours (mL): 1160  Bolus  Tube Feed Frequency: Every 6 hours   Tube Feed Start Time: 17:00  Free Water Flush  Bolus   Total Volume per Flush (mL): 250   Total Volume of Bolus (mL):  290   Frequency: Every 6 Hours (04-26-23 @ 13:02)    EN providing 1160ml formula, 1740kcal, 96gm protein, 880ml water via formula + 1000ml via free water flushes (1880ml free water inclusive of flushes)    GI: Patient noted with loose stool / fecal incontinence noted.    Anthropometrics:   Height (cm): 177.8 (04-25)  Weight (kg): 64.5 (04-26) no new weights to assess   BMI (kg/m2): 20.4 (04-26)    Edema: 1+ edema noted to Rt arm per nursing flowsheets    Pressure Injuries: DTPI noted to sacrum/b/l buttocks per nursing flowsheets    _______________ Pertinent Medications_______________  MEDICATIONS  (STANDING):  amLODIPine   Tablet 10 milliGRAM(s) Oral daily  dextrose 5%. 1000 milliLiter(s) (50 mL/Hr) IV Continuous <Continuous>  dextrose 5%. 1000 milliLiter(s) (100 mL/Hr) IV Continuous <Continuous>  dextrose 50% Injectable 25 Gram(s) IV Push once  dextrose 50% Injectable 12.5 Gram(s) IV Push once  dextrose 50% Injectable 25 Gram(s) IV Push once  enalapril 10 milliGRAM(s) Oral daily  glucagon  Injectable 1 milliGRAM(s) IntraMuscular once  insulin glargine Injectable (LANTUS) 12 Unit(s) SubCutaneous at bedtime  insulin lispro (ADMELOG) corrective regimen sliding scale   SubCutaneous every 6 hours  levETIRAcetam 500 milliGRAM(s) Oral two times a day  polyethylene glycol 3350 17 Gram(s) Oral daily  tamsulosin 0.4 milliGRAM(s) Oral at bedtime    MEDICATIONS  (PRN):  acetaminophen   Oral Liquid .. 650 milliGRAM(s) Oral every 6 hours PRN Temp greater or equal to 38C (100.4F), Mild Pain (1 - 3)  albuterol/ipratropium for Nebulization 3 milliLiter(s) Nebulizer every 6 hours PRN Shortness of Breath and/or Wheezing  aluminum hydroxide/magnesium hydroxide/simethicone Suspension 30 milliLiter(s) Oral every 4 hours PRN Dyspepsia  dextrose Oral Gel 15 Gram(s) Oral once PRN Blood Glucose LESS THAN 70 milliGRAM(s)/deciliter  melatonin 3 milliGRAM(s) Oral at bedtime PRN Insomnia  ondansetron Injectable 4 milliGRAM(s) IV Push every 8 hours PRN Nausea and/or Vomiting    __________________ Pertinent Labs__________________   05-03 Na138 mmol/L Glu 195 mg/dL<H> K+ 4.5 mmol/L Cr  0.52 mg/dL BUN 20 mg/dL 05-03 Phos 3.8 mg/dL    POCT Blood Glucose.: 182 mg/dL (05-03-23 @ 06:16)  POCT Blood Glucose.: 155 mg/dL (05-02-23 @ 23:29)  POCT Blood Glucose.: 185 mg/dL (05-02-23 @ 18:01)  POCT Blood Glucose.: 139 mg/dL (05-02-23 @ 12:29)  POCT Blood Glucose.: 176 mg/dL (05-02-23 @ 06:24)  POCT Blood Glucose.: 174 mg/dL (05-02-23 @ 00:57)  POCT Blood Glucose.: 198 mg/dL (05-01-23 @ 17:59)  POCT Blood Glucose.: 187 mg/dL (05-01-23 @ 12:37)  POCT Blood Glucose.: 185 mg/dL (05-01-23 @ 06:55)  POCT Blood Glucose.: 124 mg/dL (04-30-23 @ 23:24)  POCT Blood Glucose.: 173 mg/dL (04-30-23 @ 18:12)  POCT Blood Glucose.: 192 mg/dL (04-30-23 @ 12:42)      Estimated Needs:   1612-1935kcal/kg (25-30kcal/kg)  77-97gm protein (1.2-1.5gm/kg)  [X] no change since previous assessment  [ ] recalculated:     Previous Nutrition Diagnosis: Increased Nutritional Needs (2/2 pressure injury)    Nutrition Diagnosis is [ X ] ongoing  [ ] resolved [ ] not applicable     New Nutrition Diagnosis: n/a     Monitoring and Evaluation:     [ x ] Tolerance to EN  [ x ] Weight trends   [ x ] Pertinent labs    Recommendations:  1) Continue EN as currently ordered.    2) Consider adjusting bowel regimen - noted w loose stools, receiving MiraLax, possibly exacerbating; if no improvement, may consider changing to continuous feeds - Glucerna 1.5 at 58ml/hr x 20 hours.  3) Obtain weekly weights.    Elana Preston, MS, RDN, CDN  Pager [phone]  Also available on MS Teams Reason for Follow-Up Assessment: Follow-Up / Enteral Nutrition    Internal Medicine Attending (5/2/23) 63 y/o M h/o traumatic subdural hemorrhage following a fall down the stairs while drunk (bedbound, nonverbal, lives in [facility]), s/p r craniotomy at [facility] in 01/2023, s/p trach/PEG txfer from [place] [facility] for ENT evaluation for b/l ear bleeding. Ct head with petechial hemm LT. + elevated WBC CT chest with b/l PNA. UA +.      Chart reviewed.  Patient unable to participate in RD visit, nonverbal. Care being provided to patient at time of visit.  Noted with liquid BMs today per flowsheets.  Hyponatremia noted to be resolved (current Na 138).     Diet, NPO:   Tube Feeding Modality: Gastrostomy  Glucerna 1.5 Guero (GLUCERNA1.5RTH)  Total Volume for 24 Hours (mL): 1160  Bolus  Tube Feed Frequency: Every 6 hours   Tube Feed Start Time: 17:00  Free Water Flush  Bolus   Total Volume per Flush (mL): 250   Total Volume of Bolus (mL):  290   Frequency: Every 6 Hours (04-26-23 @ 13:02)    EN providing 1160ml formula, 1740kcal, 96gm protein, 880ml water via formula + 1000ml via free water flushes (1880ml free water inclusive of flushes)    GI: Patient noted with loose stool / fecal incontinence noted.    Anthropometrics:   Height (cm): 177.8 (04-25)  Weight (kg): 64.5 (04-26) no new weights to assess   BMI (kg/m2): 20.4 (04-26)    Edema: 1+ edema noted to Rt arm per nursing flowsheets    Pressure Injuries: DTPI noted to sacrum/b/l buttocks per nursing flowsheets    _______________ Pertinent Medications_______________  MEDICATIONS  (STANDING):  amLODIPine   Tablet 10 milliGRAM(s) Oral daily  dextrose 5%. 1000 milliLiter(s) (50 mL/Hr) IV Continuous <Continuous>  dextrose 5%. 1000 milliLiter(s) (100 mL/Hr) IV Continuous <Continuous>  dextrose 50% Injectable 25 Gram(s) IV Push once  dextrose 50% Injectable 12.5 Gram(s) IV Push once  dextrose 50% Injectable 25 Gram(s) IV Push once  enalapril 10 milliGRAM(s) Oral daily  glucagon  Injectable 1 milliGRAM(s) IntraMuscular once  insulin glargine Injectable (LANTUS) 12 Unit(s) SubCutaneous at bedtime  insulin lispro (ADMELOG) corrective regimen sliding scale   SubCutaneous every 6 hours  levETIRAcetam 500 milliGRAM(s) Oral two times a day  polyethylene glycol 3350 17 Gram(s) Oral daily  tamsulosin 0.4 milliGRAM(s) Oral at bedtime    MEDICATIONS  (PRN):  acetaminophen   Oral Liquid .. 650 milliGRAM(s) Oral every 6 hours PRN Temp greater or equal to 38C (100.4F), Mild Pain (1 - 3)  albuterol/ipratropium for Nebulization 3 milliLiter(s) Nebulizer every 6 hours PRN Shortness of Breath and/or Wheezing  aluminum hydroxide/magnesium hydroxide/simethicone Suspension 30 milliLiter(s) Oral every 4 hours PRN Dyspepsia  dextrose Oral Gel 15 Gram(s) Oral once PRN Blood Glucose LESS THAN 70 milliGRAM(s)/deciliter  melatonin 3 milliGRAM(s) Oral at bedtime PRN Insomnia  ondansetron Injectable 4 milliGRAM(s) IV Push every 8 hours PRN Nausea and/or Vomiting    __________________ Pertinent Labs__________________   05-03 Na138 mmol/L Glu 195 mg/dL<H> K+ 4.5 mmol/L Cr  0.52 mg/dL BUN 20 mg/dL 05-03 Phos 3.8 mg/dL    POCT Blood Glucose.: 182 mg/dL (05-03-23 @ 06:16)  POCT Blood Glucose.: 155 mg/dL (05-02-23 @ 23:29)  POCT Blood Glucose.: 185 mg/dL (05-02-23 @ 18:01)  POCT Blood Glucose.: 139 mg/dL (05-02-23 @ 12:29)  POCT Blood Glucose.: 176 mg/dL (05-02-23 @ 06:24)  POCT Blood Glucose.: 174 mg/dL (05-02-23 @ 00:57)  POCT Blood Glucose.: 198 mg/dL (05-01-23 @ 17:59)  POCT Blood Glucose.: 187 mg/dL (05-01-23 @ 12:37)  POCT Blood Glucose.: 185 mg/dL (05-01-23 @ 06:55)  POCT Blood Glucose.: 124 mg/dL (04-30-23 @ 23:24)  POCT Blood Glucose.: 173 mg/dL (04-30-23 @ 18:12)  POCT Blood Glucose.: 192 mg/dL (04-30-23 @ 12:42)      Estimated Needs:   1612-1935kcal/kg (25-30kcal/kg)  77-97gm protein (1.2-1.5gm/kg)  [X] no change since previous assessment  [ ] recalculated:     Previous Nutrition Diagnosis: Increased Nutritional Needs (2/2 pressure injury)    Nutrition Diagnosis is [ X ] ongoing  [ ] resolved [ ] not applicable     New Nutrition Diagnosis: n/a     Monitoring and Evaluation:     [ x ] Tolerance to EN  [ x ] Weight trends   [ x ] Pertinent labs    Recommendations:  1) Continue EN as currently ordered.    2) Consider adjusting bowel regimen - noted w loose stools, receiving MiraLax; if no improvement with adjustment to bowel regimen, may consider changing from bolus to continuous feeds - Glucerna 1.5 at 58ml/hr x 20 hours.  3) Obtain weekly weights.    Elana Preston, MS, RDN, CDN  Pager [phone]  Also available on MS Teams

## 2023-05-04 LAB
ANION GAP SERPL CALC-SCNC: 14 MMOL/L — SIGNIFICANT CHANGE UP (ref 7–14)
BUN SERPL-MCNC: 27 MG/DL — HIGH (ref 7–23)
CALCIUM SERPL-MCNC: 10.1 MG/DL — SIGNIFICANT CHANGE UP (ref 8.4–10.5)
CHLORIDE SERPL-SCNC: 102 MMOL/L — SIGNIFICANT CHANGE UP (ref 98–107)
CO2 SERPL-SCNC: 24 MMOL/L — SIGNIFICANT CHANGE UP (ref 22–31)
CREAT SERPL-MCNC: 0.6 MG/DL — SIGNIFICANT CHANGE UP (ref 0.5–1.3)
EGFR: 111 ML/MIN/1.73M2 — SIGNIFICANT CHANGE UP
GLUCOSE BLDC GLUCOMTR-MCNC: 150 MG/DL — HIGH (ref 70–99)
GLUCOSE BLDC GLUCOMTR-MCNC: 154 MG/DL — HIGH (ref 70–99)
GLUCOSE BLDC GLUCOMTR-MCNC: 185 MG/DL — HIGH (ref 70–99)
GLUCOSE BLDC GLUCOMTR-MCNC: 198 MG/DL — HIGH (ref 70–99)
GLUCOSE SERPL-MCNC: 158 MG/DL — HIGH (ref 70–99)
HCT VFR BLD CALC: 31.3 % — LOW (ref 39–50)
HGB BLD-MCNC: 9.8 G/DL — LOW (ref 13–17)
MAGNESIUM SERPL-MCNC: 2.5 MG/DL — SIGNIFICANT CHANGE UP (ref 1.6–2.6)
MCHC RBC-ENTMCNC: 28.2 PG — SIGNIFICANT CHANGE UP (ref 27–34)
MCHC RBC-ENTMCNC: 31.3 GM/DL — LOW (ref 32–36)
MCV RBC AUTO: 90.2 FL — SIGNIFICANT CHANGE UP (ref 80–100)
NRBC # BLD: 0 /100 WBCS — SIGNIFICANT CHANGE UP (ref 0–0)
NRBC # FLD: 0 K/UL — SIGNIFICANT CHANGE UP (ref 0–0)
PHOSPHATE SERPL-MCNC: 4.3 MG/DL — SIGNIFICANT CHANGE UP (ref 2.5–4.5)
PLATELET # BLD AUTO: 182 K/UL — SIGNIFICANT CHANGE UP (ref 150–400)
POTASSIUM SERPL-MCNC: 4.4 MMOL/L — SIGNIFICANT CHANGE UP (ref 3.5–5.3)
POTASSIUM SERPL-SCNC: 4.4 MMOL/L — SIGNIFICANT CHANGE UP (ref 3.5–5.3)
RBC # BLD: 3.47 M/UL — LOW (ref 4.2–5.8)
RBC # FLD: 17.5 % — HIGH (ref 10.3–14.5)
SODIUM SERPL-SCNC: 140 MMOL/L — SIGNIFICANT CHANGE UP (ref 135–145)
WBC # BLD: 14.98 K/UL — HIGH (ref 3.8–10.5)
WBC # FLD AUTO: 14.98 K/UL — HIGH (ref 3.8–10.5)

## 2023-05-04 RX ADMIN — LEVETIRACETAM 500 MILLIGRAM(S): 250 TABLET, FILM COATED ORAL at 19:18

## 2023-05-04 RX ADMIN — INSULIN GLARGINE 12 UNIT(S): 100 INJECTION, SOLUTION SUBCUTANEOUS at 23:28

## 2023-05-04 RX ADMIN — Medication 10 MILLIGRAM(S): at 05:46

## 2023-05-04 RX ADMIN — Medication 1: at 12:41

## 2023-05-04 RX ADMIN — POLYETHYLENE GLYCOL 3350 17 GRAM(S): 17 POWDER, FOR SOLUTION ORAL at 12:41

## 2023-05-04 RX ADMIN — Medication 1: at 23:29

## 2023-05-04 RX ADMIN — Medication 1: at 06:22

## 2023-05-04 RX ADMIN — AMLODIPINE BESYLATE 10 MILLIGRAM(S): 2.5 TABLET ORAL at 05:45

## 2023-05-04 RX ADMIN — TAMSULOSIN HYDROCHLORIDE 0.4 MILLIGRAM(S): 0.4 CAPSULE ORAL at 23:31

## 2023-05-04 RX ADMIN — LEVETIRACETAM 500 MILLIGRAM(S): 250 TABLET, FILM COATED ORAL at 05:45

## 2023-05-05 LAB
ANION GAP SERPL CALC-SCNC: 13 MMOL/L — SIGNIFICANT CHANGE UP (ref 7–14)
BUN SERPL-MCNC: 30 MG/DL — HIGH (ref 7–23)
CALCIUM SERPL-MCNC: 9.9 MG/DL — SIGNIFICANT CHANGE UP (ref 8.4–10.5)
CHLORIDE SERPL-SCNC: 105 MMOL/L — SIGNIFICANT CHANGE UP (ref 98–107)
CO2 SERPL-SCNC: 23 MMOL/L — SIGNIFICANT CHANGE UP (ref 22–31)
CREAT SERPL-MCNC: 0.63 MG/DL — SIGNIFICANT CHANGE UP (ref 0.5–1.3)
EGFR: 109 ML/MIN/1.73M2 — SIGNIFICANT CHANGE UP
GLUCOSE BLDC GLUCOMTR-MCNC: 150 MG/DL — HIGH (ref 70–99)
GLUCOSE BLDC GLUCOMTR-MCNC: 163 MG/DL — HIGH (ref 70–99)
GLUCOSE BLDC GLUCOMTR-MCNC: 180 MG/DL — HIGH (ref 70–99)
GLUCOSE BLDC GLUCOMTR-MCNC: 197 MG/DL — HIGH (ref 70–99)
GLUCOSE BLDC GLUCOMTR-MCNC: 242 MG/DL — HIGH (ref 70–99)
GLUCOSE SERPL-MCNC: 164 MG/DL — HIGH (ref 70–99)
HCT VFR BLD CALC: 27.7 % — LOW (ref 39–50)
HGB BLD-MCNC: 8.7 G/DL — LOW (ref 13–17)
MAGNESIUM SERPL-MCNC: 2.3 MG/DL — SIGNIFICANT CHANGE UP (ref 1.6–2.6)
MCHC RBC-ENTMCNC: 28 PG — SIGNIFICANT CHANGE UP (ref 27–34)
MCHC RBC-ENTMCNC: 31.4 GM/DL — LOW (ref 32–36)
MCV RBC AUTO: 89.1 FL — SIGNIFICANT CHANGE UP (ref 80–100)
NRBC # BLD: 0 /100 WBCS — SIGNIFICANT CHANGE UP (ref 0–0)
NRBC # FLD: 0 K/UL — SIGNIFICANT CHANGE UP (ref 0–0)
PHOSPHATE SERPL-MCNC: 4.4 MG/DL — SIGNIFICANT CHANGE UP (ref 2.5–4.5)
PLATELET # BLD AUTO: 200 K/UL — SIGNIFICANT CHANGE UP (ref 150–400)
POTASSIUM SERPL-MCNC: 4.3 MMOL/L — SIGNIFICANT CHANGE UP (ref 3.5–5.3)
POTASSIUM SERPL-SCNC: 4.3 MMOL/L — SIGNIFICANT CHANGE UP (ref 3.5–5.3)
RBC # BLD: 3.11 M/UL — LOW (ref 4.2–5.8)
RBC # FLD: 17.4 % — HIGH (ref 10.3–14.5)
SODIUM SERPL-SCNC: 141 MMOL/L — SIGNIFICANT CHANGE UP (ref 135–145)
WBC # BLD: 14.86 K/UL — HIGH (ref 3.8–10.5)
WBC # FLD AUTO: 14.86 K/UL — HIGH (ref 3.8–10.5)

## 2023-05-05 RX ADMIN — LEVETIRACETAM 500 MILLIGRAM(S): 250 TABLET, FILM COATED ORAL at 05:44

## 2023-05-05 RX ADMIN — Medication 10 MILLIGRAM(S): at 05:44

## 2023-05-05 RX ADMIN — INSULIN GLARGINE 12 UNIT(S): 100 INJECTION, SOLUTION SUBCUTANEOUS at 23:37

## 2023-05-05 RX ADMIN — LEVETIRACETAM 500 MILLIGRAM(S): 250 TABLET, FILM COATED ORAL at 18:14

## 2023-05-05 RX ADMIN — Medication 1: at 13:31

## 2023-05-05 RX ADMIN — POLYETHYLENE GLYCOL 3350 17 GRAM(S): 17 POWDER, FOR SOLUTION ORAL at 13:32

## 2023-05-05 RX ADMIN — Medication 1: at 18:14

## 2023-05-05 RX ADMIN — TAMSULOSIN HYDROCHLORIDE 0.4 MILLIGRAM(S): 0.4 CAPSULE ORAL at 23:37

## 2023-05-05 RX ADMIN — Medication 1: at 05:44

## 2023-05-05 RX ADMIN — AMLODIPINE BESYLATE 10 MILLIGRAM(S): 2.5 TABLET ORAL at 05:44

## 2023-05-06 LAB
ANION GAP SERPL CALC-SCNC: 15 MMOL/L — HIGH (ref 7–14)
BUN SERPL-MCNC: 29 MG/DL — HIGH (ref 7–23)
CALCIUM SERPL-MCNC: 10 MG/DL — SIGNIFICANT CHANGE UP (ref 8.4–10.5)
CHLORIDE SERPL-SCNC: 105 MMOL/L — SIGNIFICANT CHANGE UP (ref 98–107)
CO2 SERPL-SCNC: 22 MMOL/L — SIGNIFICANT CHANGE UP (ref 22–31)
CREAT SERPL-MCNC: 0.65 MG/DL — SIGNIFICANT CHANGE UP (ref 0.5–1.3)
EGFR: 108 ML/MIN/1.73M2 — SIGNIFICANT CHANGE UP
GLUCOSE BLDC GLUCOMTR-MCNC: 157 MG/DL — HIGH (ref 70–99)
GLUCOSE BLDC GLUCOMTR-MCNC: 195 MG/DL — HIGH (ref 70–99)
GLUCOSE BLDC GLUCOMTR-MCNC: 195 MG/DL — HIGH (ref 70–99)
GLUCOSE BLDC GLUCOMTR-MCNC: 207 MG/DL — HIGH (ref 70–99)
GLUCOSE SERPL-MCNC: 202 MG/DL — HIGH (ref 70–99)
HCT VFR BLD CALC: 28.8 % — LOW (ref 39–50)
HGB BLD-MCNC: 9.1 G/DL — LOW (ref 13–17)
MAGNESIUM SERPL-MCNC: 2.3 MG/DL — SIGNIFICANT CHANGE UP (ref 1.6–2.6)
MCHC RBC-ENTMCNC: 28.3 PG — SIGNIFICANT CHANGE UP (ref 27–34)
MCHC RBC-ENTMCNC: 31.6 GM/DL — LOW (ref 32–36)
MCV RBC AUTO: 89.4 FL — SIGNIFICANT CHANGE UP (ref 80–100)
NRBC # BLD: 0 /100 WBCS — SIGNIFICANT CHANGE UP (ref 0–0)
NRBC # FLD: 0 K/UL — SIGNIFICANT CHANGE UP (ref 0–0)
PHOSPHATE SERPL-MCNC: 4.7 MG/DL — HIGH (ref 2.5–4.5)
PLATELET # BLD AUTO: 217 K/UL — SIGNIFICANT CHANGE UP (ref 150–400)
POTASSIUM SERPL-MCNC: 4.3 MMOL/L — SIGNIFICANT CHANGE UP (ref 3.5–5.3)
POTASSIUM SERPL-SCNC: 4.3 MMOL/L — SIGNIFICANT CHANGE UP (ref 3.5–5.3)
RBC # BLD: 3.22 M/UL — LOW (ref 4.2–5.8)
RBC # FLD: 17.1 % — HIGH (ref 10.3–14.5)
SODIUM SERPL-SCNC: 142 MMOL/L — SIGNIFICANT CHANGE UP (ref 135–145)
WBC # BLD: 12.31 K/UL — HIGH (ref 3.8–10.5)
WBC # FLD AUTO: 12.31 K/UL — HIGH (ref 3.8–10.5)

## 2023-05-06 RX ADMIN — LEVETIRACETAM 500 MILLIGRAM(S): 250 TABLET, FILM COATED ORAL at 05:55

## 2023-05-06 RX ADMIN — Medication 1: at 05:54

## 2023-05-06 RX ADMIN — INSULIN GLARGINE 12 UNIT(S): 100 INJECTION, SOLUTION SUBCUTANEOUS at 23:27

## 2023-05-06 RX ADMIN — Medication 10 MILLIGRAM(S): at 05:54

## 2023-05-06 RX ADMIN — Medication 2: at 12:55

## 2023-05-06 RX ADMIN — Medication 1: at 18:42

## 2023-05-06 RX ADMIN — Medication 1: at 23:26

## 2023-05-06 RX ADMIN — AMLODIPINE BESYLATE 10 MILLIGRAM(S): 2.5 TABLET ORAL at 05:55

## 2023-05-06 RX ADMIN — POLYETHYLENE GLYCOL 3350 17 GRAM(S): 17 POWDER, FOR SOLUTION ORAL at 12:55

## 2023-05-06 RX ADMIN — LEVETIRACETAM 500 MILLIGRAM(S): 250 TABLET, FILM COATED ORAL at 17:29

## 2023-05-07 LAB
ANION GAP SERPL CALC-SCNC: 15 MMOL/L — HIGH (ref 7–14)
B PERT DNA SPEC QL NAA+PROBE: SIGNIFICANT CHANGE UP
B PERT+PARAPERT DNA PNL SPEC NAA+PROBE: SIGNIFICANT CHANGE UP
BORDETELLA PARAPERTUSSIS (RAPRVP): SIGNIFICANT CHANGE UP
BUN SERPL-MCNC: 32 MG/DL — HIGH (ref 7–23)
C PNEUM DNA SPEC QL NAA+PROBE: SIGNIFICANT CHANGE UP
CALCIUM SERPL-MCNC: 10.2 MG/DL — SIGNIFICANT CHANGE UP (ref 8.4–10.5)
CHLORIDE SERPL-SCNC: 106 MMOL/L — SIGNIFICANT CHANGE UP (ref 98–107)
CO2 SERPL-SCNC: 22 MMOL/L — SIGNIFICANT CHANGE UP (ref 22–31)
CREAT SERPL-MCNC: 0.65 MG/DL — SIGNIFICANT CHANGE UP (ref 0.5–1.3)
EGFR: 108 ML/MIN/1.73M2 — SIGNIFICANT CHANGE UP
FLUAV SUBTYP SPEC NAA+PROBE: SIGNIFICANT CHANGE UP
FLUBV RNA SPEC QL NAA+PROBE: SIGNIFICANT CHANGE UP
GLUCOSE BLDC GLUCOMTR-MCNC: 154 MG/DL — HIGH (ref 70–99)
GLUCOSE BLDC GLUCOMTR-MCNC: 171 MG/DL — HIGH (ref 70–99)
GLUCOSE BLDC GLUCOMTR-MCNC: 173 MG/DL — HIGH (ref 70–99)
GLUCOSE BLDC GLUCOMTR-MCNC: 196 MG/DL — HIGH (ref 70–99)
GLUCOSE BLDC GLUCOMTR-MCNC: 215 MG/DL — HIGH (ref 70–99)
GLUCOSE SERPL-MCNC: 190 MG/DL — HIGH (ref 70–99)
HADV DNA SPEC QL NAA+PROBE: SIGNIFICANT CHANGE UP
HCOV 229E RNA SPEC QL NAA+PROBE: SIGNIFICANT CHANGE UP
HCOV HKU1 RNA SPEC QL NAA+PROBE: SIGNIFICANT CHANGE UP
HCOV NL63 RNA SPEC QL NAA+PROBE: SIGNIFICANT CHANGE UP
HCOV OC43 RNA SPEC QL NAA+PROBE: SIGNIFICANT CHANGE UP
HCT VFR BLD CALC: 29.5 % — LOW (ref 39–50)
HGB BLD-MCNC: 9.1 G/DL — LOW (ref 13–17)
HMPV RNA SPEC QL NAA+PROBE: SIGNIFICANT CHANGE UP
HPIV1 RNA SPEC QL NAA+PROBE: SIGNIFICANT CHANGE UP
HPIV2 RNA SPEC QL NAA+PROBE: SIGNIFICANT CHANGE UP
HPIV3 RNA SPEC QL NAA+PROBE: SIGNIFICANT CHANGE UP
HPIV4 RNA SPEC QL NAA+PROBE: SIGNIFICANT CHANGE UP
M PNEUMO DNA SPEC QL NAA+PROBE: SIGNIFICANT CHANGE UP
MAGNESIUM SERPL-MCNC: 2.2 MG/DL — SIGNIFICANT CHANGE UP (ref 1.6–2.6)
MCHC RBC-ENTMCNC: 27.7 PG — SIGNIFICANT CHANGE UP (ref 27–34)
MCHC RBC-ENTMCNC: 30.8 GM/DL — LOW (ref 32–36)
MCV RBC AUTO: 89.7 FL — SIGNIFICANT CHANGE UP (ref 80–100)
NRBC # BLD: 0 /100 WBCS — SIGNIFICANT CHANGE UP (ref 0–0)
NRBC # FLD: 0 K/UL — SIGNIFICANT CHANGE UP (ref 0–0)
PHOSPHATE SERPL-MCNC: 4.7 MG/DL — HIGH (ref 2.5–4.5)
PLATELET # BLD AUTO: 235 K/UL — SIGNIFICANT CHANGE UP (ref 150–400)
POTASSIUM SERPL-MCNC: 4.3 MMOL/L — SIGNIFICANT CHANGE UP (ref 3.5–5.3)
POTASSIUM SERPL-SCNC: 4.3 MMOL/L — SIGNIFICANT CHANGE UP (ref 3.5–5.3)
RAPID RVP RESULT: SIGNIFICANT CHANGE UP
RBC # BLD: 3.29 M/UL — LOW (ref 4.2–5.8)
RBC # FLD: 16.6 % — HIGH (ref 10.3–14.5)
RSV RNA SPEC QL NAA+PROBE: SIGNIFICANT CHANGE UP
RV+EV RNA SPEC QL NAA+PROBE: SIGNIFICANT CHANGE UP
SARS-COV-2 RNA SPEC QL NAA+PROBE: SIGNIFICANT CHANGE UP
SODIUM SERPL-SCNC: 143 MMOL/L — SIGNIFICANT CHANGE UP (ref 135–145)
WBC # BLD: 9.41 K/UL — SIGNIFICANT CHANGE UP (ref 3.8–10.5)
WBC # FLD AUTO: 9.41 K/UL — SIGNIFICANT CHANGE UP (ref 3.8–10.5)

## 2023-05-07 RX ADMIN — POLYETHYLENE GLYCOL 3350 17 GRAM(S): 17 POWDER, FOR SOLUTION ORAL at 12:31

## 2023-05-07 RX ADMIN — Medication 1: at 06:41

## 2023-05-07 RX ADMIN — Medication 1: at 18:21

## 2023-05-07 RX ADMIN — LEVETIRACETAM 500 MILLIGRAM(S): 250 TABLET, FILM COATED ORAL at 05:35

## 2023-05-07 RX ADMIN — LEVETIRACETAM 500 MILLIGRAM(S): 250 TABLET, FILM COATED ORAL at 18:22

## 2023-05-07 RX ADMIN — AMLODIPINE BESYLATE 10 MILLIGRAM(S): 2.5 TABLET ORAL at 05:35

## 2023-05-07 RX ADMIN — Medication 1: at 12:45

## 2023-05-07 RX ADMIN — Medication 10 MILLIGRAM(S): at 05:35

## 2023-05-07 NOTE — PROGRESS NOTE ADULT - PROBLEM SELECTOR PROBLEM 6
Blood in ear canal
Hypertension
Blood in ear canal
Hypertension
Blood in ear canal
Hypertension

## 2023-05-07 NOTE — PROGRESS NOTE ADULT - PROBLEM SELECTOR PROBLEM 1
Blood clot of ear
Anemia
Anemia
Pneumonia
Anemia
Pneumonia
Anemia
Anemia
Pneumonia
Anemia
Anemia

## 2023-05-07 NOTE — PROGRESS NOTE ADULT - PROBLEM SELECTOR PROBLEM 4
Diabetes
BPH (benign prostatic hyperplasia)
Diabetes
Diabetes
BPH (benign prostatic hyperplasia)
Diabetes
Diabetes
BPH (benign prostatic hyperplasia)
Diabetes

## 2023-05-07 NOTE — PROGRESS NOTE ADULT - SUBJECTIVE AND OBJECTIVE BOX
CC: F/U for PNA    Saw/spoke to patient. NO fevers, no chills. No new complaints.    Allergies  No Known Allergies    ANTIMICROBIALS:  piperacillin/tazobactam IVPB.. 3.375 every 8 hours    PE:    Vital Signs Last 24 Hrs  T(C): 37.1 (2023 10:00), Max: 37.1 (2023 10:00)  T(F): 98.8 (2023 10:00), Max: 98.8 (2023 10:00)  HR: 90 (2023 10:00) (73 - 90)  BP: 132/75 (2023 10:00) (124/76 - 147/82)  RR: 15 (2023 10:00) (15 - 18)  SpO2: 100% (2023 10:00) (99% - 100%)    Gen: AOx3, NAD, non-toxic  CV: Nontachycardic  Resp: Breathing comfortably, RA  Abd: Soft, nontender  IV/Skin: No thrombophlebitis    LABS:                        8.0    9.25  )-----------( 152      ( 2023 06:00 )             26.5     04-    153<H>  |  118<H>  |  25<H>  ----------------------------<  164<H>  3.1<L>   |  25  |  0.57    Ca    9.4      2023 06:00  Phos  3.4       Mg     2.40         TPro  7.1  /  Alb  3.2<L>  /  TBili  0.9  /  DBili  x   /  AST  24  /  ALT  21  /  AlkPhos  201<H>      Urinalysis Basic - ( 2023 03:00 )    Color: Yellow / Appearance: Clear / S.018 / pH: x  Gluc: x / Ketone: Negative  / Bili: Negative / Urobili: <2 mg/dL   Blood: x / Protein: 30 mg/dL / Nitrite: Negative   Leuk Esterase: Large / RBC: 4 /HPF / WBC >50 /HPF   Sq Epi: x / Non Sq Epi: x / Bacteria: Few    MICROBIOLOGY:    .Blood Blood-Peripheral  23   No growth to date.  --  --    .Blood Blood-Peripheral  23   No growth to date.  --  --    RADIOLOGY:     CT:    IMPRESSION:    Limited noncontrast study.    Patchy consolidative and tree-in-bud opacities in the bilateral lower   lobes consistent with multifocal pneumonia. Aspiration is a consideration.    Circumferential bladder wall thickening, correlate with urinalysis for   possible infection. High density material within the bladder may be old   renally excreted contrast.  
[facility] NEPHROLOGY- PROGRESS NOTE    62y Male with history of SDH s/p trach/PEG s/p craniotomy presents with bleeding from ear. Nephrology consulted for hypernatremia.     Pt with significant drop in H/H, now s/p PRBCs.    REVIEW OF SYSTEMS: Unable to assess due to trach.    No Known Allergies      Hospital Medications: Medications reviewed    VITALS:  T(F): 97.7 (23 @ 16:45), Max: 98.4 (23 @ 14:05)  HR: 90 (23 @ 16:45)  BP: 136/80 (23 @ 16:45)  RR: 16 (23 @ 16:45)  SpO2: 100% (23 @ 16:45)   @ 07:01  -   @ 07:00  --------------------------------------------------------  IN: 1380 mL / OUT: 2150 mL / NET: -770 mL     @ 07:01  -   @ 18:42  --------------------------------------------------------  IN: 1355 mL / OUT: 1150 mL / NET: 205 mL              PHYSICAL EXAM:  Gen: NAD, calm, + trach  Cards: RRR, +S1/S2, no M/G/R  Resp: course BS B/L  GI: soft, NT/ND, NABS, + PEG  : + condom catheter  Vascular: no LE edema B/L      LABS:    142 <--, 142 <--, 140 <--, 151 <--, 151 <--, 154 <--, 153 <--  142  |  106  |  17  ----------------------------<  135<H>  4.0   |  23  |  0.58    Ca    9.6      2023 06:04  Phos  3.9     -  Mg     2.30           Creatinine Trend: 0.58 <--, 0.54 <--, 0.52 <--, 0.50 <--, 0.53 <--, 0.56 <--, 0.57 <--, 0.84 <--                        6.6    15.27 )-----------( 166      ( 2023 09:57 )             21.8     Urine Studies:  Urinalysis Basic - ( 2023 03:00 )    Color: Yellow / Appearance: Clear / S.018 / pH:   Gluc:  / Ketone: Negative  / Bili: Negative / Urobili: <2 mg/dL   Blood:  / Protein: 30 mg/dL / Nitrite: Negative   Leuk Esterase: Large / RBC: 4 /HPF / WBC >50 /HPF   Sq Epi:  / Non Sq Epi:  / Bacteria: Few        
[facility] NEPHROLOGY- PROGRESS NOTE    62y Male with history of SDH s/p trach/PEG s/p craniotomy presents with bleeding from ear. Nephrology consulted for hypernatremia.     Pt with significant drop in H/H, now s/p PRBCs.    REVIEW OF SYSTEMS: Unable to assess due to trach.    No Known Allergies      Hospital Medications: Medications reviewed    VITALS:  T(F): 98 (23 @ 11:52), Max: 98.4 (23 @ 14:05)  HR: 83 (23 @ 11:52)  BP: 132/75 (23 @ 11:52)  RR: 18 (23 @ 11:52)  SpO2: 100% (23 @ 11:52)  Wt(kg): --     @ 07:01  -   @ 07:00  --------------------------------------------------------  IN: 1605 mL / OUT: 1750 mL / NET: -145 mL      PHYSICAL EXAM:  Gen: NAD, calm, + trach  Cards: RRR, +S1/S2, no M/G/R  Resp: course BS B/L  GI: soft, NT/ND, NABS, + PEG  : + condom catheter  Vascular: no LE edema B/L      LABS:    143 <--, 142 <--, 142 <--, 140 <--, 151 <--, 151 <--, 154 <--  143  |  107  |  18  ----------------------------<  163<H>  3.9   |  25  |  0.57    Ca    9.3      2023 05:18  Phos  3.2       Mg     2.30           Creatinine Trend: 0.57 <--, 0.58 <--, 0.54 <--, 0.52 <--, 0.50 <--, 0.53 <--, 0.56 <--, 0.57 <--, 0.84 <--             Hgb Trend: 9.2<--, 9.6<--, 6.6<--, 5.3<--, 8.2<--, 8.1<--             9.2    10.76 )-----------( 143      ( 2023 05:18 )             28.8     Urine Studies:  Urinalysis Basic - ( 2023 03:00 )    Color: Yellow / Appearance: Clear / S.018 / pH:   Gluc:  / Ketone: Negative  / Bili: Negative / Urobili: <2 mg/dL   Blood:  / Protein: 30 mg/dL / Nitrite: Negative   Leuk Esterase: Large / RBC: 4 /HPF / WBC >50 /HPF   Sq Epi:  / Non Sq Epi:  / Bacteria: Few          
CC: F/U for PNA    Saw/spoke to patient. Poorly verbal, not able to interact with me.    Allergies  No Known Allergies    ANTIMICROBIALS:  piperacillin/tazobactam IVPB.. 3.375 every 8 hours    PE:    Vital Signs Last 24 Hrs  T(C): 36.4 (27 Apr 2023 10:25), Max: 36.7 (27 Apr 2023 06:35)  T(F): 97.6 (27 Apr 2023 10:25), Max: 98.1 (27 Apr 2023 06:35)  HR: 85 (27 Apr 2023 10:25) (72 - 85)  BP: 148/92 (27 Apr 2023 10:25) (135/81 - 154/82)  RR: 18 (27 Apr 2023 10:25) (17 - 18)  SpO2: 100% (27 Apr 2023 10:25) (100% - 100%)    Gen: AOx3, NAD, non-toxic  CV: Nontachycardic  Resp: Breathing comfortably, RA  Abd: Soft, nontender  IV/Skin: No thrombophlebitis    LABS:                        8.1    10.49 )-----------( 158      ( 27 Apr 2023 05:59 )             26.8     04-27    151<H>  |  117<H>  |  19  ----------------------------<  166<H>  3.5   |  24  |  0.53    Ca    9.3      27 Apr 2023 05:59  Phos  3.1     04-27  Mg     2.30     04-27    TPro  7.1  /  Alb  3.2<L>  /  TBili  0.9  /  DBili  x   /  AST  24  /  ALT  21  /  AlkPhos  201<H>  04-26    MICROBIOLOGY:    .Blood Blood  04-25-23   No growth to date.  --  --    Catheterized Catheterized  04-25-23   <10,000 CFU/mL Normal Urogenital Jane  --  --    .Blood Blood-Peripheral  04-24-23   No growth to date.  --  --    .Blood Blood-Peripheral  04-24-23   No growth to date.  --  --    RADIOLOGY:    4/25 CT:    IMPRESSION:    Limited noncontrast study.    Patchy consolidative and tree-in-bud opacities in the bilateral lower   lobes consistent with multifocal pneumonia. Aspiration is a consideration.    Circumferential bladder wall thickening, correlate with urinalysis for   possible infection. High density material within the bladder may be old   renally excreted contrast.
[facility] NEPHROLOGY- PROGRESS NOTE    62y Male with history of SDH s/p trach/PEG s/p craniotomy presents with bleeding from ear. Nephrology consulted for hypernatremia.     REVIEW OF SYSTEMS: Unable to assess due to trach.    No Known Allergies      Hospital Medications: Medications reviewed    VITALS:  T(F): 98 (23 @ 10:10), Max: 98.2 (23 @ 07:00)  HR: 75 (23 @ 10:10)  BP: 145/72 (23 @ 10:10)  RR: 18 (23 @ 10:10)  SpO2: 100% (23 @ 10:10)  Wt(kg): --  Height (cm): 177.8 (:50), 177.8 (:14)  Weight (kg): 64.5 ( @ 00:46), 64.4 ( 14:14)  BMI (kg/m2): 20.4 ( @ 00:46), 20.4 (:50), 20.4 ( @ 14:14)  BSA (m2): 1.81 ( @ 00:46), 1.8 (:50), 1.8 ( 14:14)     @ 07:01  -   @ 07:00  --------------------------------------------------------  IN: 1420 mL / OUT: 300 mL / NET: 1120 mL     @ 07:01  -   @ 12:20  --------------------------------------------------------  IN: 740 mL / OUT: 0 mL / NET: 740 mL        PHYSICAL EXAM:    Gen: NAD, calm, + trach  Cards: RRR, +S1/S2, no M/G/R  Resp: course BS B/L  GI: soft, NT/ND, NABS, + PEG  : + condom catheter  Vascular: no LE edema B/L    LABS:      140  |  105  |  14  ----------------------------<  188<H>  3.6   |  25  |  0.52    Ca    9.3      2023 06:00  Phos  3.6       Mg     2.10           Creatinine Trend: 0.52 <--, 0.50 <--, 0.53 <--, 0.56 <--, 0.57 <--, 0.84 <--                        8.2    10.90 )-----------( 152      ( 2023 06:00 )             26.4     Urine Studies:  Urinalysis Basic - ( 2023 03:00 )    Color: Yellow / Appearance: Clear / S.018 / pH:   Gluc:  / Ketone: Negative  / Bili: Negative / Urobili: <2 mg/dL   Blood:  / Protein: 30 mg/dL / Nitrite: Negative   Leuk Esterase: Large / RBC: 4 /HPF / WBC >50 /HPF   Sq Epi:  / Non Sq Epi:  / Bacteria: Few
Patient seen and examined this am. No new events    MEDICATIONS:    acetaminophen   Oral Liquid .. 650 milliGRAM(s) Oral every 6 hours PRN  albuterol/ipratropium for Nebulization 3 milliLiter(s) Nebulizer every 6 hours PRN  aluminum hydroxide/magnesium hydroxide/simethicone Suspension 30 milliLiter(s) Oral every 4 hours PRN  amLODIPine   Tablet 10 milliGRAM(s) Oral daily  dextrose 5%. 1000 milliLiter(s) IV Continuous <Continuous>  dextrose 5%. 1000 milliLiter(s) IV Continuous <Continuous>  dextrose 50% Injectable 25 Gram(s) IV Push once  dextrose 50% Injectable 12.5 Gram(s) IV Push once  dextrose 50% Injectable 25 Gram(s) IV Push once  dextrose Oral Gel 15 Gram(s) Oral once PRN  enalapril 10 milliGRAM(s) Oral daily  glucagon  Injectable 1 milliGRAM(s) IntraMuscular once  insulin glargine Injectable (LANTUS) 12 Unit(s) SubCutaneous at bedtime  insulin lispro (ADMELOG) corrective regimen sliding scale   SubCutaneous every 6 hours  levETIRAcetam 500 milliGRAM(s) Oral two times a day  melatonin 3 milliGRAM(s) Oral at bedtime PRN  ondansetron Injectable 4 milliGRAM(s) IV Push every 8 hours PRN  polyethylene glycol 3350 17 Gram(s) Oral daily  tamsulosin 0.4 milliGRAM(s) Oral at bedtime      LABS:                          10.2   9.65  )-----------( 201      ( 03 May 2023 06:20 )             33.0     05-03    138  |  102  |  20  ----------------------------<  195<H>  4.5   |  23  |  0.52    Ca    10.1      03 May 2023 06:20  Phos  3.8     05-03  Mg     2.40     05-03      CAPILLARY BLOOD GLUCOSE      POCT Blood Glucose.: 182 mg/dL (03 May 2023 06:16)  POCT Blood Glucose.: 155 mg/dL (02 May 2023 23:29)  POCT Blood Glucose.: 185 mg/dL (02 May 2023 18:01)  POCT Blood Glucose.: 139 mg/dL (02 May 2023 12:29)        I&O's Summary    02 May 2023 07:01  -  03 May 2023 07:00  --------------------------------------------------------  IN: 1620 mL / OUT: 1150 mL / NET: 470 mL      Vital Signs Last 24 Hrs  T(C): 37 (03 May 2023 05:10), Max: 37 (03 May 2023 05:10)  T(F): 98.6 (03 May 2023 05:10), Max: 98.6 (03 May 2023 05:10)  HR: 82 (03 May 2023 05:10) (82 - 95)  BP: 140/88 (03 May 2023 05:10) (132/89 - 145/85)  BP(mean): --  RR: 18 (03 May 2023 05:10) (17 - 18)  SpO2: 100% (03 May 2023 05:10) (100% - 100%)    Parameters below as of 03 May 2023 05:10  Patient On (Oxygen Delivery Method): tracheostomy collar            On Neurological Examination:    Mental Status - does not oppe eyes does not follow commands  Cranial Nerves - PERRL, , , no gross facial asymmetry, tongue/uvula midline    Motor Exam -   functional quadraplegia moves LUE and LLe at timese    rest deferref                                            RADIOLOGY  CTH   < from: CT Head No Cont (04.25.23 @ 06:55) >    No substantial change 4/24/2023, including small hypodensities within the   infarcted brain parenchyma the left occipital lobe, small blood clots   versus dystrophic calcification    --- End of Report ---      < end of copied text >      Clinical Impression:  Asymmetry Right hemispheric increased amplitude slowing may be due to known skull defect and regional structural lesion  Left sided attenuation likely due to known h/o focal cortical encephalomalacia  Moderate diffuse/multi-focal cerebral dysfunction, not specific as to etiology.  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  A single sharp transient (100uV) was seen over the right mid-temporal region, a finding of uncertain significance may indicate epileptogenicity   No seizures     -------------------------------------------------------------------------------------------------------  [facility] EEG Reading Room Ph#: [phone]  Epilepsy Answering Service after 5PM and before 8:30AM: Ph#: [phone]    Shania Weldon MD   Epilepsy Fellow, [facility]	    Ruddy Bello MD PhD  Director, Epilepsy Division, Count includes the [facility]                     
pt was evaluated by hospitalist earlier during the day 
    SUBJECTIVE / OVERNIGHT EVENTS:pt seen and examined  23  '  MEDICATIONS  (STANDING):  amLODIPine   Tablet 10 milliGRAM(s) Oral daily  dextrose 5%. 1000 milliLiter(s) (50 mL/Hr) IV Continuous <Continuous>  dextrose 5%. 1000 milliLiter(s) (100 mL/Hr) IV Continuous <Continuous>  dextrose 50% Injectable 12.5 Gram(s) IV Push once  dextrose 50% Injectable 25 Gram(s) IV Push once  dextrose 50% Injectable 25 Gram(s) IV Push once  enalapril 10 milliGRAM(s) Oral daily  glucagon  Injectable 1 milliGRAM(s) IntraMuscular once  insulin glargine Injectable (LANTUS) 12 Unit(s) SubCutaneous at bedtime  insulin lispro (ADMELOG) corrective regimen sliding scale   SubCutaneous every 6 hours  piperacillin/tazobactam IVPB.. 3.375 Gram(s) IV Intermittent every 8 hours  polyethylene glycol 3350 17 Gram(s) Oral daily  tamsulosin 0.4 milliGRAM(s) Oral at bedtime    MEDICATIONS  (PRN):  acetaminophen   Oral Liquid .. 650 milliGRAM(s) Oral every 6 hours PRN Temp greater or equal to 38C (100.4F), Mild Pain (1 - 3)  albuterol/ipratropium for Nebulization 3 milliLiter(s) Nebulizer every 6 hours PRN Shortness of Breath and/or Wheezing  aluminum hydroxide/magnesium hydroxide/simethicone Suspension 30 milliLiter(s) Oral every 4 hours PRN Dyspepsia  dextrose Oral Gel 15 Gram(s) Oral once PRN Blood Glucose LESS THAN 70 milliGRAM(s)/deciliter  melatonin 3 milliGRAM(s) Oral at bedtime PRN Insomnia  ondansetron Injectable 4 milliGRAM(s) IV Push every 8 hours PRN Nausea and/or Vomiting    Vital Signs Last 24 Hrs  T(C): 36.5 (23 @ 21:14), Max: 36.8 (23 @ 17:32)  T(F): 97.7 (23 @ 21:14), Max: 98.2 (23 @ 17:32)  HR: 85 (23 @ 21:14) (83 - 88)  BP: 112/70 (23 @ 21:14) (112/70 - 132/75)  BP(mean): --  RR: 17 (23 @ 21:14) (17 - 18)  SpO2: 100% (23 @ 21:14) (97% - 100%)          PHYSICAL EXAM:  GENERAL: NAD  EYES: EOMI, PERRLA  NECK: trach+  CHEST/LUNG: dec breath sounds at bases  HEART:  S1 , S2 +  ABDOMEN: soft, bs+  EXTREMITIES:  no edema  NEUROLOGY:alert awake commands    LABS:      143  |  107  |  18  ----------------------------<  163<H>  3.9   |  25  |  0.57    Ca    9.3      2023 05:18  Phos  3.2       Mg     2.30           Creatinine Trend: 0.57 <--, 0.58 <--, 0.54 <--, 0.52 <--, 0.50 <--, 0.53 <--, 0.56 <--, 0.57 <--, 0.84 <--                        9.2    10.76 )-----------( 143      ( 2023 05:18 )             28.8     Urine Studies:  Urinalysis Basic - ( 2023 03:00 )    Color: Yellow / Appearance: Clear / S.018 / pH:   Gluc:  / Ketone: Negative  / Bili: Negative / Urobili: <2 mg/dL   Blood:  / Protein: 30 mg/dL / Nitrite: Negative   Leuk Esterase: Large / RBC: 4 /HPF / WBC >50 /HPF   Sq Epi:  / Non Sq Epi:  / Bacteria: Few                              RADIOLOGY & ADDITIONAL TESTS:    Imaging Personally Reviewed:yes    Consultant(s) Notes Reviewed:  yes    Care Discussed with Consultants/Other Providers:yes  
CC: bleeding b/l ears     HPI: 62M pmhx  h/o traumatic subdural hemorrhage following a fall down the stairs while drunk, s/p r craniotomy at [facility] in 01/2023, s/p trach/PEG (while socialized at [facility] ), recently hospitalized (3/5-3/31/23) at [facility] with sepsis 2/2 pneumonia,  transferred from [facility] to [facility] for b/l ear bleeding. ENT consulted. Pt family at bedside. Explained pt lives at nursing home ([facility])  and facility noticed bleeding from ears today and sent pt to hospital. Pt family unable to provide further details. Unsure if still on Lovenox or ASA at nursing home     Interval:  No more bleeding from the ear. Ear wick removed.     PAST MEDICAL & SURGICAL HISTORY:  History of subdural hemorrhage      PEG (percutaneous endoscopic gastrostomy) status      DM (diabetes mellitus)      TBI (traumatic brain injury)      Chronic respiratory failure with hypoxia      S/P craniotomy        Allergies    No Known Allergies    Intolerances      MEDICATIONS  (STANDING):  meropenem  IVPB 1000 milliGRAM(s) IV Intermittent Once    MEDICATIONS  (PRN):           Family history: No pertinent family history in first degree relatives    ROS:    unable to provide due to medical conditions    Vital Signs Last 24 Hrs  T(C): 36.5 (26 Apr 2023 02:14), Max: 37.2 (25 Apr 2023 06:50)  T(F): 97.7 (26 Apr 2023 02:14), Max: 98.9 (25 Apr 2023 06:50)  HR: 88 (26 Apr 2023 02:14) (86 - 97)  BP: 124/76 (26 Apr 2023 02:14) (124/76 - 143/80)  BP(mean): --  RR: 18 (26 Apr 2023 02:14) (18 - 20)  SpO2: 100% (26 Apr 2023 02:14) (100% - 100%)                          9.2    21.55 )-----------( 169      ( 24 Apr 2023 17:00 )             29.5    04-24    145  |  114<H>  |  35<H>  ----------------------------<  223<H>  4.2   |  27  |  0.84    Ca    9.4      24 Apr 2023 17:00  Mg     2.6     04-24    TPro  8.0  /  Alb  2.7<L>  /  TBili  1.0  /  DBili  x   /  AST  30  /  ALT  35  /  AlkPhos  237<H>  04-24   PT/INR - ( 24 Apr 2023 17:00 )   PT: 16.4 sec;   INR: 1.37 ratio         PTT - ( 24 Apr 2023 17:00 )  PTT:37.1 sec    PHYSICAL EXAM:  Gen: NAD, laying on stretcher   Skin: No rashes, bruises, or lesions  Head: Normocephalic, Atraumatic  Face: no edema, erythema, or fluctuance. Parotid glands soft without mass  Eyes: no scleral injection  Ears: Right - ear canal dried blood noted, skin along the EAC lacerated (ear wic placed)  NO active bleeding  TM intact without effusion or erythema. No evidence of any fluid drainage. No mastoid tenderness, erythema, or ear bulging            Left - ear canal dried blood (one clot noted and removed) NO active bleeding , skin along EAC lacerated, TM intact without effusion or erythema. No evidence of any fluid drainage. No mastoid tenderness, erythema, or ear bulging; ear wick removed  Nose: Nares bilaterally patent  Neck:+ tracheostomy tube in place with posey ties   Lymphatic: No lymphadenopathy  Resp: breathing easily, no stridor             
    SUBJECTIVE / OVERNIGHT EVENTS:pt seen and examined  23  '  MEDICATIONS  (STANDING):  amLODIPine   Tablet 10 milliGRAM(s) Oral daily  dextrose 5%. 1000 milliLiter(s) (50 mL/Hr) IV Continuous <Continuous>  dextrose 5%. 1000 milliLiter(s) (100 mL/Hr) IV Continuous <Continuous>  dextrose 50% Injectable 12.5 Gram(s) IV Push once  dextrose 50% Injectable 25 Gram(s) IV Push once  dextrose 50% Injectable 25 Gram(s) IV Push once  enalapril 10 milliGRAM(s) Oral daily  glucagon  Injectable 1 milliGRAM(s) IntraMuscular once  insulin glargine Injectable (LANTUS) 12 Unit(s) SubCutaneous at bedtime  insulin lispro (ADMELOG) corrective regimen sliding scale   SubCutaneous every 6 hours  piperacillin/tazobactam IVPB.. 3.375 Gram(s) IV Intermittent every 8 hours  polyethylene glycol 3350 17 Gram(s) Oral daily  tamsulosin 0.4 milliGRAM(s) Oral at bedtime    MEDICATIONS  (PRN):  acetaminophen   Oral Liquid .. 650 milliGRAM(s) Oral every 6 hours PRN Temp greater or equal to 38C (100.4F), Mild Pain (1 - 3)  albuterol/ipratropium for Nebulization 3 milliLiter(s) Nebulizer every 6 hours PRN Shortness of Breath and/or Wheezing  aluminum hydroxide/magnesium hydroxide/simethicone Suspension 30 milliLiter(s) Oral every 4 hours PRN Dyspepsia  dextrose Oral Gel 15 Gram(s) Oral once PRN Blood Glucose LESS THAN 70 milliGRAM(s)/deciliter  melatonin 3 milliGRAM(s) Oral at bedtime PRN Insomnia  ondansetron Injectable 4 milliGRAM(s) IV Push every 8 hours PRN Nausea and/or Vomiting    Vital Signs Last 24 Hrs  T(C): 36.1 (23 @ 20:53), Max: 36.8 (23 @ 07:00)  T(F): 96.9 (23 @ 20:53), Max: 98.2 (23 @ 07:00)  HR: 88 (23 @ 20:53) (73 - 88)  BP: 126/76 (23 @ 20:53) (126/76 - 145/72)  BP(mean): --  RR: 18 (23 @ 20:53) (18 - 19)  SpO2: 100% (23 @ 20:53) (100% - 100%)          PHYSICAL EXAM:  GENERAL: NAD  EYES: EOMI, PERRLA  NECK: trach+  CHEST/LUNG: dec breath sounds at bases  HEART:  S1 , S2 +  ABDOMEN: soft, bs+  EXTREMITIES:  no edema  NEUROLOGY:alert awake    LABS:      142  |  105  |  15  ----------------------------<  154<H>  3.8   |  25  |  0.54    Ca    9.2      2023 19:05  Phos  3.6       Mg     2.20           Creatinine Trend: 0.54 <--, 0.52 <--, 0.50 <--, 0.53 <--, 0.56 <--, 0.57 <--, 0.84 <--                        8.2    10.90 )-----------( 152      ( 2023 06:00 )             26.4     Urine Studies:  Urinalysis Basic - ( 2023 03:00 )    Color: Yellow / Appearance: Clear / S.018 / pH:   Gluc:  / Ketone: Negative  / Bili: Negative / Urobili: <2 mg/dL   Blood:  / Protein: 30 mg/dL / Nitrite: Negative   Leuk Esterase: Large / RBC: 4 /HPF / WBC >50 /HPF   Sq Epi:  / Non Sq Epi:  / Bacteria: Few                      RADIOLOGY & ADDITIONAL TESTS:    Imaging Personally Reviewed:    Consultant(s) Notes Reviewed:      Care Discussed with Consultants/Other Providers:  
    SUBJECTIVE / OVERNIGHT EVENTS:pt seen and examined  05-03-23  '  MEDICATIONS  (STANDING):  amLODIPine   Tablet 10 milliGRAM(s) Oral daily  dextrose 5%. 1000 milliLiter(s) (50 mL/Hr) IV Continuous <Continuous>  dextrose 5%. 1000 milliLiter(s) (100 mL/Hr) IV Continuous <Continuous>  dextrose 50% Injectable 25 Gram(s) IV Push once  dextrose 50% Injectable 12.5 Gram(s) IV Push once  dextrose 50% Injectable 25 Gram(s) IV Push once  enalapril 10 milliGRAM(s) Oral daily  glucagon  Injectable 1 milliGRAM(s) IntraMuscular once  insulin glargine Injectable (LANTUS) 12 Unit(s) SubCutaneous at bedtime  insulin lispro (ADMELOG) corrective regimen sliding scale   SubCutaneous every 6 hours  levETIRAcetam 500 milliGRAM(s) Oral two times a day  polyethylene glycol 3350 17 Gram(s) Oral daily  tamsulosin 0.4 milliGRAM(s) Oral at bedtime    MEDICATIONS  (PRN):  acetaminophen   Oral Liquid .. 650 milliGRAM(s) Oral every 6 hours PRN Temp greater or equal to 38C (100.4F), Mild Pain (1 - 3)  albuterol/ipratropium for Nebulization 3 milliLiter(s) Nebulizer every 6 hours PRN Shortness of Breath and/or Wheezing  aluminum hydroxide/magnesium hydroxide/simethicone Suspension 30 milliLiter(s) Oral every 4 hours PRN Dyspepsia  dextrose Oral Gel 15 Gram(s) Oral once PRN Blood Glucose LESS THAN 70 milliGRAM(s)/deciliter  melatonin 3 milliGRAM(s) Oral at bedtime PRN Insomnia  ondansetron Injectable 4 milliGRAM(s) IV Push every 8 hours PRN Nausea and/or Vomiting    Vital Signs Last 24 Hrs  T(C): 36.4 (05-03-23 @ 20:35), Max: 37 (05-03-23 @ 05:10)  T(F): 97.6 (05-03-23 @ 20:35), Max: 98.6 (05-03-23 @ 05:10)  HR: 92 (05-03-23 @ 20:35) (82 - 92)  BP: 124/83 (05-03-23 @ 20:35) (115/79 - 140/88)  BP(mean): --  RR: 18 (05-03-23 @ 20:35) (18 - 19)  SpO2: 100% (05-03-23 @ 20:35) (100% - 100%)            PHYSICAL EXAM:  no bleeding from ears  GENERAL: NAD  EYES: EOMI, PERRLA  NECK: trach+  CHEST/LUNG: dec breath sounds at bases  HEART:  S1 , S2 +  ABDOMEN: soft, bs+  EXTREMITIES:  no edema  NEUROLOGY:alert awake doesn't follow commands  sacral decub+    LABS:  05-03    138  |  102  |  20  ----------------------------<  195<H>  4.5   |  23  |  0.52    Ca    10.1      03 May 2023 06:20  Phos  3.8     05-03  Mg     2.40     05-03      Creatinine Trend: 0.52 <--, 0.56 <--, 0.59 <--, 0.57 <--, 0.58 <--, 0.54 <--, 0.52 <--, 0.50 <--, 0.53 <--                        10.2   9.65  )-----------( 201      ( 03 May 2023 06:20 )             33.0     Urine Studies:                                                        RADIOLOGY & ADDITIONAL TESTS:    Imaging Personally Reviewed:yes    Consultant(s) Notes Reviewed:  yes    Care Discussed with Consultants/Other Providers:yes  
    SUBJECTIVE / OVERNIGHT EVENTS:pt seen and examined  05-04-23  '  MEDICATIONS  (STANDING):  amLODIPine   Tablet 10 milliGRAM(s) Oral daily  dextrose 5%. 1000 milliLiter(s) (50 mL/Hr) IV Continuous <Continuous>  dextrose 5%. 1000 milliLiter(s) (100 mL/Hr) IV Continuous <Continuous>  dextrose 50% Injectable 25 Gram(s) IV Push once  dextrose 50% Injectable 12.5 Gram(s) IV Push once  dextrose 50% Injectable 25 Gram(s) IV Push once  enalapril 10 milliGRAM(s) Oral daily  glucagon  Injectable 1 milliGRAM(s) IntraMuscular once  insulin glargine Injectable (LANTUS) 12 Unit(s) SubCutaneous at bedtime  insulin lispro (ADMELOG) corrective regimen sliding scale   SubCutaneous every 6 hours  levETIRAcetam 500 milliGRAM(s) Oral two times a day  polyethylene glycol 3350 17 Gram(s) Oral daily  tamsulosin 0.4 milliGRAM(s) Oral at bedtime    MEDICATIONS  (PRN):  acetaminophen   Oral Liquid .. 650 milliGRAM(s) Oral every 6 hours PRN Temp greater or equal to 38C (100.4F), Mild Pain (1 - 3)  albuterol/ipratropium for Nebulization 3 milliLiter(s) Nebulizer every 6 hours PRN Shortness of Breath and/or Wheezing  aluminum hydroxide/magnesium hydroxide/simethicone Suspension 30 milliLiter(s) Oral every 4 hours PRN Dyspepsia  dextrose Oral Gel 15 Gram(s) Oral once PRN Blood Glucose LESS THAN 70 milliGRAM(s)/deciliter  melatonin 3 milliGRAM(s) Oral at bedtime PRN Insomnia  ondansetron Injectable 4 milliGRAM(s) IV Push every 8 hours PRN Nausea and/or Vomiting    Vital Signs Last 24 Hrs  T(C): 36.3 (05-04-23 @ 20:22), Max: 37.6 (05-04-23 @ 14:55)  T(F): 97.3 (05-04-23 @ 20:22), Max: 99.7 (05-04-23 @ 14:55)  HR: 96 (05-04-23 @ 20:22) (91 - 96)  BP: 123/71 (05-04-23 @ 20:22) (112/69 - 127/73)  BP(mean): --  RR: 17 (05-04-23 @ 20:22) (17 - 18)  SpO2: 100% (05-04-23 @ 20:22) (100% - 100%)              PHYSICAL EXAM:  no bleeding from ears  GENERAL: NAD  EYES: EOMI, PERRLA  NECK: trach+  CHEST/LUNG: dec breath sounds at bases  HEART:  S1 , S2 +  ABDOMEN: soft, bs+  EXTREMITIES:  no edema  NEUROLOGY:alert awake doesn't follow commands  sacral decub+    LABS:  05-04    140  |  102  |  27<H>  ----------------------------<  158<H>  4.4   |  24  |  0.60    Ca    10.1      04 May 2023 06:10  Phos  4.3     05-04  Mg     2.50     05-04      Creatinine Trend: 0.60 <--, 0.52 <--, 0.56 <--, 0.59 <--, 0.57 <--, 0.58 <--, 0.54 <--, 0.52 <--                        9.8    14.98 )-----------( 182      ( 04 May 2023 06:10 )             31.3     Urine Studies:                                                                  RADIOLOGY & ADDITIONAL TESTS:    Imaging Personally Reviewed:yes    Consultant(s) Notes Reviewed:  yes    Care Discussed with Consultants/Other Providers:yes  
    SUBJECTIVE / OVERNIGHT EVENTS:pt seen and examined  23  '  MEDICATIONS  (STANDING):  amLODIPine   Tablet 10 milliGRAM(s) Oral daily  dextrose 5%. 1000 milliLiter(s) (100 mL/Hr) IV Continuous <Continuous>  dextrose 5%. 1000 milliLiter(s) (50 mL/Hr) IV Continuous <Continuous>  dextrose 5%. 1000 milliLiter(s) (100 mL/Hr) IV Continuous <Continuous>  dextrose 50% Injectable 12.5 Gram(s) IV Push once  dextrose 50% Injectable 25 Gram(s) IV Push once  dextrose 50% Injectable 25 Gram(s) IV Push once  enalapril 10 milliGRAM(s) Oral daily  glucagon  Injectable 1 milliGRAM(s) IntraMuscular once  insulin glargine Injectable (LANTUS) 12 Unit(s) SubCutaneous at bedtime  insulin lispro (ADMELOG) corrective regimen sliding scale   SubCutaneous every 6 hours  piperacillin/tazobactam IVPB.. 3.375 Gram(s) IV Intermittent every 8 hours  polyethylene glycol 3350 17 Gram(s) Oral daily  tamsulosin 0.4 milliGRAM(s) Oral at bedtime    MEDICATIONS  (PRN):  acetaminophen   Oral Liquid .. 650 milliGRAM(s) Oral every 6 hours PRN Temp greater or equal to 38C (100.4F), Mild Pain (1 - 3)  albuterol/ipratropium for Nebulization 3 milliLiter(s) Nebulizer every 6 hours PRN Shortness of Breath and/or Wheezing  aluminum hydroxide/magnesium hydroxide/simethicone Suspension 30 milliLiter(s) Oral every 4 hours PRN Dyspepsia  dextrose Oral Gel 15 Gram(s) Oral once PRN Blood Glucose LESS THAN 70 milliGRAM(s)/deciliter  melatonin 3 milliGRAM(s) Oral at bedtime PRN Insomnia  ondansetron Injectable 4 milliGRAM(s) IV Push every 8 hours PRN Nausea and/or Vomiting    Vital Signs Last 24 Hrs  T(C): 36.4 (23 @ 20:25), Max: 36.7 (23 @ 06:35)  T(F): 97.6 (23 @ 20:25), Max: 98.1 (23 @ 06:35)  HR: 90 (23 @ 20:25) (82 - 90)  BP: 144/94 (23 @ 20:25) (124/99 - 148/92)  BP(mean): --  RR: 18 (23 @ 20:25) (17 - 18)  SpO2: 98% (23 @ 20:25) (98% - 100%)        PHYSICAL EXAM:  GENERAL: NAD  EYES: EOMI, PERRLA  NECK: trach+  CHEST/LUNG: dec breath sounds at bases  HEART:  S1 , S2 +  ABDOMEN: soft, bs+  EXTREMITIES:  no edema  NEUROLOGY:alert awake    LABS:      151<H>  |  114<H>  |  16  ----------------------------<  212<H>  3.6   |  21<L>  |  0.50    Ca    9.6      2023 16:12  Phos  3.6       Mg     2.20         TPro  7.1  /  Alb  3.2<L>  /  TBili  0.9  /  DBili      /  AST  24  /  ALT  21  /  AlkPhos  201<H>      Creatinine Trend: 0.50 <--, 0.53 <--, 0.56 <--, 0.57 <--, 0.84 <--                        8.1    10.49 )-----------( 158      ( 2023 05:59 )             26.8     Urine Studies:  Urinalysis Basic - ( 2023 03:00 )    Color: Yellow / Appearance: Clear / S.018 / pH:   Gluc:  / Ketone: Negative  / Bili: Negative / Urobili: <2 mg/dL   Blood:  / Protein: 30 mg/dL / Nitrite: Negative   Leuk Esterase: Large / RBC: 4 /HPF / WBC >50 /HPF   Sq Epi:  / Non Sq Epi:  / Bacteria: Few              LIVER FUNCTIONS - ( 2023 06:00 )  Alb: 3.2 g/dL / Pro: 7.1 g/dL / ALK PHOS: 201 U/L / ALT: 21 U/L / AST: 24 U/L / GGT: x               RADIOLOGY & ADDITIONAL TESTS:    Imaging Personally Reviewed:    Consultant(s) Notes Reviewed:      Care Discussed with Consultants/Other Providers:  
    SUBJECTIVE / OVERNIGHT EVENTS:pt seen and examined  23  '  MEDICATIONS  (STANDING):  amLODIPine   Tablet 10 milliGRAM(s) Oral daily  dextrose 5%. 1000 milliLiter(s) (50 mL/Hr) IV Continuous <Continuous>  dextrose 5%. 1000 milliLiter(s) (100 mL/Hr) IV Continuous <Continuous>  dextrose 50% Injectable 25 Gram(s) IV Push once  dextrose 50% Injectable 25 Gram(s) IV Push once  dextrose 50% Injectable 12.5 Gram(s) IV Push once  enalapril 10 milliGRAM(s) Oral daily  glucagon  Injectable 1 milliGRAM(s) IntraMuscular once  insulin glargine Injectable (LANTUS) 12 Unit(s) SubCutaneous at bedtime  insulin lispro (ADMELOG) corrective regimen sliding scale   SubCutaneous every 6 hours  piperacillin/tazobactam IVPB.. 3.375 Gram(s) IV Intermittent every 8 hours  polyethylene glycol 3350 17 Gram(s) Oral daily  tamsulosin 0.4 milliGRAM(s) Oral at bedtime    MEDICATIONS  (PRN):  acetaminophen   Oral Liquid .. 650 milliGRAM(s) Oral every 6 hours PRN Temp greater or equal to 38C (100.4F), Mild Pain (1 - 3)  albuterol/ipratropium for Nebulization 3 milliLiter(s) Nebulizer every 6 hours PRN Shortness of Breath and/or Wheezing  aluminum hydroxide/magnesium hydroxide/simethicone Suspension 30 milliLiter(s) Oral every 4 hours PRN Dyspepsia  dextrose Oral Gel 15 Gram(s) Oral once PRN Blood Glucose LESS THAN 70 milliGRAM(s)/deciliter  melatonin 3 milliGRAM(s) Oral at bedtime PRN Insomnia  ondansetron Injectable 4 milliGRAM(s) IV Push every 8 hours PRN Nausea and/or Vomiting    Vital Signs Last 24 Hrs  T(C): 36.6 (23 @ 21:12), Max: 36.9 (23 @ 14:05)  T(F): 97.9 (23 @ 21:12), Max: 98.4 (23 @ 14:05)  HR: 90 (23 @ 21:12) (84 - 90)  BP: 130/75 (23 @ 21:12) (121/77 - 136/80)  BP(mean): --  RR: 17 (23 @ 21:12) (16 - 18)  SpO2: 100% (23 @ 21:12) (100% - 100%)          PHYSICAL EXAM:  GENERAL: NAD  EYES: EOMI, PERRLA  NECK: trach+  CHEST/LUNG: dec breath sounds at bases  HEART:  S1 , S2 +  ABDOMEN: soft, bs+  EXTREMITIES:  no edema  NEUROLOGY:alert awake commands    LABS:      142  |  106  |  17  ----------------------------<  135<H>  4.0   |  23  |  0.58    Ca    9.6      2023 06:04  Phos  3.9       Mg     2.30           Creatinine Trend: 0.58 <--, 0.54 <--, 0.52 <--, 0.50 <--, 0.53 <--, 0.56 <--, 0.57 <--, 0.84 <--                        9.6    12.24 )-----------( 150      ( 2023 20:44 )             29.7     Urine Studies:  Urinalysis Basic - ( 2023 03:00 )    Color: Yellow / Appearance: Clear / S.018 / pH:   Gluc:  / Ketone: Negative  / Bili: Negative / Urobili: <2 mg/dL   Blood:  / Protein: 30 mg/dL / Nitrite: Negative   Leuk Esterase: Large / RBC: 4 /HPF / WBC >50 /HPF   Sq Epi:  / Non Sq Epi:  / Bacteria: Few                  Sq Epi:  / Non Sq Epi:  / Bacteria: Few                      RADIOLOGY & ADDITIONAL TESTS:    Imaging Personally Reviewed:yes    Consultant(s) Notes Reviewed:  yes    Care Discussed with Consultants/Other Providers:yes  
    SUBJECTIVE / OVERNIGHT EVENTS:pt seen and examined  05-05-23  '  MEDICATIONS  (STANDING):  amLODIPine   Tablet 10 milliGRAM(s) Oral daily  dextrose 5%. 1000 milliLiter(s) (50 mL/Hr) IV Continuous <Continuous>  dextrose 5%. 1000 milliLiter(s) (100 mL/Hr) IV Continuous <Continuous>  dextrose 50% Injectable 12.5 Gram(s) IV Push once  dextrose 50% Injectable 25 Gram(s) IV Push once  dextrose 50% Injectable 25 Gram(s) IV Push once  enalapril 10 milliGRAM(s) Oral daily  glucagon  Injectable 1 milliGRAM(s) IntraMuscular once  insulin glargine Injectable (LANTUS) 12 Unit(s) SubCutaneous at bedtime  insulin lispro (ADMELOG) corrective regimen sliding scale   SubCutaneous every 6 hours  levETIRAcetam 500 milliGRAM(s) Oral two times a day  polyethylene glycol 3350 17 Gram(s) Oral daily  tamsulosin 0.4 milliGRAM(s) Oral at bedtime    MEDICATIONS  (PRN):  acetaminophen   Oral Liquid .. 650 milliGRAM(s) Oral every 6 hours PRN Temp greater or equal to 38C (100.4F), Mild Pain (1 - 3)  albuterol/ipratropium for Nebulization 3 milliLiter(s) Nebulizer every 6 hours PRN Shortness of Breath and/or Wheezing  aluminum hydroxide/magnesium hydroxide/simethicone Suspension 30 milliLiter(s) Oral every 4 hours PRN Dyspepsia  dextrose Oral Gel 15 Gram(s) Oral once PRN Blood Glucose LESS THAN 70 milliGRAM(s)/deciliter  melatonin 3 milliGRAM(s) Oral at bedtime PRN Insomnia  ondansetron Injectable 4 milliGRAM(s) IV Push every 8 hours PRN Nausea and/or Vomiting    Vital Signs Last 24 Hrs  T(C): 36.4 (05-05-23 @ 21:39), Max: 36.9 (05-05-23 @ 17:23)  T(F): 97.6 (05-05-23 @ 21:39), Max: 98.4 (05-05-23 @ 17:23)  HR: 102 (05-05-23 @ 21:39) (93 - 102)  BP: 117/82 (05-05-23 @ 21:39) (117/82 - 128/79)  BP(mean): --  RR: 18 (05-05-23 @ 21:39) (18 - 19)  SpO2: 97% (05-05-23 @ 21:39) (97% - 100%)            PHYSICAL EXAM:  no bleeding from ears  GENERAL: NAD  EYES: EOMI, PERRLA  NECK: trach+  CHEST/LUNG: dec breath sounds at bases  HEART:  S1 , S2 +  ABDOMEN: soft, bs+  EXTREMITIES:  no edema  NEUROLOGY:alert awake doesn't follow commands  sacral decub+    LABS:  05-05    141  |  105  |  30<H>  ----------------------------<  164<H>  4.3   |  23  |  0.63    Ca    9.9      05 May 2023 05:30  Phos  4.4     05-05  Mg     2.30     05-05      Creatinine Trend: 0.63 <--, 0.60 <--, 0.52 <--, 0.56 <--, 0.59 <--, 0.57 <--, 0.58 <--                        8.7    14.86 )-----------( 200      ( 05 May 2023 05:30 )             27.7     Urine Studies:                                                                        RADIOLOGY & ADDITIONAL TESTS:    Imaging Personally Reviewed:yes    Consultant(s) Notes Reviewed:  yes    Care Discussed with Consultants/Other Providers:yes  
    SUBJECTIVE / OVERNIGHT EVENTS:  23 @ 00:20    MEDICATIONS  (STANDING):  amLODIPine   Tablet 10 milliGRAM(s) Oral daily  dextrose 5%. 1000 milliLiter(s) (50 mL/Hr) IV Continuous <Continuous>  dextrose 5%. 1000 milliLiter(s) (100 mL/Hr) IV Continuous <Continuous>  dextrose 5%. 1000 milliLiter(s) (80 mL/Hr) IV Continuous <Continuous>  dextrose 50% Injectable 12.5 Gram(s) IV Push once  dextrose 50% Injectable 25 Gram(s) IV Push once  dextrose 50% Injectable 25 Gram(s) IV Push once  enalapril 10 milliGRAM(s) Oral daily  glucagon  Injectable 1 milliGRAM(s) IntraMuscular once  insulin glargine Injectable (LANTUS) 12 Unit(s) SubCutaneous at bedtime  insulin lispro (ADMELOG) corrective regimen sliding scale   SubCutaneous every 6 hours  piperacillin/tazobactam IVPB.. 3.375 Gram(s) IV Intermittent every 8 hours  polyethylene glycol 3350 17 Gram(s) Oral daily  tamsulosin 0.4 milliGRAM(s) Oral at bedtime    MEDICATIONS  (PRN):  acetaminophen   Oral Liquid .. 650 milliGRAM(s) Oral every 6 hours PRN Temp greater or equal to 38C (100.4F), Mild Pain (1 - 3)  albuterol/ipratropium for Nebulization 3 milliLiter(s) Nebulizer every 6 hours PRN Shortness of Breath and/or Wheezing  aluminum hydroxide/magnesium hydroxide/simethicone Suspension 30 milliLiter(s) Oral every 4 hours PRN Dyspepsia  dextrose Oral Gel 15 Gram(s) Oral once PRN Blood Glucose LESS THAN 70 milliGRAM(s)/deciliter  melatonin 3 milliGRAM(s) Oral at bedtime PRN Insomnia  ondansetron Injectable 4 milliGRAM(s) IV Push every 8 hours PRN Nausea and/or Vomiting    T(C): 36.4 (23 @ 23:20), Max: 37.1 (23 @ 10:00)  HR: 84 (23 @ 23:20) (72 - 90)  BP: 135/81 (23 @ 23:20) (124/76 - 154/82)  RR: 17 (04-26-23 @ 23:20) (15 - 18)  SpO2: 100% (23 @ 23:20) (99% - 100%)    CAPILLARY BLOOD GLUCOSE      POCT Blood Glucose.: 148 mg/dL (2023 22:25)  POCT Blood Glucose.: 231 mg/dL (2023 18:08)  POCT Blood Glucose.: 127 mg/dL (2023 14:20)  POCT Blood Glucose.: 166 mg/dL (2023 05:55)    I&O's Summary    2023 07:01  -  2023 07:00  --------------------------------------------------------  IN: 0 mL / OUT: 400 mL / NET: -400 mL      PHYSICAL EXAM:  GENERAL: NAD  EYES: EOMI, PERRLA  NECK: trach+  CHEST/LUNG: dec breath sounds at bases  HEART:  S1 , S2 +  ABDOMEN: soft, bs+  EXTREMITIES:  no edema  NEUROLOGY:alert awake      LABS:                        8.0    9.25  )-----------( 152      ( 2023 06:00 )             26.5         154<H>  |  119<H>  |  22  ----------------------------<  182<H>  3.3<L>   |  24  |  0.56    Ca    9.6      2023 21:30  Phos  3.4       Mg     2.30         TPro  7.1  /  Alb  3.2<L>  /  TBili  0.9  /  DBili  x   /  AST  24  /  ALT  21  /  AlkPhos  201<H>            Urinalysis Basic - ( 2023 03:00 )    Color: Yellow / Appearance: Clear / S.018 / pH: x  Gluc: x / Ketone: Negative  / Bili: Negative / Urobili: <2 mg/dL   Blood: x / Protein: 30 mg/dL / Nitrite: Negative   Leuk Esterase: Large / RBC: 4 /HPF / WBC >50 /HPF   Sq Epi: x / Non Sq Epi: x / Bacteria: Few        RADIOLOGY & ADDITIONAL TESTS:    Imaging Personally Reviewed:    Consultant(s) Notes Reviewed:      Care Discussed with Consultants/Other Providers:  
    SUBJECTIVE / OVERNIGHT EVENTS:pt seen and examined  05-07-23  '  MEDICATIONS  (STANDING):  amLODIPine   Tablet 10 milliGRAM(s) Oral daily  dextrose 5%. 1000 milliLiter(s) (50 mL/Hr) IV Continuous <Continuous>  dextrose 5%. 1000 milliLiter(s) (100 mL/Hr) IV Continuous <Continuous>  dextrose 50% Injectable 12.5 Gram(s) IV Push once  dextrose 50% Injectable 25 Gram(s) IV Push once  dextrose 50% Injectable 25 Gram(s) IV Push once  enalapril 10 milliGRAM(s) Oral daily  glucagon  Injectable 1 milliGRAM(s) IntraMuscular once  insulin glargine Injectable (LANTUS) 12 Unit(s) SubCutaneous at bedtime  insulin lispro (ADMELOG) corrective regimen sliding scale   SubCutaneous every 6 hours  levETIRAcetam 500 milliGRAM(s) Oral two times a day  polyethylene glycol 3350 17 Gram(s) Oral daily  tamsulosin 0.4 milliGRAM(s) Oral at bedtime    MEDICATIONS  (PRN):  acetaminophen   Oral Liquid .. 650 milliGRAM(s) Oral every 6 hours PRN Temp greater or equal to 38C (100.4F), Mild Pain (1 - 3)  albuterol/ipratropium for Nebulization 3 milliLiter(s) Nebulizer every 6 hours PRN Shortness of Breath and/or Wheezing  aluminum hydroxide/magnesium hydroxide/simethicone Suspension 30 milliLiter(s) Oral every 4 hours PRN Dyspepsia  dextrose Oral Gel 15 Gram(s) Oral once PRN Blood Glucose LESS THAN 70 milliGRAM(s)/deciliter  melatonin 3 milliGRAM(s) Oral at bedtime PRN Insomnia  ondansetron Injectable 4 milliGRAM(s) IV Push every 8 hours PRN Nausea and/or Vomiting    Vital Signs Last 24 Hrs  T(C): 36.8 (05-07-23 @ 17:40), Max: 36.8 (05-07-23 @ 05:30)  T(F): 98.2 (05-07-23 @ 17:40), Max: 98.2 (05-07-23 @ 05:30)  HR: 101 (05-07-23 @ 17:40) (80 - 101)  BP: 118/68 (05-07-23 @ 17:40) (118/68 - 135/85)  BP(mean): --  RR: 18 (05-07-23 @ 17:40) (18 - 18)  SpO2: 100% (05-07-23 @ 17:40) (100% - 100%)          PHYSICAL EXAM:  no bleeding from ears  GENERAL: NAD  EYES: EOMI, PERRLA  NECK: trach+  CHEST/LUNG: dec breath sounds at bases  HEART:  S1 , S2 +  ABDOMEN: soft, bs+  EXTREMITIES:  no edema  NEUROLOGY:alert awake doesn't follow commands  sacral decub+    LABS:  05-07    143  |  106  |  32<H>  ----------------------------<  190<H>  4.3   |  22  |  0.65    Ca    10.2      07 May 2023 04:50  Phos  4.7     05-07  Mg     2.20     05-07      Creatinine Trend: 0.65 <--, 0.65 <--, 0.63 <--, 0.60 <--, 0.52 <--, 0.56 <--, 0.59 <--                        9.1    9.41  )-----------( 235      ( 07 May 2023 04:50 )             29.5     Urine Studies:                                                                                                  RADIOLOGY & ADDITIONAL TESTS:    Imaging Personally Reviewed:yes    Consultant(s) Notes Reviewed:  yes    Care Discussed with Consultants/Other Providers:yes  
    SUBJECTIVE / OVERNIGHT EVENTS:pt seen and examined  05-02-23  '  MEDICATIONS  (STANDING):  amLODIPine   Tablet 10 milliGRAM(s) Oral daily  dextrose 5%. 1000 milliLiter(s) (50 mL/Hr) IV Continuous <Continuous>  dextrose 5%. 1000 milliLiter(s) (100 mL/Hr) IV Continuous <Continuous>  dextrose 50% Injectable 25 Gram(s) IV Push once  dextrose 50% Injectable 12.5 Gram(s) IV Push once  dextrose 50% Injectable 25 Gram(s) IV Push once  enalapril 10 milliGRAM(s) Oral daily  glucagon  Injectable 1 milliGRAM(s) IntraMuscular once  insulin glargine Injectable (LANTUS) 12 Unit(s) SubCutaneous at bedtime  insulin lispro (ADMELOG) corrective regimen sliding scale   SubCutaneous every 6 hours  levETIRAcetam 500 milliGRAM(s) Oral two times a day  polyethylene glycol 3350 17 Gram(s) Oral daily  tamsulosin 0.4 milliGRAM(s) Oral at bedtime    MEDICATIONS  (PRN):  acetaminophen   Oral Liquid .. 650 milliGRAM(s) Oral every 6 hours PRN Temp greater or equal to 38C (100.4F), Mild Pain (1 - 3)  albuterol/ipratropium for Nebulization 3 milliLiter(s) Nebulizer every 6 hours PRN Shortness of Breath and/or Wheezing  aluminum hydroxide/magnesium hydroxide/simethicone Suspension 30 milliLiter(s) Oral every 4 hours PRN Dyspepsia  dextrose Oral Gel 15 Gram(s) Oral once PRN Blood Glucose LESS THAN 70 milliGRAM(s)/deciliter  melatonin 3 milliGRAM(s) Oral at bedtime PRN Insomnia  ondansetron Injectable 4 milliGRAM(s) IV Push every 8 hours PRN Nausea and/or Vomiting    Vital Signs Last 24 Hrs  T(C): 36.7 (05-02-23 @ 10:18), Max: 36.7 (05-02-23 @ 10:18)  T(F): 98 (05-02-23 @ 10:18), Max: 98 (05-02-23 @ 10:18)  HR: 96 (05-02-23 @ 10:18) (86 - 96)  BP: 122/77 (05-02-23 @ 10:18) (122/77 - 135/83)  BP(mean): --  RR: 17 (05-02-23 @ 10:18) (17 - 18)  SpO2: 100% (05-02-23 @ 10:18) (97% - 100%)            PHYSICAL EXAM:  no bleeding from ears  GENERAL: NAD  EYES: EOMI, PERRLA  NECK: trach+  CHEST/LUNG: dec breath sounds at bases  HEART:  S1 , S2 +  ABDOMEN: soft, bs+  EXTREMITIES:  no edema  NEUROLOGY:alert awake doesn't follow commands  LABS:  05-02    141  |  105  |  20  ----------------------------<  159<H>  4.2   |  24  |  0.56    Ca    9.9      02 May 2023 07:50  Phos  3.1     05-02  Mg     2.30     05-02      Creatinine Trend: 0.56 <--, 0.59 <--, 0.57 <--, 0.58 <--, 0.54 <--, 0.52 <--, 0.50 <--, 0.53 <--, 0.56 <--, 0.57 <--                        9.4    9.79  )-----------( 186      ( 02 May 2023 07:50 )             30.3     Urine Studies:                                                    RADIOLOGY & ADDITIONAL TESTS:    Imaging Personally Reviewed:yes    Consultant(s) Notes Reviewed:  yes    Care Discussed with Consultants/Other Providers:yes  
    SUBJECTIVE / OVERNIGHT EVENTS:pt seen and examined  05-06-23  '  MEDICATIONS  (STANDING):  amLODIPine   Tablet 10 milliGRAM(s) Oral daily  dextrose 5%. 1000 milliLiter(s) (50 mL/Hr) IV Continuous <Continuous>  dextrose 5%. 1000 milliLiter(s) (100 mL/Hr) IV Continuous <Continuous>  dextrose 50% Injectable 12.5 Gram(s) IV Push once  dextrose 50% Injectable 25 Gram(s) IV Push once  dextrose 50% Injectable 25 Gram(s) IV Push once  enalapril 10 milliGRAM(s) Oral daily  glucagon  Injectable 1 milliGRAM(s) IntraMuscular once  insulin glargine Injectable (LANTUS) 12 Unit(s) SubCutaneous at bedtime  insulin lispro (ADMELOG) corrective regimen sliding scale   SubCutaneous every 6 hours  levETIRAcetam 500 milliGRAM(s) Oral two times a day  polyethylene glycol 3350 17 Gram(s) Oral daily  tamsulosin 0.4 milliGRAM(s) Oral at bedtime    MEDICATIONS  (PRN):  acetaminophen   Oral Liquid .. 650 milliGRAM(s) Oral every 6 hours PRN Temp greater or equal to 38C (100.4F), Mild Pain (1 - 3)  albuterol/ipratropium for Nebulization 3 milliLiter(s) Nebulizer every 6 hours PRN Shortness of Breath and/or Wheezing  aluminum hydroxide/magnesium hydroxide/simethicone Suspension 30 milliLiter(s) Oral every 4 hours PRN Dyspepsia  dextrose Oral Gel 15 Gram(s) Oral once PRN Blood Glucose LESS THAN 70 milliGRAM(s)/deciliter  melatonin 3 milliGRAM(s) Oral at bedtime PRN Insomnia  ondansetron Injectable 4 milliGRAM(s) IV Push every 8 hours PRN Nausea and/or Vomiting    Vital Signs Last 24 Hrs  T(C): 36.7 (05-06-23 @ 20:16), Max: 36.7 (05-06-23 @ 10:18)  T(F): 98 (05-06-23 @ 20:16), Max: 98.1 (05-06-23 @ 17:38)  HR: 102 (05-06-23 @ 20:16) (96 - 102)  BP: 124/74 (05-06-23 @ 20:16) (108/74 - 142/88)  BP(mean): --  RR: 19 (05-06-23 @ 20:16) (18 - 19)  SpO2: 99% (05-06-23 @ 20:16) (98% - 100%)        PHYSICAL EXAM:  no bleeding from ears  GENERAL: NAD  EYES: EOMI, PERRLA  NECK: trach+  CHEST/LUNG: dec breath sounds at bases  HEART:  S1 , S2 +  ABDOMEN: soft, bs+  EXTREMITIES:  no edema  NEUROLOGY:alert awake doesn't follow commands  sacral decub+    LABS:  05-06    142  |  105  |  29<H>  ----------------------------<  202<H>  4.3   |  22  |  0.65    Ca    10.0      06 May 2023 05:40  Phos  4.7     05-06  Mg     2.30     05-06      Creatinine Trend: 0.65 <--, 0.63 <--, 0.60 <--, 0.52 <--, 0.56 <--, 0.59 <--, 0.57 <--                        9.1    12.31 )-----------( 217      ( 06 May 2023 05:40 )             28.8     Urine Studies:                                                                                    RADIOLOGY & ADDITIONAL TESTS:    Imaging Personally Reviewed:yes    Consultant(s) Notes Reviewed:  yes    Care Discussed with Consultants/Other Providers:yes  
    SUBJECTIVE / OVERNIGHT EVENTS:pt seen and examined  23  '  MEDICATIONS  (STANDING):  amLODIPine   Tablet 10 milliGRAM(s) Oral daily  dextrose 5%. 1000 milliLiter(s) (50 mL/Hr) IV Continuous <Continuous>  dextrose 5%. 1000 milliLiter(s) (100 mL/Hr) IV Continuous <Continuous>  dextrose 50% Injectable 25 Gram(s) IV Push once  dextrose 50% Injectable 12.5 Gram(s) IV Push once  dextrose 50% Injectable 25 Gram(s) IV Push once  enalapril 10 milliGRAM(s) Oral daily  glucagon  Injectable 1 milliGRAM(s) IntraMuscular once  insulin glargine Injectable (LANTUS) 12 Unit(s) SubCutaneous at bedtime  insulin lispro (ADMELOG) corrective regimen sliding scale   SubCutaneous every 6 hours  polyethylene glycol 3350 17 Gram(s) Oral daily  tamsulosin 0.4 milliGRAM(s) Oral at bedtime    MEDICATIONS  (PRN):  acetaminophen   Oral Liquid .. 650 milliGRAM(s) Oral every 6 hours PRN Temp greater or equal to 38C (100.4F), Mild Pain (1 - 3)  albuterol/ipratropium for Nebulization 3 milliLiter(s) Nebulizer every 6 hours PRN Shortness of Breath and/or Wheezing  aluminum hydroxide/magnesium hydroxide/simethicone Suspension 30 milliLiter(s) Oral every 4 hours PRN Dyspepsia  dextrose Oral Gel 15 Gram(s) Oral once PRN Blood Glucose LESS THAN 70 milliGRAM(s)/deciliter  melatonin 3 milliGRAM(s) Oral at bedtime PRN Insomnia  ondansetron Injectable 4 milliGRAM(s) IV Push every 8 hours PRN Nausea and/or Vomiting    Vital Signs Last 24 Hrs  T(C): 36.6 (23 @ 20:26), Max: 36.9 (23 @ 02:06)  T(F): 97.9 (23 @ 20:26), Max: 98.4 (23 @ 02:06)  HR: 90 (23 @ 20:26) (78 - 90)  BP: 122/83 (23 @ 20:26) (120/78 - 141/91)  BP(mean): --  RR: 18 (23 @ 20:26) (16 - 18)  SpO2: 98% (23 @ 20:26) (98% - 100%)            PHYSICAL EXAM:  GENERAL: NAD  EYES: EOMI, PERRLA  NECK: trach+  CHEST/LUNG: dec breath sounds at bases  HEART:  S1 , S2 +  ABDOMEN: soft, bs+  EXTREMITIES:  no edema  NEUROLOGY:alert awake commands    LABS:      139  |  103  |  18  ----------------------------<  197<H>  3.9   |  25  |  0.59    Ca    9.6      01 May 2023 05:52  Phos  3.1       Mg     2.20           Creatinine Trend: 0.59 <--, 0.57 <--, 0.58 <--, 0.54 <--, 0.52 <--, 0.50 <--, 0.53 <--, 0.56 <--, 0.57 <--                        9.1    10.62 )-----------( 162      ( 01 May 2023 05:52 )             28.9     Urine Studies:  Urinalysis Basic - ( 2023 03:00 )    Color: Yellow / Appearance: Clear / S.018 / pH:   Gluc:  / Ketone: Negative  / Bili: Negative / Urobili: <2 mg/dL   Blood:  / Protein: 30 mg/dL / Nitrite: Negative   Leuk Esterase: Large / RBC: 4 /HPF / WBC >50 /HPF   Sq Epi:  / Non Sq Epi:  / Bacteria: Few                                    RADIOLOGY & ADDITIONAL TESTS:    Imaging Personally Reviewed:yes    Consultant(s) Notes Reviewed:  yes    Care Discussed with Consultants/Other Providers:yes

## 2023-05-07 NOTE — PROGRESS NOTE ADULT - PROBLEM SELECTOR PLAN 1
- Ct chest with consolidation  - UA+ unable to obtain symptoms   - abx as per ID   f/u cx
NO signs of active bleeding   monitor hb closely   s/p prbc   monitor hb closely
- Ct chest with consolidation  - UA+ unable to obtain symptoms   - abx as per ID   f/u cx
NO signs of active bleeding   monitor hb closely   transfuse prbc
NO signs of active bleeding   monitor hb closely   s/p prbc   monitor hb closely
NO signs of active bleeding   monitor hb closely   s/p prbc   monitor hb closely
NO signs of active bleeding   monitor hb closely   transfuse prbc
NO signs of active bleeding   monitor hb closely   s/p prbc   monitor hb closely
NO signs of active bleeding   monitor hb closely   s/p prbc   monitor hb closely
- Ct chest with consolidation  - UA+ unable to obtain symptoms   - abx as per ID   f/u cx

## 2023-05-07 NOTE — PROGRESS NOTE ADULT - PROBLEM SELECTOR PLAN 4
- monitor FS q6   - cont Glucerna 1 can x 4 day   - titrate insulin optimal blood sugar control
- monitor FS q6   - cont Glucerna 1 can x 4 day   - c/w Levmir 12 U with ISS
-iss titrate insulin for optimal blood sugar control
- flomax
- monitor FS q6   - cont Glucerna 1 can x 4 day   - c/w Levmir 12 U with ISS
-iss titrate insulin for optimal blood sugar control
- flomax
- flomax
-iss titrate insulin for optimal blood sugar control
-iss titrate insulin for optimal blood sugar control

## 2023-05-07 NOTE — PROGRESS NOTE ADULT - REASON FOR ADMISSION
bleeding from ears

## 2023-05-07 NOTE — PROGRESS NOTE ADULT - PROBLEM SELECTOR PROBLEM 8
Prophylactic measure
Prophylactic measure
Sacral wound
Prophylactic measure
Sacral wound

## 2023-05-07 NOTE — PROGRESS NOTE ADULT - PROBLEM SELECTOR PROBLEM 2
Pneumonia
Abnormal CT scan
Pneumonia
Abnormal CT scan
Pneumonia
Abnormal CT scan
Pneumonia
Pneumonia

## 2023-05-07 NOTE — PROGRESS NOTE ADULT - PROBLEM SELECTOR PLAN 5
- flomax
- seen by ENT suspect due to trauma  - Hold ASA and lovenox
- flomax
- seen by ENT suspect due to trauma  - Hold ASA and lovenox
- flomax
- seen by ENT suspect due to trauma  - Hold ASA and lovenox
- flomax

## 2023-05-07 NOTE — PROGRESS NOTE ADULT - PROBLEM SELECTOR PROBLEM 7
Hypertension
Sacral wound
Sacral wound
Hypertension
Sacral wound
Hypertension
Hypertension

## 2023-05-07 NOTE — PROGRESS NOTE ADULT - PROBLEM SELECTOR PLAN 6
- seen by ENT suspect due to trauma  - stable  -
- seen by ENT suspect due to trauma  - stable  -
- c/w ARB/ACE and norvasc  - monitor BP
- seen by ENT suspect due to trauma  - stable  -
- c/w ARB/ACE and norvasc  - monitor BP
- c/w ARB/ACE and norvasc  - monitor BP
- seen by ENT suspect due to trauma  - Hold ASA and lovenox
- seen by ENT suspect due to trauma  - stable  -

## 2023-05-07 NOTE — PROGRESS NOTE ADULT - PROBLEM SELECTOR PLAN 9
restart aspirin   if hb cont to be stable no further bleeding from ears poss restart dvt prophylaxis at NH    WAITING FOR MRI - discussed pts current clinical status , management plan in detail in length with pts father  discussed management plan with acp covering pt
- hold Lovenox until cleared by NS
- hold Lovenox until cleared by NS
restart aspirin   if hb cont to be stable no further bleeding from ears poss restart dvt prophylaxis at NH
restart aspirin   if hb cont to be stable no further bleeding from ears poss restart dvt prophylaxis at NH    WAITING FOR MRI - discussed pts current clinical status , management plan in detail in length with pts father  discussed management plan with acp covering pt
restart aspirin   if hb cont to be stable no further bleeding from ears poss restart dvt prophylaxis at NH    WAITING FOR MRI - discussed pts current clinical status , management plan in detail in length with pts father  discussed management plan with acp covering pt
- hold Lovenox until cleared by NS
restart aspirin   if hb cont to be stable no further bleeding from ears poss restart dvt prophylaxis at NH    WAITING FOR MRI - discussed pts current clinical status , management plan in detail in length with pts father  discussed management plan with acp covering pt
restart aspirin   if hb cont to be stable no further bleeding from ears poss restart dvt prophylaxis at NH    WAITING FOR MRI - discussed pts current clinical status , management plan in detail in length with pts father  discussed management plan with acp covering pt

## 2023-05-07 NOTE — PROGRESS NOTE ADULT - PROVIDER SPECIALTY LIST ADULT
Internal Medicine
Nephrology
Neurology
ENT
Infectious Disease
Infectious Disease
Nephrology
Nephrology
Internal Medicine
71.4

## 2023-05-07 NOTE — PROGRESS NOTE ADULT - PROBLEM SELECTOR PLAN 7
- c/w ARB/ACE and norvasc  - monitor BP
- c/w ARB/ACE and norvasc  - monitor BP
- wound care c/s
- c/w ARB/ACE and norvasc  - monitor BP
- wound care c/s
- c/w ARB/ACE and norvasc  - monitor BP
- wound care c/s

## 2023-05-07 NOTE — PROGRESS NOTE ADULT - PROBLEM SELECTOR PLAN 2
- Ct chest with consolidation  - UA+ unable to obtain symptoms   - s/p abx
- Ct chest with consolidation  - UA+ unable to obtain symptoms   - s/p abx
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 petechial parenchymal bleed; repeat CT head 4/25 No substantial change 4/24/2023, including small hypodensities within the infarcted brain parenchyma the left occipital lobe, small blood clots versus dystrophic calcification  - hold ASA and AC  - NSx c/s  - neruology eval
- Ct chest with consolidation  - UA+ unable to obtain symptoms   - s/p abx
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 petechial parenchymal bleed; repeat CT head 4/25 No substantial change 4/24/2023, including small hypodensities within the infarcted brain parenchyma the left occipital lobe, small blood clots versus dystrophic calcification  - hold ASA and AC  - NSx c/s  - neruology eval
- Ct chest with consolidation  - UA+ unable to obtain symptoms   - s/p abx  leukocytosis - pt afebrile/ no diarrhea, f/u closely- improved today   pt clear for dc
- Ct chest with consolidation  - UA+ unable to obtain symptoms   - abx as per ID   f/u cx
- Ct chest with consolidation  - UA+ unable to obtain symptoms   - s/p abx  leukocytosis - pt afebrile/ no diarrhea, f/u closely
- Ct chest with consolidation  - UA+ unable to obtain symptoms   - s/p abx  leukocytosis - pt afebrile/ no diarrhea, f/u closely- improved today   pt clear for dc
- Ct chest with consolidation  - UA+ unable to obtain symptoms   - abx as per ID   f/u cx
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 petechial parenchymal bleed; repeat CT head 4/25 No substantial change 4/24/2023, including small hypodensities within the infarcted brain parenchyma the left occipital lobe, small blood clots versus dystrophic calcification  - hold ASA and AC  - NSx c/s  - neruology f/u -  CT head reviewed- exvaco dilation of venticles, no comparison imaging. Per wife at bedside, patient has been at this neuro baseline since end of Janurary  - Per wife, rehab is arranging replacement of bone flap with primary surgeon at [facility]-   - No additional workup needed from neurosurgical standpoint. Tiny petechial hemorrhage less conspicuous on repeat CT head. No intervention needed.
- Ct chest with consolidation  - UA+ unable to obtain symptoms   - s/p abx  leukocytosis - pt afebrile/ no diarrhea, f/u closely

## 2023-05-07 NOTE — PROGRESS NOTE ADULT - ASSESSMENT
61 y/o M h/o traumatic subdural hemorrhage following a fall down the stairs while drunk (bedbound, nonverbal, lives in [facility]), s/p r craniotomy at [facility] in 01/2023, s/p trach/PEG sharon from [place] [facility] for ENT evaluation for b/l ear bleeding. Ct head with petechial hemm LT. + elevated WBC CT chest with b/l PNA. UA + 
63 y/o M h/o traumatic subdural hemorrhage following a fall down the stairs while drunk (bedbound, nonverbal, lives in [facility]), s/p r craniotomy at [facility] in 01/2023, s/p trach/PEG sharon from [place] [facility] for ENT evaluation for b/l ear bleeding. Ct head with petechial hemm LT. + elevated WBC CT chest with b/l PNA. UA + 
63 y/o M h/o traumatic subdural hemorrhage following a fall down the stairs while drunk (bedbound, nonverbal, lives in [facility]), s/p r craniotomy at [facility] in 01/2023, s/p trach/PEG sharon from [place] [facility] for ENT evaluation for b/l ear bleeding. Ct head with petechial hemm LT. + elevated WBC CT chest with b/l PNA. UA + 
63 yo M with history of traumatic CDH, sp craniotomy, trach/PEG, recently at OSH, with pneumonia, transferred to [facility] in setting of ear bleeding  Fever, leukocytosis  Seen at OSH ED, now transferred to [facility] for further care, patient having bleeding from ears  CT with consolidative/tree in bud opacities, multifocal PNA; aspiration? bladder thickening  UA+  Patient with poor mental status not able to provide further input  Pneumonia as cause of fever/leukocytosis? Uncertain etiology for bleeding from ears  Improved on current treatment course, complete treatment for possible PNA  Overall, Pneumonia with leukocytosis, leukocytosis, bleeding  - Zosyn 3.375g q 8, complete 7 days then discontinue  - F/U BCXs x 2  - Care for bleeding from ears per team    Signing off. Please call with further questions or change in status.    Zelalem Juan MD  Contact on TEAMS messaging from 9am - 5pm  From 5pm-9am, on weekends, or if no response call [phone]
62y Male with history of SDH s/p trach/PEG s/p craniotomy presents with bleeding from ear. Nephrology consulted for hypernatremia.     1) Hypernatremia: Secondary to free water deficit with rapid correction overnight 4/27 -->4/28 s/p D5W @ 100 ml/hour.  Now Na+ stabilized at an acceptable level.   Continue with free water 250 ml Q6 hours to maintain Na at current value.  No IVF for now. Monitor serum Na, particularly in the setting of drop in H/H and Transfusion of PRBCs    2) HTN: BP controlled. Continue with current medications. Monitor BP.    3) Acute cystitis without hematuria: As per ID.    4) Pneumonia: As per ID.        [facility] NEPHROLOGY  Caleb King M.D.  Angelo Han D.O.  Cecilia Rowley M.D.  Arlet Beltrán, MSN, ANP-C    Telephone: [phone]  Facsimile: [phone]    [address] [place] [id number]  
62y Male with history of SDH s/p trach/PEG s/p craniotomy presents with bleeding from ear. Nephrology consulted for hypernatremia.     1) Hypernatremia: Secondary to free water deficit with rapid correction overnight 4/27 -->4/28 s/p D5W @ 100 ml/hour.  Now Na+ stabilized at an acceptable level.   Continue with free water 250 ml Q6 hours to maintain Na at current value.  No IVF for now. Monitor serum Na, particularly in the setting of drop in H/H and Transfusion of PRBCs    2) HTN: BP controlled. Continue with current medications. Monitor BP.    3) Acute cystitis without hematuria: As per ID.    4) Pneumonia: As per ID.        [facility] NEPHROLOGY  Caleb King M.D.  Angelo Han D.O.  Cecilia Rowley M.D.  Arlet Beltrán, MSN, ANP-C    Telephone: [phone]  Facsimile: [phone]    [address] [place] [id number]  
63 y/o M h/o traumatic subdural hemorrhage following a fall down the stairs while drunk (bedbound, nonverbal, lives in [facility]), s/p r craniotomy at [facility] in 01/2023, s/p trach/PEG sharon from [place] ED for ENT evaluation for b/l ear bleeding. Ct head with petechial hemm LT. + elevated WBC CT chest with b/l PNA. UA + 
63 y/o M h/o traumatic subdural hemorrhage following a fall down the stairs while drunk (bedbound, nonverbal, lives in [facility]), s/p r craniotomy at [facility] in 01/2023, s/p trach/PEG sharon from [place] ED for ENT evaluation for b/l ear bleeding. Ct head with petechial hemm LT. + elevated WBC CT chest with b/l PNA. UA + 
63 yo M with history of traumatic CDH, sp craniotomy, trach/PEG, recently at OSH, with pneumonia, transferred to [facility] in setting of ear bleeding  Fever, leukocytosis  Seen at OSH ED, now transferred to [facility] for further care, patient having bleeding from ears  CT with consolidative/tree in bud opacities, multifocal PNA; aspiration? bladder thickening  UA+  Patient with poor mental status not able to provide further input  Pneumonia as cause of fever/leukocytosis? Uncertain etiology for bleeding from ears  Overall, Pneumonia with leukocytosis, leukocytosis, bleeding  - Zosyn 3.375g q 8  - F/U BCXs x 2  - Check sputum culture  - F/U UCX  - Care for bleeding from ears per team    Zelalem Juan MD  Contact on TEAMS messaging from 9am - 5pm  From 5pm-9am, on weekends, or if no response call [phone]
61 y/o M h/o traumatic subdural hemorrhage following a fall down the stairs while drunk (bedbound, nonverbal, lives in [facility]), s/p r craniotomy at [facility] in 01/2023, s/p trach/PEG sharon from [place] ED for ENT evaluation for b/l ear bleeding. Ct head with petechial hemm LT. + elevated WBC CT chest with b/l PNA. UA + 
62y Male with history of SDH s/p trach/PEG s/p craniotomy presents with bleeding from ear. Nephrology consulted for hypernatremia.     1) Hypernatremia: Secondary to free water deficit with rapid correction overnight s/p D5W @ 100 ml/hour. Continue with free water 250 ml Q6 hours to maintain Na at current value. Monitor serum Na.    2) HTN: BP controlled. Continue with current medications. Monitor BP.    3) Acute cystitis without hematuria: As per ID.    4) Pneumonia: As per ID.        [facility] NEPHROLOGY  Caleb King M.D.  Angelo Han D.O.  Cecilia Rowley M.D.  Arlet Beltrán, MSN, ANP-C    Telephone: [phone]  Facsimile: [phone]    [address] [place] [address]  
63 yo M w/ PMHx traumatic subdural hemorrhage following a fall down the stairs while drunk, s/p r craniotomy at [facility] in 01/2023, non verbal and functional quadriplegic, s/p trach/PEG  Pe qadraplegic, some movement on left not follow commands  CTH s/p right crani, left occiptal infarction  EEG Asymmetry Right hemispheric increased amplitude slowing may be due to known skull defect and regional structural lesion  Left sided attenuation likely due to known h/o focal cortical encephalomalacia  Moderate diffuse/multi-focal cerebral dysfunction, not specific as to etiology.  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
61 y/o M h/o traumatic subdural hemorrhage following a fall down the stairs while drunk (bedbound, nonverbal, lives in [facility]), s/p r craniotomy at [facility] in 01/2023, s/p trach/PEG sharon from [place] ED for ENT evaluation for b/l ear bleeding. Ct head with petechial hemm LT. + elevated WBC CT chest with b/l PNA. UA + 
62M pmhx  h/o traumatic subdural hemorrhage following a fall down the stairs while drunk, s/p r craniotomy at [facility] in 01/2023, s/p trach/PEG (unsure when), transferred from [facility] to [facility] for b/l ear bleeding. Physical exam revealed dried b/l EAC with + traumatic lacerations on EAC afrin used. B/l TM in tact likely 2/2 trauma   - otowick removed  - please continue with ofloxacin drops twice a day for 5 days
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 Ct head with petechial hemm LT. + elevated WBC CT chest with b/l PNA. UA + 
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 Ct head with petechial hemm LT. + elevated WBC CT chest with b/l PNA. UA + 
61 y/o M h/o traumatic subdural hemorrhage following a fall down the stairs while drunk (bedbound, nonverbal, lives in [facility]), s/p r craniotomy at [facility] in 01/2023, s/p trach/PEG sharon from [place] ED for ENT evaluation for b/l ear bleeding. Ct head with petechial hemm LT. + elevated WBC CT chest with b/l PNA. UA + 
61 y/o M h/o traumatic subdural hemorrhage following a fall down the stairs while drunk (bedbound, nonverbal, lives in [email or web address] s/p r craniotomy at [facility] in 01/2023, s/p trach/PEG sharon from [place] [facility] for ENT evaluation for b/l ear bleeding. Ct head with petechial hemm LT. + elevated WBC CT chest with b/l PNA. UA + 
63 y/o M h/o traumatic subdural hemorrhage following a fall down the stairs while drunk (bedbound, nonverbal, lives in [facility]), s/p r craniotomy at [facility] in 01/2023, s/p trach/PEG sharon from [place] [facility] for ENT evaluation for b/l ear bleeding. Ct head with petechial hemm LT. + elevated WBC CT chest with b/l PNA. UA +

## 2023-05-07 NOTE — PROGRESS NOTE ADULT - PROBLEM SELECTOR PLAN 8
- hold Lovenox until cleared by NS
- wound care c/s
- hold Lovenox until cleared by NS
- wound care c/s
- wound care c/s
- hold Lovenox until cleared by NS
- wound care c/s

## 2023-05-07 NOTE — PROGRESS NOTE ADULT - PROBLEM SELECTOR PLAN 3
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 petechial parenchymal bleed; repeat CT head 4/25 No substantial change 4/24/2023, including small hypodensities within the infarcted brain parenchyma the left occipital lobe, small blood clots versus dystrophic calcification  - hold ASA and AC  - NSx c/s  - neurology f/u -  CT head reviewed- exvaco dilation of ventricles, no comparison imaging. Per wife at bedside, patient has been at this neuro baseline since end of Janurary  - Per wife, rehab is arranging replacement of [facility]-   - No additional workup needed from neurosurgical standpoint. Tiny petechial hemorrhage less conspicuous on repeat CT head. No intervention needed.  neurology saw pt - recommending keppra
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 petechial parenchymal bleed; repeat CT head 4/25 No substantial change 4/24/2023, including small hypodensities within the infarcted brain parenchyma the left occipital lobe, small blood clots versus dystrophic calcification  - hold ASA and AC  - NSx c/s  - neruology f/u -  CT head reviewed- exvaco dilation of venticles, no comparison imaging. Per wife at bedside, patient has been at this neuro baseline since end of Janurary  - Per wife, rehab is arranging replacement of bone flap with primary surgeon at [facility]-   - No additional workup needed from neurosurgical standpoint. Tiny petechial hemorrhage less conspicuous on repeat CT head. No intervention needed.
- monitor FS q6   - cont Glucerna 1 can x 4 day   - c/w Levmir 12 U with ISS
- monitor FS q6   - cont Glucerna 1 can x 4 day   - c/w Levmir 12 U with ISS
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 petechial parenchymal bleed; repeat CT head 4/25 No substantial change 4/24/2023, including small hypodensities within the infarcted brain parenchyma the left occipital lobe, small blood clots versus dystrophic calcification  - hold ASA and AC  - NSx c/s  - neruology f/u -  CT head reviewed- exvaco dilation of venticles, no comparison imaging. Per wife at bedside, patient has been at this neuro baseline since end of Janurary  - Per wife, rehab is arranging replacement of bone flap with primary surgeon at [facility]-   - No additional workup needed from neurosurgical standpoint. Tiny petechial hemorrhage less conspicuous on repeat CT head. No intervention needed.
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 petechial parenchymal bleed; repeat CT head 4/25 No substantial change 4/24/2023, including small hypodensities within the infarcted brain parenchyma the left occipital lobe, small blood clots versus dystrophic calcification  - hold ASA and AC  - NSx c/s  - neurology f/u -  CT head reviewed- exvaco dilation of ventricles, no comparison imaging. Per wife at bedside, patient has been at this neuro baseline since end of Janurary  - Per wife, rehab is arranging replacement of bone flap with primary surgeon at [facility]-   - No additional workup needed from neurosurgical standpoint. Tiny petechial hemorrhage less conspicuous on repeat CT head. No intervention needed.  neurology saw pt - recommending keppra
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 petechial parenchymal bleed; repeat CT head 4/25 No substantial change 4/24/2023, including small hypodensities within the infarcted brain parenchyma the left occipital lobe, small blood clots versus dystrophic calcification  - hold ASA and AC  - NSx c/s  - neruology f/u -  CT head reviewed- exvaco dilation of venticles, no comparison imaging. Per wife at bedside, patient has been at this neuro baseline since end of Janurary  - Per wife, rehab is arranging replacement of bone flap with primary surgeon at [facility]-   - No additional workup needed from neurosurgical standpoint. Tiny petechial hemorrhage less conspicuous on repeat CT head. No intervention needed.
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 petechial parenchymal bleed; repeat CT head 4/25 No substantial change 4/24/2023, including small hypodensities within the infarcted brain parenchyma the left occipital lobe, small blood clots versus dystrophic calcification  - hold ASA and AC  - NSx c/s  - neurology f/u -  CT head reviewed- exvaco dilation of ventricles, no comparison imaging. Per wife at bedside, patient has been at this neuro baseline since end of Janurary  - Per wife, rehab is arranging replacement of bone flap with primary surgeon at [facility]-   - No additional workup needed from neurosurgical standpoint. Tiny petechial hemorrhage less conspicuous on repeat CT head. No intervention needed.  neurology saw pt - recommending keppra
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 petechial parenchymal bleed; repeat CT head 4/25 No substantial change 4/24/2023, including small hypodensities within the infarcted brain parenchyma the left occipital lobe, small blood clots versus dystrophic calcification  - hold ASA and AC  - NSx c/s  - neurology f/u -  CT head reviewed- exvaco dilation of ventricles, no comparison imaging. Per wife at bedside, patient has been at this neuro baseline since end of Janurary  - Per wife, rehab is arranging replacement of bone flap with primary surgeon at [facility]-   - No additional workup needed from neurosurgical standpoint. Tiny petechial hemorrhage less conspicuous on repeat CT head. No intervention needed.  neurology saw pt - recommending keppra
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 petechial parenchymal bleed; repeat CT head 4/25 No substantial change 4/24/2023, including small hypodensities within the infarcted brain parenchyma the left occipital lobe, small blood clots versus dystrophic calcification  - hold ASA and AC  - NSx c/s  - neurology f/u -  CT head reviewed- exvaco dilation of ventricles, no comparison imaging. Per wife at bedside, patient has been at this neuro baseline since end of Janurary  - Per wife, rehab is arranging replacement of bone flap with primary surgeon at [facility]-   - No additional workup needed from neurosurgical standpoint. Tiny petechial hemorrhage less conspicuous on repeat CT head. No intervention needed.  neurology saw pt - recommending keppra
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 petechial parenchymal bleed; repeat CT head 4/25 No substantial change 4/24/2023, including small hypodensities within the infarcted brain parenchyma the left occipital lobe, small blood clots versus dystrophic calcification  - hold ASA and AC  - NSx c/s  - neurology f/u -  CT head reviewed- exvaco dilation of ventricles, no comparison imaging. Per wife at bedside, patient has been at this neuro baseline since end of Janurary  - Per wife, rehab is arranging replacement of bone flap with primary surgeon at [facility]-   - No additional workup needed from neurosurgical standpoint. Tiny petechial hemorrhage less conspicuous on repeat CT head. No intervention needed.  neurology saw pt - recommending keppra
- monitor FS q6   - cont Glucerna 1 can x 4 day   - c/w Levmir 12 U with ISS

## 2023-05-07 NOTE — PROGRESS NOTE ADULT - PROBLEM SELECTOR PROBLEM 3
Abnormal CT scan
Abnormal CT scan
Diabetes
Abnormal CT scan
Diabetes
Abnormal CT scan
Diabetes
Abnormal CT scan

## 2023-05-07 NOTE — PROGRESS NOTE ADULT - PROBLEM SELECTOR PROBLEM 5
BPH (benign prostatic hyperplasia)
Blood in ear canal
BPH (benign prostatic hyperplasia)
Blood in ear canal
Blood in ear canal

## 2023-05-08 VITALS
RESPIRATION RATE: 18 BRPM | HEART RATE: 90 BPM | SYSTOLIC BLOOD PRESSURE: 111 MMHG | DIASTOLIC BLOOD PRESSURE: 70 MMHG | TEMPERATURE: 98 F | OXYGEN SATURATION: 100 %

## 2023-05-08 LAB
ANION GAP SERPL CALC-SCNC: 11 MMOL/L — SIGNIFICANT CHANGE UP (ref 7–14)
BUN SERPL-MCNC: 31 MG/DL — HIGH (ref 7–23)
CALCIUM SERPL-MCNC: 9.7 MG/DL — SIGNIFICANT CHANGE UP (ref 8.4–10.5)
CHLORIDE SERPL-SCNC: 106 MMOL/L — SIGNIFICANT CHANGE UP (ref 98–107)
CO2 SERPL-SCNC: 24 MMOL/L — SIGNIFICANT CHANGE UP (ref 22–31)
CREAT SERPL-MCNC: 0.64 MG/DL — SIGNIFICANT CHANGE UP (ref 0.5–1.3)
EGFR: 108 ML/MIN/1.73M2 — SIGNIFICANT CHANGE UP
GLUCOSE BLDC GLUCOMTR-MCNC: 138 MG/DL — HIGH (ref 70–99)
GLUCOSE BLDC GLUCOMTR-MCNC: 148 MG/DL — HIGH (ref 70–99)
GLUCOSE BLDC GLUCOMTR-MCNC: 186 MG/DL — HIGH (ref 70–99)
GLUCOSE SERPL-MCNC: 185 MG/DL — HIGH (ref 70–99)
HCT VFR BLD CALC: 26.7 % — LOW (ref 39–50)
HGB BLD-MCNC: 8.5 G/DL — LOW (ref 13–17)
MAGNESIUM SERPL-MCNC: 2.1 MG/DL — SIGNIFICANT CHANGE UP (ref 1.6–2.6)
MCHC RBC-ENTMCNC: 28 PG — SIGNIFICANT CHANGE UP (ref 27–34)
MCHC RBC-ENTMCNC: 31.8 GM/DL — LOW (ref 32–36)
MCV RBC AUTO: 87.8 FL — SIGNIFICANT CHANGE UP (ref 80–100)
NRBC # BLD: 0 /100 WBCS — SIGNIFICANT CHANGE UP (ref 0–0)
NRBC # FLD: 0 K/UL — SIGNIFICANT CHANGE UP (ref 0–0)
PHOSPHATE SERPL-MCNC: 4.4 MG/DL — SIGNIFICANT CHANGE UP (ref 2.5–4.5)
PLATELET # BLD AUTO: 241 K/UL — SIGNIFICANT CHANGE UP (ref 150–400)
POTASSIUM SERPL-MCNC: 4.2 MMOL/L — SIGNIFICANT CHANGE UP (ref 3.5–5.3)
POTASSIUM SERPL-SCNC: 4.2 MMOL/L — SIGNIFICANT CHANGE UP (ref 3.5–5.3)
RBC # BLD: 3.04 M/UL — LOW (ref 4.2–5.8)
RBC # FLD: 16.5 % — HIGH (ref 10.3–14.5)
SODIUM SERPL-SCNC: 141 MMOL/L — SIGNIFICANT CHANGE UP (ref 135–145)
WBC # BLD: 8.21 K/UL — SIGNIFICANT CHANGE UP (ref 3.8–10.5)
WBC # FLD AUTO: 8.21 K/UL — SIGNIFICANT CHANGE UP (ref 3.8–10.5)

## 2023-05-08 RX ORDER — ENOXAPARIN SODIUM 100 MG/ML
0.4 INJECTION SUBCUTANEOUS
Qty: 0 | Refills: 0 | DISCHARGE

## 2023-05-08 RX ORDER — INSULIN GLARGINE 100 [IU]/ML
12 INJECTION, SOLUTION SUBCUTANEOUS
Qty: 0 | Refills: 0 | DISCHARGE
Start: 2023-05-08

## 2023-05-08 RX ORDER — AMANTADINE HCL 100 MG
15 CAPSULE ORAL
Refills: 0 | DISCHARGE

## 2023-05-08 RX ORDER — INSULIN LISPRO 100/ML
1 VIAL (ML) SUBCUTANEOUS
Qty: 1 | Refills: 0
Start: 2023-05-08 | End: 2023-06-06

## 2023-05-08 RX ORDER — ASPIRIN/CALCIUM CARB/MAGNESIUM 324 MG
1 TABLET ORAL
Qty: 0 | Refills: 0 | DISCHARGE

## 2023-05-08 RX ORDER — AMLODIPINE BESYLATE 2.5 MG/1
1 TABLET ORAL
Qty: 0 | Refills: 0 | DISCHARGE
Start: 2023-05-08

## 2023-05-08 RX ORDER — POLYETHYLENE GLYCOL 3350 17 G/17G
17 POWDER, FOR SOLUTION ORAL
Qty: 0 | Refills: 0 | DISCHARGE
Start: 2023-05-08

## 2023-05-08 RX ORDER — POLYETHYLENE GLYCOL 3350 17 G/17G
17 POWDER, FOR SOLUTION ORAL
Qty: 0 | Refills: 0 | DISCHARGE

## 2023-05-08 RX ORDER — TAMSULOSIN HYDROCHLORIDE 0.4 MG/1
1 CAPSULE ORAL
Qty: 0 | Refills: 0 | DISCHARGE
Start: 2023-05-08

## 2023-05-08 RX ORDER — INSULIN LISPRO 100/ML
0 VIAL (ML) SUBCUTANEOUS
Qty: 1 | Refills: 0
Start: 2023-05-08

## 2023-05-08 RX ORDER — LEVETIRACETAM 250 MG/1
1 TABLET, FILM COATED ORAL
Qty: 0 | Refills: 0 | DISCHARGE
Start: 2023-05-08

## 2023-05-08 RX ORDER — ACETAMINOPHEN 500 MG
20 TABLET ORAL
Qty: 0 | Refills: 0 | DISCHARGE

## 2023-05-08 RX ORDER — AMLODIPINE BESYLATE 2.5 MG/1
1 TABLET ORAL
Qty: 0 | Refills: 0 | DISCHARGE

## 2023-05-08 RX ADMIN — LEVETIRACETAM 500 MILLIGRAM(S): 250 TABLET, FILM COATED ORAL at 05:00

## 2023-05-08 RX ADMIN — Medication 10 MILLIGRAM(S): at 05:00

## 2023-05-08 RX ADMIN — AMLODIPINE BESYLATE 10 MILLIGRAM(S): 2.5 TABLET ORAL at 05:00

## 2023-05-08 RX ADMIN — Medication 1: at 06:24

## 2023-05-08 RX ADMIN — INSULIN GLARGINE 12 UNIT(S): 100 INJECTION, SOLUTION SUBCUTANEOUS at 00:49

## 2023-05-08 RX ADMIN — POLYETHYLENE GLYCOL 3350 17 GRAM(S): 17 POWDER, FOR SOLUTION ORAL at 12:17

## 2023-05-08 NOTE — DISCHARGE NOTE PROVIDER - NSDCMRMEDTOKEN_GEN_ALL_CORE_FT
acetaminophen 48 mg/mL oral liquid: 20 milliliter(s) orally every 6 hours as needed for  mild pain  Admelog 100 units/mL injectable solution: 1 unit(s) subcutaneously every 6 hours 1 Unit(s) if Glucose 151 - 200  2 Unit(s) if Glucose 201 - 250  3 Unit(s) if Glucose 251 - 300  4 Unit(s) if Glucose 301 - 350  5 Unit(s) if Glucose 351 - 400  6 Unit(s) if Glucose Greater Than 400    Give correctional scale insulin  REGARDLESS of PO status NOTIFY Provider for blood glucose LESS THAN 70 milliGRAM(s)/deciLiter or GREATER THAN 400 milliGRAM(s)/deciLiter  amLODIPine 10 mg oral tablet: 1 tab(s) orally once a day  enalapril 10 mg oral tablet: 1 tab(s) orally once a day  insulin glargine 100 units/mL subcutaneous solution: 12 unit(s) subcutaneous once a day (at bedtime)  levETIRAcetam 500 mg oral tablet: 1 tab(s) orally 2 times a day  polyethylene glycol 3350 oral powder for reconstitution: 17 gram(s) orally once a day  tamsulosin 0.4 mg oral capsule: 1 cap(s) orally once a day (at bedtime)   acetaminophen 48 mg/mL oral liquid: 20 milliliter(s) orally every 6 hours as needed for  mild pain  Admelog 100 units/mL injectable solution: 1 Unit(s) if Glucose 151 - 200  2 Unit(s) if Glucose 201 - 250  3 Unit(s) if Glucose 251 - 300  4 Unit(s) if Glucose 301 - 350  5 Unit(s) if Glucose 351 - 400  6 Unit(s) if Glucose Greater Than 400    Give correctional scale insulin  REGARDLESS of PO status NOTIFY Provider for blood glucose LESS THAN 70 milliGRAM(s)/deciLiter or GREATER THAN 400 milliGRAM(s)/deciLiter  amLODIPine 10 mg oral tablet: 1 tab(s) orally once a day  enalapril 10 mg oral tablet: 1 tab(s) orally once a day  insulin glargine 100 units/mL subcutaneous solution: 12 unit(s) subcutaneous once a day (at bedtime)  levETIRAcetam 500 mg oral tablet: 1 tab(s) orally 2 times a day  polyethylene glycol 3350 oral powder for reconstitution: 17 gram(s) orally once a day  tamsulosin 0.4 mg oral capsule: 1 cap(s) orally once a day (at bedtime)

## 2023-05-08 NOTE — DISCHARGE NOTE PROVIDER - NSFOLLOWUPCLINICS_GEN_ALL_ED_FT
[facility]  Neurology Epilepsy  [address] [place] [id number]  Phone: [phone]  Fax: [phone]  Follow Up Time: 1 month

## 2023-05-08 NOTE — DISCHARGE NOTE PROVIDER - NSDCCPCAREPLAN_GEN_ALL_CORE_FT
PRINCIPAL DISCHARGE DIAGNOSIS  Diagnosis: PNA (pneumonia)  Assessment and Plan of Treatment: Pneumonia  Pneumonia is an infection of the lungs. Pneumonia may be caused by bacteria, viruses, or funguses. Symptoms include coughing, fever, chest pain when breathing deeply or coughing, shortness of breath, fatigue, or muscle aches. Pneumonia can be diagnosed with a medical history and physical exam, as well as other tests which may include a chest X-ray. If you were prescribed an antibiotic medicine, take it as told by your health care provider and do not stop taking the antibiotic even if you start to feel better. Do not use tobacco products, including cigarettes, chewing tobacco, and e-cigarettes.  SEEK IMMEDIATE MEDICAL CARE IF YOU HAVE ANY OF THE FOLLOWING SYMPTOMS: worsening shortness of breath, worsening chest pain, coughing up blood, change in mental status, lightheadedness/dizziness.        SECONDARY DISCHARGE DIAGNOSES  Diagnosis: Hypertension  Assessment and Plan of Treatment: .Continue blood pressure medication regimen as directed. Monitor for any visual changes, headaches or dizziness.  Monitor blood pressure regularly.  Follow up with your primary care provider for further management for high blood pressure.      Diagnosis: Blood in ear canal  Assessment and Plan of Treatment: If no further bleeding from ears poss restart dvt prophylaxis at NH    Diagnosis: Anemia  Assessment and Plan of Treatment: Follow-up with your outpatient provider for further monitoring of your blood counts.   Monitor for signs/symptoms indicating worsening of disease, such as, easy bleeding/bruising, pale skin, fatigue, dizziness, increased heart rate, or chest pain.      Diagnosis: BPH (benign prostatic hyperplasia)  Assessment and Plan of Treatment: Benign prostatic hyperplasia, or BPH, is an enlarged prostate gland. The prostate is a small gland that makes some of the fluid in semen. Prostate enlargement happens to almost all men as they age. It is usually not serious. BPH does not cause prostate cancer.   Avoid caffeine and alcohol. These drinks will increase how often you need to urinate.  Many over-the-counter cold and allergy medicines can make the symptoms of BPH worse. Avoid antihistamines, decongestants, and allergy pills, if you can.   Call your doctor or seek immediate medical care if:  You cannot urinate at all.  You have symptoms of a urinary infection. For example:  You have blood or pus in your urine.  You have pain in your back just below your rib cage. This is called flank pain.  You have a fever, chills, or body aches.  It hurts to urinate.  You have groin or belly pain.      Diagnosis: Diabetes  Assessment and Plan of Treatment: Continue your medication regimen and a consistent carbohydrate diet (Meaning eating the same amount of carbohydrates at the same time each day). Monitor blood glucose levels throughout the day before meals and at bedtime. Record blood sugars and bring to outpatient providers appointment in order to be reviewed by your doctor for management modifications. If your sugars are more than 400 or less than 70 you should contact your PCP immediately.   Monitor for signs/symptoms of low blood glucose, such as, dizziness, altered mental status, or cool/clammy skin. In addition, monitor for signs/symptoms of high blood glucose, such as, feeling hot, dry, fatigued, or with increased thirst/urination.   Make regular podiatry appointments in order to have feet checked for wounds and uncontrolled toe nail growth to prevent infections, as well as, appointments with an ophthalmologist to monitor your vision.      Diagnosis: Acute UTI  Assessment and Plan of Treatment: Continue antibiotics as directed and monitor for signs/symptoms of infection, such as, fever/chills, burning/pain with urination, urinary frequency/hesitancy, cloudy urine, or blood in urine. Always wipe from front to back after using the bathroom. Never wipe twice with the same tissue. Drink plenty of water.

## 2023-05-08 NOTE — DISCHARGE NOTE PROVIDER - HOSPITAL COURSE
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 Ct head with petechial hemm LT. + elevated WBC CT chest with b/l PNA. UA +       Problem/Plan - 1:  ·  Problem: Anemia.   ·  Plan: NO signs of active bleeding   monitor hb closely   s/p prbc   monitor hb closely.    Problem/Plan - 2:  ·  Problem: Pneumonia.   ·  Plan: - Ct chest with consolidation  - UA+ unable to obtain symptoms   - s/p abx  leukocytosis - pt afebrile/ no diarrhea, f/u closely- improved today   pt clear for dc.    Problem/Plan - 3:  ·  Problem: Abnormal CT scan.   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 petechial parenchymal bleed; repeat CT head 4/25 No substantial change 4/24/2023, including small hypodensities within the infarcted brain parenchyma the left occipital lobe, small blood clots versus dystrophic calcification  - hold ASA and AC  - NSx c/s  - neurology f/u -  CT head reviewed- exvaco dilation of ventricles, no comparison imaging. Per wife at bedside, patient has been at this neuro baseline since end of Janurary  - Per wife, rehab is arranging replacement of bone flap with primary surgeon at [facility]-   - No additional workup needed from neurosurgical standpoint. Tiny petechial hemorrhage less conspicuous on repeat CT head. No intervention needed.  neurology saw pt - recommending keppra.    Problem/Plan - 4:  ·  Problem: Diabetes.   ·  Plan: -iss titrate insulin for optimal blood sugar control.    Problem/Plan - 5:  ·  Problem: BPH (benign prostatic hyperplasia).   ·  Plan: - flomax.    Problem/Plan - 6:  ·  Problem: Blood in ear canal.   ·  Plan: - seen by ENT suspect due to trauma  - stable  -.    Problem/Plan - 7:  ·  Problem: Hypertension.   ·  Plan: - c/w ARB/ACE and norvasc  - monitor BP.    Problem/Plan - 8:  ·  Problem: Sacral wound.   ·  Plan: - wound care c/s.    Problem/Plan - 9:  ·  Problem: Prophylactic measure.   ·  Plan: restart aspirin   if hb cont to be stable no further bleeding from ears poss restart dvt prophylaxis at NH    On 5/8/23 this case was reviewed with Dr. Ryan, the patient is medically stable and optimized for discharge as per attending. All medications were reviewed and prescriptions were sent to mutually agreed upon pharmacy. Discharge plan reviewed with patient and/or family.   61 y/o M h/o traumatic subdural hemorrhage following a fall down the stairs while drunk (bedbound, nonverbal, lives in [facility]), s/p r craniotomy at [facility] in 01/2023, s/p trach/PEG txfer from [place] ED for ENT evaluation for b/l ear bleeding. Ct head with petechial hemm LT. + elevated WBC CT chest with b/l PNA. UA +       Problem/Plan - 1:  ·  Problem: Anemia.   ·  Plan: NO signs of active bleeding   monitor hb closely   s/p prbc   monitor hb closely.    Problem/Plan - 2:  ·  Problem: Pneumonia.   ·  Plan: - Ct chest with consolidation  - UA+ unable to obtain symptoms   - s/p abx  leukocytosis - pt afebrile/ no diarrhea, f/u closely- improved today   pt clear for dc.    Problem/Plan - 3:  ·  Problem: Abnormal CT scan.   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 petechial parenchymal bleed; repeat CT head 4/25 No substantial change 4/24/2023, including small hypodensities within the infarcted brain parenchyma the left occipital lobe, small blood clots versus dystrophic calcification  - hold ASA and AC  - NSx c/s  - neurology f/u -  CT head reviewed- exvaco dilation of ventricles, no comparison imaging. Per wife at bedside, patient has been at this neuro baseline since end of Janurary  - Per wife, rehab is arranging replacement of bone flap with primary surgeon at [facility]-   - No additional workup needed from neurosurgical standpoint. Tiny petechial hemorrhage less conspicuous on repeat CT head. No intervention needed.  neurology saw pt - recommending keppra.    Problem/Plan - 4:  ·  Problem: Diabetes.   ·  Plan: -iss titrate insulin for optimal blood sugar control.    Problem/Plan - 5:  ·  Problem: BPH (benign prostatic hyperplasia).   ·  Plan: - flomax.    Problem/Plan - 6:  ·  Problem: Blood in ear canal.   ·  Plan: - seen by ENT suspect due to trauma  - stable  -.    Problem/Plan - 7:  ·  Problem: Hypertension.   ·  Plan: - c/w ARB/ACE and norvasc  - monitor BP.    Problem/Plan - 8:  ·  Problem: Sacral wound.   ·  Plan: - wound care c/s.    Problem/Plan - 9:  ·  Problem: Prophylactic measure.   ·  Plan: restart aspirin   if hb cont to be stable no further bleeding from ears poss restart dvt prophylaxis at NH    On 5/8/23 this case was reviewed with Dr. Ryan, the patient is medically stable and optimized for discharge as per attending. All medications were reviewed and prescriptions were sent to mutually agreed upon pharmacy. Discharge plan reviewed with patient and/or family.

## 2023-05-08 NOTE — DISCHARGE NOTE NURSING/CASE MANAGEMENT/SOCIAL WORK - NSPROEXTENSIONSOFSELF_GEN_A_NUR
----- Message from Татьяна Carrasquillo MD sent at 1/20/2020  6:38 AM CST -----  All labs drawn are normal. Recommend doing progesterone challenge test. Rx for provera 10mg po x 10 days (take at night) and then see if a period results.   trach/none

## 2023-05-08 NOTE — DISCHARGE NOTE NURSING/CASE MANAGEMENT/SOCIAL WORK - PATIENT PORTAL LINK FT
You can access the FollowMyHealth Patient Portal offered by [facility] by registering at the following website: [email or web address]. By joining [facility]’s FollowMyHealth portal, you will also be able to view your health information using other applications (apps) compatible with our system.

## 2023-05-08 NOTE — DISCHARGE NOTE PROVIDER - INSTRUCTIONS
Glucerna 1.5 raina, total daily volume 1160, bolus volume 290 ml every 6 hours; Free water flush 250 ml every 6 hours daily 
0 = swallows foods/liquids without difficulty

## 2023-07-13 NOTE — CONSULT NOTE ADULT - SUBJECTIVE AND OBJECTIVE BOX
Rest. Drink plenty of fluids to stay hydrated. Tylenol or motrin for fever or aches. The CDC permits 5 days of quarantine and to wear a mask for 5 additional days. Flonase for nasal congestion. Ninjacof as needed for cough. No driving when taking Ninacof, may cause drowsiness. Go to the ER if you experience chest pain with shortness of breath, shortness of breath when moving around your house, high fevers 103.0+, excessive vomiting/diarrhea, or any distress.    Time of visit:    CHIEF COMPLAINT: Patient is a 62y old  Male who presents with a chief complaint of Sepsis secondary to pneumonia (06 Mar 2023 17:06)      HPI:  62M from [facility], h/o traumatic subdural hemorrhage following a fall down the stairs while drunk, s/p r craniotomy at [facility] in 2023, s/p trach/PEG BIBEMS for tachycardia 170s, RR 27, increased secretions. Pt treated with rocephin 1g from 3/2 but continued to worsen and was sent to the hospital for persistent tachycardia and tachypnea.     Spoke with Raulito Evans Jr over phone who provided history about his father's condition. He states that his father had a fall from the top of the stairs while he was drunk and was at [facility] in [place] for a period of time where he underwent a craniotomy, trach and peg, then was discharged to [facility]. (05 Mar 2023 06:27)   Patient seen and examined.     PAST MEDICAL & SURGICAL HISTORY:  History of subdural hemorrhage      PEG (percutaneous endoscopic gastrostomy) status      DM (diabetes mellitus)      S/P craniotomy          Allergies    No Known Allergies    Intolerances        MEDICATIONS  (STANDING):  albuterol/ipratropium for Nebulization 3 milliLiter(s) Nebulizer every 6 hours  amantadine Syrup 100 milliGRAM(s) Oral two times a day  aspirin  chewable 81 milliGRAM(s) Oral daily  atorvastatin 80 milliGRAM(s) Oral at bedtime  cefepime   IVPB      cefepime   IVPB 2000 milliGRAM(s) IV Intermittent every 8 hours  doxazosin 2 milliGRAM(s) Oral at bedtime  enoxaparin Injectable 40 milliGRAM(s) SubCutaneous every 24 hours  insulin glargine Injectable (LANTUS) 12 Unit(s) SubCutaneous every morning  insulin glargine Injectable (LANTUS) 12 Unit(s) SubCutaneous at bedtime  insulin lispro (ADMELOG) corrective regimen sliding scale   SubCutaneous every 6 hours  insulin lispro Injectable (ADMELOG) 6 Unit(s) SubCutaneous every 6 hours  nicotine -   7 mG/24Hr(s) Patch 1 Patch Transdermal daily  polyethylene glycol 3350 17 Gram(s) Oral daily  senna Syrup 10 milliLiter(s) Oral daily  silver sulfADIAZINE 1% Cream 1 Application(s) Topical two times a day  vancomycin  IVPB 1250 milliGRAM(s) IV Intermittent every 12 hours      MEDICATIONS  (PRN):  acetaminophen    Suspension .. 650 milliGRAM(s) Oral every 6 hours PRN Temp greater or equal to 38C (100.4F), Mild Pain (1 - 3)  aluminum hydroxide/magnesium hydroxide/simethicone Suspension 30 milliLiter(s) Oral every 4 hours PRN Dyspepsia  melatonin 3 milliGRAM(s) Oral at bedtime PRN Insomnia  ondansetron Injectable 4 milliGRAM(s) IV Push every 8 hours PRN Nausea and/or Vomiting   Medications up to date at time of exam.    Medications up to date at time of exam.    FAMILY HISTORY:      SOCIAL HISTORY: Limited due to Non verbal. Poor historian.     REVIEW OF SYSTEMS: No fever, chills, vomiting, seizure on exam. No hypoxia and no tachypnea          PHYSICAL EXAMINATION:      Vital Signs Last 24 Hrs  T(C): 37.3 (06 Mar 2023 14:05), Max: 38.7 (06 Mar 2023 05:01)  T(F): 99.2 (06 Mar 2023 14:05), Max: 101.7 (06 Mar 2023 05:01)  HR: 103 (06 Mar 2023 14:05) (103 - 119)  BP: 110/68 (06 Mar 2023 14:05) (110/68 - 133/80)  BP(mean): --  RR: 24 (06 Mar 2023 14:05) (18 - 28)  SpO2: 100% (06 Mar 2023 14:05) (100% - 100%)    Parameters below as of 06 Mar 2023 14:05  Patient On (Oxygen Delivery Method): hydrotra  O2 Flow (L/min): 3     (if applicable)    General ; Non verbal. No acute distress.     HEENT: Craniotomy suture site, non infected . Extraocular muscles are intact. Mucous membranes are moist.     NECK: Has Tracheostomy cuffed #7.5, non infected.      LUNGS: Non labored. Diminished breath sounds  at bases, scattered rhonchi right lung side. No use of accessory muscle.     HEART: S1 S2 Regular rate and no murmur.    ABDOMEN: Soft, nontender, and nondistended. Active bowel sounds. +ve Peg.     EXTREMITIES: Total care. Non ambulatory. B/L UE with +ve edema.     NEUROLOGIC: Cognitive impaired .       LABS:                        8.6    5.23  )-----------( 221      ( 06 Mar 2023 04:58 )             26.1     03-06    137  |  105  |  8   ----------------------------<  129<H>  2.9<LL>   |  25  |  0.51    Ca    8.2<L>      06 Mar 2023 04:58  Phos  2.6     03-06  Mg     1.9     03-06    TPro  6.7  /  Alb  1.9<L>  /  TBili  0.9  /  DBili  x   /  AST  86<H>  /  ALT  86<H>  /  AlkPhos  186<H>  03-06    PT/INR - ( 05 Mar 2023 04:00 )   PT: 17.6 sec;   INR: 1.47 ratio         PTT - ( 05 Mar 2023 04:00 )  PTT:26.1 sec  Urinalysis Basic - ( 05 Mar 2023 03:15 )    Color: Yellow / Appearance: Clear / S.010 / pH: x  Gluc: x / Ketone: Negative  / Bili: Negative / Urobili: 12   Blood: x / Protein: 30 mg/dL / Nitrite: Negative   Leuk Esterase: Negative / RBC: 2-5 /HPF / WBC 0-2 /HPF   Sq Epi: x / Non Sq Epi: Few /HPF / Bacteria: Few /HPF                      MICROBIOLOGY: (if applicable)    RADIOLOGY & ADDITIONAL STUDIES:  EKG:   CXR:  ECHO:    IMPRESSION: 62y Male PAST MEDICAL & SURGICAL HISTORY:  History of subdural hemorrhage      PEG (percutaneous endoscopic gastrostomy) status      DM (diabetes mellitus)      S/P craniotomy    Impression: This is a 61 Y/O Male from [facility] . Hx of Traumatic Subdural Hemorrhage following a fall down the stairs while drunk, had s/p Craniotomy at [facility] on  with s/p Trach / Peg . Admitted to  with Acute on chronic hypoxic respiratory failure secondary to RUL Pneumonia.    Suggestion:   O2 saturation 96% with HME O2 supplementation at 3L via trach collar.  Oral, trach care, suction.  Not a candidate for speaking valve, trach capping at present time.   Not a candidate for decannulation at this time.   Continue Duoneb via nebulization Q 6 Hours.  On Cefepime 2 mg IVPB Q 8 hours.   Vancomycin 1250 mg IVPB Q 12 Hours with Vacomycin trough monitoring.   DVT / GI prophylactic. On Lovenox 40mg SQ daily.   Aspiration precautions with HOB elevation especially during Peg feeding.

## 2023-10-09 NOTE — ED PROVIDER NOTE - SKIN TEMP
[facility] Quality Flow/Interdisciplinary Rounds Progress Note        Quality Flow Rounds held on October 9, 2023    Disciplines Attending:  Bedside Nurse, , , and Nursing Unit Leadership    Parul Price. was admitted on 10/3/2023  8:23 PM    Anticipated Discharge Date:  Expected Discharge Date: 10/06/23    Disposition:    Michael Score:  Michael Scale Score: 11    Readmission Risk              Risk of Unplanned Readmission:  28           Discussed patient goal for the day, patient clinical progression, and barriers to discharge.   The following Goal(s) of the Day/Commitment(s) have been identified:   wean oxygen as able, breathe easy, pulm eval, CTa chest      Shona Tejeda RN  October 9, 2023 warm

## 2025-02-10 NOTE — PROGRESS NOTE ADULT - PROBLEM SELECTOR PLAN 3
Secondary to SDH and craniotomy  Craniotomy at [facility] 1/23  Maintain safety precautions  Strict aspiration precautions. [Negative] : Heme/Lymph
